# Patient Record
Sex: FEMALE | Race: WHITE | NOT HISPANIC OR LATINO | Employment: FULL TIME | URBAN - METROPOLITAN AREA
[De-identification: names, ages, dates, MRNs, and addresses within clinical notes are randomized per-mention and may not be internally consistent; named-entity substitution may affect disease eponyms.]

---

## 2017-08-25 ENCOUNTER — ALLSCRIPTS OFFICE VISIT (OUTPATIENT)
Dept: OTHER | Facility: OTHER | Age: 57
End: 2017-08-25

## 2017-08-25 DIAGNOSIS — I10 ESSENTIAL (PRIMARY) HYPERTENSION: ICD-10-CM

## 2017-08-25 DIAGNOSIS — R19.7 DIARRHEA: ICD-10-CM

## 2017-08-25 DIAGNOSIS — F41.9 ANXIETY DISORDER: ICD-10-CM

## 2017-08-25 DIAGNOSIS — R00.2 PALPITATIONS: ICD-10-CM

## 2017-08-26 ENCOUNTER — APPOINTMENT (OUTPATIENT)
Dept: LAB | Facility: MEDICAL CENTER | Age: 57
End: 2017-08-26
Payer: COMMERCIAL

## 2017-08-26 DIAGNOSIS — F41.9 ANXIETY DISORDER: ICD-10-CM

## 2017-08-26 DIAGNOSIS — I10 ESSENTIAL (PRIMARY) HYPERTENSION: ICD-10-CM

## 2017-08-26 DIAGNOSIS — R19.7 DIARRHEA: ICD-10-CM

## 2017-08-26 DIAGNOSIS — R00.2 PALPITATIONS: ICD-10-CM

## 2017-08-26 LAB
ALBUMIN SERPL BCP-MCNC: 3.6 G/DL (ref 3.5–5)
ALP SERPL-CCNC: 93 U/L (ref 46–116)
ALT SERPL W P-5'-P-CCNC: 55 U/L (ref 12–78)
ANION GAP SERPL CALCULATED.3IONS-SCNC: 9 MMOL/L (ref 4–13)
AST SERPL W P-5'-P-CCNC: 18 U/L (ref 5–45)
BASOPHILS # BLD AUTO: 0.03 THOUSANDS/ΜL (ref 0–0.1)
BASOPHILS NFR BLD AUTO: 0 % (ref 0–1)
BILIRUB SERPL-MCNC: 0.43 MG/DL (ref 0.2–1)
BUN SERPL-MCNC: 16 MG/DL (ref 5–25)
CALCIUM SERPL-MCNC: 9.3 MG/DL (ref 8.3–10.1)
CHLORIDE SERPL-SCNC: 102 MMOL/L (ref 100–108)
CHOLEST SERPL-MCNC: 164 MG/DL (ref 50–200)
CO2 SERPL-SCNC: 24 MMOL/L (ref 21–32)
CREAT SERPL-MCNC: 0.81 MG/DL (ref 0.6–1.3)
EOSINOPHIL # BLD AUTO: 0.14 THOUSAND/ΜL (ref 0–0.61)
EOSINOPHIL NFR BLD AUTO: 2 % (ref 0–6)
ERYTHROCYTE [DISTWIDTH] IN BLOOD BY AUTOMATED COUNT: 12.9 % (ref 11.6–15.1)
GFR SERPL CREATININE-BSD FRML MDRD: 81 ML/MIN/1.73SQ M
GLUCOSE P FAST SERPL-MCNC: 99 MG/DL (ref 65–99)
HCT VFR BLD AUTO: 44.7 % (ref 34.8–46.1)
HDLC SERPL-MCNC: 41 MG/DL (ref 40–60)
HGB BLD-MCNC: 14.1 G/DL (ref 11.5–15.4)
LDLC SERPL CALC-MCNC: 97 MG/DL (ref 0–100)
LYMPHOCYTES # BLD AUTO: 2.17 THOUSANDS/ΜL (ref 0.6–4.47)
LYMPHOCYTES NFR BLD AUTO: 32 % (ref 14–44)
MCH RBC QN AUTO: 29.1 PG (ref 26.8–34.3)
MCHC RBC AUTO-ENTMCNC: 31.5 G/DL (ref 31.4–37.4)
MCV RBC AUTO: 92 FL (ref 82–98)
MONOCYTES # BLD AUTO: 0.37 THOUSAND/ΜL (ref 0.17–1.22)
MONOCYTES NFR BLD AUTO: 5 % (ref 4–12)
NEUTROPHILS # BLD AUTO: 4.08 THOUSANDS/ΜL (ref 1.85–7.62)
NEUTS SEG NFR BLD AUTO: 61 % (ref 43–75)
NRBC BLD AUTO-RTO: 0 /100 WBCS
PLATELET # BLD AUTO: 293 THOUSANDS/UL (ref 149–390)
PMV BLD AUTO: 10.7 FL (ref 8.9–12.7)
POTASSIUM SERPL-SCNC: 4.3 MMOL/L (ref 3.5–5.3)
PROT SERPL-MCNC: 7.7 G/DL (ref 6.4–8.2)
RBC # BLD AUTO: 4.84 MILLION/UL (ref 3.81–5.12)
SODIUM SERPL-SCNC: 135 MMOL/L (ref 136–145)
TRIGL SERPL-MCNC: 131 MG/DL
TSH SERPL DL<=0.05 MIU/L-ACNC: 1.68 UIU/ML (ref 0.36–3.74)
WBC # BLD AUTO: 6.82 THOUSAND/UL (ref 4.31–10.16)

## 2017-08-26 PROCEDURE — 80061 LIPID PANEL: CPT

## 2017-08-26 PROCEDURE — 80053 COMPREHEN METABOLIC PANEL: CPT

## 2017-08-26 PROCEDURE — 85025 COMPLETE CBC W/AUTO DIFF WBC: CPT

## 2017-08-26 PROCEDURE — 84443 ASSAY THYROID STIM HORMONE: CPT

## 2017-08-26 PROCEDURE — 36415 COLL VENOUS BLD VENIPUNCTURE: CPT

## 2017-08-28 ENCOUNTER — GENERIC CONVERSION - ENCOUNTER (OUTPATIENT)
Dept: OTHER | Facility: OTHER | Age: 57
End: 2017-08-28

## 2017-09-25 ENCOUNTER — ALLSCRIPTS OFFICE VISIT (OUTPATIENT)
Dept: OTHER | Facility: OTHER | Age: 57
End: 2017-09-25

## 2017-09-25 DIAGNOSIS — Z12.31 ENCOUNTER FOR SCREENING MAMMOGRAM FOR MALIGNANT NEOPLASM OF BREAST: ICD-10-CM

## 2017-10-03 ENCOUNTER — ALLSCRIPTS OFFICE VISIT (OUTPATIENT)
Dept: OTHER | Facility: OTHER | Age: 57
End: 2017-10-03

## 2017-10-05 NOTE — PROGRESS NOTES
Assessment  1  Encounter for routine gynecological examination (V72 31) (Z01 419)    Plan  Visit for screening mammogram    · * MAMMO SCREENING BILATERAL W CAD; Status:Hold For - Scheduling,Retrospective  By Protocol Authorization; Requested SGD:[id number];    Perform:[facility]; [phone]; Last Updated Lori Powers; 10/3/2017 2:21:25 PM;Ordered; For:Visit for screening mammogram; Ordered By:Keshia Patel;    Discussion/Summary  healthy adult female cervical cancer screening is not indicated Breast cancer screening: mammogram has been ordered  Colorectal cancer screening: colorectal cancer screening is current  Advice and education were given regarding weight bearing exercise, calcium supplements and vitamin D supplements  Annual exam performedrx Brandon Howell year  History of Present Illness  HPI: 63 y/o female here for annual GYN exam  Pt denies any changes in medical, surgical or family histories  Pt had hysterectomy, no bleeding or issues since  Pt denies any hot flashes  (+) sexually active with , denies any dryness or pain with intercourse  last pap 2005 WNL  last mammo 2012 WNL  last colonoscopy 2 yrs ago  complaints   GYN HM, Adult Female [facility]: The patient is being seen for a gynecology evaluation  General Health: The patient's health since the last visit is described as good  Lifestyle:  She exercises regularly -She does not use tobacco  The patient is a former cigarette smoker -She consumes alcohol  She reports occasional alcohol use  Reproductive health:  she uses no contraception -she is sexually active  She is monogamous with a male partner -pregnancy history: G 3P 3  Screening: Cervical cancer screening includes a pap smear performed 11/17/05  Breast cancer screening includes a mammogram performed 12/11/12-and-monthly breast self-exams performed  Colorectal cancer screening includes a colonoscopy performed 2015        Review of Systems    Constitutional: no fever-and-no chills  Cardiovascular: no chest pain  Respiratory: no shortness of breath  Breasts: no breast pain-and-no breast lump  Gastrointestinal: no abdominal pain  Genitourinary: no dysuria,-no pelvic pain,-no vaginal discharge,-no dysmenorrhea-and-no unexplained vaginal bleeding  Neurological: no headache  Active Problems  1  Anxiety (300 00) (F41 9)   2  Diarrhea (787 91) (R19 7)   3  Encounter for routine gynecological examination (V72 31) (Z01 419)   4  Hypertension (401 9) (I10)   5  Visit for screening mammogram (V76 12) (Z12 31)    Past Medical History   · History of anemia (V12 3) (Z86 2)   · History of menorrhagia (V13 29) (Z87 42)    The active problems and past medical history were reviewed and updated today  Surgical History   · History of Biopsy Endometrial, Without Cervical Dilation   · History of Complete Colonoscopy   · History of Dilation And Curettage   · History of Endoscopic Control Of Gastric Bleeding   · History of Episiotomy   · History of Gallbladder Surgery   · History of Nasal Septal Deviation Repair   · History of Total Abdominal Hysterectomy    The surgical history was reviewed and updated today  Family History  Father    · Family history of Lung Cancer (V16 1)  Family History    · Family history of Adenocarcinoma In Situ In Villous Adenoma Of The Breast   · Family history of Depression With Anxiety   · Family history of Diabetes Mellitus (V18 0)   · Family history of Fibrocystic Disease Of Breast   · Family history of Headache   · Family history of Heart Disease (V17 49)   · Family history of Hiatal Hernia   · Family history of Hypertension (V17 49)   · Family history of Irritable Bowel Syndrome   · Denied: Family history of Mental illness in member of household   · Family history of Reported Prior Kidney Disease   · Family history of Skin Disorder (V19 4)   · Family history of Urinary Tract Infection    The family history was reviewed and updated today  Social History   · Being A Social Drinker   · Caffeine Use   · Daily Coffee Consumption (___ Cups/Day)   · Denied: History of Drug use   · Exercise Frequency (Times/Week)   · Former smoker (V15 82) (R15 929)   · Marital History - Currently    · Sexually Active  The social history was reviewed and updated today  The social history was reviewed and is unchanged  Current Meds   1  Sertraline HCl - 25 MG Oral Tablet; TAKE 1 TABLET DAILY; Therapy: 77Fxa1423 to (Evaluate:24Mar2018)  Requested for: 07XOJ3372; Last   Rx:44Hdl0042 Ordered   2  Valsartan 320 MG Oral Tablet; TAKE 1 TABLET ONCE DAILY  Requested for:   44Out2607; Last Rx:10Zjl2192 Ordered    Allergies  1  Vicodin TABS    Vitals   Recorded: 57FZN8110 64:84QS   Systolic 932, RUE, Sitting   Diastolic 78, RUE, Sitting   Height 5 ft 4 in   Weight 211 lb    BMI Calculated 36 22   BSA Calculated 2   LMP hyst     Physical Exam    Constitutional   General appearance: No acute distress, well appearing and well nourished  Neck   Thyroid: Normal, no thyromegaly  Pulmonary   Respiratory effort: No increased work of breathing or signs of respiratory distress  Auscultation of lungs: Clear to auscultation  Cardiovascular   Auscultation of heart: Normal rate and rhythm, normal S1 and S2, no murmurs  Genitourinary   External genitalia: Normal and no lesions appreciated  Vagina: Normal, no lesions or dryness appreciated  Urethra: Normal     Urethral meatus: Normal     Bladder: Normal, soft, non-tender and no prolapse or masses appreciated  Cervix: Surgically absent  Uterus: Surgically absent  Adnexa/parametria: Surgically absent  Chest   Breasts: Normal and no dimpling or skin changes noted  Abdomen   Abdomen: Normal, non-tender, and no organomegaly noted      Psychiatric   Orientation to person, place, and time: Normal     Mood and affect: Normal        Future Appointments    Date/Time Provider Specialty Site   03/26/2018 03:00 PM EULALIO Coon   [address] [facility]   10/09/2018 03:00 PM Fernando Cross [facility]ne[facility]   Evan Gonzalez 8     Signatures   Electronically signed by : Lito Barrera [facility]; Oct  3 2017  3:58PM EST                       (Author)    Electronically signed by : EULALIO Estrella ; Oct  4 2017  1:10PM EST

## 2017-10-17 ENCOUNTER — TRANSCRIBE ORDERS (OUTPATIENT)
Dept: ADMINISTRATIVE | Facility: HOSPITAL | Age: 57
End: 2017-10-17

## 2017-10-17 DIAGNOSIS — R19.7 DIARRHEA, UNSPECIFIED TYPE: Primary | ICD-10-CM

## 2017-10-19 ENCOUNTER — HOSPITAL ENCOUNTER (OUTPATIENT)
Dept: RADIOLOGY | Facility: MEDICAL CENTER | Age: 57
Discharge: HOME | End: 2017-10-19
Payer: COMMERCIAL

## 2017-10-19 DIAGNOSIS — Z12.31 ENCOUNTER FOR SCREENING MAMMOGRAM FOR MALIGNANT NEOPLASM OF BREAST: ICD-10-CM

## 2017-10-19 PROCEDURE — G0202 SCR MAMMO BI INCL CAD: HCPCS

## 2017-10-23 RX ORDER — VALSARTAN 320 MG/1
320 TABLET ORAL DAILY
COMMUNITY
End: 2018-03-26 | Stop reason: SDUPTHER

## 2017-10-23 RX ORDER — SERTRALINE HYDROCHLORIDE 25 MG/1
50 TABLET, FILM COATED ORAL DAILY
COMMUNITY
End: 2018-03-26 | Stop reason: SDUPTHER

## 2017-10-24 ENCOUNTER — ANESTHESIA (OUTPATIENT)
Dept: GASTROENTEROLOGY | Facility: HOSPITAL | Age: 57
End: 2017-10-24
Payer: COMMERCIAL

## 2017-10-24 ENCOUNTER — HOSPITAL ENCOUNTER (OUTPATIENT)
Facility: HOSPITAL | Age: 57
Setting detail: OUTPATIENT SURGERY
Discharge: HOME | End: 2017-10-24
Attending: COLON & RECTAL SURGERY | Admitting: COLON & RECTAL SURGERY
Payer: COMMERCIAL

## 2017-10-24 ENCOUNTER — ANESTHESIA EVENT (OUTPATIENT)
Dept: GASTROENTEROLOGY | Facility: HOSPITAL | Age: 57
End: 2017-10-24
Payer: COMMERCIAL

## 2017-10-24 VITALS
HEIGHT: 66 IN | HEART RATE: 62 BPM | OXYGEN SATURATION: 98 % | WEIGHT: 210 LBS | DIASTOLIC BLOOD PRESSURE: 71 MMHG | BODY MASS INDEX: 33.75 KG/M2 | SYSTOLIC BLOOD PRESSURE: 136 MMHG | RESPIRATION RATE: 18 BRPM | TEMPERATURE: 98.9 F

## 2017-10-24 PROCEDURE — 88305 TISSUE EXAM BY PATHOLOGIST: CPT | Performed by: COLON & RECTAL SURGERY

## 2017-10-24 RX ORDER — SODIUM CHLORIDE 9 MG/ML
50 INJECTION, SOLUTION INTRAVENOUS CONTINUOUS
Status: DISCONTINUED | OUTPATIENT
Start: 2017-10-24 | End: 2017-10-24 | Stop reason: HOSPADM

## 2017-10-24 RX ORDER — PROPOFOL 10 MG/ML
INJECTION, EMULSION INTRAVENOUS AS NEEDED
Status: DISCONTINUED | OUTPATIENT
Start: 2017-10-24 | End: 2017-10-24 | Stop reason: SURG

## 2017-10-24 RX ORDER — LIDOCAINE HYDROCHLORIDE 10 MG/ML
INJECTION, SOLUTION INFILTRATION; PERINEURAL AS NEEDED
Status: DISCONTINUED | OUTPATIENT
Start: 2017-10-24 | End: 2017-10-24 | Stop reason: SURG

## 2017-10-24 RX ORDER — PROPOFOL 10 MG/ML
INJECTION, EMULSION INTRAVENOUS CONTINUOUS PRN
Status: DISCONTINUED | OUTPATIENT
Start: 2017-10-24 | End: 2017-10-24 | Stop reason: SURG

## 2017-10-24 RX ADMIN — PROPOFOL 110 MCG/KG/MIN: 10 INJECTION, EMULSION INTRAVENOUS at 09:49

## 2017-10-24 RX ADMIN — LIDOCAINE HYDROCHLORIDE 50 MG: 10 INJECTION, SOLUTION INFILTRATION; PERINEURAL at 09:49

## 2017-10-24 RX ADMIN — SODIUM CHLORIDE 50 ML/HR: 0.9 INJECTION, SOLUTION INTRAVENOUS at 08:26

## 2017-10-24 RX ADMIN — PROPOFOL 120 MG: 10 INJECTION, EMULSION INTRAVENOUS at 09:49

## 2017-10-24 NOTE — PROGRESS NOTES
Endo anal ultrasound     Indication:  Fecal incontinence  Probe was inserted through the anus and advanced to the anal canal   Appropriate views were taken for evaluation of external sphincter and internal sphincter  Patient significant scarring of the anterior 90° of her anal canal consistent with prior injury/repair  No gross mass lesions were noted  Plan for colonoscopy to follow

## 2017-10-24 NOTE — ANESTHESIA PREPROCEDURE EVALUATION
Review of Systems/Medical History  Patient summary reviewed  Chart reviewed  No history of anesthetic complications     Cardiovascular  Exercise tolerance: good,  Hypertension controlled,    Pulmonary       GI/Hepatic            Endo/Other     GYN       Hematology   Musculoskeletal       Neurology   Psychology   Anxiety, Depression ,            Physical Exam    Airway    Mallampati score: II  TM Distance: >3 FB  Neck ROM: full     Dental   No notable dental hx     Cardiovascular      Pulmonary      Other Findings        Anesthesia Plan  ASA Score- 2       Anesthesia Type- IV sedation with anesthesia with ASA Monitors  Additional Monitors:   Airway Plan:           Induction- intravenous  Informed Consent- Anesthetic plan and risks discussed with patient  I personally reviewed this patient with the CRNA  Discussed and agreed on the Anesthesia Plan with the CRNA  Flory Shah

## 2017-10-24 NOTE — H&P
History and Physical   Colon and Rectal Surgery   Conchita Tadeo 64 y o  female MRN: [id number]  Unit/Bed#: ENDO POOL Encounter: [id number]  10/24/17   @NOW    No chief complaint on file  History of Present Illness   HPI:  Conchita Tadeo is a 64 y o  female who presents for evaluation of frequent bm and fecal incontinence  Last fc over 3 years ago  BM are up to 8-10 times daily  Historical Information   Past Medical History:   Diagnosis Date    Anxiety     Depression     Diarrhea     Fecal incontinence     History of colon polyps      Past Surgical History:   Procedure Laterality Date    CHOLECYSTECTOMY      HYSTERECTOMY      NASAL SEPTUM SURGERY      ROTATOR CUFF REPAIR         Meds/Allergies     Prescriptions Prior to Admission   Medication    sertraline (ZOLOFT) 25 mg tablet    valsartan (DIOVAN) 320 MG tablet       No current facility-administered medications for this encounter  Allergies no known allergies      Social History   History   Alcohol use Not on file     History   Drug use: Unknown     History   Smoking Status    Not on file   Smokeless Tobacco    Not on file         Family History: No family history on file  Objective     Current Vitals:      No intake or output data in the 24 hours ending 10/24/17 0800    Physical Exam:  General:  Resting comfortably in bed   Eyes:Sclera anicteric  ENT: Trachea midline  Pulm:  Symmetric chest raise  No respiratory Distress  CV:  Regular on monitor  Abdomen:  Soft NT ND  Extremities:  No clubbing/ cyanosis/ edema    Lab Results: I have personally reviewed pertinent lab results  Imaging: I have personally reviewed pertinent reports  ASSESSMENT:  Conchita Tadeo is a 64 y o  female who presents for outpatient colonoscopy  PLAN:  For colonoscopy    Risks/ Benefits reviewed to include but not limited to anesthesia, bleeding, missed lesions, and colonoscopic perforation requiring surgery

## 2017-10-24 NOTE — OP NOTE
**** GI/ENDOSCOPY REPORT ****     PATIENT NAME: GONZALEZ IGLESIAS ------ VISIT ID:  Patient ID:   [id number] YOB: 1960     INTRODUCTION: Colonoscopy - A 64 female patient presents for an outpatient   Colonoscopy at [address] [place] [address]  PREVIOUS COLONOSCOPY:     INDICATIONS: Change in bowel habits  Diarrhea  CONSENT:  The benefits, risks, and alternatives to the procedure were   discussed and informed consent was obtained from the patient  PREPARATION: EKG, pulse, pulse oximetry and blood pressure were monitored   throughout the procedure  The patient was identified by myself both   verbally and by visual inspection of ID band  MEDICATIONS: Anesthesia-check records     PROCEDURE:  The endoscope was passed without difficulty through the anus   under direct visualization and advanced to the cecum, confirmed by   appendiceal orifice and ileocecal valve  The scope was withdrawn and the   mucosa was carefully examined  The quality of the preparation was good  Cecal Intubation Time: 2 minutes(s) Scope Withdrawal Time: 8 minutes(s)     RECTAL EXAM: Decreased sphincter tone  FINDINGS:  The terminal ileum appeared to be normal   A cold forceps   biopsy was taken (jar 1)  There was evidence of mild diverticulosis in the   sigmoid colon  Otherwise, the colon appeared to be normal  Multiple   random cold forceps biopsies was taken from the whole colon (jar 2)  COMPLICATIONS: There were no complications  IMPRESSIONS: Decreased sphincter tone was noted during the rectal exam    Normal terminal ileum  Biopsy taken  Mild diverticulosis found in the   sigmoid colon  RECOMMENDATIONS:     ESTIMATED BLOOD LOSS:     PATHOLOGY SPECIMENS: Cold forceps random biopsy taken (jar 1)  Multiple   cold forceps random biopsies taken from the whole colon (jar 2)       PROCEDURE CODES:     ICD-9 Codes: 787 99 Other symptoms involving digestive system 787 91   Diarrhea 562 10 Diverticulosis of colon (without mention of hemorrhage)     ICD-10 Codes: R19 4 Change in bowel habit R19 7 Diarrhea, unspecified   K59 8 Other specified functional intestinal disorders K57 Diverticular   disease of intestine     PERFORMED BY: EULALIO Rosenberg  on 10/24/2017  Version 1, electronically signed by EULALIO Colon  on 10/24/2017   at 10:09

## 2017-10-24 NOTE — ANESTHESIA POSTPROCEDURE EVALUATION
Post-Op Assessment Note      CV Status:  Stable    Mental Status:  Alert and awake    Hydration Status:  Stable    PONV Controlled:  None    Airway Patency:  Patent    Post Op Vitals Reviewed: Yes          Staff: CRNA           /67 (10/24/17 1008)    Temp      Pulse 66 (10/24/17 1008)   Resp 16 (10/24/17 1008)    SpO2 94 % (10/24/17 1008)

## 2017-10-26 ENCOUNTER — GENERIC CONVERSION - ENCOUNTER (OUTPATIENT)
Dept: OTHER | Facility: OTHER | Age: 57
End: 2017-10-26

## 2017-10-31 ENCOUNTER — HOSPITAL ENCOUNTER (OUTPATIENT)
Dept: GASTROENTEROLOGY | Facility: HOSPITAL | Age: 57
Discharge: HOME | End: 2017-10-31
Attending: COLON & RECTAL SURGERY
Payer: COMMERCIAL

## 2017-10-31 VITALS
TEMPERATURE: 98.3 F | HEART RATE: 81 BPM | OXYGEN SATURATION: 95 % | RESPIRATION RATE: 20 BRPM | DIASTOLIC BLOOD PRESSURE: 99 MMHG | WEIGHT: 210 LBS | BODY MASS INDEX: 33.75 KG/M2 | SYSTOLIC BLOOD PRESSURE: 189 MMHG | HEIGHT: 66 IN

## 2017-10-31 PROCEDURE — 91122 HB ANAL PRESSURE RECORD: CPT

## 2018-01-12 VITALS
WEIGHT: 215.38 LBS | RESPIRATION RATE: 16 BRPM | BODY MASS INDEX: 36.77 KG/M2 | HEIGHT: 64 IN | DIASTOLIC BLOOD PRESSURE: 80 MMHG | SYSTOLIC BLOOD PRESSURE: 132 MMHG | HEART RATE: 80 BPM

## 2018-01-12 VITALS
HEIGHT: 64 IN | DIASTOLIC BLOOD PRESSURE: 74 MMHG | SYSTOLIC BLOOD PRESSURE: 128 MMHG | WEIGHT: 211.38 LBS | RESPIRATION RATE: 16 BRPM | BODY MASS INDEX: 36.09 KG/M2 | HEART RATE: 90 BPM

## 2018-01-15 VITALS
BODY MASS INDEX: 36.02 KG/M2 | HEIGHT: 64 IN | SYSTOLIC BLOOD PRESSURE: 138 MMHG | WEIGHT: 211 LBS | DIASTOLIC BLOOD PRESSURE: 78 MMHG

## 2018-01-15 NOTE — RESULT NOTES
Verified Results  * MAMMO SCREENING BILATERAL W CAD [id number] 03:38PM Carlene Licona Order Number: [id number]    - Patient Instructions: To schedule this appointment, please contact Central Scheduling at [phone]  Do not wear any perfume, powder, lotion or deodorant on breast or underarm area  Please bring your doctors order, referral (if needed) and insurance information with you on the day of the test  Failure to bring this information may result in this test being rescheduled  Arrive 15 minutes prior to your appointment time to register  On the day of your test, please bring any prior mammogram or breast studies with you that were not performed at a [facility]  Failure to bring prior exams may result in your test needing to be rescheduled  Test Name Result Flag Reference   MAMMO SCREENING BILATERAL W CAD (Report)     Patient History:   Patient is postmenopausal    No Hormone Replacement Therapy   Patient has never smoked  Patient's BMI is 33 9  Reason for exam: screening, asymptomatic  Mammo Screening Bilateral W CAD: October 19, 2017 - Check In #:    [de-identified]   Bilateral MLO, CC, and XCCL view(s) were taken  Technologist: RT CINDY Delarosa   Prior study comparison: December 11, 2012, mammo screening    bilateral W CAD, performed at [facility]  November 30, 2011, mammo screening bilateral W CAD, performed at    [facility]  October 27, 2010, mammo screening    bilateral W CAD, performed at [facility]  July 31, 2008, mammo screening bilateral W CAD, performed at [facility]  July 31, 2008, mammo screening bilateral   W CAD, performed at [facility]  There are scattered fibroglandular densities  No dominant soft tissue mass, architectural distortion or    suspicious calcifications are noted in either breast   The skin    and nipple structures are within normal limits  Scattered benign   appearing calcifications are noted  No evidence of malignancy  No significant changes when compared with prior studies  ACR BI-RADSï¾® Assessments: BiRad:2 - Benign     Recommendation:   Routine screening mammogram of both breasts in 1 year  Analyzed by CAD     The patient is scheduled in a reminder system for screening    mammography  8-10% of cancers will be missed on mammography  Management of a    palpable abnormality must be based on clinical grounds  Patients   will be notified of their results via letter from our facility  Accredited by [employer] of [employer] and FDA       Transcription Location: [facility]: [id number]     Risk Value(s):   Tyrer-Cuzick 10 Year: 2 500%, Tyrer-Cuzick Lifetime: 7 900%,    Myriad Table: 1 5%, DENIS 5 Year: 0 9%, NCI Lifetime: 5 9%   Signed by:   Jessica Gomez MD   10/26/17

## 2018-01-15 NOTE — RESULT NOTES
Verified Results  (1) CBC/PLT/DIFF [id number] 07:30AM Eileen Mitchell Order Number: [id number]     Test Name Result Flag Reference   WBC COUNT 6 82 Thousand/uL  4 31-10 16   RBC COUNT 4 84 Million/uL  3 81-5 12   HEMOGLOBIN 14 1 g/dL  11 5-15 4   HEMATOCRIT 44 7 %  34 8-46  1   MCV 92 fL  82-98   MCH 29 1 pg  26 8-34 3   MCHC 31 5 g/dL  31 4-37 4   RDW 12 9 %  11 6-15 1   MPV 10 7 fL  8 9-12 7   PLATELET COUNT 687 Thousands/uL  149-390   nRBC AUTOMATED 0 /100 WBCs     NEUTROPHILS RELATIVE PERCENT 61 %  43-75   LYMPHOCYTES RELATIVE PERCENT 32 %  14-44   MONOCYTES RELATIVE PERCENT 5 %  4-12   EOSINOPHILS RELATIVE PERCENT 2 %  0-6   BASOPHILS RELATIVE PERCENT 0 %  0-1   NEUTROPHILS ABSOLUTE COUNT 4 08 Thousands/? ??L  1 85-7 62   LYMPHOCYTES ABSOLUTE COUNT 2 17 Thousands/? ??L  0 60-4 47   MONOCYTES ABSOLUTE COUNT 0 37 Thousand/? ??L  0 17-1 22   EOSINOPHILS ABSOLUTE COUNT 0 14 Thousand/? ??L  0 00-0 61   BASOPHILS ABSOLUTE COUNT 0 03 Thousands/? ??L  0 00-0 10     (1) COMPREHENSIVE METABOLIC PANEL [id number] 32:01CO Eileen Mitchell Order Number: [id number]     Test Name Result Flag Reference   SODIUM 135 mmol/L L 136-145   POTASSIUM 4 3 mmol/L  3 5-5 3   CHLORIDE 102 mmol/L  100-108   CARBON DIOXIDE 24 mmol/L  21-32   ANION GAP (CALC) 9 mmol/L  4-13   BLOOD UREA NITROGEN 16 mg/dL  5-25   CREATININE 0 81 mg/dL  0 60-1 30   Standardized to IDMS reference method   CALCIUM 9 3 mg/dL  8 3-10 1   BILI, TOTAL 0 43 mg/dL  0 20-1 00   ALK PHOSPHATAS 93 U/L     ALT (SGPT) 55 U/L  12-78   Specimen collection should occur prior to Sulfasalazine and/or Sulfapyridine administration due to the potential for falsely depressed results  AST(SGOT) 18 U/L  5-45   Specimen collection should occur prior to Sulfasalazine administration due to the potential for falsely depressed results     ALBUMIN 3 6 g/dL  3 5-5 0   TOTAL PROTEIN 7 7 g/dL  6 4-8 2   eGFR 81 ml/min/1 73sq m     National Kidney Disease Education Program recommendations are as follows:  GFR calculation is accurate only with a steady state creatinine  Chronic Kidney disease less than 60 ml/min/1 73 sq  meters  Kidney failure less than 15 ml/min/1 73 sq  meters  GLUCOSE FASTING 99 mg/dL  65-99   Specimen collection should occur prior to Sulfasalazine administration due to the potential for falsely depressed results  Specimen collection should occur prior to Sulfapyridine administration due to the potential for falsely elevated results  (1) TSH WITH FT4 REFLEX 26Aug2017 07:30AM Rosemarie Crowe Order Number: [id number]     Test Name Result Flag Reference   TSH 1 680 uIU/mL  0 358-3 740   Patients undergoing fluorescein dye angiography may retain small amounts of fluorescein in the body for 48-72 hours post procedure  Samples containing fluorescein can produce falsely depressed TSH values  If the patient had this procedure,a specimen should be resubmitted post fluorescein clearance  The recommended reference ranges for TSH during pregnancy are as follows:  First trimester 0 1 to 2 5 uIU/mL  Second trimester  0 2 to 3 0 uIU/mL  Third trimester 0 3 to 3 0 uIU/m     (1) LIPID PANEL, FASTING 26Aug2017 07:30AM "[employer] Order Number: [id number]     Test Name Result Flag Reference   CHOLESTEROL 164 mg/dL     HDL,DIRECT 41 mg/dL  40-60   Specimen collection should occur prior to Metamizole administration due to the potential for falsley depressed results  LDL CHOLESTEROL CALCULATED 97 mg/dL  0-100   Triglyceride:        Normal ??? ??? ??? ??? ??? ??? ??? <150 mg/dl   ??? ??? ???Borderline High ??? ??? 150-199 mg/dl   ??? ??? ? ?? High ??? ??? ??? ??? ??? ??? ??? 200-499 mg/dl   ??? ??? ? ??Very High ??? ??? ??? ??? ??? >499 mg/dl      Cholesterol:       Desirable ??? ??? ??? ??? <200 mg/dl   ??? ??? Borderline High ??? 200-239 mg/dl   ??? ???  High ??? ??? ??? ??? ??? ??? >239 mg/dl      HDL Cholesterol:       High ??? ???>59 mg/dL   ??? ??? Low ??? ??? <41 mg/dL      This screening LDL is a calculated result  It does not have the accuracy of the Direct Measured LDL in the monitoring of patients with hyperlipidemia and/or statin therapy  Direct Measure LDL (UUG989) must be ordered separately in these patients  TRIGLYCERIDES 131 mg/dL  <=150   Specimen collection should occur prior to N-Acetylcysteine or Metamizole administration due to the potential for falsely depressed results

## 2018-03-26 ENCOUNTER — APPOINTMENT (OUTPATIENT)
Dept: LAB | Facility: MEDICAL CENTER | Age: 58
End: 2018-03-26
Payer: COMMERCIAL

## 2018-03-26 ENCOUNTER — OFFICE VISIT (OUTPATIENT)
Dept: FAMILY MEDICINE CLINIC | Facility: MEDICAL CENTER | Age: 58
End: 2018-03-26
Payer: COMMERCIAL

## 2018-03-26 VITALS
RESPIRATION RATE: 16 BRPM | DIASTOLIC BLOOD PRESSURE: 72 MMHG | HEART RATE: 72 BPM | SYSTOLIC BLOOD PRESSURE: 132 MMHG | WEIGHT: 213.2 LBS | BODY MASS INDEX: 34.41 KG/M2

## 2018-03-26 DIAGNOSIS — E87.1 HYPONATREMIA: ICD-10-CM

## 2018-03-26 DIAGNOSIS — Z11.59 NEED FOR HEPATITIS C SCREENING TEST: ICD-10-CM

## 2018-03-26 DIAGNOSIS — F41.9 ANXIETY: ICD-10-CM

## 2018-03-26 DIAGNOSIS — I10 ESSENTIAL HYPERTENSION: Primary | ICD-10-CM

## 2018-03-26 DIAGNOSIS — Z00.00 PHYSICAL EXAM, ANNUAL: Primary | ICD-10-CM

## 2018-03-26 LAB
ANION GAP SERPL CALCULATED.3IONS-SCNC: 5 MMOL/L (ref 4–13)
BUN SERPL-MCNC: 15 MG/DL (ref 5–25)
CALCIUM SERPL-MCNC: 9.2 MG/DL (ref 8.3–10.1)
CHLORIDE SERPL-SCNC: 106 MMOL/L (ref 100–108)
CO2 SERPL-SCNC: 28 MMOL/L (ref 21–32)
CREAT SERPL-MCNC: 0.68 MG/DL (ref 0.6–1.3)
GFR SERPL CREATININE-BSD FRML MDRD: 97 ML/MIN/1.73SQ M
GLUCOSE SERPL-MCNC: 99 MG/DL (ref 65–140)
POTASSIUM SERPL-SCNC: 4 MMOL/L (ref 3.5–5.3)
SODIUM SERPL-SCNC: 139 MMOL/L (ref 136–145)

## 2018-03-26 PROCEDURE — 80048 BASIC METABOLIC PNL TOTAL CA: CPT

## 2018-03-26 PROCEDURE — 86803 HEPATITIS C AB TEST: CPT

## 2018-03-26 PROCEDURE — 99396 PREV VISIT EST AGE 40-64: CPT | Performed by: FAMILY MEDICINE

## 2018-03-26 PROCEDURE — 36415 COLL VENOUS BLD VENIPUNCTURE: CPT

## 2018-03-26 RX ORDER — CLOBETASOL PROPIONATE 0.5 MG/G
OINTMENT TOPICAL
Refills: 0 | COMMUNITY
Start: 2017-12-18 | End: 2019-04-02

## 2018-03-26 NOTE — PROGRESS NOTES
Assessment/Plan:    No problem-specific Assessment & Plan notes found for this encounter  Diagnoses and all orders for this visit:    Physical exam, annual    Need for hepatitis C screening test  -     Hepatitis C antibody; Future    Hyponatremia  -     Basic metabolic panel; Future    Anxiety    Other orders  -     clobetasol (TEMOVATE) 0 05 % ointment;     Patient appears to be doing well overall  Continuation of healthy lifestyle encouraged  I reviewed her labs  She has not had hepatitis C screening and I recommended we get the testing done  Patient is agreeable  She also has the past labs showing hyponatremia  I will repeat a BMP to assess for normalization  She has anxiety and feels she would benefit from an increased dose of her zoloft  She is on a rather low dose now  Will increase it to 50 mg   I asked the patient call the office in a few weeks to see if that this helped or if she went back down to 25 mg and patient agreed  Follow-up in six months or sooner if needed  Subjective:      Patient ID: Stacey De La Vega is a 62 y o  female  Patient presents for physical exam   No acute concerns however she does have anxiety and is currently on Zoloft 25 mg daily  She has been under some more pressure at work and her anxiety has increased  She would like to increase her dose of Zoloft if possible  States she eats a healthy diet and gets some exercise  Did smoke for about 10 years when she was younger but no longer smokes  Social alcohol use  No drug use  Is up-to-date on her mammogram and colonoscopy  Would be agreeable to hepatitis C testing  The following portions of the patient's history were reviewed and updated as appropriate: allergies, current medications, past family history, past medical history, past social history, past surgical history and problem list     Review of Systems   Constitutional: Negative for fever  Respiratory: Negative for shortness of breath  Cardiovascular: Negative for chest pain  Gastrointestinal: Negative for abdominal pain and blood in stool  Genitourinary: Negative for dysuria  Musculoskeletal: Negative for arthralgias and myalgias  Skin: Negative for rash  Neurological: Negative for headaches  Objective:      /72 (BP Location: Left arm, Patient Position: Sitting, Cuff Size: Large)   Pulse 72   Resp 16   Wt 96 7 kg (213 lb 3 2 oz)   BMI 34 41 kg/m²          Physical Exam   Constitutional: She is oriented to person, place, and time  Vital signs are normal  She appears well-developed and well-nourished  HENT:   Head: Normocephalic and atraumatic  Right Ear: Tympanic membrane, external ear and ear canal normal    Left Ear: Tympanic membrane, external ear and ear canal normal    Nose: Nose normal    Mouth/Throat: Uvula is midline, oropharynx is clear and moist and mucous membranes are normal    Eyes: Conjunctivae, EOM and lids are normal  Pupils are equal, round, and reactive to light  Neck: Trachea normal  Neck supple  No thyromegaly present  Cardiovascular: Normal rate, regular rhythm, S1 normal and S2 normal     No murmur heard  Pulmonary/Chest: Effort normal and breath sounds normal    Abdominal: Soft  Bowel sounds are normal  There is no hepatosplenomegaly  There is no tenderness  Lymphadenopathy:     She has no cervical adenopathy  Neurological: She is alert and oriented to person, place, and time  Skin: Skin is warm and dry     Psychiatric: Her speech is normal and behavior is normal  Judgment and thought content normal  Cognition and memory are normal

## 2018-03-27 LAB — HCV AB SER QL: NORMAL

## 2018-03-27 RX ORDER — VALSARTAN 320 MG/1
320 TABLET ORAL DAILY
Qty: 90 TABLET | Refills: 1 | Status: SHIPPED | OUTPATIENT
Start: 2018-03-27 | End: 2018-09-04 | Stop reason: SDUPTHER

## 2018-03-27 RX ORDER — SERTRALINE HYDROCHLORIDE 25 MG/1
50 TABLET, FILM COATED ORAL DAILY
Qty: 90 TABLET | Refills: 1 | Status: SHIPPED | OUTPATIENT
Start: 2018-03-27 | End: 2018-06-17 | Stop reason: SDUPTHER

## 2018-05-21 ENCOUNTER — OFFICE VISIT (OUTPATIENT)
Dept: FAMILY MEDICINE CLINIC | Facility: MEDICAL CENTER | Age: 58
End: 2018-05-21
Payer: COMMERCIAL

## 2018-05-21 VITALS
HEART RATE: 76 BPM | DIASTOLIC BLOOD PRESSURE: 78 MMHG | WEIGHT: 216 LBS | RESPIRATION RATE: 16 BRPM | BODY MASS INDEX: 34.86 KG/M2 | SYSTOLIC BLOOD PRESSURE: 134 MMHG

## 2018-05-21 DIAGNOSIS — R31.9 HEMATURIA, UNSPECIFIED TYPE: ICD-10-CM

## 2018-05-21 DIAGNOSIS — R01.1 MURMUR: Primary | ICD-10-CM

## 2018-05-21 DIAGNOSIS — R09.89 BILATERAL CAROTID BRUITS: ICD-10-CM

## 2018-05-21 PROCEDURE — 99214 OFFICE O/P EST MOD 30 MIN: CPT | Performed by: FAMILY MEDICINE

## 2018-05-21 NOTE — PROGRESS NOTES
Assessment/Plan:    No problem-specific Assessment & Plan notes found for this encounter  Diagnoses and all orders for this visit:    Murmur  -     Echo complete with contrast if indicated; Future  No obvious murmur on exam   I do not doubt that another physician did auscultate a murmur  Check an echo for evaluation  Bilateral carotid bruits  -     VAS carotid complete study; Future  No obvious bruit on exam   I again do not doubt that another physician did auscultated bruit  Check carotid Dopplers for evaluation  Hematuria, unspecified type  -     UA (URINE) with reflex to Microscopic  Patient is currently asymptomatic  Etiology of hematuria unclear  Will repeat a urinalysis to assess for normalization  If hematuria persists then will have patient see a urologist     Follow-up in one month if symptoms persist or sooner if needed  Subjective:      Patient ID: Ayden Whitney is a 62 y o  female  Patient presents for follow-up  She recently had a [facility] physical on 5/15/2018  Patient states that at that [facility] physical she was found to have a heart murmur  She has not had a heart murmur in the past   She has no chest pain  No trouble breathing  She is concerned and wants to know what the next steps are  Patient was also told she has clogging of her carotid arteries  She states the doctor at the [facility] physical listened to her carotid arteries and said they were clogged  She has no lightheadedness or dizziness  She does have a family history in her father of requiring surgery for clogged carotid arteries  She wants to know what the next steps are to test this  She was also told she has blood in the urine  She has not noticed any blood in the urine  She has no urinary symptoms such as dysuria, frequency or urgency  She has no back pain  No history of kidney stones          The following portions of the patient's history were reviewed and updated as appropriate: allergies, current medications and problem list     Review of Systems   Constitutional: Negative for fever  Respiratory: Negative for shortness of breath  Cardiovascular: Negative for chest pain  Objective:      /78 (BP Location: Left arm, Patient Position: Sitting, Cuff Size: Large)   Pulse 76   Resp 16   Wt 98 kg (216 lb)   BMI 34 86 kg/m²          Physical Exam   Constitutional: She appears well-developed and well-nourished  Cardiovascular: Normal rate, regular rhythm and normal heart sounds  No murmur heard    No obvious carotid bruits on exam    Pulmonary/Chest: Effort normal and breath sounds normal

## 2018-06-11 ENCOUNTER — HOSPITAL ENCOUNTER (EMERGENCY)
Facility: HOSPITAL | Age: 58
End: 2018-06-11
Attending: EMERGENCY MEDICINE
Payer: COMMERCIAL

## 2018-06-11 ENCOUNTER — APPOINTMENT (EMERGENCY)
Dept: RADIOLOGY | Facility: HOSPITAL | Age: 58
End: 2018-06-11
Payer: COMMERCIAL

## 2018-06-11 ENCOUNTER — APPOINTMENT (EMERGENCY)
Dept: CT IMAGING | Facility: HOSPITAL | Age: 58
End: 2018-06-11
Payer: COMMERCIAL

## 2018-06-11 ENCOUNTER — HOSPITAL ENCOUNTER (OUTPATIENT)
Facility: HOSPITAL | Age: 58
Setting detail: OBSERVATION
Discharge: HOME | End: 2018-06-13
Attending: SURGERY | Admitting: SURGERY
Payer: COMMERCIAL

## 2018-06-11 VITALS
HEIGHT: 66 IN | RESPIRATION RATE: 26 BRPM | TEMPERATURE: 98.1 F | DIASTOLIC BLOOD PRESSURE: 81 MMHG | WEIGHT: 213 LBS | SYSTOLIC BLOOD PRESSURE: 154 MMHG | HEART RATE: 76 BPM | BODY MASS INDEX: 34.23 KG/M2 | OXYGEN SATURATION: 97 %

## 2018-06-11 DIAGNOSIS — R55 SYNCOPE AND COLLAPSE: ICD-10-CM

## 2018-06-11 DIAGNOSIS — R10.9 RIGHT FLANK PAIN: ICD-10-CM

## 2018-06-11 DIAGNOSIS — I60.9 SAH (SUBARACHNOID HEMORRHAGE) (HCC): Primary | ICD-10-CM

## 2018-06-11 DIAGNOSIS — S06.9X9A HEAD INJURY WITH LOSS OF CONSCIOUSNESS (HCC): Primary | ICD-10-CM

## 2018-06-11 DIAGNOSIS — S22.31XA CLOSED FRACTURE OF ONE RIB OF RIGHT SIDE, INITIAL ENCOUNTER: ICD-10-CM

## 2018-06-11 DIAGNOSIS — S16.1XXA CERVICAL STRAIN: ICD-10-CM
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
## 2018-06-11 PROBLEM — S09.90XA CLOSED HEAD INJURY: Status: ACTIVE | Noted: 2018-06-11

## 2018-06-11 PROBLEM — S06.9XAA MILD TBI: Status: ACTIVE | Noted: 2018-06-11

## 2018-06-11 PROBLEM — S22.39XA CLOSED RIB FRACTURE: Status: ACTIVE | Noted: 2018-06-11

## 2018-06-11 LAB
ALBUMIN SERPL BCP-MCNC: 3.6 G/DL (ref 3.5–5)
ALP SERPL-CCNC: 101 U/L (ref 46–116)
ALT SERPL W P-5'-P-CCNC: 71 U/L (ref 12–78)
ANION GAP SERPL CALCULATED.3IONS-SCNC: 9 MMOL/L (ref 4–13)
APTT PPP: 27 SECONDS (ref 24–36)
AST SERPL W P-5'-P-CCNC: 20 U/L (ref 5–45)
ATRIAL RATE: 76 BPM
BASOPHILS # BLD AUTO: 0.07 THOUSANDS/ΜL (ref 0–0.1)
BASOPHILS NFR BLD AUTO: 1 % (ref 0–1)
BILIRUB SERPL-MCNC: 0.3 MG/DL (ref 0.2–1)
BUN SERPL-MCNC: 11 MG/DL (ref 5–25)
CALCIUM SERPL-MCNC: 9.1 MG/DL (ref 8.3–10.1)
CHLORIDE SERPL-SCNC: 103 MMOL/L (ref 100–108)
CO2 SERPL-SCNC: 28 MMOL/L (ref 21–32)
CREAT SERPL-MCNC: 0.74 MG/DL (ref 0.6–1.3)
EOSINOPHIL # BLD AUTO: 0.15 THOUSAND/ΜL (ref 0–0.61)
EOSINOPHIL NFR BLD AUTO: 2 % (ref 0–6)
ERYTHROCYTE [DISTWIDTH] IN BLOOD BY AUTOMATED COUNT: 12.3 % (ref 11.6–15.1)
GFR SERPL CREATININE-BSD FRML MDRD: 90 ML/MIN/1.73SQ M
GLUCOSE SERPL-MCNC: 134 MG/DL (ref 65–140)
HCT VFR BLD AUTO: 41.6 % (ref 34.8–46.1)
HGB BLD-MCNC: 13.3 G/DL (ref 11.5–15.4)
IMM GRANULOCYTES # BLD AUTO: 0.03 THOUSAND/UL (ref 0–0.2)
IMM GRANULOCYTES NFR BLD AUTO: 0 % (ref 0–2)
INR PPP: 0.94 (ref 0.86–1.17)
LYMPHOCYTES # BLD AUTO: 2.56 THOUSANDS/ΜL (ref 0.6–4.47)
LYMPHOCYTES NFR BLD AUTO: 33 % (ref 14–44)
MCH RBC QN AUTO: 29.2 PG (ref 26.8–34.3)
MCHC RBC AUTO-ENTMCNC: 32 G/DL (ref 31.4–37.4)
MCV RBC AUTO: 91 FL (ref 82–98)
MONOCYTES # BLD AUTO: 0.41 THOUSAND/ΜL (ref 0.17–1.22)
MONOCYTES NFR BLD AUTO: 5 % (ref 4–12)
NEUTROPHILS # BLD AUTO: 4.46 THOUSANDS/ΜL (ref 1.85–7.62)
NEUTS SEG NFR BLD AUTO: 59 % (ref 43–75)
NRBC BLD AUTO-RTO: 0 /100 WBCS
P AXIS: 56 DEGREES
PLATELET # BLD AUTO: 250 THOUSANDS/UL (ref 149–390)
PMV BLD AUTO: 9.5 FL (ref 8.9–12.7)
POTASSIUM SERPL-SCNC: 3.7 MMOL/L (ref 3.5–5.3)
PR INTERVAL: 158 MS
PROT SERPL-MCNC: 7.8 G/DL (ref 6.4–8.2)
PROTHROMBIN TIME: 12.5 SECONDS (ref 11.8–14.2)
QRS AXIS: 81 DEGREES
QRSD INTERVAL: 76 MS
QT INTERVAL: 382 MS
QTC INTERVAL: 429 MS
RBC # BLD AUTO: 4.55 MILLION/UL (ref 3.81–5.12)
SODIUM SERPL-SCNC: 140 MMOL/L (ref 136–145)
T WAVE AXIS: 35 DEGREES
TROPONIN I SERPL-MCNC: <0.02 NG/ML
VENTRICULAR RATE: 76 BPM
WBC # BLD AUTO: 7.68 THOUSAND/UL (ref 4.31–10.16)

## 2018-06-11 PROCEDURE — 73030 X-RAY EXAM OF SHOULDER: CPT

## 2018-06-11 PROCEDURE — 96360 HYDRATION IV INFUSION INIT: CPT

## 2018-06-11 PROCEDURE — 73564 X-RAY EXAM KNEE 4 OR MORE: CPT

## 2018-06-11 PROCEDURE — 85025 COMPLETE CBC W/AUTO DIFF WBC: CPT | Performed by: PHYSICIAN ASSISTANT

## 2018-06-11 PROCEDURE — 74177 CT ABD & PELVIS W/CONTRAST: CPT

## 2018-06-11 PROCEDURE — 36415 COLL VENOUS BLD VENIPUNCTURE: CPT | Performed by: PHYSICIAN ASSISTANT

## 2018-06-11 PROCEDURE — 93010 ELECTROCARDIOGRAM REPORT: CPT | Performed by: INTERNAL MEDICINE

## 2018-06-11 PROCEDURE — 85730 THROMBOPLASTIN TIME PARTIAL: CPT | Performed by: PHYSICIAN ASSISTANT

## 2018-06-11 PROCEDURE — 99285 EMERGENCY DEPT VISIT HI MDM: CPT

## 2018-06-11 PROCEDURE — 80053 COMPREHEN METABOLIC PANEL: CPT | Performed by: PHYSICIAN ASSISTANT

## 2018-06-11 PROCEDURE — 72125 CT NECK SPINE W/O DYE: CPT

## 2018-06-11 PROCEDURE — 93005 ELECTROCARDIOGRAM TRACING: CPT

## 2018-06-11 PROCEDURE — 99220 PR INITIAL OBSERVATION CARE/DAY 70 MINUTES: CPT | Performed by: SURGERY

## 2018-06-11 PROCEDURE — 71260 CT THORAX DX C+: CPT

## 2018-06-11 PROCEDURE — 70450 CT HEAD/BRAIN W/O DYE: CPT

## 2018-06-11 PROCEDURE — 84484 ASSAY OF TROPONIN QUANT: CPT | Performed by: PHYSICIAN ASSISTANT

## 2018-06-11 PROCEDURE — 85610 PROTHROMBIN TIME: CPT | Performed by: PHYSICIAN ASSISTANT

## 2018-06-11 RX ORDER — METHOCARBAMOL 500 MG/1
500 TABLET, FILM COATED ORAL EVERY 6 HOURS PRN
Status: DISCONTINUED | OUTPATIENT
Start: 2018-06-11 | End: 2018-06-12

## 2018-06-11 RX ORDER — CYCLOBENZAPRINE HCL 10 MG
10 TABLET ORAL ONCE
Status: COMPLETED | OUTPATIENT
Start: 2018-06-11 | End: 2018-06-11

## 2018-06-11 RX ORDER — GINSENG 100 MG
1 CAPSULE ORAL 2 TIMES DAILY
Status: DISCONTINUED | OUTPATIENT
Start: 2018-06-11 | End: 2018-06-13 | Stop reason: HOSPADM

## 2018-06-11 RX ORDER — LIDOCAINE 50 MG/G
1 PATCH TOPICAL DAILY
Status: DISCONTINUED | OUTPATIENT
Start: 2018-06-12 | End: 2018-06-13 | Stop reason: HOSPADM

## 2018-06-11 RX ORDER — ACETAMINOPHEN 325 MG/1
975 TABLET ORAL EVERY 8 HOURS SCHEDULED
Status: DISCONTINUED | OUTPATIENT
Start: 2018-06-11 | End: 2018-06-13 | Stop reason: HOSPADM

## 2018-06-11 RX ORDER — VALSARTAN 160 MG/1
320 TABLET ORAL DAILY
Status: DISCONTINUED | OUTPATIENT
Start: 2018-06-12 | End: 2018-06-13 | Stop reason: HOSPADM

## 2018-06-11 RX ORDER — LIDOCAINE 50 MG/G
1 PATCH TOPICAL ONCE
Status: DISCONTINUED | OUTPATIENT
Start: 2018-06-11 | End: 2018-06-11 | Stop reason: HOSPADM

## 2018-06-11 RX ADMIN — ACETAMINOPHEN 975 MG: 325 TABLET ORAL at 18:25

## 2018-06-11 RX ADMIN — SODIUM CHLORIDE 1000 ML: 0.9 INJECTION, SOLUTION INTRAVENOUS at 12:51

## 2018-06-11 RX ADMIN — LIDOCAINE 1 PATCH: 50 PATCH CUTANEOUS at 14:10

## 2018-06-11 RX ADMIN — CYCLOBENZAPRINE HYDROCHLORIDE 10 MG: 10 TABLET, FILM COATED ORAL at 14:10

## 2018-06-11 RX ADMIN — BACITRACIN ZINC 1 SMALL APPLICATION: 500 OINTMENT TOPICAL at 18:26

## 2018-06-11 RX ADMIN — ACETAMINOPHEN 975 MG: 325 TABLET ORAL at 22:54

## 2018-06-11 RX ADMIN — METHOCARBAMOL 500 MG: 500 TABLET ORAL at 22:54

## 2018-06-11 RX ADMIN — IOHEXOL 100 ML: 350 INJECTION, SOLUTION INTRAVENOUS at 12:59

## 2018-06-11 NOTE — LETTER
[address] [place] [address] 9  [address] [address]  Dept: [phone]    June 13, 2018     Patient: Chaim Hurt   YOB: 1960   Date of Visit: 6/11/2018       To Whom it May Concern:    Nicole Marti is under my professional care  She was seen in the hospital from 6/11/2018   to 06/13/18  She may return to work on Once seen by the primary care provider in 2 weeks  Please excuse the patient at this time until they have follow-up with her primary care provider  Please call the office any questions or concerns       If you have any questions or concerns, please don't hesitate to call           Sincerely,          Elizabeth Pedersen PA-C

## 2018-06-11 NOTE — EMTALA/ACUTE CARE TRANSFER
[address] [address]  Dept: [phone]      [facility] TRANSFER CONSENT    NAME Radha Wall                                         1960                              MRN [id number]    I have been informed of my rights regarding examination, treatment, and transfer   by Dr Bar Hull: Specialized equipment and/or services available at the receiving facility (Include comment)________________________, Other benefits (Include comment)_______________________ (ICH/trauma)    Risks:        Consent for Transfer:  I acknowledge that my medical condition has been evaluated and explained to me by the emergency department physician or other qualified medical person and/or my attending physician, who has recommended that I be transferred to the service of  Accepting Physician: Dr Joann Luque at [address] [facility] Name, [address] : [facility]  The above potential benefits of such transfer, the potential risks associated with such transfer, and the probable risks of not being transferred have been explained to me, and I fully understand them  The doctor has explained that, in my case, the benefits of transfer outweigh the risks  I agree to be transferred  I authorize the performance of emergency medical procedures and treatments upon me in both transit and upon arrival at the receiving facility  Additionally, I authorize the release of any and all medical records to the receiving facility and request they be transported with me, if possible  I understand that the safest mode of transportation during a medical emergency is an ambulance and that the Hospital advocates the use of this mode of transport  Risks of traveling to the receiving facility by car, including absence of medical control, life sustaining equipment, such as oxygen, and medical personnel has been explained to me and I fully understand them      ([place] BELOW)  [  ]  I consent to the stated transfer and to be transported by ambulance/helicopter  [  ]  I consent to the stated transfer, but refuse transportation by ambulance and accept full responsibility for my transportation by car  I understand the risks of non-ambulance transfers and I exonerate the Hospital and its staff from any deterioration in my condition that results from this refusal     X___________________________________________    DATE  18  TIME________  Signature of patient or legally responsible individual signing on patient behalf           RELATIONSHIP TO PATIENT_________________________          Provider Certification    NAME Mayte Hawley                                         1960                              MRN [id number]    A medical screening exam was performed on the above named patient  Based on the examination:    Condition Necessitating Transfer The primary encounter diagnosis was Head injury with loss of consciousness ([facility] [address] )  Diagnoses of Right flank pain and Cervical strain were also pertinent to this visit      Patient Condition: The patient has been stabilized such that within reasonable medical probability, no material deterioration of the patient condition or the condition of the unborn child(magan) is likely to result from the transfer    Reason for Transfer: Level of Care needed not available at this facility, No bed available at level of patient's needs, Other (Include comment)____________________ (Trauma patient, [facility])    Transfer Requirements: Facility [facility]   · Space available and qualified personnel available for treatment as acknowledged by [facility]  · Agreed to accept transfer and to provide appropriate medical treatment as acknowledged by       Dr Bg Ramsay  · Appropriate medical records of the examination and treatment of the patient are provided at the time of transfer   [address] _______  · Transfer will be performed by qualified personnel from [facility]  and appropriate transfer equipment as required, including the use of necessary and appropriate life support measures  Provider Certification: I have examined the patient and explained the following risks and benefits of being transferred/refusing transfer to the patient/family:  General risk, such as traffic hazards, adverse weather conditions, rough terrain or turbulence, possible failure of equipment (including vehicle or aircraft), or consequences of actions of persons outside the control of the transport personnel, Unanticipated needs of medical equipment and personnel during transport, Risk of worsening condition, The possibility of a transport vehicle being unavailable      Based on these reasonable risks and benefits to the patient and/or the unborn child(magan), and based upon the information available at the time of the patients examination, I certify that the medical benefits reasonably to be expected from the provision of appropriate medical treatments at another medical facility outweigh the increasing risks, if any, to the individuals medical condition, and in the case of labor to the unborn child, from effecting the transfer      X____________________________________________ DATE 06/11/18        TIME_______      ORIGINAL - SEND TO MEDICAL RECORDS   COPY - SEND WITH PATIENT DURING TRANSFER

## 2018-06-11 NOTE — ASSESSMENT & PLAN NOTE
-Anterograde amnesia of the event; does not remember falling off the horse or anything that happened afterward until she was back at home   Also does not remember falling today at home  -Supportive care  -OT cognitive evaluation

## 2018-06-11 NOTE — H&P
H&P- Edith Espinoza 1960, 62 y o  female MRN: [id number]    Unit/Bed#: ED 16 Encounter: [id number]    Primary Care Provider: Neda Oro DO   Date and time admitted to hospital: 6/11/2018  3:13 PM    H&P Exam - Trauma   Edith Espinoza 62 y o  female MRN: [id number]  Unit/Bed#: ED 16 Encounter: [id number]    Assessment/Plan   Trauma Alert: Evaluation  Model of Arrival: Ambulance  Trauma Team: Attending Aakash Saab and AP Fellow Dinah  Consultants: None    Trauma Active Problems:   -Fall x 2  -Traumatic brain injury  -Possible small intracranial punctate bleed  -Right 10th nondisplaced rib fracture  -R shoulder pain  -R knee pain   -R rib pain      Trauma Plan:   -Pain control  Patient has an allergy (anaphylaxis) to Vicodin, but states she can take Tylenol without a problem  Will order Tylenol 975mg Q 8 H, Robaxin 500mg Q 6 H, and Lidoderm patch daily  Avoid NSAIDs d/t possible mild head bleed  -Pt has a history of syncope  Discussed with Dr Aakash Saab  Based on her CT scan and her history, will not repeat CT head tomorrow  Will not consult NSGY at this time  We will, however, order an echo and a carotid doppler which were to be done soon as an outpatient  -PT/OT and cog eval for TBI  -Supportive care for TBI: limit stimulation, cell phone use, TV use, etc     Chief Complaint: Fall, head injury    History of Present Illness   HPI:  Edith Espinoza is a 62 y o  female who presents as a transfer from [facility] for fall x 2  Yesterday she was riding a horse when she was thrown from the horse to the ground, landing on her right side and striking her head  She has no memory of the incident and does not remember anything until she was back at home  Has a mild abrasion of the R side of the face and an area of swelling in the right postauricular area  Today, she was walking onto her deck when she fell again  Unsure why; says there was nothing there to trip over, but again, she does not remember this   Both were witnessed by her   She c/o feeling dizzy currently, as well as right knee pain, right shoulder pain, R rib pain, left SCM pain, and pain in the right postauricular area  She is not on any blood thinners  She was evaluated [facility] at [facility], where she had a CT of the head, c-spine, chest, abdomen, and pelvis  There was a small, questionable area of subarachnoid hemorrhage vs  artifact  There was also a questionable right 10th nondisplaced rib fracture  The rest if her imaging was unremarkable  She was transferred here for further care  Mechanism:Fall    Review of Systems   Constitutional: Negative for chills and fever  HENT: Negative for congestion, hearing loss, mouth sores, nosebleeds, postnasal drip, rhinorrhea, sinus pain and sinus pressure  Eyes: Negative for photophobia and visual disturbance  Respiratory: Negative for cough, chest tightness and shortness of breath  Cardiovascular: Negative for chest pain and palpitations  Gastrointestinal: Positive for nausea  Negative for abdominal pain, blood in stool, constipation, diarrhea and vomiting  Genitourinary: Negative for flank pain, frequency, genital sores and hematuria  Musculoskeletal: Positive for arthralgias  Negative for back pain  Neurological: Positive for dizziness, syncope and headaches  Negative for tremors, seizures, speech difficulty, weakness, light-headedness and numbness  All other systems reviewed and are negative  Historical Information   History is obtained from the patient and from her chart  Past Medical History:   Diagnosis Date    Anemia     Anxiety     Depression     Diarrhea     Fecal incontinence     History of colon polyps     Hypertension      Past Surgical History:   Procedure Laterality Date    CHOLECYSTECTOMY      COLONOSCOPY N/A 10/24/2017    Procedure: COLONOSCOPY;  Surgeon: Latoya Rodriguez MD;  Location: [facility];   Service: Colorectal    COLONOSCOPY W/ ENDOSCOPIC US N/A 10/24/2017    Procedure: ANAL ENDOSCOPIC U/S;  Surgeon: Marii Clay MD;  Location: [facility]; Service: Colorectal    DILATION AND CURETTAGE OF UTERUS      ENDOMETRIAL BIOPSY      WITHOUT CERVICAL DILATION    GALLBLADDER SURGERY      HYSTERECTOMY      NASAL SEPTUM SURGERY      NOSE SURGERY      NASAL SEPTAL  DEVIATION REPAIR    OTHER SURGICAL HISTORY      ENDOSCOPIC CONTROL OF GASTRIC BLEEDING, EPISIOTOMY    ROTATOR CUFF REPAIR       Social History   History   Alcohol Use    Yes     Comment: social     History   Drug Use No     History   Smoking Status    Former Smoker   Smokeless Tobacco    Never Used     Comment: Smoked for 10yrs  There is no immunization history on file for this patient  Last Tetanus: Unknown  Family History: Non-contributory  Unable to obtain/limited by None      Meds/Allergies   all current active meds have been reviewed, current meds:   Current Facility-Administered Medications   Medication Dose Route Frequency    bacitracin topical ointment 1 small application  1 small application Topical BID    [START ON 6/12/2018] sertraline (ZOLOFT) tablet 50 mg  50 mg Oral Daily    [START ON 6/12/2018] valsartan (DIOVAN) tablet 320 mg  320 mg Oral Daily    and PTA meds:   Prior to Admission Medications   Prescriptions Last Dose Informant Patient Reported? Taking?    clobetasol (TEMOVATE) 0 05 % ointment   Yes No   sertraline (ZOLOFT) 25 mg tablet   No No   Sig: Take 2 tablets (50 mg total) by mouth daily   valsartan (DIOVAN) 320 MG tablet   No No   Sig: Take 1 tablet (320 mg total) by mouth daily      Facility-Administered Medications: None       Allergies   Allergen Reactions    Vicodin [Hydrocodone-Acetaminophen] Anaphylaxis     Reaction Date: 26Oct2011;          PHYSICAL EXAM    PE limited by: None    Objective   Vitals:   First set: Temperature: 98 2 °F (36 8 °C) (06/11/18 1515)  Pulse: 74 (06/11/18 1515)  Respirations: (!) 24 (06/11/18 1515)  Blood Pressure: (!) 175/85 (06/11/18 4488)    Primary Survey:   (A) Airway: Intact  (B) Breathing: Equal breath sounds bilat  (C) Circulation: Pulses:   carotid  2/4, pedal  2/4, radial  2/4 and femoral  2/4  (D) Disabliity:  GCS Total:  15  (E) Expose:  Completed    Secondary Survey: (Click on Physical Exam tab above)  Physical Exam   Constitutional: She is oriented to person, place, and time  She appears well-developed and well-nourished  No distress  HENT:   Head: Normocephalic  Head is without raccoon's eyes and without laceration  Right Ear: External ear normal    Left Ear: External ear normal    Nose: Nose normal    Mouth/Throat: Oropharynx is clear and moist    Eyes: Conjunctivae and EOM are normal  Pupils are equal, round, and reactive to light  Right eye exhibits no discharge  Left eye exhibits no discharge  Neck: Normal range of motion  Neck supple  Tracheal tenderness and muscular tenderness present  No spinous process tenderness present  No neck rigidity  Normal range of motion present  No midline c-spine tenderness   Cardiovascular: Normal rate, regular rhythm, normal heart sounds and intact distal pulses  Exam reveals no gallop and no friction rub  No murmur heard  Pulmonary/Chest: Effort normal and breath sounds normal  No respiratory distress  She has no wheezes  She has no rales  She exhibits tenderness  Abdominal: Soft  She exhibits no distension  There is no tenderness  Musculoskeletal: Normal range of motion  Legs:  No deformity to inspection of the C/T/L/S spines  No tenderness to palpation of the C/T/L/S spines  No bony deformity noted of the upper or lower extremities bilat  Tender to palpation over the anterior and posterior R shoulder  Mild tenderness to palpation over the R knee  No tenderness to palpation otherwise of the extremities  ROM normal and MS 5/5 in all four extremities  Neurological: She is alert and oriented to person, place, and time   No cranial nerve deficit  Skin: Skin is warm and dry  She is not diaphoretic  Psychiatric: She has a normal mood and affect  Her behavior is normal  Judgment and thought content normal        Invasive Devices     Peripheral Intravenous Line            Peripheral IV 06/11/18 Right Antecubital less than 1 day                Lab Results:   BMP/CMP:   Lab Results   Component Value Date     06/11/2018    K 3 7 06/11/2018     06/11/2018    CO2 28 06/11/2018    ANIONGAP 9 06/11/2018    BUN 11 06/11/2018    CREATININE 0 74 06/11/2018    GLUCOSE 134 06/11/2018    CALCIUM 9 1 06/11/2018    AST 20 06/11/2018    ALT 71 06/11/2018    ALKPHOS 101 06/11/2018    PROT 7 8 06/11/2018    BILITOT 0 30 06/11/2018    EGFR 90 06/11/2018    and CBC:   Lab Results   Component Value Date    WBC 7 68 06/11/2018    HGB 13 3 06/11/2018    HCT 41 6 06/11/2018    MCV 91 06/11/2018     06/11/2018    MCH 29 2 06/11/2018    MCHC 32 0 06/11/2018    RDW 12 3 06/11/2018    MPV 9 5 06/11/2018    NRBC 0 06/11/2018     Imaging/EKG Studies: CT Scan Head: Possible small SAH vs artifact, CT Scan C-Spine: Negative, CT Chest: Possible R 10th rib fx, CT Scan Abdomen/Pelvis: Negative  Other Studies: XR R shoulder normal per my read; XR R knee normal per my read    Code Status: Level 1 Full Code  Advance Directive and Living Will:      Power of :    POLST:          Mild TBI (HCC)   Assessment & Plan    -Anterograde amnesia of the event; does not remember falling off the horse or anything that happened afterward until she was back at home   Also does not remember falling today at home  -Supportive care  -OT cognitive evaluation        Closed head injury   Assessment & Plan    -S/P fall from horse and striking R side of head  -Mild superficial abrasions on the R side of face and area of swelling in the R postauricular area  -No skull fx on CT        SAH (subarachnoid hemorrhage) (HCC)   Assessment & Plan    -Very small traumatic SAH vs artifact vs dense blood vessel  -NSGY consult  -Defer to NSGY for timing of repeat head CT  -No pharmacologic VTE ppx  -Patient is not on any AC/AP at home  -HOT protocol        Closed rib fracture   Assessment & Plan    -Rib fracture protocol  -Analgesia  -IS every hour

## 2018-06-11 NOTE — ED NOTES
When: [facility]@ 2142  Where: [facility]  Report: [phone]  Accepting: Dr Petrona Del Valle  06/11/18 9041

## 2018-06-11 NOTE — ASSESSMENT & PLAN NOTE
-Very small traumatic SAH vs artifact vs dense blood vessel  -NSGY consult  -Defer to NSGY for timing of repeat head CT  -No pharmacologic VTE ppx  -Patient is not on any AC/AP at home  -HOT protocol

## 2018-06-11 NOTE — ED PROVIDER NOTES
History  Chief Complaint   Patient presents with    Head Injury w/LOC     pt was thrown from a horse yesterday and hit her head on a fence, pt doesnt remember fall  Pt also fell this morning but doesnt remember if she hit her head at all       40-year-old female here for evaluation of injuries after falling off of a horse yesterday and again following today  She states yesterday she was thrown from a horse and struck the right side her head off of a fence and landed on her right side/flank area  She had a witnessed loss of consciousness for a few seconds  She was able to get up afterwards and ambulate  She thought nothing of her pain up until today  Just prior to arrival she was lying or dogs out and while on the deck she fell to the ground again  She states she is unsure how exactly she fell not sure if she tripped  There is no loss of consciousness on this occasion  She remembers everything before and after  She remembers the process of falling  She was able to brace her fall  She again landed on her right side  She is having pain along her right flank and has pain with deep inspiration  She has neck pain  Headache  No visual disturbances  No nausea vomiting  She states she feels generally off and weak  History provided by:  Patient   used: No    Head Injury w/LOC   Location:  Generalized  Time since incident:  1 day  Mechanism of injury: fall    Fall:     Fall occurred: From a horse  Height of fall:  Horse    Impact surface: Fence and dirt  Point of impact:  Head (Right flank)    Entrapped after fall: no    Pain details:     Quality:  Aching    Severity:  Moderate    Duration:  1 day    Timing:  Constant    Progression:  Worsening  Chronicity:  New  Relieved by:  Nothing  Worsened by:   Movement (Deep inspiration)  Ineffective treatments:  None tried  Associated symptoms: headache, loss of consciousness and neck pain    Associated symptoms: no blurred vision, no difficulty breathing, no disorientation, no double vision, no focal weakness, no hearing loss, no memory loss, no nausea, no numbness, no seizures, no tinnitus and no vomiting    Loss of consciousness:     LOC duration: "a few seconds"    Witnessed: yes      Suspicion of head trauma:  Yes  Risk factors: no alcohol use, no aspirin use, not elderly, no obesity and no occupational exposure        Prior to Admission Medications   Prescriptions Last Dose Informant Patient Reported? Taking? clobetasol (TEMOVATE) 0 05 % ointment   Yes No   sertraline (ZOLOFT) 25 mg tablet   No No   Sig: Take 2 tablets (50 mg total) by mouth daily   valsartan (DIOVAN) 320 MG tablet   No No   Sig: Take 1 tablet (320 mg total) by mouth daily      Facility-Administered Medications: None       Past Medical History:   Diagnosis Date    Anemia     Anxiety     Depression     Diarrhea     Fecal incontinence     History of colon polyps     Hypertension        Past Surgical History:   Procedure Laterality Date    CHOLECYSTECTOMY      COLONOSCOPY N/A 10/24/2017    Procedure: COLONOSCOPY;  Surgeon: Otis Davila MD;  Location: [facility]; Service: Colorectal    COLONOSCOPY W/ ENDOSCOPIC US N/A 10/24/2017    Procedure: ANAL ENDOSCOPIC U/S;  Surgeon: Otis Davila MD;  Location: [facility];   Service: Colorectal    DILATION AND CURETTAGE OF UTERUS      ENDOMETRIAL BIOPSY      WITHOUT CERVICAL DILATION    GALLBLADDER SURGERY      HYSTERECTOMY      NASAL SEPTUM SURGERY      NOSE SURGERY      NASAL SEPTAL  DEVIATION REPAIR    OTHER SURGICAL HISTORY      ENDOSCOPIC CONTROL OF GASTRIC BLEEDING, EPISIOTOMY    ROTATOR CUFF REPAIR         Family History   Problem Relation Age of Onset    Lung cancer Father     Cancer Father     Other Family      ADENOCARCINOMA IN SIOBHAN IN VILLOUS ADENOMA OF THE BREAST, MIGRAINES, SKIN DISORDER    Depression Family     Anxiety disorder Family     Diabetes Family      MELLITUS    Fibrocystic breast disease Family     Heart disease Family     Hiatal hernia Family     Hypertension Family     Irritable bowel syndrome Family     Kidney disease Family     Urinary tract infection Family     Heart disease Mother     Atrial fibrillation Mother      I have reviewed and agree with the history as documented  Social History   Substance Use Topics    Smoking status: Former Smoker    Smokeless tobacco: Never Used      Comment: Smoked for [employer]   Alcohol use Yes      Comment: social        Review of Systems   Constitutional: Negative  HENT: Negative for dental problem, ear pain, hearing loss, nosebleeds, tinnitus and trouble swallowing  Eyes: Negative for blurred vision, double vision, photophobia, pain and visual disturbance  Respiratory: Negative for apnea, cough, choking, chest tightness, shortness of breath, wheezing and stridor  Gastrointestinal: Negative for abdominal pain, nausea and vomiting  Genitourinary: Negative for decreased urine volume and enuresis  Musculoskeletal: Positive for neck pain  Negative for back pain, myalgias and neck stiffness  Skin: Negative for pallor, rash and wound  Allergic/Immunologic: Negative  Neurological: Positive for loss of consciousness and headaches  Negative for dizziness, focal weakness, seizures, syncope, speech difficulty, weakness, light-headedness and numbness  Psychiatric/Behavioral: Negative for memory loss  All other systems reviewed and are negative  Physical Exam  Physical Exam   Constitutional: She is oriented to person, place, and time  She appears well-developed and well-nourished  Non-toxic appearance  She does not have a sickly appearance  She does not appear ill  No distress  HENT:   Head: Normocephalic and atraumatic  Eyes: EOM and lids are normal  Pupils are equal, round, and reactive to light  Neck: Trachea normal and normal range of motion  Muscular tenderness present  No spinous process tenderness present  Normal range of motion present  No midline C spine tenderness, step off or crepitus  Does have right sided paraspinal tenderness without any crepitus  Cardiovascular: Normal rate, regular rhythm, S1 normal, S2 normal, normal heart sounds, intact distal pulses and normal pulses  Exam reveals no gallop, no distant heart sounds, no friction rub and no decreased pulses  No murmur heard  Pulses:       Radial pulses are 2+ on the right side, and 2+ on the left side  Pulmonary/Chest: Effort normal and breath sounds normal  No accessory muscle usage  No apnea, no tachypnea and no bradypnea  No respiratory distress  She has no decreased breath sounds  She has no wheezes  She has no rhonchi  She has no rales  She exhibits tenderness  She exhibits no crepitus  Abdominal: Soft  Normal appearance and bowel sounds are normal  She exhibits no distension and no mass  There is tenderness (right flank) in the right upper quadrant  There is no rigidity, no rebound and no guarding  No hernia  Musculoskeletal: Normal range of motion  She exhibits no edema, tenderness or deformity  Neurological: She is alert and oriented to person, place, and time  No cranial nerve deficit  GCS eye subscore is 4  GCS verbal subscore is 5  GCS motor subscore is 6  GCS 15  AAOx3  No focal neuro deficits  CN II-XII grossly intact  Speech normal, no aphasia or dysarthria  No pronator drift  Cerebellar function normal  Finger to nose normal  PERRL  EOMI  Peripheral vision intact  No nystagmus  Upper and lower extremity strength 5/5 through   strength 5/5 b/l  Gross sensation intact b/l  Skin: Skin is warm, dry and intact  No rash noted  She is not diaphoretic  No erythema  No pallor  Psychiatric: Her speech is normal    Nursing note and vitals reviewed        Vital Signs  ED Triage Vitals [06/11/18 1153]   Temperature Pulse Respirations Blood Pressure SpO2   98 1 °F (36 7 °C) 83 16 (!) 183/90 98 %      Temp src Heart Rate Source Patient Position - Orthostatic VS BP Location FiO2 (%)   -- Monitor Sitting Left arm --      Pain Score       6           Vitals:    06/11/18 1153 06/11/18 1300 06/11/18 1400   BP: (!) 183/90 143/82 154/81   Pulse: 83 76 76   Patient Position - Orthostatic VS: Sitting  Lying       Visual Acuity  Visual Acuity      Most Recent Value   L Pupil Size (mm)  3   R Pupil Size (mm)  3          ED Medications  Medications   lidocaine (LIDODERM) 5 % patch 1 patch (1 patch Transdermal Medication Applied 6/11/18 1410)   sodium chloride 0 9 % bolus 1,000 mL (0 mL Intravenous Stopped 6/11/18 1351)   iohexol (OMNIPAQUE) 350 MG/ML injection (MULTI-DOSE) 100 mL (100 mL Intravenous Given 6/11/18 1259)   cyclobenzaprine (FLEXERIL) tablet 10 mg (10 mg Oral Given 6/11/18 1410)       Diagnostic Studies  Results Reviewed     Procedure Component Value Units Date/Time    Troponin I [[id number]]  (Normal) Collected:  06/11/18 1243    Lab Status:  Final result Specimen:  Blood from Arm, Right Updated:  06/11/18 1313     Troponin I <0 02 ng/mL     Narrative:         [employer] Chemistry analyzer 99% cutoff is > 0 04 ng/mL in network labs    o cTnI 99% cutoff is useful only when applied to patients in the clinical setting of myocardial ischemia  o cTnI 99% cutoff should be interpreted in the context of clinical history, ECG findings and possibly cardiac imaging to establish correct diagnosis  o cTnI 99% cutoff may be suggestive but clearly not indicative of a coronary event without the clinical setting of myocardial ischemia      Comprehensive metabolic panel [[id number]] Collected:  06/11/18 1243    Lab Status:  Final result Specimen:  Blood from Arm, Right Updated:  06/11/18 1311     Sodium 140 mmol/L      Potassium 3 7 mmol/L      Chloride 103 mmol/L      CO2 28 mmol/L      Anion Gap 9 mmol/L      BUN 11 mg/dL      Creatinine 0 74 mg/dL      Glucose 134 mg/dL      Calcium 9 1 mg/dL      AST 20 U/L      ALT 71 U/L      Alkaline Phosphatase 101 U/L      Total Protein 7 8 g/dL      Albumin 3 6 g/dL      Total Bilirubin 0 30 mg/dL      eGFR 90 ml/min/1 73sq m     Narrative:         National Kidney Disease Education Program recommendations are as follows:  GFR calculation is accurate only with a steady state creatinine  Chronic Kidney disease less than 60 ml/min/1 73 sq  meters  Kidney failure less than 15 ml/min/1 73 sq  meters  Protime-INR [[id number]]  (Normal) Collected:  06/11/18 1243    Lab Status:  Final result Specimen:  Blood from Arm, Right Updated:  06/11/18 1300     Protime 12 5 seconds      INR 0 94    APTT [[id number]]  (Normal) Collected:  06/11/18 1243    Lab Status:  Final result Specimen:  Blood from Arm, Right Updated:  06/11/18 1300     PTT 27 seconds     CBC and differential [[id number]] Collected:  06/11/18 1243    Lab Status:  Final result Specimen:  Blood from Arm, Right Updated:  06/11/18 1252     WBC 7 68 Thousand/uL      RBC 4 55 Million/uL      Hemoglobin 13 3 g/dL      Hematocrit 41 6 %      MCV 91 fL      MCH 29 2 pg      MCHC 32 0 g/dL      RDW 12 3 %      MPV 9 5 fL      Platelets 873 Thousands/uL      nRBC 0 /100 WBCs      Neutrophils Relative 59 %      Immat GRANS % 0 %      Lymphocytes Relative 33 %      Monocytes Relative 5 %      Eosinophils Relative 2 %      Basophils Relative 1 %      Neutrophils Absolute 4 46 Thousands/µL      Immature Grans Absolute 0 03 Thousand/uL      Lymphocytes Absolute 2 56 Thousands/µL      Monocytes Absolute 0 41 Thousand/µL      Eosinophils Absolute 0 15 Thousand/µL      Basophils Absolute 0 07 Thousands/µL                  CT chest abdomen pelvis w contrast   Final Result by Yris Silverman MD (06/11 9983)      1  Mild irregularity of the lateral aspect of the right 10th rib possibly representing a nondisplaced fracture  Clinical correlation for pain and tenderness in this region is recommended  2   No acute hepatic injury to the chest, abdomen, or pelvic viscera        3   Hepatomegaly with moderate to severe hepatic steatosis  Workstation performed: [id number]         CT head without contrast   Final Result by Loretta Shaw MD (06/11 1863)      Small focus of hyperdensity in the interhemispheric fissure which may represent a small calcification, dense blood vessel or possibly tiny focus of subarachnoid hemorrhage  Additional subtle hyperdensity in the left frontal subcortical white matter    which could be artifactual versus mild parenchymal blood  A 12 hour follow-up CT is recommended  I personally discussed this study with Malik aGmino on 6/11/2018 at 1:10 PM                         Workstation performed: [id number]         CT spine cervical without contrast   Final Result by Loretta Shaw MD (06/11 0413)      No cervical spine fracture or traumatic malalignment                     Workstation performed: [id number]                    Procedures  ECG 12 Lead Documentation  Date/Time: 6/11/2018 2:35 PM  Performed by: Serene Montes  Authorized by: Serene Montes     Indications / Diagnosis:  Syncope  ECG reviewed by me, the ED Provider: yes    Patient location:  ED  Previous ECG:     Previous ECG:  Unavailable    Comparison to cardiac monitor: Yes    Interpretation:     Interpretation: normal    Quality:     Tracing quality:  Limited by artifact  Rate:     ECG rate:  76    ECG rate assessment: normal    Rhythm:     Rhythm: sinus rhythm    Ectopy:     Ectopy: none    QRS:     QRS axis:  Normal    QRS intervals:  Normal  Conduction:     Conduction: normal    ST segments:     ST segments:  Normal  T waves:     T waves: normal    CriticalCare Time  Performed by: Benjamin Green by: Serene Montes     Critical care provider statement:     Critical care time (minutes):  35    Critical care time was exclusive of:  Separately billable procedures and treating other patients    Critical care was necessary to treat or prevent imminent or life-threatening deterioration of the following conditions:  CNS failure or compromise and trauma (Intracranial hemorrhage, trauma patient)    Critical care was time spent personally by me on the following activities:  Examination of patient, discussions with consultants, development of treatment plan with patient or surrogate, blood draw for specimens, ordering and review of laboratory studies, ordering and review of radiographic studies and re-evaluation of patient's condition           Phone Contacts  ED Phone Contact    ED Course  ED Course as of Jun 11 1436   Mon Jun 11, 2018   1238 IV being established now  Called CT to have patient brought over next  [address] with radiology  Called PAC  Trauma paged, no response  [address] with Dr Cl Pinto  Transfer to [facility] trauma          HEART Risk Score      Most Recent Value   History  0 Filed at: 06/11/2018 1434   ECG     Age  1 Filed at: 06/11/2018 1434   Risk Factors  0 Filed at: 06/11/2018 1434   Troponin  0 Filed at: 06/11/2018 1434   Heart Score Risk Calculator   History  0 Filed at: 06/11/2018 1434   ECG     Age  1 Filed at: 06/11/2018 1434   Risk Factors  0 Filed at: 06/11/2018 1434   Troponin  0 Filed at: 06/11/2018 1434   HEART Score     HEART Score                              MDM  Number of Diagnoses or Management Options  Cervical strain: new and requires workup  Head injury with loss of consciousness ([facility] [address] ): new and requires workup  Right flank pain: new and requires workup  Diagnosis management comments: DDx including but not limited to: intracranial injury, cervical injury, intrathoracic injury, intraabdominal injury, extremity injury  Plan:  Trauma scan, analgesia offered which she declined  Disposition pending         Amount and/or Complexity of Data Reviewed  Clinical lab tests: ordered and reviewed  Tests in the radiology section of CPT®: ordered and reviewed    Risk of Complications, Morbidity, and/or Mortality  Presenting problems: moderate  Management options: low  General comments: 60-year-old female here for injuries suffered from being thrown off a horse  CT head revealed questionable intracranial hemorrhage versus vascular abnormality versus artifact  Given the mechanism of injury and her loss of consciousness fine concern for intracranial hemorrhage  Discussed with trauma and patient will go down for observation  She also has what appears to be isolated nondisplaced rib fracture on right, 10th rib  Laboratory evaluation unremarkable  Remainder of CT scans unremarkable  Patient agrees with transfer  Stable at time of transfer  Agrees to flexeril for pain and lidoderm patch  Patient Progress  Patient progress: stable    CritCare Time    Disposition  Final diagnoses:   Head injury with loss of consciousness ([facility] [address] ) - r/o intracranial hemorrhage  Right flank pain   Cervical strain     Time reflects when diagnosis was documented in both MDM as applicable and the Disposition within this note     Time User Action Codes Description Comment    6/11/2018  1:46 PM Debo SUAREZ Add [S09 90XA] Acute head injury without loss of consciousness     6/11/2018  1:46 PM Vanessa Dunlap Remove [S09 90XA] Acute head injury without loss of consciousness     6/11/2018  1:46 PM Savana Pitts Add [G48 [id number]] Head injury with loss of consciousness ([facility] [address] )     6/11/2018  1:46 PM Savana Pitts Add [R10 9] Right flank pain     6/11/2018  1:47 PM Savana Hinton Jenni Files  1XXA] Cervical strain     6/11/2018  1:47 PM Savana Pitts Modify [N40 5P6K] Head injury with loss of consciousness ([facility] [address] ) r/o intracranial hemorrhage  ED Disposition     ED Disposition Condition Comment    Transfer to Another [address] [place] [address] should be transferred [facility] to [facility]        MD Documentation      Most Recent Value   Patient Condition  The patient has been stabilized such that within reasonable medical probability, no material deterioration of the patient condition or the condition of the unborn child(magan) is likely to result from the transfer   Reason for Transfer  Level of Care needed not available at this facility, No bed available at level of patient's needs, Other (Include comment)____________________ Cherie Erazo patient, [facility]]   Benefits of Transfer  Specialized equipment and/or services available at the receiving facility (Include comment)________________________, Other benefits (Include comment)_______________________ [ICH/trauma]   Accepting Physician  Dr Radha Henderson Name, [address]   B    (Name & Tel number)  [facility]   Transported by (Company and Unit #)  Risa Dawkins PA-C   Provider Certification  General risk, such as traffic hazards, adverse weather conditions, rough terrain or turbulence, possible failure of equipment (including vehicle or aircraft), or consequences of actions of persons outside the control of the transport personnel, Unanticipated needs of medical equipment and personnel during transport, Risk of worsening condition, The possibility of a transport vehicle being unavailable      RN Documentation      [facility] Name, [address]   B    (Name & Tel number)  [facility]   Transported by (Company and Unit #)  [employer]      Follow-up Information    None         Discharge Medication List as of 6/11/2018  2:15 PM      CONTINUE these medications which have NOT CHANGED    Details   clobetasol (TEMOVATE) 0 05 % ointment Starting Mon 12/18/2017, Historical Med      sertraline (ZOLOFT) 25 mg tablet Take 2 tablets (50 mg total) by mouth daily, Starting Tue 3/27/2018, Normal      valsartan (DIOVAN) 320 MG tablet Take 1 tablet (320 mg total) by mouth daily, Starting Tue 3/27/2018, Normal           No discharge procedures on file      ED Provider  Electronically Signed by           Emma Fleming PA-C  06/11/18 [id number] [place], [place] [address]JALYN  06/11/18 [id number] [place]

## 2018-06-11 NOTE — ASSESSMENT & PLAN NOTE
-S/P fall from horse and striking R side of head  -Mild superficial abrasions on the R side of face and area of swelling in the R postauricular area  -No skull fx on CT

## 2018-06-12 ENCOUNTER — APPOINTMENT (OUTPATIENT)
Dept: NON INVASIVE DIAGNOSTICS | Facility: HOSPITAL | Age: 58
End: 2018-06-12
Payer: COMMERCIAL

## 2018-06-12 LAB
ANION GAP SERPL CALCULATED.3IONS-SCNC: 6 MMOL/L (ref 4–13)
BUN SERPL-MCNC: 9 MG/DL (ref 5–25)
CALCIUM SERPL-MCNC: 8.6 MG/DL (ref 8.3–10.1)
CHLORIDE SERPL-SCNC: 106 MMOL/L (ref 100–108)
CO2 SERPL-SCNC: 27 MMOL/L (ref 21–32)
CREAT SERPL-MCNC: 0.64 MG/DL (ref 0.6–1.3)
ERYTHROCYTE [DISTWIDTH] IN BLOOD BY AUTOMATED COUNT: 12.3 % (ref 11.6–15.1)
GFR SERPL CREATININE-BSD FRML MDRD: 99 ML/MIN/1.73SQ M
GLUCOSE SERPL-MCNC: 118 MG/DL (ref 65–140)
HCT VFR BLD AUTO: 40.7 % (ref 34.8–46.1)
HGB BLD-MCNC: 12.8 G/DL (ref 11.5–15.4)
MCH RBC QN AUTO: 29.3 PG (ref 26.8–34.3)
MCHC RBC AUTO-ENTMCNC: 31.4 G/DL (ref 31.4–37.4)
MCV RBC AUTO: 93 FL (ref 82–98)
PLATELET # BLD AUTO: 230 THOUSANDS/UL (ref 149–390)
PMV BLD AUTO: 9.8 FL (ref 8.9–12.7)
POTASSIUM SERPL-SCNC: 4 MMOL/L (ref 3.5–5.3)
RBC # BLD AUTO: 4.37 MILLION/UL (ref 3.81–5.12)
SODIUM SERPL-SCNC: 139 MMOL/L (ref 136–145)
WBC # BLD AUTO: 6.13 THOUSAND/UL (ref 4.31–10.16)

## 2018-06-12 PROCEDURE — 97166 OT EVAL MOD COMPLEX 45 MIN: CPT

## 2018-06-12 PROCEDURE — 99225 PR SBSQ OBSERVATION CARE/DAY 25 MINUTES: CPT | Performed by: SURGERY

## 2018-06-12 PROCEDURE — G8987 SELF CARE CURRENT STATUS: HCPCS

## 2018-06-12 PROCEDURE — G8978 MOBILITY CURRENT STATUS: HCPCS

## 2018-06-12 PROCEDURE — 97162 PT EVAL MOD COMPLEX 30 MIN: CPT

## 2018-06-12 PROCEDURE — 80048 BASIC METABOLIC PNL TOTAL CA: CPT | Performed by: PHYSICIAN ASSISTANT

## 2018-06-12 PROCEDURE — 93306 TTE W/DOPPLER COMPLETE: CPT

## 2018-06-12 PROCEDURE — G8988 SELF CARE GOAL STATUS: HCPCS

## 2018-06-12 PROCEDURE — 85027 COMPLETE CBC AUTOMATED: CPT | Performed by: PHYSICIAN ASSISTANT

## 2018-06-12 PROCEDURE — 93880 EXTRACRANIAL BILAT STUDY: CPT | Performed by: SURGERY

## 2018-06-12 PROCEDURE — G8979 MOBILITY GOAL STATUS: HCPCS

## 2018-06-12 PROCEDURE — 97535 SELF CARE MNGMENT TRAINING: CPT

## 2018-06-12 PROCEDURE — 93880 EXTRACRANIAL BILAT STUDY: CPT

## 2018-06-12 PROCEDURE — 93306 TTE W/DOPPLER COMPLETE: CPT | Performed by: INTERNAL MEDICINE

## 2018-06-12 RX ORDER — METHOCARBAMOL 500 MG/1
500 TABLET, FILM COATED ORAL EVERY 6 HOURS SCHEDULED
Status: DISCONTINUED | OUTPATIENT
Start: 2018-06-12 | End: 2018-06-13 | Stop reason: HOSPADM

## 2018-06-12 RX ADMIN — BACITRACIN ZINC 1 SMALL APPLICATION: 500 OINTMENT TOPICAL at 08:58

## 2018-06-12 RX ADMIN — BACITRACIN ZINC 1 SMALL APPLICATION: 500 OINTMENT TOPICAL at 17:15

## 2018-06-12 RX ADMIN — LIDOCAINE 1 PATCH: 50 PATCH TOPICAL at 08:58

## 2018-06-12 RX ADMIN — ACETAMINOPHEN 975 MG: 325 TABLET ORAL at 05:01

## 2018-06-12 RX ADMIN — SERTRALINE HYDROCHLORIDE 50 MG: 50 TABLET ORAL at 08:59

## 2018-06-12 RX ADMIN — VALSARTAN 320 MG: 160 TABLET ORAL at 08:59

## 2018-06-12 RX ADMIN — ACETAMINOPHEN 975 MG: 325 TABLET ORAL at 21:11

## 2018-06-12 RX ADMIN — METHOCARBAMOL 500 MG: 500 TABLET ORAL at 17:15

## 2018-06-12 RX ADMIN — METHOCARBAMOL 500 MG: 500 TABLET ORAL at 12:00

## 2018-06-12 RX ADMIN — ACETAMINOPHEN 975 MG: 325 TABLET ORAL at 13:39

## 2018-06-12 NOTE — CASE MANAGEMENT
Initial Clinical Review    Admission: Date/Time/Statement: N/A @ N/A     No orders of the defined types were placed in this encounter  ED: Date/Time/Mode of Arrival:   ED Arrival Information     Expected Arrival Acuity Means of Arrival Escorted By Service Admission Type    6/11/2018 14:29 6/11/2018 15:13 [facility]    Arrival Complaint    head injury          Chief Complaint:   Chief Complaint   Patient presents with    Head Injury w/LOC     thrown off horse yesterday with head strike on fence and + LOC  had syncopal episode today in driveway,  brought to [facility], transferred for trauma eval         History of Illness:  :  Chanelle Mahmood is a 62 y o  female who presents as a transfer from [facility] for fall x 2  Yesterday she was riding a horse when she was thrown from the horse to the ground, landing on her right side and striking her head  She has no memory of the incident and does not remember anything until she was back at home  Has a mild abrasion of the R side of the face and an area of swelling in the right postauricular area  Today, she was walking onto her deck when she fell again  Unsure why; says there was nothing there to trip over, but again, she does not remember this  Both were witnessed by her   She c/o feeling dizzy currently, as well as right knee pain, right shoulder pain, R rib pain, left SCM pain, and pain in the right postauricular area  She is not on any blood thinners       She was evaluated today at [facility], where she had a CT of the head, c-spine, chest, abdomen, and pelvis  There was a small, questionable area of subarachnoid hemorrhage vs  artifact  There was also a questionable right 10th nondisplaced rib fracture  The rest if her imaging was unremarkable  She was transferred here for further care            Temperature Pulse Respirations Blood Pressure SpO2   06/11/18 1515 06/11/18 1515 06/11/18 1515 06/11/18 1515 06/11/18 1515   98 2 °F (36 8 °C) 74 (!) 24 (!) 175/85 96 %         Pain Score       06/11/18 1700       6        Wt Readings from Last 1 Encounters:   06/11/18 103 kg (226 lb 13 7 oz)       Vital Signs (abnormal):    Date/Time  Temp  Pulse  Resp  BP  SpO2  O2 Device  Patient Position - Orthostatic VS   06/12/18 0715  98 °F (36 7 °C)  68  18  160/81  99 %  None (Room air)  Sitting   06/11/18 2359   97 4 °F (36 3 °C)  69  18  138/81  98 %  --  Lying   06/11/18 1809  98 6 °F (37 °C)  73  20  134/71  --  --  Lying   06/11/18 1700  --  72   24  144/82  95 %  --  Sitting   06/11/18 1630  --  76   24  166/93  99 %  --  Sitting   06/11/18 1600  --  74  22   183/79  100 %  --  Sitting   06/11/18 1545  --  72   24   177/86  100 %  --  Sitting   06/11/18 1530  --  78   24  151/70  98 %  --  Sitting         ED Treatment:   Medication Administration from 06/11/2018 1429 to 06/11/2018 1803     None       Past Medical/Surgical History: Active Ambulatory Problems     Diagnosis Date Noted    Anxiety 08/25/2017    Hypertension 08/25/2017       Past Medical History:    Anemia     Anxiety     Depression     Diarrhea     Fecal incontinence     History of colon polyps     Hypertension        Admitting Diagnosis: Head injury [S09 90XA]  [facility] [I60 9]    Age/Sex: 62 y o  female    Assessment/Plan:   Trauma Active Problems:   -Fall x 2  -Traumatic brain injury  -Possible small intracranial punctate bleed  -Right 10th nondisplaced rib fracture  -R shoulder pain  -R knee pain   -R rib pain        Trauma Plan:   -Pain control  Patient has an allergy (anaphylaxis) to Vicodin, but states she can take Tylenol without a problem  Will order Tylenol 975mg Q 8 H, Robaxin 500mg Q 6 H, and Lidoderm patch daily  Avoid NSAIDs d/t possible mild head bleed  -Pt has a history of syncope  Discussed with Dr Aakash Saab  Based on her CT scan and her history, will not repeat CT head tomorrow   Will not consult NSGY at this time  We will, however, order an echo and a carotid doppler which were to be done soon as an outpatient    -PT/OT and cog eval for TBI  -Supportive care for TBI: limit stimulation, cell phone use, TV use, etc       Admission Orders:    CAROTID COMPLETE STUDY  ECHO   REGULAR DIET   PT AND OT EVAL AND TREAT     Scheduled Meds:   Current Facility-Administered Medications:  acetaminophen 975 mg Oral Q8H [address]   bacitracin 1 small application Topical BID   lidocaine 1 patch Transdermal Daily   methocarbamol 500 mg Oral Q6H PRN   sertraline 50 mg Oral Daily   valsartan 320 mg Oral Daily     Continuous Infusions:    PRN Meds: methocarbamol

## 2018-06-12 NOTE — PROGRESS NOTES
Progress Note - Kiya Kirk 1960, 62 y o  female MRN: [id number]    Unit/Bed#: [facility] 922-01 Encounter: [id number]    Primary Care Provider: Isabel Negro DO   Date and time admitted to hospital: 6/11/2018  3:13 PM        Mild TBI [facility])   Assessment & Plan    -Anterograde amnesia of the event; does not remember falling off the horse or anything that happened afterward until she was back at home  Also does not remember falling today at home  -Supportive care  -OT cognitive evaluation        Closed head injury   Assessment & Plan    -S/P fall from horse and striking R side of head  -Mild superficial abrasions on the R side of face and area of swelling in the R postauricular area  -No skull fx on CT        SAH (subarachnoid hemorrhage) (HCC)   Assessment & Plan    -Very small traumatic SAH vs artifact vs dense blood vessel  -Monitor clinically; will not consult NSGY or repeat head CT at this time per Dr Greg Thompson  -Note that pt has a hx of syncope -- echo and carotid doppler were to be done as an outpatient; these will be done today   Currently pending  -No pharmacologic VTE ppx  -Patient is not on any AC/AP at home          Closed rib fracture   Assessment & Plan    -Analgesia - patient has an anaphylactic allergy to hydrocodone; she is comfortable with Tylenol and Robaxin for pain  -IS every hour                  Subjective/Objective   Chief Complaint: I feel better    Subjective: Pt reports some mild dizziness and R rib pain    Objective: Awake, alert, lying comfortably in bed    Meds/Allergies   Prescriptions Prior to Admission   Medication Sig Dispense Refill Last Dose    clobetasol (TEMOVATE) 0 05 % ointment   0 Past Month at Unknown time    valsartan (DIOVAN) 320 MG tablet Take 1 tablet (320 mg total) by mouth daily 90 tablet 1 6/11/2018 at 0630    sertraline (ZOLOFT) 25 mg tablet Take 2 tablets (50 mg total) by mouth daily 90 tablet 1 6/11/2018 at 0630       Vitals: Blood pressure 160/81, pulse 68, temperature 98 °F (36 7 °C), temperature source Oral, resp  rate 18, height 5' 6" (1 676 m), weight 103 kg (226 lb 13 7 oz), SpO2 99 %  Body mass index is 36 62 kg/m²  SpO2: SpO2: 99 %    ABG: No results found for: PHART, QQI1ZYJ, PO2ART, BNY5AXA, X7DOVVFT, BEART, SOURCE      Intake/Output Summary (Last 24 hours) at 06/12/18 0947  Last data filed at 06/12/18 0900   Gross per 24 hour   Intake              430 ml   Output                0 ml   Net              430 ml       Invasive Devices     Peripheral Intravenous Line            Peripheral IV 06/11/18 Right Antecubital less than 1 day                Nutrition/GI Proph/Bowel Reg: Regular diet    Physical Exam:   GENERAL APPEARANCE: Awake, alert, lying in bed  HEENT: EOM intact, mild abrasion of R face  CV: RRR, no murmur  LUNGS: CTAB  ABD: soft, NT  EXT: moves all four extremities spontaneously; abrasion of R knee  NEURO: no focal deficit  SKIN: warm and dry    Lab Results:   BMP/CMP:   Lab Results   Component Value Date     06/12/2018    K 4 0 06/12/2018     06/12/2018    CO2 27 06/12/2018    ANIONGAP 6 06/12/2018    BUN 9 06/12/2018    CREATININE 0 64 06/12/2018    GLUCOSE 118 06/12/2018    CALCIUM 8 6 06/12/2018    AST 20 06/11/2018    ALT 71 06/11/2018    ALKPHOS 101 06/11/2018    PROT 7 8 06/11/2018    BILITOT 0 30 06/11/2018    EGFR 99 06/12/2018    and CBC:   Lab Results   Component Value Date    WBC 6 13 06/12/2018    HGB 12 8 06/12/2018    HCT 40 7 06/12/2018    MCV 93 06/12/2018     06/12/2018    MCH 29 3 06/12/2018    MCHC 31 4 06/12/2018    RDW 12 3 06/12/2018    MPV 9 8 06/12/2018    NRBC 0 06/11/2018     Imaging/EKG Studies: Results: I have personally reviewed pertinent reports      Other Studies: N/A  VTE Prophylaxis: none d/t possible SAH

## 2018-06-12 NOTE — OCCUPATIONAL THERAPY NOTE
[address]  Evaluation     Patient Name: Bryce Gomez  WDFRB'Q Date: 6/12/2018  Problem List  Patient Active Problem List   Diagnosis    Anxiety    Hypertension    Closed rib fracture    [facility]    Closed head injury    Mild TBI [facility])     Past Medical History  Past Medical History:   Diagnosis Date    Anemia     Anxiety     Depression     Diarrhea     Fecal incontinence     History of colon polyps     Hypertension      Past Surgical History  Past Surgical History:   Procedure Laterality Date    CHOLECYSTECTOMY      COLONOSCOPY N/A 10/24/2017    Procedure: COLONOSCOPY;  Surgeon: Abe Meléndez MD;  [facility]; Service: Colorectal    COLONOSCOPY W/ ENDOSCOPIC US N/A 10/24/2017    Procedure: ANAL ENDOSCOPIC U/S;  Surgeon: Abe Meléndez MD;  [facility]; Service: Colorectal    DILATION AND CURETTAGE OF UTERUS      ENDOMETRIAL BIOPSY      WITHOUT CERVICAL DILATION    GALLBLADDER SURGERY      HYSTERECTOMY      NASAL SEPTUM SURGERY      NOSE SURGERY      NASAL SEPTAL  DEVIATION REPAIR    OTHER SURGICAL HISTORY      ENDOSCOPIC CONTROL OF GASTRIC BLEEDING, EPISIOTOMY    ROTATOR CUFF REPAIR           06/12/18 1054   Note Type   Note type Eval/Treat   Restrictions/Precautions   Weight Bearing Precautions Per Order No   Other Precautions Cognitive; Fall Risk;Pain;Telemetry   Pain Assessment   Pain Assessment No/denies pain   Pain Score No Pain   Pain Type Acute pain   Pain Location Rib cage   Home Living   Type of [address] [place] [address] Two level; Able to live on main level with bedroom/bathroom; Laundry in basement   [employer] Tub/shower unit   Bathroom Toilet Standard   Bathroom Equipment Shower chair;Grab bars in shower   Bathroom Accessibility Accessible   Additional Comments Pt lives with her  in a 2 story home with a first floor set up     Prior Function   Level of [place] Independent with ADLs and functional mobility Lives With Spouse   Receives Help From Family   ADL Assistance Independent   IADLs Independent   Falls in the last 6 months 1 to 4  (2- related to current admission, none prior )   Vocational Full time employment   Comments Pt was I w/  ADLS and IADLS, drove, worked & required no use of DME PTA   Lifestyle   Autonomy Pt was I w/ ADLS/IADLS PTA  + driving    Reciprocal Relationships Pt lives with her  who works    Service to [id number] works full time as transportation dispatch  Reports performs many computer tasks w/ extensive screen time for job duty    Intrinsic Gratification Pt active PTA   Psychosocial   Psychosocial (WDL) WDL   ADL   Eating Assistance 7  3 \[facility]\"" 7  [address] [facility] 5  [address] [place] [address] Dr [address]  5  Supervision/Setup    [facility] 5  [address]  5  [phone] [facility] 5  Supervision/Setup   Additional Comments S- SBA ADLS 2* dizziness and balance    Bed Mobility   Supine to Sit 5  Supervision   Additional items HOB elevated; Increased time required;Verbal cues   Transfers   Sit to Stand 5  Supervision   Additional items Increased time required;Verbal cues   Stand to Sit 5  Supervision   Additional items Increased time required;Verbal cues   Functional Mobility   Functional Mobility 5  Supervision   Additional Comments without AD   Balance   Static Sitting Good   Dynamic Sitting Fair   Static Standing Fair   Dynamic Standing Fair -   Ambulatory Fair -   Activity Tolerance   Activity Tolerance Patient tolerated treatment well; Other (Comment); Treatment limited secondary to medical complications (Comment)  (dizziness )   Medical Staff Made Aware PT, CM   Nurse Made Aware yes   RUE Assessment   RUE Assessment WFL   LUE Assessment   LUE Assessment WFL   Hand Function   Gross Motor Coordination Functional   Fine Motor Coordination Functional   Vision-Basic Assessment   Current Vision Wears glasses all the time   Cognition   Overall Cognitive Status [facility]   Arousal/Participation Alert; Responsive; Cooperative   Attention Within functional limits   Orientation Level Oriented to person;Oriented to place;Oriented to situation   Memory Decreased short term memory;Decreased recall of recent events   Following Commands Follows one step commands without difficulty   Comments Pt is alert and oriented to person, place and situation  Pt oriented to month, year and day of the week  Pt unable to determine date, reporting it was the beginning of the month  Pt reports no memory of either fall or the events in between the falls  Recommend formal cognitive evaluation to assist w/ safe D/C planning ([facility])  Assessment   Limitation Decreased cognition;Decreased endurance;Decreased self-care trans;Decreased high-level ADLs   Prognosis Good   Assessment Pt is a 63 y/o female seen for OT eval s/p adm to [facility] as a transfer from [facility] s/p falls x2 on 6/10  Pt was thrown from her horse w/ + headstrike, then returned home where she later fell again for unclear reason  Pt has no memory of fall off horse or of 2nd fall at home  Pt presents w/ R knee and shoulder pain, R rib fx, SAH, closed head injury and mild TBI  Comorbidities include a h/o anxiety, depression, and HTN  Pt with active OT orders and okay to mobilize per trauma  Pt lives with her  in a 2 story home with a first floor set up  Pt was I w/  ADLS and IADLS, drove, worked & required no use of DME PTA  Pt is currently demonstrating the following occupational deficits: S ADLS, S functional transfers and CGA Functional mobility without AD  Pt with deficits and limitations in all baseline areas of occupation 2* dizziness, lightheadedness, pain, decreased endurance/activity tolerance, impaired balance, occasional nausea and occasional confusion  Pt admits to + concussion symptoms   The following Occupational Performance Areas to address include: bathing/shower, dressing, medication management, functional mobility, community mobility, meal prep, household maintenance and job performance/volunteering  Pt scored overall 70/100 on the Barthel Index  Based on the aforementioned OT evaluation, functional performance deficits, and assessments, pt has been identified as a moderate complexity evaluation  Anticipate pt will be able to return home with increased family support pending progress and improvement of symptoms  Recommend formal cognitive evaluation, however anticipate outpatient OT for cog/concussion rehab  Pt to continue to benefit from acute immediate OT services to address the following goals 3-5x/wk to  w/in 7-10 days:   Goals   Patient Goals to go home    Plan   Treatment Interventions Cognitive reorientation;Patient/family training;Continued evaluation; Activityengagement;ADL retraining;Functional transfer training   Goal Expiration Date 18   OT Frequency 3-5x/wk   Recommendation   OT Discharge Recommendation Other (Comment)  (Please see assessment section )   OT - OK to Discharge No  (cog eval needed)   Barthel Index   Feeding 10   Bathing 5   Grooming Score 5   Dressing Score 10   Bladder Score 10   Bowels Score 10   Toilet Use Score 10   Transfers (Bed/Chair) Score 10   Mobility (Level Surface) Score 0   Stairs Score 0   Barthel Index Score 70       GOALS:    1) Pt will complete toileting w/ mod I w/ G hygiene/thoroughness using DME as needed  2) Pt will improve functional transfers on/off all surfaces using DME as needed w/ G balance/safety including toileting w/ mod I  3) Pt will improve functional mobility during ADL/IADL/leisure tasks using DME as needed w/ g balance/safety w/ mod I  4) Pt will engage in ongoing cognitive assessment w/ G participation w/ mod I to assist w/ safe d/c planning/recommendations  5) Pt will demonstrate G carryover of pt/caregiver education and training as appropriate w/ mod I w/o cues w/ G tolerance      Marina Oleary MS, OTR/L

## 2018-06-12 NOTE — PHYSICIAN ADVISOR
Current patient class: Outpatient Procedure  The patient is currently on Hospital Day: 2 at [facility]        The patient was admitted to the hospital  on N/A at N/A for the following diagnosis:  Head injury [S09 90XA]  SAH (subarachnoid hemorrhage) ([facility] ) [I60 9]     After review of the relevant documentation, labs, vital signs and test results, the patient is most appropriate for OBSERVATION STATUS  The patient was admitted to the hospital without an expected length of stay of at least 2 midnights  Given that the patient is subject to the 2 midnight benchmark as a CMS patient, they are appropriate for observation status at this time  Should the patient remain hospitalized for a second midnight the status should be reevaluated for medical necessity  Rationale is as follows: The patient is a 62 yrs   Female who presented to the ED at 6/11/2018  3:13 PM with a chief complaint of Head Injury w/LOC (thrown off horse yesterday with head strike on fence and + LOC  had syncopal episode today in driveway,  brought to [facility] ED, transferred for trauma eval  )     Pt currently remains for monitoring and observation of possible SAH s/p trauma with TBI  Given the need for observation and without intervention the patient is appropriate for at least observation status pending further diagnostics      The patients vitals on arrival were ED Triage Vitals   Temperature Pulse Respirations Blood Pressure SpO2   06/11/18 1515 06/11/18 1515 06/11/18 1515 06/11/18 1515 06/11/18 1515   98 2 °F (36 8 °C) 74 (!) 24 (!) 175/85 96 %      Temp Source Heart Rate Source Patient Position - Orthostatic VS BP Location FiO2 (%)   06/11/18 1515 06/11/18 1515 06/11/18 1515 06/11/18 1809 --   Oral Monitor Sitting Right arm       Pain Score       06/11/18 1700       6           Past Medical History:   Diagnosis Date    Anemia     Anxiety     Depression     Diarrhea     Fecal incontinence     History of colon polyps     Hypertension      Past Surgical History:   Procedure Laterality Date    CHOLECYSTECTOMY      COLONOSCOPY N/A 10/24/2017    Procedure: COLONOSCOPY;  Surgeon: Jesse Borja MD;  [facility]; Service: Colorectal    COLONOSCOPY W/ ENDOSCOPIC US N/A 10/24/2017    Procedure: ANAL ENDOSCOPIC U/S;  Surgeon: Jesse Borja MD;  [facility];   Service: Colorectal    DILATION AND CURETTAGE OF UTERUS      ENDOMETRIAL BIOPSY      WITHOUT CERVICAL DILATION    GALLBLADDER SURGERY      HYSTERECTOMY      NASAL SEPTUM SURGERY      NOSE SURGERY      NASAL SEPTAL  DEVIATION REPAIR    OTHER SURGICAL HISTORY      ENDOSCOPIC CONTROL OF GASTRIC BLEEDING, EPISIOTOMY    ROTATOR CUFF REPAIR             Consults have been placed to:   IP CONSULT TO CASE MANAGEMENT    Vitals:    06/11/18 1809 06/11/18 2359 06/12/18 0715 06/12/18 1529   BP: 134/71 138/81 160/81 150/76   BP Location: Right arm Left arm Left arm Left arm   Pulse: 73 69 68 69   Resp: 20 18 18 18   Temp: 98 6 °F (37 °C) (!) 97 4 °F (36 3 °C) 98 °F (36 7 °C) 97 8 °F (36 6 °C)   TempSrc: Oral Oral Oral Oral   SpO2:  98% 99% 98%   Weight: 103 kg (226 lb 13 7 oz)      Height: 5' 6" (1 676 m)          Most recent labs:    Recent Labs      06/11/18   1243  06/12/18   0520   WBC  7 68  6 13   HGB  13 3  12 8   HCT  41 6  40 7   PLT  250  230   K  3 7  4 0   NA  140  139   CALCIUM  9 1  8 6   BUN  11  9   CREATININE  0 74  0 64   INR  0 94   --    TROPONINI  <0 02   --    AST  20   --    ALT  71   --    ALKPHOS  101   --    BILITOT  0 30   --        Scheduled Meds:  Current Facility-Administered Medications:  acetaminophen 975 mg Oral Q8H [address] Melissa Nix PA-C   bacitracin 1 small application Topical BID Melissa Nix PA-C   lidocaine 1 patch Transdermal Daily Melissa Nix PA-C   methocarbamol 500 mg Oral Q6H [address] JEFERSON Charles   sertraline 50 mg Oral Daily Melissa Nix PA-C   valsartan 320 mg Oral Daily Melissa Nix PA-C     Continuous Infusions:   PRN Meds:      Surgical procedures (if appropriate):

## 2018-06-12 NOTE — ASSESSMENT & PLAN NOTE
-Analgesia - patient has an anaphylactic allergy to hydrocodone; she is comfortable with Tylenol and Robaxin for pain  -IS every hour

## 2018-06-12 NOTE — PLAN OF CARE
Problem: OCCUPATIONAL THERAPY ADULT  Goal: Performs self-care activities at highest level of function for planned discharge setting  See evaluation for individualized goals  Treatment Interventions: Cognitive reorientation, Patient/family training, Continued evaluation, Activityengagement, ADL retraining, Functional transfer training          See flowsheet documentation for full assessment, interventions and recommendations  Outcome: Progressing  Limitation: Decreased cognition, Decreased endurance, Decreased self-care trans, Decreased high-level ADLs  Prognosis: Good  Assessment: pt participated in pm ot session and was seen focusing on ongoing cognitive assessment using antonio cognitive assessment version 7 1 and scored 23/30 indicating minimal cognitive decline       OT Discharge Recommendation: Outpatient OT  OT - OK to Discharge: No (cog eval needed)  Corinna Ley

## 2018-06-12 NOTE — TERTIARY TRAUMA SURVEY
Progress Note - Tertiary Trauma Survery   Javier Pearce 62 y o  female MRN: [id number]  Unit/Bed#: [facility] 922-01 Encounter: [id number]    Summary of Diagnosed Injuries:   -Very small SAH vs artifact on head CT  -TBI  -R shoulder pain  -R knee pain  -10th R rib fx    Clinical Plan:   -Await echo and carotid doppler result  -PT/OT  -OT cog eval  -Pain control  -IS    Mechanism of Injury: Fall    [place] [facility] [place]  Outside Films Received: yes  Tertiary Exam Due on: 6/12/18    Vitals: Blood pressure 160/81, pulse 68, temperature 98 °F (36 7 °C), temperature source Oral, resp  rate 18, height 5' 6" (1 676 m), weight 103 kg (226 lb 13 7 oz), SpO2 99 %  ,Body mass index is 36 62 kg/m²      CT / RADIOGRAPHS: ALL RESULTS MUST BE CONFIRMED BY FACULTY OR PRINTED REPORT    CT HEAD:  CT CHEST:    CT FACE:  CT ABDOMEN / PELVIS:   CT CERVICAL SPINE:  XR PELVIS:    CT THORACIC / LUMBAR SPINE:  CXR CHEST:    OTHER: OTHER:    OTHER:  OTHER:    OTHER: OTHER:    OTHER:  OTHER:    OTHER:  OTHER:      Consultants - List Service/ Faculty and Date: None    Active medications:           Current Facility-Administered Medications:     acetaminophen (TYLENOL) tablet 975 mg, 975 mg, Oral, Q8H [facility], 975 mg at 06/12/18 0501    bacitracin topical ointment 1 small application, 1 small application, Topical, BID, 1 small application at 66/69/41 0858    lidocaine (LIDODERM) 5 % patch 1 patch, 1 patch, Transdermal, Daily, 1 patch at 06/12/18 0858    methocarbamol (ROBAXIN) tablet 500 mg, 500 mg, Oral, Q6H [facility], 500 mg at 06/12/18 1200    sertraline (ZOLOFT) tablet 50 mg, 50 mg, Oral, Daily, 50 mg at 06/12/18 0859    valsartan (DIOVAN) tablet 320 mg, 320 mg, Oral, Daily, 320 mg at 06/12/18 0859      Intake/Output Summary (Last 24 hours) at 06/12/18 1251  Last data filed at 06/12/18 0900   Gross per 24 hour   Intake              430 ml   Output                0 ml   Net              430 ml       Invasive Devices     Peripheral Intravenous Line            Peripheral IV 06/11/18 Right Antecubital 1 day                CAGE-AID Questionnaire:    Was the patient able to participate in the CAGE-AID screening questions on admission? Yes    Is the patient 65 years or older: No    1  GCS:  GCS Total:  15  2  Head:   a  Inspect and palpate SCALP for:  lac/abrasion:  Present: Mild abrasion of R face, b  Inspect and palpate FACE for:   swelling/ecchymosis:  None and c  Examine EYES Record: eye movement:  Appropriate  3  Neck:   a  Inspect for lac/abrasion/hematoma/swelling:  None, b   Palpate for tenderness:  None, c   Palpate for subcutaneous air:  None and d   C-spine cleared radiographically:  yes  4  Chest:   b   Palpate for tenderness:  ribs/sternum/clavicle:  Present: R lower ribs  5  Abdomen/Pelvis:   a  Inspect for:    lac/abrasion:  None, b   Palpate for:   tenderness/guarding:  None and c  PELVIS:  stability/tenderness:  stable  6  Back (log roll with spinal immobilization unless cleared radiographically):   b   Palpate for tenderness and deformity:  vertebrae (T-L-S spine):  None  7  Extremities:   Lacs, abrasions, swelling, ecchymosis: Mild abrasion R face   Tenderness, pain with motor, instability: Mild tenderness to palpation of R shoulder and R knee  8  Peripheral Nerves: WNL    Do NOT use the following abbreviations: DTO, gr, Mirta, MS, MSO4, MgSO4, Nitro, QD, QID, QOD, u, , ?, ?g or trailing zeros   Always use a zero before a decimal     Labs:   CBC:   Lab Results   Component Value Date    WBC 6 13 06/12/2018    HGB 12 8 06/12/2018    HCT 40 7 06/12/2018    MCV 93 06/12/2018     06/12/2018    MCH 29 3 06/12/2018    MCHC 31 4 06/12/2018    RDW 12 3 06/12/2018    MPV 9 8 06/12/2018     CMP:   Lab Results   Component Value Date     06/12/2018     06/12/2018    CO2 27 06/12/2018    ANIONGAP 6 06/12/2018    BUN 9 06/12/2018    CREATININE 0 64 06/12/2018    GLUCOSE 118 06/12/2018    CALCIUM 8 6 06/12/2018    EGFR 99 06/12/2018

## 2018-06-12 NOTE — PLAN OF CARE
Problem: PHYSICAL THERAPY ADULT  Goal: Performs mobility at highest level of function for planned discharge setting  See evaluation for individualized goals  Treatment/Interventions: Functional transfer training, LE strengthening/ROM, Elevations, Therapeutic exercise, Endurance training, Patient/family training, Equipment eval/education, Gait training, Bed mobility, Spoke to nursing, Spoke to case management, OT          See flowsheet documentation for full assessment, interventions and recommendations  Prognosis: Good  Problem List: Impaired balance, Decreased mobility, Decreased coordination  Assessment: Pt is a 62year old female presenting as a transfer from [facility] s/p fall  Pt found to have sustained a SAH and head injury  Additional PMH/problem list includes anxiety, HTN, closed rib fracture, anemia, and depression  PT consulted to assess functional mobility and assist with safe d/c planning  PTA, pt living home with her  in a 1 floor ranch with 2 JABARI and a basement  Pt (I), driving and working full time  On eval, pt presenting with impaired balance, light headedness and impaired coordination  Supervision provided for bed mobility, transfers and gait 2* dizziness and imbalance  PT to follow up 1 session to continue to assess gait  Patient will be appropriate for home with OPPT for concussion rehab when medically appropriate  Recommendation: Home with family support, Outpatient PT          See flowsheet documentation for full assessment

## 2018-06-12 NOTE — PLAN OF CARE
Problem: OCCUPATIONAL THERAPY ADULT  Goal: Performs self-care activities at highest level of function for planned discharge setting  See evaluation for individualized goals  Treatment Interventions: Cognitive reorientation, Patient/family training, Continued evaluation, Activityengagement, ADL retraining, Functional transfer training          See flowsheet documentation for full assessment, interventions and recommendations  Limitation: Decreased cognition, Decreased endurance, Decreased self-care trans, Decreased high-level ADLs  Prognosis: Good  Assessment: Pt is a 61 y/o female seen for OT eval s/p adm to [facility] as a transfer from [place] s/p falls x2 on 6/10  Pt was thrown from her horse w/ + headstrike, then returned home where she later fell again for unclear reason  Pt has no memory of fall off horse or of 2nd fall at home  Pt presents w/ R knee and shoulder pain, R rib fx, SAH, closed head injury and mild TBI  Comorbidities include a h/o anxiety, depression, and HTN  Pt with active OT orders and okay to mobilize per trauma  Pt lives with her  in a 2 story home with a first floor set up  Pt was I w/  ADLS and IADLS, drove, worked & required no use of DME PTA  Pt is currently demonstrating the following occupational deficits: S ADLS, S functional transfers and CGA Functional mobility without AD  Pt with deficits and limitations in all baseline areas of occupation 2* dizziness, lightheadedness, pain, decreased endurance/activity tolerance, impaired balance, occasional nausea and occasional confusion  Pt admits to + concussion symptoms  The following Occupational Performance Areas to address include: bathing/shower, dressing, medication management, functional mobility, community mobility, meal prep, household maintenance and job performance/volunteering  Pt scored overall 70/100 on the Barthel Index   Based on the aforementioned OT evaluation, functional performance deficits, and assessments, pt has been identified as a moderate complexity evaluation  Anticipate pt will be able to return home with increased family support pending progress and improvement of symptoms  Recommend formal cognitive evaluation, however anticipate outpatient OT for cog/concussion rehab   Pt to continue to benefit from acute immediate OT services to address the following goals 3-5x/wk to  w/in 7-10 days:     OT Discharge Recommendation: Other (Comment) (Please see assessment section )  OT - OK to Discharge: No (cog eval needed)      Sho Bravo MS, OTR/L

## 2018-06-12 NOTE — PHYSICAL THERAPY NOTE
Physical Therapy Evaluation     Patient's Name: Paulino Stewart    Admitting Diagnosis  Head injury [S09 90XA]  SAH (subarachnoid hemorrhage) ([facility] [address] ) [I60 9]    Problem List  Patient Active Problem List   Diagnosis    Anxiety    Hypertension    Closed rib fracture    [facility]    Closed head injury    Mild TBI [facility])       Past Medical History  Past Medical History:   Diagnosis Date    Anemia     Anxiety     Depression     Diarrhea     Fecal incontinence     History of colon polyps     Hypertension        Past Surgical History  Past Surgical History:   Procedure Laterality Date    CHOLECYSTECTOMY      COLONOSCOPY N/A 10/24/2017    Procedure: COLONOSCOPY;  Surgeon: Bishnu Caballero MD;  [facility]: [facility]; Service: Colorectal    COLONOSCOPY W/ ENDOSCOPIC US N/A 10/24/2017    Procedure: ANAL ENDOSCOPIC U/S;  Surgeon: Bishnu Caballero MD;  [facility]: [facility];   Service: Colorectal    DILATION AND CURETTAGE OF UTERUS      ENDOMETRIAL BIOPSY      WITHOUT CERVICAL DILATION    GALLBLADDER SURGERY      HYSTERECTOMY      NASAL SEPTUM SURGERY      NOSE SURGERY      NASAL SEPTAL  DEVIATION REPAIR    OTHER SURGICAL HISTORY      ENDOSCOPIC CONTROL OF GASTRIC BLEEDING, EPISIOTOMY    ROTATOR CUFF REPAIR          06/12/18 1053   Note Type   Note type Eval/Treat   Pain Assessment   Pain Assessment No/denies pain   Pain Score No Pain   Home Living   Type of Home House   Home Layout Stairs to enter with rails  (Ranch 2 [address])   Bathroom Shower/Tub Tub/shower unit   Bathroom Toilet Standard   Bathroom Accessibility Accessible   Additional Comments Pt denies any use of DME PTA   Prior Function   Level of [place] Independent with ADLs and functional mobility   Lives With Spouse   Receives Help From Family   ADL Assistance Independent   IADLs Independent   Falls in the last 6 months 1 to 4  (2)   Vocational Full time employment   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 Restrictions/Precautions   Weight Bearing Precautions Per Order No   Other Precautions Fall Risk;Pain   General   Family/Caregiver Present No   Cognition   Overall Cognitive Status Impaired   Arousal/Participation Cooperative   Orientation Level Oriented to person;Oriented to place;Oriented to situation  (oriented to month and year)   Memory Decreased recall of recent events   Following Commands Follows one step commands with increased time or repetition   Comments Pt admits to recent difficulty remembering recent events  She reports inability to remember fall and events post fall    RUE Assessment   RUE Assessment WFL   LUE Assessment   LUE Assessment WFL   RLE Assessment   RLE Assessment WFL   LLE Assessment   LLE Assessment WFL   Coordination   Movements are Fluid and Coordinated 0   Coordination and Movement Description bradykinetic 2* dizziness    Sensation WFL   Light Touch   RLE Light Touch Grossly intact   LLE Light Touch Grossly intact   Proprioception   RLE Proprioception Grossly intact   LLE Proprioception Grossly Intact   Bed Mobility   Supine to Sit 5  Supervision   Additional items HOB elevated; Increased time required;Verbal cues   Additional Comments Reported dizziness/light headedness sitting EOB; subsided with time but increased again with standing and ambulation    Transfers   Sit to Stand 5  Supervision   Additional items Increased time required;Verbal cues   Stand to Sit 5  Supervision   Additional items Increased time required;Verbal cues   Ambulation/Elevation   Gait pattern Decreased foot clearance  (variable speed and path deviaiton noted )   Gait Assistance 5  Supervision   Additional items (CGA)   Assistive Device None   Distance 20'   Balance   Static Sitting Good   Dynamic Sitting Fair   Static Standing Fair   Dynamic Standing Fair -   Ambulatory Fair -   Endurance Deficit   Endurance Deficit No   Activity Tolerance   Activity Tolerance Patient tolerated treatment well  (limited by light headedness)   Medical Staff Made Aware Francisco Javier Back, [facility]; Sally Corona   Nurse Made Aware appropriate to see   Assessment   Prognosis Good   Problem List Impaired balance;Decreased mobility; Decreased coordination   Assessment Pt is a 62year old female presenting as a transfer from [facility] s/p fall  Pt found to have sustained a SAH and head injury  Additional PMH/problem list includes anxiety, HTN, closed rib fracture, anemia, and depression  PT consulted to assess functional mobility and assist with safe d/c planning  PTA, pt living home with her  in a 1 floor ranch with 2 JABARI and a basement  Pt (I), driving and working full time  On eval, pt presenting with impaired balance, light headedness and impaired coordination  Supervision provided for bed mobility, transfers and gait 2* dizziness and imbalance  PT to follow up 1 session to continue to assess gait  Patient will be appropriate for home with OPPT for concussion rehab when medically appropriate  Goals   Patient Goals Patient would like to return to work  STG Expiration Date 06/15/18   Short Term Goal #1 Pt will be mod(I) with rolling R and L for ease with OOB transfer  Pt will be mod(I) with sit<->stand to promote (I) with toiletingPt will be mod(I) with supine<->sit transfer to ease with OOB  Pt will be mod(I) with ambualtion of household distances (appx 50') to reduce caregiver burden  Pt will be S with community distance ambulaiton using least restrictive AD to increase tolerance to activity  Pt will be S ascending and descending 2 JABARI home  to gain access into home  Pt will particiapte in HEP consisitng of balance, strength, ROM and coordinaiton tasks to increase activitiy, improve (I) and decrease pain  Treatment Day 0   Plan   Treatment/Interventions Functional transfer training;LE strengthening/ROM; Elevations; Therapeutic exercise; Endurance training;Patient/family training;Equipment eval/education;Gait training;Bed mobility;Spoke to nursing;Spoke to case management;OT   PT Frequency Follow-up visit only   Recommendation   Recommendation Home with family support; Outpatient PT   Additional Comments OOB in chair with alarm activated and all needs within reach   Barthel Index   Feeding 10   Bathing 0   Grooming Score 5   Dressing Score 10   Bladder Score 10   Bowels Score 10   Toilet Use Score 10   Transfers (Bed/Chair) Score 10   Mobility (Level Surface) Score 0   Stairs Score 0   Barthel Index Score 65         Keshia Mcmanus, PT

## 2018-06-12 NOTE — SOCIAL WORK
CM met with pt to discuss the role of CM  Pt lives with her  in a 1 story home which has 2STE and 12 steps to the basement  Pt works, drives, and was dully independent PTA  Pt owns a cane, walker, and shower seat  Pt's pharmacy is [facility] in [place]  Pt has anxiety and her PCP manages  Pt has no Psych admissions  Pt has no hx of substance abuse   Pt made aware of meds to beds program  CM will follow up

## 2018-06-12 NOTE — ASSESSMENT & PLAN NOTE
-Very small traumatic SAH vs artifact vs dense blood vessel  -Monitor clinically; will not consult NSGY or repeat head CT at this time per Dr Aakash Saab  -Note that pt has a hx of syncope -- echo and carotid doppler were to be done as an outpatient; these will be done today   Currently pending  -No pharmacologic VTE ppx  -Patient is not on any AC/AP at home

## 2018-06-12 NOTE — OCCUPATIONAL THERAPY NOTE
Occupational Therapy Treatment Note:       06/12/18 1515   Pain Assessment   Pain Assessment 0-10   Pain Score 3   Pain Type Acute pain   Pain Location Head   Cognition   Cognition Assessment Tools MOCA   Score 23   Assessment   Assessment pt participated in pm ot session and was seen focusing on ongoing cognitive assessment using pippa cognitive assessment version 7 1 and scored 23/30 indicating minimal cognitive decline  Plan   Treatment Interventions Cognitive reorientation;Patient/family training;Functional transfer training;ADL retraining   Goal Expiration Date 06/22/18   Treatment Day 1   OT Frequency 3-5x/wk   Recommendation   OT Discharge Recommendation Outpatient OT   Barthel Index   Feeding 10   Bathing 5   Grooming Score 5   Dressing Score 10   Bladder Score 10   Bowels Score 10   Toilet Use Score 10   Transfers (Bed/Chair) Score 10   Mobility (Level Surface) Score 0   Stairs Score 0   Barthel Index Score 70   PT SEEN FOR PIPPA COGNITIVE ASSESSMENT  SCORED  23/30 INDICATING _______minimal____ COGNITIVE IMPAIRMENT FOR AGE/EDUCATION    SCORES ARE AS FOLLOWS:    VISUOSPATIAL/EXECUTIVE FUNCTION: 5/5    NAMING: 3/3    ATTENTION: 3/6  Unable to subtract    LANGUAGE: 2/3  Could only name 10 "f" words in 1 minute    ABSTRACTION: 2/2    DELAYED RECALL: 2/5    ORIENTATION: 6/6

## 2018-06-13 ENCOUNTER — TELEPHONE (OUTPATIENT)
Dept: FAMILY MEDICINE CLINIC | Facility: MEDICAL CENTER | Age: 58
End: 2018-06-13

## 2018-06-13 ENCOUNTER — TRANSITIONAL CARE MANAGEMENT (OUTPATIENT)
Dept: FAMILY MEDICINE CLINIC | Facility: MEDICAL CENTER | Age: 58
End: 2018-06-13

## 2018-06-13 VITALS
HEART RATE: 79 BPM | RESPIRATION RATE: 18 BRPM | DIASTOLIC BLOOD PRESSURE: 92 MMHG | BODY MASS INDEX: 36.46 KG/M2 | OXYGEN SATURATION: 97 % | HEIGHT: 66 IN | TEMPERATURE: 98 F | WEIGHT: 226.85 LBS | SYSTOLIC BLOOD PRESSURE: 182 MMHG

## 2018-06-13 PROCEDURE — 97116 GAIT TRAINING THERAPY: CPT

## 2018-06-13 PROCEDURE — G8979 MOBILITY GOAL STATUS: HCPCS

## 2018-06-13 PROCEDURE — G8978 MOBILITY CURRENT STATUS: HCPCS

## 2018-06-13 PROCEDURE — 99217 PR OBSERVATION CARE DISCHARGE MANAGEMENT: CPT | Performed by: PHYSICIAN ASSISTANT

## 2018-06-13 PROCEDURE — G8980 MOBILITY D/C STATUS: HCPCS

## 2018-06-13 RX ORDER — METHOCARBAMOL 500 MG/1
500 TABLET, FILM COATED ORAL EVERY 6 HOURS SCHEDULED
Qty: 30 TABLET | Refills: 0 | Status: SHIPPED | OUTPATIENT
Start: 2018-06-13 | End: 2018-06-22

## 2018-06-13 RX ORDER — ACETAMINOPHEN 325 MG/1
650 TABLET ORAL EVERY 6 HOURS PRN
Qty: 30 TABLET | Refills: 0
Start: 2018-06-13 | End: 2020-06-25 | Stop reason: ALTCHOICE

## 2018-06-13 RX ADMIN — LIDOCAINE 1 PATCH: 50 PATCH TOPICAL at 08:30

## 2018-06-13 RX ADMIN — METHOCARBAMOL 500 MG: 500 TABLET ORAL at 00:00

## 2018-06-13 RX ADMIN — VALSARTAN 320 MG: 160 TABLET ORAL at 08:30

## 2018-06-13 RX ADMIN — SERTRALINE HYDROCHLORIDE 50 MG: 50 TABLET ORAL at 08:30

## 2018-06-13 RX ADMIN — ACETAMINOPHEN 975 MG: 325 TABLET ORAL at 05:14

## 2018-06-13 RX ADMIN — METHOCARBAMOL 500 MG: 500 TABLET ORAL at 12:09

## 2018-06-13 RX ADMIN — METHOCARBAMOL 500 MG: 500 TABLET ORAL at 05:14

## 2018-06-13 RX ADMIN — BACITRACIN ZINC 1 SMALL APPLICATION: 500 OINTMENT TOPICAL at 08:30

## 2018-06-13 NOTE — ASSESSMENT & PLAN NOTE
-Very small traumatic SAH vs artifact vs dense blood vessel  -Monitor clinically; will not consult NSGY or repeat head CT at this time per Dr Lorna Lama  -Note that pt has a hx of syncope -- echo and carotid doppler were to be done as an outpatient; these will be done today   Currently pending  -No pharmacologic VTE ppx in setting the patient will be discharged home today and the patient is ambulatory  -Patient is not on any AC/AP at home

## 2018-06-13 NOTE — ASSESSMENT & PLAN NOTE
-rib fracture protocol  -incentive spirometry  -p r n  pain management  -possible fracture located at the 10th rib right side

## 2018-06-13 NOTE — PHYSICAL THERAPY NOTE
Physical Therapy Progress Note     06/13/18 1300   Pain Assessment   Pain Assessment No/denies pain   Pain Score No Pain   Subjective   Subjective The pt  states that she is leaving shortly  Transfers   Sit to Stand 7  Independent   Stand to Sit 7  Independent   Ambulation/Elevation   Gait pattern WNL   Gait Assistance 7  Independent   Assistive Device None   Distance 500 feet  Stair Management Assistance 7  Independent   Stair Management Technique No rails   Number of Stairs 5   Balance   Static Sitting Normal   Dynamic Sitting Normal   Static Standing Normal   Dynamic Standing Normal   Ambulatory Normal   Activity Tolerance   Activity Tolerance Patient tolerated treatment well   Nurse Made Aware DENNIS Guajardo  Assessment   Prognosis Excellent   Assessment The pt  has returned to independence with all mobility, and she has no further deficits  She has no further skilled physical therapy needs  Barriers to Discharge None   Goals   Patient Goals To return home  STG Expiration Date 06/15/18   Treatment Day 1   Plan   Treatment/Interventions Functional transfer training;LE strengthening/ROM; Elevations; Endurance training;Patient/family training;Bed mobility;Gait training   Progress Discontinue PT   Recommendation   Recommendation Home independently;Home with family support   PT - OK to Discharge Yes     Yaneth Torrez, PTA

## 2018-06-13 NOTE — DISCHARGE SUMMARY
Discharge- [facility]na [facility] 1960, 62 y o  female MRN: [id number]    [facility] Encounter: [id number]    Primary Care Provider: William Orta DO   Date and time admitted to hospital: 6/11/2018  3:13 PM        SAH (subarachnoid hemorrhage) ([facility] [address] )   Assessment & Plan    -Very small traumatic SAH vs artifact vs dense blood vessel  -Monitor clinically; will not consult NSGY or repeat head CT at this time per Dr El Barone  -Note that pt has a hx of syncope -- echo and carotid doppler were to be done as an outpatient; these will be done today  Currently pending  -No pharmacologic VTE ppx in setting the patient will be discharged home today and the patient is ambulatory  -Patient is not on any AC/AP at home          Closed rib fracture   Assessment & Plan    -rib fracture protocol  -incentive spirometry  -p r n  pain management  -possible fracture located at the 10th rib right side        * Closed head injury   Assessment & Plan    -S/P fall from horse and striking R side of head  -Mild superficial abrasions on the R side of face and area of swelling in the R postauricular area  -No skull fx on CT          PT OT:  Outpatient PT and OT    Disposition:  Discharged today with home with family support    Subjective: "No complaints "    Physical exam:  General:  No acute distress, well-appearing  HEENT:  Minor facial abrasions, PERRLA, EOMs intact  Cardiac:  Regular rate and rhythm, no split S1-S2  Lungs:  CTA bilaterally, no rales or rhonchi  Abdomen:   Bowel sounds x4, nontender  Extremities:  +2 pulses on extremities, neurovascularly intact  Neuro:  Cranial nerves 2-12 intact, no focal deficits  Skin:  Warm, dry, intact        Resolved Problems  Date Reviewed: 6/13/2018    None          Admission Date:   Admission Orders     Ordered        06/12/18 1818  Place in Observation  Once               Admitting Diagnosis: Head injury [S09 90XA]  SA[facility]ach[facility]) ([facility] [I60 9]    HPI: Per Lupe Sepulveda Dinah, "Sherman Jensen is a 62 y o  female who presents as a transfer from [facility] for fall x 2  Yesterday she was riding a horse when she was thrown from the horse to the ground, landing on her right side and striking her head  She has no memory of the incident and does not remember anything until she was back at home  Has a mild abrasion of the R side of the face and an area of swelling in the right postauricular area  Today, she was walking onto her deck when she fell again  Unsure why; says there was nothing there to trip over, but again, she does not remember this  Both were witnessed by her   She c/o feeling dizzy currently, as well as right knee pain, right shoulder pain, R rib pain, left SCM pain, and pain in the right postauricular area  She is not on any blood thinners       She was evaluated today at [facility], where she had a CT of the head, c-spine, chest, abdomen, and pelvis  There was a small, questionable area of subarachnoid hemorrhage vs  artifact  There was also a questionable right 10th nondisplaced rib fracture  The rest if her imaging was unremarkable  She was transferred here for further care "       Procedures Performed: No orders of the defined types were placed in this encounter  Summary of Hospital Course:  Patient is a 59-year-old female who comes in for evaluation status post fall from horse  Patient was transferred from [facility] secondary to possible subarachnoid bleed  Patient was evaluated by trauma team at [facility] who noted that there was no acute subarachnoid head bleed  Neurosurgery was not consulted per the attending  Neuro exam remained intact during hospital course  GCS remained 15  She was not scheduled for repeat head CT  She was not started on Keppra secondary to the 1 cut possible head bleed  She was discharged in stable condition to home with family support  She did require outpatient PT and OT    She would have follow-up with her out patient primary care provider  Significant Findings, Care, Treatment and Services Provided: Xr Shoulder 2+ Vw Right    Result Date: 6/11/2018  Impression: No acute osseous abnormality  Workstation performed: [id number]     Xr Knee 4+ Vw Right Injury    Result Date: 6/11/2018  Impression: No acute osseous abnormality  Workstation performed: [id number]     Ct Head Without Contrast    Result Date: 6/11/2018  Impression: Small focus of hyperdensity in the interhemispheric fissure which may represent a small calcification, dense blood vessel or possibly tiny focus of subarachnoid hemorrhage  Additional subtle hyperdensity in the left frontal subcortical white matter which could be artifactual versus mild parenchymal blood  A 12 hour follow-up CT is recommended  I personally discussed this study with Maegan Jt on 6/11/2018 at 1:10 PM  Workstation performed: [id number]     Ct Spine Cervical Without Contrast    Result Date: 6/11/2018  Impression: No cervical spine fracture or traumatic malalignment  Workstation performed: [id number]     Ct Chest Abdomen Pelvis W Contrast    Result Date: 6/11/2018  Impression: 1  Mild irregularity of the lateral aspect of the right 10th rib possibly representing a nondisplaced fracture  Clinical correlation for pain and tenderness in this region is recommended  2   No acute hepatic injury to the chest, abdomen, or pelvic viscera  3   Hepatomegaly with moderate to severe hepatic steatosis  Workstation performed: [id number]       Complications:  No complications    Condition at Discharge: good         Discharge instructions/Information to patient and family:   See after visit summary for information provided to patient and family  Provisions for Follow-Up Care:  See after visit summary for information related to follow-up care and any pertinent home health orders        PCP: Isac Esteves DO    Disposition: Home    Planned Readmission: No    Discharge Statement   I spent 22 minutes discharging the patient  This time was spent on the day of discharge  I had direct contact with the patient on the day of discharge  Additional documentation is required if more than 30 minutes were spent on discharge  Discharge Medications:  See after visit summary for reconciled discharge medications provided to patient and family

## 2018-06-13 NOTE — PLAN OF CARE
Problem: PHYSICAL THERAPY ADULT  Goal: Performs mobility at highest level of function for planned discharge setting  See evaluation for individualized goals  Treatment/Interventions: Functional transfer training, LE strengthening/ROM, Elevations, Therapeutic exercise, Endurance training, Patient/family training, Equipment eval/education, Gait training, Bed mobility, Spoke to nursing, Spoke to case management, OT          See flowsheet documentation for full assessment, interventions and recommendations  Outcome: Completed Date Met: 06/13/18  Prognosis: Excellent  Problem List: Impaired balance, Decreased mobility, Decreased coordination  Assessment: The pt  has returned to independence with all mobility, and she has no further deficits  She has no further skilled physical therapy needs  Barriers to Discharge: None     Recommendation: Home independently, Home with family support     PT - OK to Discharge: Yes    See flowsheet documentation for full assessment

## 2018-06-13 NOTE — DISCHARGE INSTRUCTIONS
Head Injury   WHAT YOU NEED TO KNOW:   A head injury is most often caused by a blow to the head  This may occur from a fall, bicycle injury, sports injury, being struck in the head, or a motor vehicle accident  DISCHARGE INSTRUCTIONS:   Call 911 or have someone else call for any of the following:   · You cannot be woken  · You have a seizure  · You stop responding to others or you faint  · You have blurry or double vision  · Your speech becomes slurred or confused  · You have arm or leg weakness, loss of feeling, or new problems with coordination  · Your pupils are larger than usual or one pupil is a different size than the other  · You have blood or clear fluid coming out of your ears or nose  Seek care immediately if:   · You have repeated or forceful vomiting  · You feel confused  · Your headache gets worse or becomes severe  · You or someone caring for you notices that you are harder to wake than usual   Contact your healthcare provider if:   · Your symptoms last longer than 6 weeks after the injury  · You have questions or concerns about your condition or care  Medicines:   · Acetaminophen  decreases pain  Acetaminophen is available without a doctor's order  Ask how much to take and how often to take it  Follow directions  Acetaminophen can cause liver damage if not taken correctly  · Take your medicine as directed  Contact your healthcare provider if you think your medicine is not helping or if you have side effects  Tell him or her if you are allergic to any medicine  Keep a list of the medicines, vitamins, and herbs you take  Include the amounts, and when and why you take them  Bring the list or the pill bottles to follow-up visits  Carry your medicine list with you in case of an emergency  Self-care:   · Rest  or do quiet activities for 24 to 48 hours  Limit your time watching TV, using the computer, or doing tasks that require a lot of thinking   Slowly return to your normal activities as directed  Do not play sports or do activities that may cause you to get hit in the head  Ask your healthcare provider when you can return to sports  · Apply ice  on your head for 15 to 20 minutes every hour or as directed  Use an ice pack, or put crushed ice in a plastic bag  Cover it with a towel before you apply it to your skin  Ice helps prevent tissue damage and decreases swelling and pain  · Have someone stay with you for 24 hours  or as directed  This person can monitor you for complications and call [phone]  When you are awake the person should ask you a few questions to see if you are thinking clearly  An example would be to ask your name or your address  Prevent another head injury:   · Wear a helmet that fits properly  Do this when you play sports, or ride a bike, scooter, or skateboard  Helmets help decrease your risk of a serious head injury  Talk to your healthcare provider about other ways you can protect yourself if you play sports  · Wear your seat belt every time you are in a car  This helps to decrease your risk for a head injury if you are in a car accident  Follow up with your healthcare provider as directed:  Write down your questions so you remember to ask them during your visits  © 2017 [employer]  Information is for End User's use only and may not be sold, redistributed or otherwise used for commercial purposes  All illustrations and images included in CareNotes® are the copyrighted property of [employer]  or [employer] Land[employer]  The above information is an  only  It is not intended as medical advice for individual conditions or treatments  Talk to your doctor, nurse or pharmacist before following any medical regimen to see if it is safe and effective for you  Rib Fracture   WHAT YOU NEED TO KNOW:   A rib fracture is a crack or break in a rib bone  Rib fractures usually heal within 6 weeks   You should be able to return to normal activities before that time  Do not wrap anything around your body to try to splint your ribs  This can prevent you from taking deep breaths and increases your risk for pneumonia  DISCHARGE INSTRUCTIONS:   Call 911 for any of the following:   · You have trouble breathing  · You have new or increased pain  Return to the emergency department if:   · Your pain does not get better, even after treatment  · You have a fever or a cough  Contact your healthcare provider if:   · You have questions or concerns about your condition or care  Medicines:   · NSAIDs  help decrease swelling and pain  NSAIDs are available without a doctor's order  Ask your healthcare provider which medicine is right for you  Ask how much to take and when to take it  Take as directed  NSAIDs can cause stomach bleeding and kidney problems if not taken correctly  · Prescription pain medicine  may be given  Ask your healthcare provider how to take this medicine safely  Some prescription pain medicines contain acetaminophen  Do not take other medicines that contain acetaminophen without talking to your healthcare provider  Too much acetaminophen may cause liver damage  Prescription pain medicine may cause constipation  Ask your healthcare provider how to prevent or treat constipation  · Take your medicine as directed  Contact your healthcare provider if you think your medicine is not helping or if you have side effects  Tell him or her if you are allergic to any medicine  Keep a list of the medicines, vitamins, and herbs you take  Include the amounts, and when and why you take them  Bring the list or the pill bottles to follow-up visits  Carry your medicine list with you in case of an emergency  Follow up with your healthcare provider as directed:  Write down your questions so you remember to ask them during your visits  Deep breathing:  Deep breathing will decrease your risk for pneumonia   Hug a pillow on the injured side while doing this exercise, to decrease pain  Take a deep breath and hold it for as long as possible  You should let the air out and then cough strongly  Deep breaths help open your airway  You may be given an incentive spirometer to help take deep breaths  Put the plastic piece in your mouth  Take a slow, deep breath  You should then let the air out and cough  Repeat these steps 10 times every hour  Rest:  Rest and limit activity to decrease swelling and pain, and allow your injury to heal  Avoid activities that may cause more pain or damage to your ribs such as, pulling, pushing, and lifting  As your pain decreases, begin movements slowly  Take short walks between rest periods  Ice:  Apply ice on the fractured area for 15 to 20 minutes every hour or as directed  Use an ice pack or put crushed ice in a plastic bag  Cover it with a towel  Ice helps prevent tissue damage and decreases swelling and pain  © 2017 [employer]  Information is for End User's use only and may not be sold, redistributed or otherwise used for commercial purposes  All illustrations and images included in CareNotes® are the copyrighted property of [employer]  or [employer]e Landry  The above information is an  only  It is not intended as medical advice for individual conditions or treatments  Talk to your doctor, nurse or pharmacist before following any medical regimen to see if it is safe and effective for you

## 2018-06-17 DIAGNOSIS — F41.9 ANXIETY: ICD-10-CM

## 2018-06-18 RX ORDER — SERTRALINE HYDROCHLORIDE 25 MG/1
TABLET, FILM COATED ORAL
Qty: 90 TABLET | Refills: 1 | Status: SHIPPED | OUTPATIENT
Start: 2018-06-18 | End: 2018-09-19 | Stop reason: SDUPTHER

## 2018-06-21 ENCOUNTER — EVALUATION (OUTPATIENT)
Dept: PHYSICAL THERAPY | Facility: CLINIC | Age: 58
End: 2018-06-21
Payer: COMMERCIAL

## 2018-06-21 ENCOUNTER — EVALUATION (OUTPATIENT)
Dept: OCCUPATIONAL THERAPY | Facility: CLINIC | Age: 58
End: 2018-06-21
Payer: COMMERCIAL

## 2018-06-21 DIAGNOSIS — I60.9 SAH (SUBARACHNOID HEMORRHAGE) (HCC): Primary | ICD-10-CM

## 2018-06-21 DIAGNOSIS — I60.9 SAH (SUBARACHNOID HEMORRHAGE) (HCC): ICD-10-CM

## 2018-06-21 DIAGNOSIS — S06.0X9D CONCUSSION WITH LOSS OF CONSCIOUSNESS, SUBSEQUENT ENCOUNTER: Primary | ICD-10-CM

## 2018-06-21 PROCEDURE — G8979 MOBILITY GOAL STATUS: HCPCS | Performed by: PHYSICAL THERAPIST

## 2018-06-21 PROCEDURE — 97162 PT EVAL MOD COMPLEX 30 MIN: CPT | Performed by: PHYSICAL THERAPIST

## 2018-06-21 PROCEDURE — G8991 OTHER PT/OT GOAL STATUS: HCPCS

## 2018-06-21 PROCEDURE — G8978 MOBILITY CURRENT STATUS: HCPCS | Performed by: PHYSICAL THERAPIST

## 2018-06-21 PROCEDURE — 97166 OT EVAL MOD COMPLEX 45 MIN: CPT

## 2018-06-21 PROCEDURE — G8990 OTHER PT/OT CURRENT STATUS: HCPCS

## 2018-06-21 NOTE — PROGRESS NOTES
Occupational Therapy Concussion Evaluation    Today's Date: 2018  Patient Name: Shawn Hannah  : 1960  MRN: [id number]  Referring Provider: Estefany Berkowitz PA-C  Dx: [facility] (subarachnoid hemorrhage) [facility]) [I60 9]    Active Problem List:   Patient Active Problem List   Diagnosis    Anxiety    Hypertension    Closed rib fracture    [facility]    Closed head injury    Mild TBI ([facility] )     Past Medical Hx:   Past Medical History:   Diagnosis Date    Anemia     Anxiety     Depression     Diarrhea     Fecal incontinence     History of colon polyps     Hypertension      Past Surgical Hx:   Past Surgical History:   Procedure Laterality Date    CHOLECYSTECTOMY      COLONOSCOPY N/A 10/24/2017    Procedure: COLONOSCOPY;  Surgeon: Sabrina Babb MD;  [facility]: [facility]; Service: Colorectal    COLONOSCOPY W/ ENDOSCOPIC US N/A 10/24/2017    Procedure: ANAL ENDOSCOPIC U/S;  Surgeon: Sabrina Babb MD;  Location: [facility];   Service: Colorectal    DILATION AND CURETTAGE OF UTERUS      ENDOMETRIAL BIOPSY      WITHOUT CERVICAL DILATION    GALLBLADDER SURGERY      HYSTERECTOMY      NASAL SEPTUM SURGERY      NOSE SURGERY      NASAL SEPTAL  DEVIATION REPAIR    OTHER SURGICAL HISTORY      ENDOSCOPIC CONTROL OF GASTRIC BLEEDING, EPISIOTOMY    ROTATOR CUFF REPAIR        Pain Levels:   Resting:  3    With Activity:  5    Subjective/Patient Goal: "To work on my memory and my eyes"    History of Present Illness:  Pt is a pleasant, active,  62 y o  female seen for OT matthew s/p referred to [facility] s/p d/c'd from hospitals P9 s/p tfer from [facility] for fall x2, riding a horse and was thrown from the horse landing on the ground striking her head, no memory of incident not remembering anything until she wa sback at home, mild abrasion of the R side of the face and area of swelling in the rihgt postauricular area, was walking onto her deck when she fell again no recall of incident, witnessed per , c/o R shoulder pain, R rib pain, left SCM pain, R pain in the R postauricular area, CTB @ [facility] + SAH tferred to [facility] for neurosx c/s, now dx'd w/ R 10th nondisplaced rib fx, traumatic SAH, closed head injury, comorbidities as listed above  Lifestyle Performance Model:  Autonomy: Pt was I w/ I/ADLs, drove, & required no use of DME PTA  Reciprocal Relationships: Supportive , 3 children, 2 grandchildren  Service to Others: 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  Home Setup: Pt lives in [facility] w/ FF to basement w/ laundry in the cellar used regularly, 1 JABARI  Objective  Impairments Section:   1  Convergence Insufficiency Symptom Survey (CISS): 31/60 FAIL    2  Concussion Cognitive Checklist: self report symptom checklist  *Patient indicated that she is experiencing the following symptoms:    · Memory: Remembering people's names    · Attention: Keeping attention during a conversation, Focusing or concentrating on a specific task, Sustaining attention on a task and Dividing your attention (i e , multi-tasking)    · Processing: Responding to questions in a timely manner    · Executive Functions: None reported    · Communication: Word finding in conversation and Expressing thoughts and ideas fluently    · Visual: Losing spot on the page when reading    · Emotional: Frustration tolerance    · Increased Sensitivities to: Lighting and Noise     3   Contextual Memory Test (CMT): Pt reports has noticed a change in her memory, rates her memory capacity at 75%, if studied 20 objects for 90 seconds would recall 8 of them, would have recalled 15 of them pre injury, frequently forgets things that happened the day before 50% of the time, forgets important details 25% of the time, frequently forgets things people have told her 25% of the time, frequently forgets things that happened a few minutes ago almost never, would remember facts about this form a week from now  IMMEDIATE RECALL:     score falls  in WFL/WNL deficit range    DELAYED RECALL:   score falls in suspect deficit range   RECOGNITION:   with 3 confabulations resulting in score 18/     4  [place] Cognitive Assessment Version 8 2 (MoCA V8 2): Pt completed MoCA V8 1 in acute care w/ OT on 2018 and scored 23/30 indicative of mild neurocognitive impairments  Visuospatial/executive functionin/5  Naming: 3/3  Memory: 1st trial: , 2nd trial:   Attention/concentration:   List of letters:   Serial Seven Subtraction: 3/3 w/ 0 errors  Language/sentence repetition:   Language Fluency:   Abstract/Correlational Thinkin/2  Delayed Recall: 3/5  Orientation:    Memory Index Score: 13/15  MoCA V1 8 2 Raw Score: 27/30, MIS: 13/15, indicative of normal neurocognitive functioning  5  Vision Screening Recording Form:   vision screen: + progressive glasses @ all times present for vision screen  near acuity: R 20/25, L 20/30  binocularity far: orthophoria  binocularity near: orthophoria  red green fusion: PLRG @ 3"  near point of convergence: diplopia @ 12"  mike string: suppression of far w/ divergence insufficiency  Pursuits: jerky in all planes w/ intolerance w/ pursuits hard blinking eye strain and eye fatigue  Saccades: inaccurate in all planes w/ eye strain and eye fatigue hard blinking  ocular ROM: intact/full  visual perceptual midline shift: @ midline and @ horizon    6  Anxiety/Depression  Pt engaged in the Neuro QOL Anxiety Short Form and Neuro QOL Depression Short Form in order to screen for anxiety and depressive s/s  Pt reported the following:  Anxiety- Short Form:   --used to measure anxious s/s  For scoring purposes, scores of 25+ indicate the threshold for treatment per neurology/SLIM  Score:10/40   Pt  Most often chose the response never when asked about feeling anxious s/s  Depression- Short Form:   --used to measure depressive s/s  For scoring purposes, scores of 25+ indicate the threshold for treatment per neurology/SLIM  Score:  Pt  Most often chose the response never when asked about feeling depressive s/s  Assessment/Plan  Occupational Therapy Skilled Analysis Assessment and Plan of Care:  Pt requires overall mod I for ADLs/self care and mod I for fx'l mobility w/o DME  Pt is currently demonstrating the following occupational deficits: limited 2* photophobia, phonophobia, HA, dizziness, nausea, impaired high level dynamic balance t/o I/ADL/leisure tasks, divergence insufficiency, suppression of far, difficulty w/ reading comprehension, processing, divided attention, STM/immediate delayed recall, decreased river role, decreased worker role, external stimuli hypersensitivity, decreased attention/concentration to task, diplopia @ 12" for near point of convergence, jerky pursuits in all planes, inaccurate saccades in all planes, hard blinking, eye watering, intolerance to oncoming objects, hippussing, difficulty w/ divided attention, auditory processing  The following Occupational Performance Areas to address include: medication management, socialization, health maintenance, functional mobility, community mobility, clothing management, cleaning, meal prep, money management, household maintenance, care of children, care of pets, job performance/volunteering and social participation  Based on the aforementioned OT evaluation, functional performance deficits, and assessments, pt has been identified as a moderate complexity evaluation   Pt to continue to benefit from outpatient skilled OT services to address the following goals 2x/wk to  w/in 4 weeks with special focus on self-care management, pt education,  and VM training as well as motor training to improve above defiicits and enhance overall QOL/function      Goals:  Short Term Goals:  - Pt will increase auditory processing to take notes while listening in multi-modal work related/classroom symptom free at baseline performance for improved work/school performance, once returned  4 weeks as applicable  - Pt will increase attention to 2+ tasks for improved work/school performance (once returned) and engagement in salient tasks 4 weeks as applicable  - Pt will increase temporal awareness for keeping to schedule within 5 min increments, recall appointments, functional addition of time with 80% accuracy 4 weeks  - Pt will increase verbal and written direction following with processing time of <2 min and 80% accuracy for improved work/school performance, once returned 4 weeks as applicable  - Pt will demo good carryover of internal and external memory aides for improved recall of daily events, improved executive functioning with 80% accuracy in 4 weeks  - Pt will increase insight into deficits for improved carryover of recommendations, accommodations, improved rate of healing 4 weeks  - Pt will demo good carryover of self calming strategies for hypersensitivities to decrease symptoms within 5 min to baseline for improved tolerance of cog load tasks in 4 weeks  - Pt will increase screen tolerance to 2 hours with min increase in HA by 1-2 levels for improved leisure pursuits and work/school performance 4 weeks as applicable  - Pt will increase oculomotor control for improved saccades, con/divergent tasks for improved reading, board to table tasks with minimal increase in symptoms 4 weeks  - Pt will tolerate multi-modal envt x 15 min with 80% accuracy of cog load and min increase of symptoms of 2 levels in HA/dizziness/nausea 4 weeks  Long Term Goals:  - Pt will increase attention to 3+ tasks for improved divided attention with work/school and pre driving roles as applicable  - Pt will increase verbal and written direction following with processing time of <1 min and 85% accuracy  - Pt will tolerate multimodal envt x 60 min symptom free for return to work/school  - Pt will demo with decreased anxiety and frustration for improved insight into concussion process and rate of recovery  - Pt will demo with G carryover and understanding of accommodations for school/work environment to allow for enhanced learning environment symptom free, if needed  - Pt will increase oculomotor control for improved dynamic activities with head turns, board/screen to table tasks symptom free, improved VMI and return to baseline handwriting  - Pt will increase oculomotor control for WNL saccades, con/divergent tasks symptom free  - Pt will increase screen tolerance to 3 hours with min increase in HA by 1-2 levels for improved leisure pursuits and work/school performance as applicable    INTERVENTION COMMENTS:  Diagnosis: R 10th nondisplaced rib fx, traumatic SAH, closed head injury  Precautions: R rib fx  FOTO: 79 with 21% limitation  Insurance: [facility] [[id number]]  1 of 12 visits, PN due 7/21/2018    Thank you for the consult!   Please call if you have any questions: [phone]  Jona Stein, OTADIEL, OTR/L, C-GCM, CSRS  Director of Outpatient Neuro Occupational Therapy

## 2018-06-21 NOTE — PROGRESS NOTES
PT Evaluation     Today's date: 2018  Patient name: Radha Wall  : 1960  MRN: [id number]  Referring provider: Suman Centeno PA-C  Dx:   Encounter Diagnosis     ICD-10-CM    1  Concussion with loss of consciousness, subsequent encounter S06  [id number]    2  SAH (subarachnoid hemorrhage) ([facility]) I60 9        Start Time: 0910  Stop Time: 1000  Total time in clinic (min): 50 minutes    Assessment  Impairments: abnormal gait, impaired balance and safety issue  Other impairment: increased dizziness, fogginess, and headache    Assessment details: Pt is a 62year old female referred after concussion and SAH from being thrown off of a horse  Pt presented today with impairments in decreased standing balance specifically with eyes closed, increased c/o HA, dizziness, and fogginess throughout day, convergence insufficiency, abnormal VOR, and increased dizziness with oculomotor exam all which limit her functionally with gait, elevations, transfers, and returning to work  Pt will benefit from PT plan below needed to address above impairments, reduce symptoms, and return to work  Understanding of Dx/Px/POC: good   Prognosis: good    Goals  STGs:  1  Pt will reduce HA rating to at worst a 2/10 within 6 weeks  2  Pt will reduce dizziness rating to at worst a 2/10 within 6 weeks  3  Pt will improve DHI score by at least 5 points within 6 weeks  4  Pt will improve balance on noncompliant surface with eyes closed for 30 seconds within 6 weeks  5  Pt will demonstrate independence with HEP within 6 weeks  LTGs:  1  Pt will deny headache within 12 weeks  2  Pt will deny dizziness 12 weeks  3  Pt will improve DHI score to at least a 15/100 within 12 weeks  4  Pt will improve DGI score to at least 23/24 needed to reduce fall risk within 12 weeks  5  Pt will demonstrate independence with HEP within 12 weeks  6  Pt will have returned to normal tolerance for work within 12 weeks      Plan  Patient would benefit from: skilled physical therapy and skilled occupational therapy  Referral necessary: No  Planned therapy interventions: balance, gait training, home exercise program, therapeutic exercise, patient education and neuromuscular re-education  Frequency: 2x week  Duration in weeks: 12  Treatment plan discussed with: patient  Plan details: Certification period from today 18 through 18        Subjective Evaluation    History of Present Illness  Date of onset: 6/10/2018  Mechanism of injury: Thrown from a horse, not wearing a helmet, went to look at a horse to buy, wasn't calm  Has no memory of falling off, unsure if lost conscious, head hit the fence  Hit on right side and front  Has rib fractures on R side  Went to ER, then admitted to hospital   Found to have a small SAH as well  Currently complaints off dizziness, Has, pain in ribs  Can't move too quickly due to feeling off balance  Nothing in particulate makes her dizzy  Works as dispatcher, works with three screens, has been off work  Sees PCP tomorrow    Dizziness currently 2/10, best 2/10, worst 3-4/10  Not a recurrent problem   Pain  Current pain ratin  At best pain ratin  At worst pain ratin  Location: right ribs  Quality: sharp    Social Support  Steps to enter house: yes  Stairs in house: yes (in basement)   Lives in: [facility]  Lives with: spouse    Employment status: working  Hand dominance: right      Diagnostic Tests  CT scan: abnormal  Carotid study: normal  Patient Goals  Patient goals for therapy: return to work and improved balance          Objective  PT/OT Neuro Exam  Neurologic Exam      Dysequilibrium: Yes  Lightheadedness: Yes  Vertigo: No  Rocking or Swaying: No         Oscillopsia: No  Diplopia: No  Motion sickness: No  Floating, Swimming, Disconnected: Yes    Exacerbation Factors:  Bending over: Yes  Turning Head: No  Rolling in bed: Yes  Walking: No  Looking up: No  Supine to/from sitting: Yes  Optokinetic movement: Yes  Walking in busy environment: Yes     Concurrent Complaints:  Tinnitus:Yes, both  Aural Fullness:No  Known hearing loss:No  Nausea, Vomiting: No  Altered Vision: Yes  Poor Concentration: Yes  Memory Loss: Yes  Peripheral Neuropathy:No      Pain Assessment      Headache       Best:0        Worst:4        Average: 2  Exacerbating Factors: bright lights, moving too fast  Relieving Factors: rest, closing eyes   Cervical 0     PHYSICAL FINDINGS:  Oculomotor ROM : WNL  Resting nystagmus: No  Gaze holding nystagmus Yes , up to right and left  VOMS (Vestibular/Ocular Motor Screen)--no symptoms unless noted      Smooth pursuit Normal, increased dizziness     Saccades (horizontal) Normal, increased dizziness     Saccades (vertical)  Normal, increased dizziness     Convergence (near point)--Abnormal trial 1: 15 cm     VOR (horizontal) Abnormal, increased dizziness, unable to keep eyes on target     VOR (vertical) Normal, increased dizziness     Visual Motion Sensitivity Abnormal  MCTSIB  30 eyes open firm surface   30 eyes closed form surface  30 eyes open foam surface  9 eyes closed foam surface, with loss of balance  DHI: 48/100  0-30 mild , 30-60    Flowsheet Rows      Most Recent Value   Dynamic Gait Index   Gait level surface   2   Change in gait speed  2   Gait with horizontal head turns   1   Gait with vertical head turns   1   Gait and pivot turn  2   Step over obstacle  2   Step around obstacle  2   Steps  2   Total score   14         Flowsheet Rows      Most Recent Value   Dynamic Gait Index   Gait level surface   2   Change in gait speed  2   Gait with horizontal head turns   1   Gait with vertical head turns   1   Gait and pivot turn  2   Step over obstacle  2   Step around obstacle  2   Steps  2   Total score   14      Gait speed:  58 m/s without AD; no remarkable deviations    Precautions concussion, SAH, anxiety/depression    Specialty Daily Treatment Diary     Manual Exercise Diary  6/21/18       VORx1, H/V  standing Plain  Reviewed for HEP       VORcx, H/V  standing Plain  Reviewed for HEP       Saccades, H/V standing     Plain  Reviewed for HEP       Smooth pursuits  standing Plain  Reviewed for HEP       Foam, arms crossed EC        Walking with HTs, H/V        Walking with turns        Tandem gait        Ball toss hand to hand        Rockerboard, M/L and A/P                                                                                            Modalities

## 2018-06-22 ENCOUNTER — OFFICE VISIT (OUTPATIENT)
Dept: FAMILY MEDICINE CLINIC | Facility: MEDICAL CENTER | Age: 58
End: 2018-06-22
Payer: COMMERCIAL

## 2018-06-22 VITALS
DIASTOLIC BLOOD PRESSURE: 90 MMHG | WEIGHT: 215.4 LBS | BODY MASS INDEX: 34.77 KG/M2 | OXYGEN SATURATION: 98 % | HEART RATE: 85 BPM | SYSTOLIC BLOOD PRESSURE: 150 MMHG

## 2018-06-22 DIAGNOSIS — S22.31XA CLOSED FRACTURE OF ONE RIB OF RIGHT SIDE, INITIAL ENCOUNTER: ICD-10-CM

## 2018-06-22 DIAGNOSIS — I60.9 SUBARACHNOID HEMORRHAGE (HCC): Primary | ICD-10-CM

## 2018-06-22 DIAGNOSIS — S06.0X9A CONCUSSION WITH LOSS OF CONSCIOUSNESS, INITIAL ENCOUNTER: ICD-10-CM

## 2018-06-22 DIAGNOSIS — I10 ESSENTIAL HYPERTENSION: ICD-10-CM

## 2018-06-22 PROCEDURE — 99495 TRANSJ CARE MGMT MOD F2F 14D: CPT | Performed by: FAMILY MEDICINE

## 2018-06-22 NOTE — PROGRESS NOTES
Assessment/Plan:    No problem-specific Assessment & Plan notes found for this encounter  Diagnoses and all orders for this visit:    Subarachnoid hemorrhage ([address] )  -     CT head wo contrast; Future  Seen on recent CT scan done in the hospital due to her head injury  Patient is still symptomatic  I am going to repeat her CT scan to assess if her hemorrhage has increased in size  Closed fracture of one rib of right side, initial encounter  Patient still has pain from the rib fracture  Continue Tylenol as needed  Continue the incentive spirometer  Call the office or go to the ER if shortness of breath develops or pain worsens  Concussion with loss of consciousness, initial encounter  -     Ambulatory referral to Sports Medicine; Future  Patient has a concussion as she is getting headaches and dizziness status post head injury from falling off the horse  She is going for occupational therapy  I will have her see the concussion specialist for further evaluation  Essential hypertension  Blood pressure did improve somewhat  No change in medication at this time  Close follow-up  I discussed with patient that I will complete her FMLA paperwork as well as her disability paperwork as I do not want her returning to work in the state she is in  I recommended patient not drive until further notice  I recommended against activities that cause headaches such is using the phone of the computer  Follow-up in two weeks or sooner if needed  Subjective:      Patient ID: aRdha Wall is a 62 y o  female  Patient presents for hospital follow-up  She states she went to look at a new hoarse that she was going to buy  This was on 6/10/2018  While she was riding the horse she was thrown off of the horse  She landed on a fence with her head and the right side of her chest   She did lose consciousness per her   Patient does not remember the incident    Patient's  states that patient got back on the horse and start riding again  Patient does not remember any of this  Patient's  wanted patient to go to the emergency room for evaluation but patient declined  The next day on 6/11/2018 patient was not feeling well with dizziness, headache and right-sided chest pain  She also fell suddenly while she was at home and does not know why  She decided it was a good idea to drive herself to the emergency room for evaluation  There she was diagnosed with a subarachnoid hemorrhage and rib fracture  This was done at [address] [place] [address]  She was subsequently transferred to [facility] in [place] as they are a trauma center  Patient stayed in the hospital for three days and was subsequently discharged as she was doing well and ambulating on her own  Patient states she still has headaches  She still gets dizzy  She was told she has a concussion  She has been going for occupational therapy  She was instructed to follow up with her PCP but not any other specialist   Her head hurts currently  She does get dizzy with just speaking and slight head motion  She does have an incentive spirometer at home and has been using it  Patient did notice that her blood pressure is elevated today  She has been taking her blood pressure medication every day  Patient did bring forms in for FMLA and disability as she does not feel she can return to work  The following portions of the patient's history were reviewed and updated as appropriate: allergies, current medications and problem list     Review of Systems   Constitutional: Negative for fever  Respiratory: Negative for shortness of breath  Cardiovascular: Positive for chest pain  Gastrointestinal: Negative for abdominal pain  Neurological: Positive for dizziness and headaches  Negative for syncope and speech difficulty           Objective:      /90 (BP Location: Left arm, Patient Position: Sitting, Cuff Size: Large)   Pulse 85 Wt 97 7 kg (215 lb 6 4 oz)   SpO2 98%   BMI 34 77 kg/m²          Physical Exam   Constitutional: She is oriented to person, place, and time  Vital signs are normal  She appears well-developed and well-nourished  HENT:   Head: Normocephalic and atraumatic  Right Ear: Tympanic membrane, external ear and ear canal normal    Left Ear: Tympanic membrane, external ear and ear canal normal    Nose: Nose normal    Mouth/Throat: Uvula is midline, oropharynx is clear and moist and mucous membranes are normal    Eyes: Conjunctivae, EOM and lids are normal  Pupils are equal, round, and reactive to light  Neck: Trachea normal  Neck supple  No thyromegaly present  Cardiovascular: Normal rate, regular rhythm, S1 normal and S2 normal     No murmur heard  Pulmonary/Chest: Effort normal and breath sounds normal        Lymphadenopathy:     She has no cervical adenopathy  Neurological: She is alert and oriented to person, place, and time  Patient does get very dizzy during the examination despite minimal head movements  Skin: Skin is warm and dry     Psychiatric: Her speech is normal and behavior is normal  Judgment and thought content normal  Cognition and memory are normal

## 2018-06-25 ENCOUNTER — OFFICE VISIT (OUTPATIENT)
Dept: OCCUPATIONAL THERAPY | Facility: CLINIC | Age: 58
End: 2018-06-25
Payer: COMMERCIAL

## 2018-06-25 ENCOUNTER — OFFICE VISIT (OUTPATIENT)
Dept: PHYSICAL THERAPY | Facility: CLINIC | Age: 58
End: 2018-06-25
Payer: COMMERCIAL

## 2018-06-25 DIAGNOSIS — S06.0X9D CONCUSSION WITH LOSS OF CONSCIOUSNESS, SUBSEQUENT ENCOUNTER: ICD-10-CM

## 2018-06-25 DIAGNOSIS — I60.9 SAH (SUBARACHNOID HEMORRHAGE) (HCC): Primary | ICD-10-CM

## 2018-06-25 PROCEDURE — 97535 SELF CARE MNGMENT TRAINING: CPT

## 2018-06-25 PROCEDURE — 97112 NEUROMUSCULAR REEDUCATION: CPT

## 2018-06-25 NOTE — PROGRESS NOTES
Daily Note     Today's date: 2018  Patient name: Nicole Wu  : 1960  MRN: [id number]  Referring provider: Cleta Moritz  Dx:   Encounter Diagnosis   Name Primary?  SAH (subarachnoid hemorrhage) ([facility]) Yes                  Subjective: "I have about a 2/10 HA right now "      Objective: See treatment diary below      Assessment: Tolerated treatment fair  Arrived 10 minutes  Patient required quiet, dark room for session  Patient arrived 10 minutes late to session  Reported a 2/10 HA  Completed internal/external memory strategy education  Displayed excellent carryover throughout education packet  Required multiple rest breaks due to stimulation  Educated on memory book to which patient reports her children recently purchased a journal for her at home  HA increased to a 3/10 at end of session  Patient also reported that she has noticed difficulty with word finding, and though organization  OTR was notified and to write a communication to MD for speech therapy      Plan: Continued skilled OT per POC     INTERVENTION COMMENTS:  Diagnosis: [facility] [I60 9]  Precautions: R rib fx  FOTO:  2 of 8 visits, PN due

## 2018-06-25 NOTE — PROGRESS NOTES
Daily Note     Today's date: 2018  Patient name: Vani Quesada  : 1960  MRN: [id number]  Referring provider: Felisha Mandujano PA-C  Dx:   Encounter Diagnosis     ICD-10-CM    1  SAH (subarachnoid hemorrhage) (HCC) I60 9    2  Concussion with loss of consciousness, subsequent encounter S06  0X9D          Subjective: Patient reports 3/10 headache and 2-3/10 dizziness  Objective: See treatment diary below  Precautions concussion, SAH, anxiety/depression     Specialty Daily Treatment Diary      Manual                                                                                          Exercise Diary  18         VORx1, H/V  standing Plain  Reviewed for HEP  30''x2  D 5/10         VORcx, H/V  standing Plain  Reviewed for HEP  30''x2         Saccades, H/V standing       Plain  Reviewed for HEP  30''x2         Smooth pursuits  standing Plain  Reviewed for HEP           Foam, arms crossed EC             Walking with HTs, H/V    1 lap         Walking with turns             Tandem gait             Ball toss hand to hand    ball follow, 1 lap         Rockerboard, M/L and A/P                                                                                                                                                               Modalities                                                              Assessment: Fair tolerance to performance of oculomotor exercises  Reports dizziness, headache, and nausea  Also reports target getting out of focus  Patient demonstrates postural sway at times  Poor to fair tolerance to exercises  Needs to perform exercises very slowly and needs to take frequent rest breaks  Exercises in hallway needed to be performed with lights off  Plan: Progress treatment as tolerated

## 2018-06-27 DIAGNOSIS — S06.0X0D CONCUSSION WITHOUT LOSS OF CONSCIOUSNESS, SUBSEQUENT ENCOUNTER: Primary | ICD-10-CM

## 2018-06-28 ENCOUNTER — OFFICE VISIT (OUTPATIENT)
Dept: PHYSICAL THERAPY | Facility: CLINIC | Age: 58
End: 2018-06-28
Payer: COMMERCIAL

## 2018-06-28 ENCOUNTER — EVALUATION (OUTPATIENT)
Dept: SPEECH THERAPY | Facility: CLINIC | Age: 58
End: 2018-06-28
Payer: COMMERCIAL

## 2018-06-28 ENCOUNTER — OFFICE VISIT (OUTPATIENT)
Dept: OCCUPATIONAL THERAPY | Facility: CLINIC | Age: 58
End: 2018-06-28
Payer: COMMERCIAL

## 2018-06-28 DIAGNOSIS — S06.0X0D CONCUSSION WITHOUT LOSS OF CONSCIOUSNESS, SUBSEQUENT ENCOUNTER: ICD-10-CM

## 2018-06-28 DIAGNOSIS — I60.9 SAH (SUBARACHNOID HEMORRHAGE) (HCC): Primary | ICD-10-CM

## 2018-06-28 DIAGNOSIS — R48.8 OTHER SYMBOLIC DYSFUNCTIONS: Primary | ICD-10-CM

## 2018-06-28 DIAGNOSIS — S06.0X9D CONCUSSION WITH LOSS OF CONSCIOUSNESS, SUBSEQUENT ENCOUNTER: ICD-10-CM

## 2018-06-28 PROCEDURE — 92522 EVALUATE SPEECH PRODUCTION: CPT

## 2018-06-28 PROCEDURE — 97112 NEUROMUSCULAR REEDUCATION: CPT

## 2018-06-28 PROCEDURE — G9163 LANG EXPRESS GOAL STATUS: HCPCS

## 2018-06-28 PROCEDURE — G9162 LANG EXPRESS CURRENT STATUS: HCPCS

## 2018-06-28 PROCEDURE — 97535 SELF CARE MNGMENT TRAINING: CPT | Performed by: OCCUPATIONAL THERAPIST

## 2018-06-28 PROCEDURE — 97140 MANUAL THERAPY 1/> REGIONS: CPT

## 2018-06-28 NOTE — PROGRESS NOTES
Speech-Language Pathology Initial Evaluation    Today's date: 2018  Patients name: Javier Pearce  : 1960  MRN: [id number]  Safety measures: Fall risk, headaches  Referring provider: David Villela PA-C    Medical history significant for:   Past Medical History:   Diagnosis Date    Anemia     Anxiety     Depression     Diarrhea     Fecal incontinence     History of colon polyps     Hypertension        Medication list:   Current Outpatient Prescriptions   Medication Sig Dispense Refill    acetaminophen (TYLENOL) 325 mg tablet Take 2 tablets (650 mg total) by mouth every 6 (six) hours as needed for mild pain 30 tablet 0    clobetasol (TEMOVATE) 0 05 % ointment   0    sertraline (ZOLOFT) 25 mg tablet take 2 tablets by mouth once daily 90 tablet 1    valsartan (DIOVAN) 320 MG tablet Take 1 tablet (320 mg total) by mouth daily 90 tablet 1     No current facility-administered medications for this visit  Allergies: Allergies   Allergen Reactions    Vicodin [Hydrocodone-Acetaminophen] Anaphylaxis     Reaction Date: ;        Subjective comments: Patient reported HA of 3/10 upon arrival, which she explained began when traveling to [facility] due to bright light outside  Patient also reported that her head "feels foggy" and that bright light is often aggravating  "It's up here (in head) what I want to say, but I can't get it out " Patient reported this is not an everyday occurrence, but she is frustrated with its frequency  When evaluated in hospital and by OT, patient noted particular difficulty with drawing (i e , a cube and chair) and with math (i e , subtraction), which she reported having no problems with prior to injury  During eval, HA increased to 5/10      Patient's goal(s): "To think clearly and get back to where I was "    Reason for referral: Change in cognitive status  Prior functional status: Communication effective and appropriate in all situations  Clinically complex situations: N/A    History: Patient is a 70-year-old female who received ambulatory referral to  following concussion w/out LOC s/p fall x2  Initial injury occurred on 6/10/2018, when patient was reportedly thrown from a horse, striking her head on the ground  Patient reportedly fell and hit her head again the next day when walking onto her deck  Hearing: WFL  Vision: With glasses    Home environment/lifestyle: Lives with   Highest level of education: High school  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)    Mental status: Alert  Behavior status: Cooperative  Communication modalities: Verbal  Rehabilitation prognosis: Excellent rehab potential to reach and maintain prior level of function       Assessments    Concussion Cognitive Checklist: self report symptom checklist  *Patient indicated that she is experiencing the following symptoms:     · Memory: Remembering people's names     · Attention: Keeping attention during a conversation, Focusing or concentrating on a specific task, Sustaining attention on a task and Dividing your attention (i e , multi-tasking)     · Processing: Responding to questions in a timely manner     · Executive Functions: None reported     · Communication: Word finding in conversation and Expressing thoughts and ideas fluently     · Visual: Losing spot on the page when reading     · Emotional: Frustration tolerance     · Increased Sensitivities to: Lighting and Noise      The Test of Adult Language  Fourth Edition (TOAL-4) is a standardized, norm-referenced assessment measuring important communicative abilities in spoken and written language  The test in normed on individuals 12:0-24:11  Due to these age restrictions, these standardized scores should not be compared to standard or percentile rank  Objective measures were taken to complete a diagnostic impression and prognosis for this pt   The TOAL-4 has 6 subtests; their results are reported as percentile ranks and scaled scores  The results of the TOAL-4 are generally used for three purposes; (a) to identify adolescents and adults who score significantly below their peers and therefore might need help improving their language proficiency, (b) to determine areas of relative strength and weakness among language abilities, and (c) to serve as a research tool in studies investigating language problems in adolescents and adults  Subtest: Raw Score: Percentile:  Scaled Score: Descriptive Ratin  Word Opposites  75%tile 12 Average   3  Spoken Analogies  37%tile 9 Average   4  Word Similarities  50%tile 10 Average       The Ross Information Processing Assessment-Geriatric: Second Edition (RIPA-) is a comprehensive, norm-referenced assessment battery designed to identify, describe, and quantify cognitive-linguistic deficits in the geriatric population (individuals 55 years and older)  Despite the patient's age, this assessment was utilized today obtain measurements of the patient's current level of cognitive-linguistic functioning   The subtests of the RIPA  include: (1) Immediate Memory--repeat numbers, words, and sentences of increasing length and complexity; (2) Temporal Orientation--answer questions related to the concept of time (elicitation of accurate responses requires recall from recent and remote memory/temporal concepts require functional organizational skills); (3) Spatial Orientation--answer questions related to the concept of locations or places (elicitation of accurate responses requires recall from both recent and remote memory/spatial concepts require organizational skills, including categorization and sequencing); (4) General Information--recall general information encoded in remote memory (most stimuli represented in this subtest includes information learned by the sixth grade); (5) Situational Knowledge--respond to stimuli requiring problem solving and reasoning strategies (ability to generate response options is based on relevant information/once a solution is deduced, it must be checked against knowledge of reality and against reality itself); (6) Categorical Vocabulary--list items in a category as well as name categories names of items (elicitation of accurate responses require a semantic organization base, word finding abilities, and general organization skills; and (7) Listening Comprehension--listen to a short narrative paragraph and answer specific questions about the content (paragraphs included are of increasing length and complexity/elicitation of accurate response require skills of immediate memory, auditory sequential memory, receptive vocabulary, and processing speed)  The following results were obtained during the administration of the assessment:     Subtest: Raw score: Percentile:  Scaled Score: Degree of Severity:   1  Immediate Memory 27/39 43%tile 9 WNL     OTHER:    *Patient named 19 concrete category members (animals) in 60 sec (norm=15+)  -- AVERAGE    *Patient named 8 abstract category members (words beginning with letter 'b') in 60 sec (norm=10+)  -- BELOW AVERAGE    Goals  Short-term goals:  1  Patient will complete word retrieval tasks (i e , analogies, category matrices, etc ) independently with 80% accuracy in 4-6 weeks to improve word finding ability  2  Patient will independently name 15+ items that begin with the same letter with 80% accuracy in 4-6 weeks to improve word generation ability  3  Patient will name 3 synonyms for given words with 80% accuracy with min verbal cuing in 4-6 weeks to develop word finding strategies (i e , circumlocution)  4  Patient will independently name an antonym for given words with 80% accuracy in 4-6 weeks to develop word finding strategies (i e , circumlocution)      5  Patient will complete auditory information processing tasks (i e, mental manipulation, anagrams, etc ) with 80% accuracy with min verbal cuing in 4-6 weeks to improve processing speed  6  Patient will complete complex thought organization tasks (i e, deduction puzzles) with 80% accuracy with min verbal cuing in 4-6 weeks to improve executive functioning abilities  Long-term goals:  1  Patient will demonstrate cognitive-communication skills consistent with age and education given use of compensatory strategies when needed to resume baseline activities and responsibilities in home, community, and work settings (to be achieved by discharge)  2  Patient will complete higher-level expressive language tasks (e g , word definitions, idioms, synonym/antonyms, etc) with 80% accuracy to improve functional communication skills by discharge  Functional Limitations Reporting (G-codes):   Flowsheet Rows      Most Recent Value   SLP G-Codes   FOTO information reviewed  N/A   Assessment Type  Evaluation   Functional Limitations  Spoken language expressive   Spoken Language Expression Current Status ()  CI   Spoken Language Expression Goal Status ()  CH            Impressions/Recommendations    Impressions: Patient presents with a mild cognitive-linguistic impairment, with noted difficulty in areas of word finding, thought organization, and processing speed  Patient was noted to become frustrated during testing, reportedly feeling aggravated by her cognitive status  This frustration appeared evident during testing, as patient required prolonged time to complete several subtests (i e , word opposites and word similarities)  Testing was not completed due to time restriction  Though patient scored in the average range on testing, she was triggered early on in testing and required lights to be off during eval (headache of 3/10 prior to testing, increasing to 5/10 after testing)  Patient will also be receiving OT to address auditory processing, attention, direction following, and memory   Speech therapy will address word finding, thought organization, and processing speed  Recommendations:  -Patient would benefit from outpatient skilled Speech Therapy services : Cognitive-Linguistic therapy    -Frequency: 2x weekly  -Duration: 4-6 weeks    -Intervention certification from: 4/46/0998  -Intervention certification to: 5/0/4675    Visit Tracking:  -Referring provider: Epic  -Billing guidelines: AMDARIA  -Visit #1  -[facility]  -RE due 8/9/2018  Patient was evaluated by Ileana Condon, graduate SLP student, and was under the direct supervision of EULALIO Velázquez , CCC-SLP

## 2018-06-28 NOTE — PROGRESS NOTES
Daily Note     Today's date: 2018  Patient name: Holly Fernandez  : 1960  MRN: [id number]  Referring provider: Abdulkadir Alvarado PA-C  Dx:   Encounter Diagnosis     ICD-10-CM    1  SAH (subarachnoid hemorrhage) (HCC) I60 9    2  Concussion with loss of consciousness, subsequent encounter S06  0X9D        Start Time: 1600  Stop Time: 0102  Total time in clinic (min): 55 minutes    Subjective: Reports 4-5/10 post cognitive retraining  Reports 3/5 pain post      Objective: See treatment diary below  Precautions concussion, SAH, anxiety/depression     Specialty Daily Treatment Diary      Manual   18            Trigger point release  7 min            Gentle Soft tissue mob/ justin fiber  5 min                                                           Exercise Diary  18       VORx1, H/V  standing Plain  Reviewed for HEP  30''x2  D /10 30" x 2       VORcx, H/V  standing Plain  Reviewed for HEP  30''x2  30" x 2       Saccades, H/V standing       Plain  Reviewed for HEP  30''x2  30" x 2       Smooth pursuits  standing Plain  Reviewed for HEP    30" x 2       Foam, arms crossed EC      30" x 2       Walking with HTs, H/V    1 lap  ea  1 lap ea       Walking with turns             Tandem gait             Ball toss hand to hand    ball follow, 1 lap  ball follow 1 lap       Rockerboard, M/L and A/P                                                                                                                                                               Modalities                                                             Assessment: Tolerated treatment well  Initated intervention in a quiet, dimmed light room and increased lighting as well as noise to a comfortable level  Manual therapy was initiated  Patient would benefit from continued PT      Plan: Progress treatment as tolerated

## 2018-07-02 ENCOUNTER — APPOINTMENT (OUTPATIENT)
Dept: LAB | Facility: MEDICAL CENTER | Age: 58
End: 2018-07-02
Payer: COMMERCIAL

## 2018-07-02 ENCOUNTER — TRANSCRIBE ORDERS (OUTPATIENT)
Dept: ADMINISTRATIVE | Facility: HOSPITAL | Age: 58
End: 2018-07-02

## 2018-07-02 ENCOUNTER — OFFICE VISIT (OUTPATIENT)
Dept: PHYSICAL THERAPY | Facility: CLINIC | Age: 58
End: 2018-07-02
Payer: COMMERCIAL

## 2018-07-02 ENCOUNTER — OFFICE VISIT (OUTPATIENT)
Dept: OCCUPATIONAL THERAPY | Facility: CLINIC | Age: 58
End: 2018-07-02
Payer: COMMERCIAL

## 2018-07-02 ENCOUNTER — HOSPITAL ENCOUNTER (OUTPATIENT)
Dept: RADIOLOGY | Facility: MEDICAL CENTER | Age: 58
Discharge: HOME | End: 2018-07-02
Payer: COMMERCIAL

## 2018-07-02 DIAGNOSIS — I10 ESSENTIAL HYPERTENSION, BENIGN: Primary | ICD-10-CM

## 2018-07-02 DIAGNOSIS — S06.0X9D CONCUSSION WITH LOSS OF CONSCIOUSNESS, SUBSEQUENT ENCOUNTER: ICD-10-CM

## 2018-07-02 DIAGNOSIS — I60.9 SAH (SUBARACHNOID HEMORRHAGE) (HCC): Primary | ICD-10-CM

## 2018-07-02 DIAGNOSIS — E78.5 HYPERLIPIDEMIA, UNSPECIFIED HYPERLIPIDEMIA TYPE: ICD-10-CM

## 2018-07-02 DIAGNOSIS — Z00.8 HEALTH EXAMINATION IN POPULATION SURVEYS: Primary | ICD-10-CM

## 2018-07-02 DIAGNOSIS — I60.9 SUBARACHNOID HEMORRHAGE (HCC): ICD-10-CM

## 2018-07-02 LAB
BACTERIA UR QL AUTO: ABNORMAL /HPF
BILIRUB UR QL STRIP: NEGATIVE
CHOLEST SERPL-MCNC: 182 MG/DL (ref 50–200)
CLARITY UR: ABNORMAL
COLOR UR: YELLOW
EST. AVERAGE GLUCOSE BLD GHB EST-MCNC: 148 MG/DL
GLUCOSE UR STRIP-MCNC: NEGATIVE MG/DL
HBA1C MFR BLD: 6.8 % (ref 4.2–6.3)
HDLC SERPL-MCNC: 42 MG/DL (ref 40–60)
HGB UR QL STRIP.AUTO: ABNORMAL
HYALINE CASTS #/AREA URNS LPF: ABNORMAL /LPF
KETONES UR STRIP-MCNC: NEGATIVE MG/DL
LDLC SERPL CALC-MCNC: 87 MG/DL (ref 0–100)
LEUKOCYTE ESTERASE UR QL STRIP: NEGATIVE
NITRITE UR QL STRIP: NEGATIVE
NON-SQ EPI CELLS URNS QL MICRO: ABNORMAL /HPF
NONHDLC SERPL-MCNC: 140 MG/DL
PH UR STRIP.AUTO: 5.5 [PH] (ref 4.5–8)
PROT UR STRIP-MCNC: NEGATIVE MG/DL
RBC #/AREA URNS AUTO: ABNORMAL /HPF
SP GR UR STRIP.AUTO: 1.03 (ref 1–1.03)
TRIGL SERPL-MCNC: 264 MG/DL
UROBILINOGEN UR QL STRIP.AUTO: 0.2 E.U./DL
WBC #/AREA URNS AUTO: ABNORMAL /HPF

## 2018-07-02 PROCEDURE — 36415 COLL VENOUS BLD VENIPUNCTURE: CPT | Performed by: PREVENTIVE MEDICINE

## 2018-07-02 PROCEDURE — 97535 SELF CARE MNGMENT TRAINING: CPT

## 2018-07-02 PROCEDURE — 70450 CT HEAD/BRAIN W/O DYE: CPT

## 2018-07-02 PROCEDURE — 83036 HEMOGLOBIN GLYCOSYLATED A1C: CPT | Performed by: FAMILY MEDICINE

## 2018-07-02 PROCEDURE — 97112 NEUROMUSCULAR REEDUCATION: CPT

## 2018-07-02 PROCEDURE — 81001 URINALYSIS AUTO W/SCOPE: CPT | Performed by: FAMILY MEDICINE

## 2018-07-02 PROCEDURE — 80061 LIPID PANEL: CPT | Performed by: FAMILY MEDICINE

## 2018-07-02 NOTE — PROGRESS NOTES
Daily Note     Today's date: 2018  Patient name: Fernando Mccollum  : 1960  MRN: [id number]  Referring provider: Emmie Tompkins PA-C  Dx:   Encounter Diagnosis     ICD-10-CM    1  SAH (subarachnoid hemorrhage) (HCC) I60 9    2  Concussion with loss of consciousness, subsequent encounter S06  0X9D          Subjective: Patient reports feeling a little better today  Reports 3/10 headache upon arrival  Denies dizziness upon arrival, but states having some earlier today  Reports photosensitivity  Objective: See treatment diary below  Precautions concussion, SAH, anxiety/depression     Specialty Daily Treatment Diary      Manual   18            Trigger point release  7 min            Gentle Soft tissue mob/ justin fiber  5 min                                                           Exercise Diary  18     VORx1, H/V  standing Plain  Reviewed for HEP  30''x2  D 10 30" x 2  60''      VORcx, H/V  standing Plain  Reviewed for HEP  30''x2  30" x 2  60''     Saccades, H/V standing       Plain  Reviewed for HEP  30''x2  30" x 2  60''     Smooth pursuits  standing Plain  Reviewed for HEP    30" x 2       Foam, arms crossed EC      30" x 2  FT/EC, foam, 30''x3     Walking with HTs, H/V    1 lap  ea  1 lap ea  1 lap ea     Walking with turns             Tandem gait        3 laps     Ball toss hand to hand    ball follow, 1 lap  ball follow 1 lap  ball follow 1 lap     Rockerboard, M/L and A/P             HT/HN       FA/EC on floor, 30''x2      semi tandem stance       Foam, 30''x3                                                                                                                           Modalities                                                             Assessment: Patient continues to demonstrate postural sway at times  Increased to 61'' with oculomotor exercises today with good tolerance  Reports nausea with oculomotor exercises, especially with horizontal VOR cx  Reports mild to moderate increase in dizziness and headache with exercises  Improved tolerance overall to treatment session today, patient able to perform exercises today out in gym area with lights on  Reports 5/10 headache post tx  Plan: Progress treatment as tolerated

## 2018-07-02 NOTE — PROGRESS NOTES
Daily Note     Today's date: 2018  Patient name: Vnai Quesada  : 1960  MRN: [id number]  Referring provider: Maverick Franco  Dx:   Encounter Diagnosis   Name Primary?  SAH (subarachnoid hemorrhage) ([facility]) Yes                  Subjective: "I [place] and I didn't wear earplugs "      Objective: See treatment diary below      Assessment: Tolerated treatment fair  Arrived to session with an 5/10 HA  Patient was able to tolerate multimodal environment this session with no sunglasses  Completed syed pinedo letter crossouts on blue paper  Timur number task at table top with auditory processing  and noted with dizziness and increase HA to a 6/10 and required rest break  Transitioned to auditory processing only with alphabetical sequencing  Dizziness and nausea noted which PARK then ended session due to severity of symptoms         Plan: Continued skilled OT per POC     INTERVENTION COMMENTS:  Diagnosis: SAH (subarachnoid hemorrhage) ([facility] [address] ) [I60 9]  Precautions: R rib fx  FOTO:  4 of 8 visits, PN due

## 2018-07-03 ENCOUNTER — TELEPHONE (OUTPATIENT)
Dept: FAMILY MEDICINE CLINIC | Facility: MEDICAL CENTER | Age: 58
End: 2018-07-03

## 2018-07-03 ENCOUNTER — OFFICE VISIT (OUTPATIENT)
Dept: OBGYN CLINIC | Facility: OTHER | Age: 58
End: 2018-07-03
Payer: COMMERCIAL

## 2018-07-03 ENCOUNTER — TELEPHONE (OUTPATIENT)
Dept: NEUROLOGY | Facility: CLINIC | Age: 58
End: 2018-07-03

## 2018-07-03 VITALS
SYSTOLIC BLOOD PRESSURE: 150 MMHG | HEART RATE: 80 BPM | WEIGHT: 215 LBS | BODY MASS INDEX: 34.55 KG/M2 | DIASTOLIC BLOOD PRESSURE: 91 MMHG | HEIGHT: 66 IN

## 2018-07-03 DIAGNOSIS — S06.0X9A CONCUSSION WITH LOSS OF CONSCIOUSNESS, INITIAL ENCOUNTER: ICD-10-CM

## 2018-07-03 DIAGNOSIS — S09.90XA CLOSED HEAD INJURY, INITIAL ENCOUNTER: ICD-10-CM

## 2018-07-03 DIAGNOSIS — I60.9 SAH (SUBARACHNOID HEMORRHAGE) (HCC): Primary | ICD-10-CM

## 2018-07-03 PROCEDURE — 99204 OFFICE O/P NEW MOD 45 MIN: CPT | Performed by: ORTHOPAEDIC SURGERY

## 2018-07-03 RX ORDER — AMANTADINE HYDROCHLORIDE 100 MG/1
100 CAPSULE, GELATIN COATED ORAL
Qty: 30 CAPSULE | Refills: 2 | Status: SHIPPED | OUTPATIENT
Start: 2018-07-03 | End: 2018-09-11 | Stop reason: SDDI

## 2018-07-03 NOTE — TELEPHONE ENCOUNTER
----- Message from Columba Aguayo DO sent at 7/3/2018  8:23 AM EDT -----  Head CT results reviewed  There is no acute abnormality and specifically there is no subarachnoid hemorrhage  There are some small calcifications that are unchanged from patient's initial head CT scan, are likely chronic and incidental findings  No additional workup for these    Please encouraged patient to go to her appointment today with the concussion specialist

## 2018-07-03 NOTE — PROGRESS NOTES
Assessment:       1  SAH (subarachnoid hemorrhage) ([facility] )    2  Concussion with loss of consciousness, initial encounter    3  Closed head injury, initial encounter          Plan:      I had a very detailed discussion with Michael Sanchez with regards to her head injury  She has significant vestibular and oculomotor deficits on today's exam and is very symptomatic secondary to her head injury  Based on her studies so far, she may have sustained a subarachnoid hemorrhage versus significant concussion injury  She is already doing physical therapy/occupational therapy rehabilitation and I have advised her to continue with the same  She will also consult with her primary care physician and is advised to continue/increase the dose of sertraline as needed for her mood associated symptoms  Additionally, she may benefit from a trial of oral amantadine for headache prophylaxis, dizziness and cognitive difficulty  I will refer her to neurology for further assessment and management in this regard  Total time spent was 45 minutes of which more than half was spent counseling  Subjective:     Patient ID: Chase Hinojosa is a 62 y o  female  Chief Complaint:    HPI  Assessment / Plan     Begin RTP:  N/A  Work Restrictions:  Yes, Currently unable to perform work duties due to multiple head injury associated symptoms    Diagnoses and all orders for this visit:    SAH (subarachnoid hemorrhage) ([facility] )  -     Ambulatory referral to Neurology; Future  -     amantadine (SYMMETREL) 100 mg capsule; Take 1 capsule (100 mg total) by mouth 2 (two) times a day before breakfast and lunch    Concussion with loss of consciousness, initial encounter  -     Ambulatory referral to Sports Medicine  -     Ambulatory referral to Neurology; Future  -     amantadine (SYMMETREL) 100 mg capsule;  Take 1 capsule (100 mg total) by mouth 2 (two) times a day before breakfast and lunch    Closed head injury, initial encounter  -     amantadine (SYMMETREL) 100 mg capsule; Take 1 capsule (100 mg total) by mouth 2 (two) times a day before breakfast and lunch             Discussed with Trainer:  N/A    Subjective       Patient ID:  Paulino Stewart is a 62 y o  female    School:  n/a  Sport:  n/a  Position:  n/a    Injury Description:  Date / Time:  6/10/2018  :  Patient  Injury Description:  Was thrown off from a horse hitting her head on the ground  Evidence of forcible blow to the head:  yes  Evidence of IC Injury / Fracture:  yes  Location:  unclear  Cause: Other:  [place] off from a horse  Amnesia:   Retrograde:  no   Anterograde:  yes   LOC:  yes  Early Signs:  Appears dazed/stunned, LOC, Appeared dazed, Dizziness, Forgetful, Headache and Nausea  Seizures:  No  CT Scan:  Yes, CT scan of the head done on 6/11/2018Revealed a small focus of hyperdensity in the interhemispheric fissure which was suspicious for possibly a small calcification, dense blood vessel or possibly a tiny focus of subarachnoid hemorrhage  Additional subtle hyperdensity in the left frontal subcortical white matter was noted which was suspected to be artifactual versus mild parenchymal blood  Follow-up CAT scan was done on 7/2/2018 Which did not reveal any acute intracranial abnormality  The small calcification adjacent to the anterior falxSeen on the previous examination was unchanged and was thought to represent possible dural calcification or vascular calcification  History of Concussion:  no   Headache History: No previous history of headache  Current headache is generalized, present several times a day and is made worse with physical and cognitive activities  Family History of Headache:  No  Developmental History:  Not applicable  History of Sleep Disorder:  No  Psychiatric History:  Anxiety, Depression and On Sertraline for over one year as per the patient  Do symptoms worsen with Physical Activity? Yes  Do symptoms worsen with Cognitive Activity?   Yes  Overall Rating:  What percent is this person back to normal?  Patient 60 %  Current Concussion symptom score is 16/22 positive for headache, nausea, balance problems, visual problems, dizziness, fatigue, sensitivity to light, sensitivity to noise, numbness and tingling, foggy, slow down, difficulty concentrating, difficulty remembering, irritability, more emotional, sleeping more  The following portions of the patient's history were reviewed and updated as appropriate: allergies, current medications, past family history, past medical history, past social history, past surgical history and problem list                Physical Exam     /91   Pulse 80   Ht 5' 6" (1 676 m)   Wt 97 5 kg (215 lb)   BMI 34 70 kg/m²   General:   NAD:  Appears fatigued  Psych:   AAOX3:  Yes   Mood and Affect:  Normal  HEENT:   Lacerations:  No   Bruising:  No   PEERLA:  Yes   EOMI:  Yes   C/D/I:  Yes   Fracture/Trauma:  No   Fundi Discs Sharp:  N/A  Neuro:   Examination of Coordination:  Abnormal:   Limited Balance:   Yes, Romberg:   Abnormal   Explain:  Loss of balance, Past Pointing:   Normal, Single Leg Stance:   Abnormal   Explain:  Loss of balance, Forward Tandem Gait:   Abnormal   Explain:  Loss of balance, Backward Tandem Gait:   Abnormal   Explain:  Loss of balance, Eyes Close Tandem Gait:   Abnormal   Explain:  Loss of balance, Dysdiadochokinesia:   No and Finger - Nose Impaired:   No   CNII - XII Intact:  Yes   FTN:  Normal   Accommodation:  > 50 cm   Convergence:  >30 cm  Vestibular Ocular:  Gaze stability:  Abnormal:   Nystagmus with horizontal motion and Dizziness with horizontal motion          Social History     Occupational History    Not on file  Social History Main Topics    Smoking status: Former Smoker    Smokeless tobacco: Never Used      Comment: Smoked for [employer]   Alcohol use Yes      Comment: social    Drug use: No    Sexual activity: Yes      Review of Systems   Constitutional: Positive for fatigue  HENT: Negative      Eyes: Negative  Respiratory: Negative  Cardiovascular: Negative  Gastrointestinal: Negative  Endocrine: Negative  Genitourinary: Negative  Skin: Negative  Allergic/Immunologic: Negative  Neurological: Negative  Hematological: Negative  Psychiatric/Behavioral: The patient is nervous/anxious  Objective:     Ortho ExamPhysical Exam   Constitutional: She is oriented to person, place, and time  She appears well-developed and well-nourished  HENT:   Head: Normocephalic and atraumatic  Eyes: Conjunctivae are normal  Pupils are equal, round, and reactive to light  Cardiovascular: Normal rate and regular rhythm  Pulmonary/Chest: Effort normal  No respiratory distress  Neurological: She is alert and oriented to person, place, and time  No cranial nerve deficit  Skin: Skin is warm and dry  No erythema  Psychiatric: She has a normal mood and affect  Her behavior is normal  Judgment and thought content normal          I have personally reviewed pertinent films in PACS

## 2018-07-05 ENCOUNTER — OFFICE VISIT (OUTPATIENT)
Dept: SPEECH THERAPY | Facility: CLINIC | Age: 58
End: 2018-07-05
Payer: COMMERCIAL

## 2018-07-05 ENCOUNTER — OFFICE VISIT (OUTPATIENT)
Dept: OCCUPATIONAL THERAPY | Facility: CLINIC | Age: 58
End: 2018-07-05
Payer: COMMERCIAL

## 2018-07-05 ENCOUNTER — OFFICE VISIT (OUTPATIENT)
Dept: PHYSICAL THERAPY | Facility: CLINIC | Age: 58
End: 2018-07-05
Payer: COMMERCIAL

## 2018-07-05 ENCOUNTER — OFFICE VISIT (OUTPATIENT)
Dept: FAMILY MEDICINE CLINIC | Facility: MEDICAL CENTER | Age: 58
End: 2018-07-05
Payer: COMMERCIAL

## 2018-07-05 ENCOUNTER — TELEPHONE (OUTPATIENT)
Dept: FAMILY MEDICINE CLINIC | Facility: MEDICAL CENTER | Age: 58
End: 2018-07-05

## 2018-07-05 ENCOUNTER — TELEPHONE (OUTPATIENT)
Dept: UROLOGY | Facility: AMBULATORY SURGERY CENTER | Age: 58
End: 2018-07-05

## 2018-07-05 ENCOUNTER — TELEPHONE (OUTPATIENT)
Dept: OBGYN CLINIC | Facility: CLINIC | Age: 58
End: 2018-07-05

## 2018-07-05 VITALS
WEIGHT: 216 LBS | DIASTOLIC BLOOD PRESSURE: 88 MMHG | BODY MASS INDEX: 34.86 KG/M2 | HEART RATE: 80 BPM | RESPIRATION RATE: 16 BRPM | SYSTOLIC BLOOD PRESSURE: 130 MMHG

## 2018-07-05 DIAGNOSIS — E78.1 HYPERTRIGLYCERIDEMIA: ICD-10-CM

## 2018-07-05 DIAGNOSIS — E11.9 TYPE 2 DIABETES MELLITUS WITHOUT COMPLICATION, WITHOUT LONG-TERM CURRENT USE OF INSULIN (HCC): Primary | ICD-10-CM

## 2018-07-05 DIAGNOSIS — S06.0X9D CONCUSSION WITH LOSS OF CONSCIOUSNESS, SUBSEQUENT ENCOUNTER: ICD-10-CM

## 2018-07-05 DIAGNOSIS — R48.8 OTHER SYMBOLIC DYSFUNCTIONS: Primary | ICD-10-CM

## 2018-07-05 DIAGNOSIS — I60.9 SAH (SUBARACHNOID HEMORRHAGE) (HCC): Primary | ICD-10-CM

## 2018-07-05 DIAGNOSIS — S06.9X9A MILD TRAUMATIC BRAIN INJURY WITH LOSS OF CONSCIOUSNESS, INITIAL ENCOUNTER (HCC): ICD-10-CM

## 2018-07-05 DIAGNOSIS — F41.9 ANXIETY: ICD-10-CM

## 2018-07-05 DIAGNOSIS — R31.9 HEMATURIA, UNSPECIFIED TYPE: ICD-10-CM

## 2018-07-05 DIAGNOSIS — I10 ESSENTIAL HYPERTENSION: ICD-10-CM

## 2018-07-05 DIAGNOSIS — S06.0X0D CONCUSSION WITHOUT LOSS OF CONSCIOUSNESS, SUBSEQUENT ENCOUNTER: ICD-10-CM

## 2018-07-05 DIAGNOSIS — S22.31XA CLOSED FRACTURE OF ONE RIB OF RIGHT SIDE, INITIAL ENCOUNTER: ICD-10-CM

## 2018-07-05 PROCEDURE — 92507 TX SP LANG VOICE COMM INDIV: CPT

## 2018-07-05 PROCEDURE — 99214 OFFICE O/P EST MOD 30 MIN: CPT | Performed by: FAMILY MEDICINE

## 2018-07-05 PROCEDURE — 3075F SYST BP GE 130 - 139MM HG: CPT | Performed by: FAMILY MEDICINE

## 2018-07-05 PROCEDURE — 3079F DIAST BP 80-89 MM HG: CPT | Performed by: FAMILY MEDICINE

## 2018-07-05 PROCEDURE — 97535 SELF CARE MNGMENT TRAINING: CPT | Performed by: OCCUPATIONAL THERAPIST

## 2018-07-05 PROCEDURE — 97112 NEUROMUSCULAR REEDUCATION: CPT

## 2018-07-05 RX ORDER — PRAVASTATIN SODIUM 10 MG
10 TABLET ORAL DAILY
Qty: 90 TABLET | Refills: 0 | Status: SHIPPED | OUTPATIENT
Start: 2018-07-05 | End: 2018-09-26 | Stop reason: SDUPTHER

## 2018-07-05 RX ORDER — LORAZEPAM 0.5 MG/1
0.5 TABLET ORAL DAILY PRN
Qty: 10 TABLET | Refills: 0 | Status: SHIPPED | OUTPATIENT
Start: 2018-07-05 | End: 2019-04-02 | Stop reason: SDUPTHER

## 2018-07-05 NOTE — TELEPHONE ENCOUNTER
Dear Dr Britney Asencio,  Amantadine has shown to have helped with post concussion headache and cognition along with dizziness  Hence, it may be useful with the patient's symptom cluster  She has a history of possible subarachnoid hemorrhage which is technically traumatic brain injury instead of a concussion injury  Hence, she suggested to follow-up with neurology as she may need further modulation of her treatment  Return to work would be a gradual process and decision could be made in this regard by Neurology upon follow-up assessment  I will call and talk to the patient with regards to the amantadine therapy      Thank you,    Tal Hernandez

## 2018-07-05 NOTE — PROGRESS NOTES
Daily Note     Today's date: 2018  Patient name: Vikas Kidd  : 1960  MRN: [id number]  Referring provider: Frannie Miner PA-C  Dx:   Encounter Diagnosis     ICD-10-CM    1  SAH (subarachnoid hemorrhage) (HCC) I60 9    2  Concussion with loss of consciousness, subsequent encounter S06  [id number]          Subjective: Reports 4/10 headache and nausea upon arrival       Objective: See treatment diary below  Precautions concussion, SAH, anxiety/depression     Specialty Daily Treatment Diary      Manual   18            Trigger point release  7 min            Gentle Soft tissue mob/ justin fiber  5 min                                                           Exercise Diary  18   VORx1, H/V  standing Plain  Reviewed for HEP  30''x2  D 5/10 30" x 2  60''   60''   VORcx, H/V  standing Plain  Reviewed for HEP  30''x2  30" x 2  60''  60''   Saccades, H/V standing       Plain  Reviewed for HEP  30''x2  30" x 2  60''  60''   Smooth pursuits  standing Plain  Reviewed for HEP    30" x 2       Foam, arms crossed EC      30" x 2  FT/EC, foam, 30''x3  FT/EC, foam, 30''x3   Walking with HTs, H/V    1 lap  ea  1 lap ea  1 lap ea  1 lap   Walking with turns          1 lap   Tandem gait        3 laps  3 laps   Ball toss hand to hand    ball follow, 1 lap  ball follow 1 lap  ball follow 1 lap Ball follow, 1 lap   Rockerboard, M/L and A/P             HT/HN       FA/EC on floor, 30''x2  FA/EC on foam, 30''x2    semi tandem stance       Foam, 30''x3      backwards walking         EC, 1 lap                                                                                                           Modalities                                                             Assessment: Patient continues to demonstrate postural sway at times with oculomotor exercises, but improving  Reports nausea with oculomotor exercises  Reports diplopia with saccades today   Continues to experience mild increases in nausea and dizziness  Patient demonstrates unsteadiness at times with static balance, able to correct utilizing mostly hip/ankle strategy  Plan: Progress treatment as tolerated

## 2018-07-05 NOTE — TELEPHONE ENCOUNTER
Reason for appointment/Complaint/Diagnosis : micro hematuria     Insurance: [employer]     History of Cancer? no                       If yes, what kind? Previous urologist?     no                  Records requested/where? In [facility] [address]     Outside testing/where? In Epic     Location Preference for office visit?  [facility]

## 2018-07-05 NOTE — TELEPHONE ENCOUNTER
I called the patient's listed mobile phone number today on 7/5/2018 at 4:30 p m  and left a voice mail message to call back if she has any questions with regards to her recently started medication

## 2018-07-05 NOTE — TELEPHONE ENCOUNTER
That is very good information and I thank you for that  I appreciate you calling patient as well  Have a great day

## 2018-07-05 NOTE — PROGRESS NOTES
Daily Speech Treatment Note    Today's date: 2018  Patients name: Man Christian  : 1960  MRN: [id number]  Safety measures: fall risk, headaches  Referring provider: Kaitlin Jim PA-C    Primary Diagnosis/Billing code: L16 6  Secondary Diagnosis/ Billing code: S06 0X0D    Visit Tracking:  -Referring provider: Epic  -Billing guidelines: AMA  -Visit #2; No auth required  [employer]  -RE due 2018  Subjective/Behavioral:  -"I over did it yesterday" - patient reports being outside by the pool all day, and thinks it increased her symptoms  Patient reporting increased dizziness, nausea and headaches today  HA 3/10 at start of session  Objective/Assessment:  -Reviewed testing results and goals in plan care with patient  Patient is in agreement at this time        Short-term goals:  1  Patient will complete word retrieval tasks (i e , analogies, category matrices, etc ) independently with 80% accuracy in 4-6 weeks to improve word finding ability  To target generative naming with initial letter and category provided, patient completed category members acitvity (i e , SPORT, begins with /g/)  Patient appropriately named 45/48 words; increasing to 48/48 with verbal cues  To target word finding and attention; patient completed the card game "Anomia" where they are asked to name people/places/things based on category presented on card (i e , artificial sweetener)  Target of game was for speed of naming and processing  Pt completed level 4 with average of 15 cards min (WNL; goal is 10+)  Pt skipped cards x 0      2  Patient will independently name 15+ items that begin with the same letter with 80% accuracy in 4-6 weeks to improve word generation ability      3   Patient will name 3 synonyms for given words with 80% accuracy with min verbal cuing in 4-6 weeks to develop word finding strategies (i e , circumlocution)      4  Patient will independently name an antonym for given words with 80% accuracy in 4-6 weeks to develop word finding strategies (i e , circumlocution)  To target semantic association and lexicon building, patient asked to name an opposite/antonym and synonym of an abstract word (i e , rigid)  Opposite naming completed 21/22 opp, increased to 22/22 with verbal cues  Synonym naming completed 19/22 opp, increased to 22/22 with verbal cues      5  Patient will complete auditory information processing tasks (i e, mental manipulation, anagrams, etc ) with 80% accuracy with min verbal cuing in 4-6 weeks to improve processing speed  Anagrams: To target auditory attention and mental manipulation during a word finding activity, patient was asked to listen to a word, and rearrange the letters within the word to create a new word (i e , late = tale)  Task completed over 14 trials  Completed 12/14 independently, increased to 14/14 with use of written support       6  Patient will complete complex thought organization tasks (i e, deduction puzzles) with 80% accuracy with min verbal cuing in 4-6 weeks to improve executive functioning abilities      HA 4/10 at end of session  Patient required multiple breaks during activities today due to increased HA symptoms  Attempted to keep all activities auditory  Plan:  -Patient was provided with home exercises/activities to target goals in plan of care at the end of today's session   -Continue with current plan of care

## 2018-07-05 NOTE — TELEPHONE ENCOUNTER
Patient was seen in the office today for follow-up  We discussed her recent office visit with you  She did inform me that you would be sending her to see Neurology  She did question a return to work date  I discussed with her that I did not feel she should return to work as she is still symptomatic and that ultimately it would be up to either you or the neurologist to clear her to return to work  Am I correct in this assumption? Also patient has not started her amantadine as she is really unsure why she needs to take the medication  I do not use this medication on a regular basis and I believe the last time that I even heard of this medication being used was as a student  Could you are one of your clinical staff call patient to discuss the importance of taking the amantadine? Thanks for your help

## 2018-07-05 NOTE — PROGRESS NOTES
Assessment/Plan:    No problem-specific Assessment & Plan notes found for this encounter  Diagnoses and all orders for this visit:    Type 2 diabetes mellitus without complication, without long-term current use of insulin ([facility] )  -     Ambulatory referral to Diabetic Education; Future  -     Ambulatory referral to medical nutrition therapy for diabetes; Future  -     pravastatin (PRAVACHOL) 10 mg tablet; Take 1 tablet (10 mg total) by mouth daily  -     Hepatic function panel; Future  -     Lipid Panel with Direct LDL reflex; Future  Patient has diabetes based on recent A1c of 6 8%  She is not currently on any medication  6 8% is not too bad of an A1c  We discussed starting medication verses diet and exercise verses both  Patient would rather not start medication at this time and would rather utilized diet and exercise which I feel is not a bad choice since her A1c is not too high  I did however encouraged patient to go for diabetes education and for diabetes nutrition as they can help her get on the right path to getting her blood sugar lowered without medication  Patient was agreeable to this and the referral was placed  I did discuss with patient that diabetes does increase her risk for cardiovascular disorder such as heart attack and stroke  I did recommend we at least start a statin to help lower her cardiovascular risk  Potential side effects of statins discussed  Patient is agreeable to taking the medication and it was faxed to her pharmacy  Repeat a hepatic function panel and a lipid panel in six weeks to assess if the statin dose needs to be changed  Essential hypertension  Blood pressure is well controlled  It was elevated at previous office visit likely to everything that was going on with patient  Medication was not adjusted at that time as I suspected her blood pressure would improve and it did  Continue current blood pressure medication dose        Anxiety  -     LORazepam (ATIVAN) 0 5 mg tablet; Take 1 tablet (0 5 mg total) by mouth daily as needed for anxiety  Patient's increase in anxiety is likely related to her recent concussion  I recommended she stay with the sertraline 25 mg daily and have her add a low-dose benzodiazepine to use as needed  I discussed the patient the potential side effects associated with benzodiazepines and she was agreeable to taking the medication  Will have her start Ativan 0 5 mg daily as needed for anxiety  Prior to prescribing the controlled substance, a patient search was performed on the [place] prescription drug monitoring program web site and NO Red Flags identified  Patient was instructed not to drive if she is going to take the Ativan and she acknowledged understanding  Closed fracture of one rib of right side, initial encounter  Symptoms improving  Monitor for now  No additional imaging at this time  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, initial encounter ([facility] )  Due to recent fall from a horse  Patient was seen by the concussion specialist who referred patient to Neurology  She has an upcoming appointment Neurology at the end of this month  Patient did not yet start her amantadine and it is unclear whether she will take this at all  I did inform patient that I would send a message to her concussion specialist and she was in agreement  Hematuria, unspecified type  -     Ambulatory referral to Urology; Future  Hematuria persists on recent labs  Will have patient see urology for further evaluation  Hypertriglyceridemia  Elevated triglycerides on recent labs  Likely due to poor diet  Should improve with diet controlled  Should also improve with the statin that was prescribed as above  Follow-up in three months or sooner if needed  Subjective:      Patient ID: Juan Carlos Ramirez is a 62 y o  female  Patient presents for follow-up      She recently sustained a concussion after falling off a horse  She was seen by the concussion specialist with [employer]  Patient states that the specialist stated that patient had more than just a concussion and referred her to see Neurology  She has an appointment with Neurology at the end of this month  Patient is still getting episodes of headaches and dizziness  This usually occurs when she tries to do too much physical activity  She has not been driving  She is not return to work  She is resting as much as she can but has gotten to the pool with her daughter and grandchild  The concussion specialist recommended patient start amantadine for her symptoms but she has not yet started the medication  Patient also sustained right-sided rib fractures secondary to the fall  Her rib pain is much improved  She only feels the pain once in a while with taking in a deep breath  No shortness of breath or chest pain  Patient has high blood pressure  She is currently on valsartan 320 mg daily  She is tolerating the medication well  Her blood pressure was elevated at her last appointment but no medication changes were made  Patient has anxiety  It has been worse since her accident  The concussion specialist recommended she discuss adjusting her sertraline dose as this is what she takes for anxiety  Patient states that she had a panic attack the other day  Her anxiety is well controlled otherwise but she does get these episodes of panic  The episode did last for few hours and she was able to calm down on her own  Patient has a history of blood in her urine  She did recently have a urinalysis to see if the blood was still present  Patient does not see any blood in her urine otherwise  She would like to review the results  Patient also had labs that show she has diabetes as well as elevated triglycerides  Patient was unaware of her diabetes diagnosis  She does not currently take any diabetes medication    She would like to avoid medication if possible and would like to stick with diet and exercise if okay  The following portions of the patient's history were reviewed and updated as appropriate: allergies, current medications and problem list     Review of Systems   Constitutional: Negative for fever  Respiratory: Negative for shortness of breath  Cardiovascular: Negative for chest pain  Objective:      /88 (BP Location: Left arm, Patient Position: Sitting, Cuff Size: Large)   Pulse 80   Resp 16   Wt 98 kg (216 lb)   BMI 34 86 kg/m²          Physical Exam   Constitutional: She appears well-developed and well-nourished  Cardiovascular: Normal rate, regular rhythm and normal heart sounds  Pulses are no weak pulses  Pulses:       Dorsalis pedis pulses are 2+ on the right side, and 2+ on the left side  Posterior tibial pulses are 2+ on the right side, and 2+ on the left side  Pulmonary/Chest: Effort normal and breath sounds normal    Feet:   Right Foot:   Skin Integrity: Negative for ulcer, skin breakdown, erythema, warmth, callus or dry skin  Left Foot:   Skin Integrity: Negative for ulcer, skin breakdown, erythema, warmth, callus or dry skin  Patient's shoes and socks removed  Right Foot/Ankle   Right Foot Inspection  Skin Exam: skin normal and skin intact no dry skin, no warmth, no callus, no erythema, no maceration, no abnormal color, no pre-ulcer, no ulcer and no callus                            Sensory       Monofilament testing: intact  Vascular  Capillary refills: < 3 seconds  The right DP pulse is 2+  The right PT pulse is 2+  Left Foot/Ankle  Left Foot Inspection  Skin Exam: skin normal and skin intactno dry skin, no warmth, no erythema, no maceration, normal color, no pre-ulcer, no ulcer and no callus                                         Sensory       Monofilament: intact  Vascular  Capillary refills: < 3 seconds  The left DP pulse is 2+  The left PT pulse is 2+  Assign Risk Category:  No deformity present; No loss of protective sensation;  No weak pulses       Risk: 0

## 2018-07-05 NOTE — PROGRESS NOTES
Daily Note     Today's date: 2018  Patient name: Conchita Tadeo  : 1960  MRN: [id number]  Referring provider: Tomas Alva PA-C  Dx: No diagnosis found  Subjective:       Objective: See treatment description below      Assessment: Tolerated treatment well  Patient would benefit from continued OT      HA 4/10 upon arrival after Speech services  Word search with cog load with focus on near/far saccadic movements, working memory, and increased processing speed-- Pt required x2 rest breaks for symptoms reduction with HA maintaining 4/10 and increased nausea 4/10  Pt with minimal blurriness reported with increased time spent on functional task  Pt completed functional task with appropriate cog pacing abilities and with 100% accuracy  Keeping in Mind and Embedded words worksheets completed simultaneously with focus on alternating attention, working memory, EF, direction-follow, and mental manipulation-- Pt continues to present with excellent insight on when a rest break is warranted without cues required  Pt with task completion with x3 rest breaks with self-report of increased visual blurriness, HA maintaining at 4/10, and nausea maintaining at 4/10  Pt required to complete at slowed cog pacing abilities 2* increased symptoms evident  Auditory processing and sustained attention with identifying amount of times "the" was stated with OTR reading short-story  Pt with 95% accuracy with requirement to write check marks for improved memory retention  Plan: Continue per plan of care           INTERVENTION COMMENTS:  Diagnosis: SAH (subarachnoid hemorrhage) (HCC) [I60 9]  Precautions: R rib fx  FOTO:  5 of 8 visits, PN due

## 2018-07-09 ENCOUNTER — OFFICE VISIT (OUTPATIENT)
Dept: SPEECH THERAPY | Facility: CLINIC | Age: 58
End: 2018-07-09
Payer: COMMERCIAL

## 2018-07-09 ENCOUNTER — OFFICE VISIT (OUTPATIENT)
Dept: OCCUPATIONAL THERAPY | Facility: CLINIC | Age: 58
End: 2018-07-09
Payer: COMMERCIAL

## 2018-07-09 ENCOUNTER — OFFICE VISIT (OUTPATIENT)
Dept: PHYSICAL THERAPY | Facility: CLINIC | Age: 58
End: 2018-07-09
Payer: COMMERCIAL

## 2018-07-09 DIAGNOSIS — R48.8 OTHER SYMBOLIC DYSFUNCTIONS: Primary | ICD-10-CM

## 2018-07-09 DIAGNOSIS — S06.0X9D CONCUSSION WITH LOSS OF CONSCIOUSNESS, SUBSEQUENT ENCOUNTER: ICD-10-CM

## 2018-07-09 DIAGNOSIS — I60.9 SAH (SUBARACHNOID HEMORRHAGE) (HCC): Primary | ICD-10-CM

## 2018-07-09 DIAGNOSIS — S06.0X0D CONCUSSION WITHOUT LOSS OF CONSCIOUSNESS, SUBSEQUENT ENCOUNTER: ICD-10-CM

## 2018-07-09 PROCEDURE — 97110 THERAPEUTIC EXERCISES: CPT

## 2018-07-09 PROCEDURE — 97150 GROUP THERAPEUTIC PROCEDURES: CPT

## 2018-07-09 PROCEDURE — 97530 THERAPEUTIC ACTIVITIES: CPT

## 2018-07-09 PROCEDURE — 97535 SELF CARE MNGMENT TRAINING: CPT

## 2018-07-09 PROCEDURE — 92507 TX SP LANG VOICE COMM INDIV: CPT

## 2018-07-09 PROCEDURE — 97112 NEUROMUSCULAR REEDUCATION: CPT

## 2018-07-09 NOTE — PROGRESS NOTES
Daily Note     Today's date: 2018  Patient name: Javier Pearce  : 1960  MRN: [id number]  Referring provider: David Villela PA-C  Dx:   Encounter Diagnosis     ICD-10-CM    1  SAH (subarachnoid hemorrhage) (HCC) I60 9    2  Concussion with loss of consciousness, subsequent encounter S06  0X9D          Subjective: Patient noted no headache nasueasous or dizziness pre treatment  Objective: See treatment diary below      Assessment: Nasueasous  noted throughout and  post treatment decreased with rest breaks throughout treatment  Patient still challenged with head turns increasing nausea  Patient would benefit from continued PT    Plan: Continue per plan of care     Precautions concussion, SAH, anxiety/depression     Specialty Daily Treatment Diary      Manual   18            Trigger point release  7 min            Gentle Soft tissue mob/ justin fiber  5 min                                                           Exercise Diary     VORx1, H/V  standing 60"  30''x2  D 5/10 30" x 2  60''   60''   VORcx, H/V  standing 60"  30''x2  30" x 2  60''  60''   Saccades, H/V standing         30''x2  30" x 2  60''  60''   Smooth pursuits  standing     30" x 2       Foam, arms crossed EC  FT/EC, foam, 30''x3    30" x 2  FT/EC, foam, 30''x3  FT/EC, foam, 30''x3   Walking with HTs, H/V    1 lap  ea  1 lap ea  1 lap ea  1 lap   Walking with turns          1 lap   Tandem gait        3 laps  3 laps   Ball toss hand to hand Ball follow, 1 lap  ball follow, 1 lap  ball follow 1 lap  ball follow 1 lap Ball follow, 1 lap   Rockerboard, M/L and A/P             HT/HN  FA/EC on foam, 30''x2     FA/EC on floor, 30''x2  FA/EC on foam, 30''x2    semi tandem stance       Foam, 30''x3      backwards walking  EC, 1 lap       EC, 1 lap                                                                                                           Modalities

## 2018-07-09 NOTE — PROGRESS NOTES
Daily Speech Treatment Note    Today's date: 2018  Patients name: Ayden Whitney  : 1960  MRN: [id number]  Safety measures: fall risk, headaches  Referring provider: Molly Lopez PA-C    Primary Diagnosis/Billing code: R38 1  Secondary Diagnosis/ Billing code: S06 0X0D    Visit Tracking:  -Referring provider: Epic  -Billing guidelines: AMA  -Visit #3/12; No auth required  [employer]  -RE due 2018  Subjective/Behavioral:  -"Today has been a great day in terms of headaches " Patient's friend accompanied her during today's session  Patient and friend requesting additional at-home activities to complete between sessions  HA 0/10 at start of session  Objective/Assessment:       Short-term goals:  1  Patient will complete word retrieval tasks (i e , analogies, category matrices, etc ) independently with 80% accuracy in 4-6 weeks to improve word finding ability  To target generative naming with initial letter and category provided, patient completed category members acitvity (i e , SPORT, begins with /g/)  Patient appropriately named 20/24 words independently; increasing to 24/24 with min verbal cues  Patient required extended amount of processing time for 4/24 items  To target education and practice of the circumlocution strategy, patient was asked to provide verbal/semantic descriptions of different people/places/things using "[employer]" cards  Task completed in  opp independently, increasing to  with min verbal cues to implement learned strategies       2  Patient will independently name 15+ items that begin with the same letter with 80% accuracy in 4-6 weeks to improve word generation ability      3   Patient will name 3 synonyms for given words with 80% accuracy with min verbal cuing in 4-6 weeks to develop word finding strategies (i e , circumlocution)      4  Patient will independently name an antonym for given words with 80% accuracy in 4-6 weeks to develop word finding strategies (i e , circumlocution)  To target semantic association and lexicon building, patient asked to name an opposite/antonym and synonym of an abstract word (i e , rigid)  Opposite naming completed 18/22 opp independently, increased to 21/22 with min verbal semantic cues  Synonym naming completed 21/22 opp independently, increased to 22/22 with min verbal semantic cues      5  Patient will complete auditory information processing tasks (i e, mental manipulation, anagrams, etc ) with 80% accuracy with min verbal cuing in 4-6 weeks to improve processing speed      6  Patient will complete complex thought organization tasks (i e, deduction puzzles) with 80% accuracy with min verbal cuing in 4-6 weeks to improve executive functioning abilities      HA 2/10 at end of session  Patient required multiple breaks during activities today due to increased HA symptoms  Attempted to keep all activities auditory  Plan:  -Patient was provided with home exercises/activities to target goals in plan of care at the end of today's session   -Continue with current plan of care

## 2018-07-09 NOTE — PROGRESS NOTES
Daily Note     Today's date: 2018  Patient name: Kurtis Valdes  : 1960  MRN: [id number]  Referring provider: Aruna Ro  Dx:   Encounter Diagnosis   Name Primary?  SAH (subarachnoid hemorrhage) (HCC) Yes                  Subjective: "I'm having a really good day today "      Objective: See treatment diary below      Assessment: Tolerated treatment well  HA and nausea at a 2/10 upon arrival and sustained at this level throughout session  Brain box for immediate recall with 5/6 correct answers and 5 trials  [employer] #6 for sustained attention, clutter, and visual memory  Spot the difference and noted slight blurriness with horizontal saccades  Plan: Continued skilled OT per POC      INTERVENTION COMMENTS:  Diagnosis: SAH (subarachnoid hemorrhage) (HCC) [I60 9]  Precautions: R rib fx  FOTO:  6 of 8 visits, PN due

## 2018-07-12 ENCOUNTER — OFFICE VISIT (OUTPATIENT)
Dept: OCCUPATIONAL THERAPY | Facility: CLINIC | Age: 58
End: 2018-07-12
Payer: COMMERCIAL

## 2018-07-12 ENCOUNTER — OFFICE VISIT (OUTPATIENT)
Dept: PHYSICAL THERAPY | Facility: CLINIC | Age: 58
End: 2018-07-12
Payer: COMMERCIAL

## 2018-07-12 ENCOUNTER — OFFICE VISIT (OUTPATIENT)
Dept: SPEECH THERAPY | Facility: CLINIC | Age: 58
End: 2018-07-12
Payer: COMMERCIAL

## 2018-07-12 DIAGNOSIS — I60.9 SAH (SUBARACHNOID HEMORRHAGE) (HCC): Primary | ICD-10-CM

## 2018-07-12 DIAGNOSIS — R48.8 OTHER SYMBOLIC DYSFUNCTIONS: Primary | ICD-10-CM

## 2018-07-12 DIAGNOSIS — S06.0X9D CONCUSSION WITH LOSS OF CONSCIOUSNESS, SUBSEQUENT ENCOUNTER: ICD-10-CM

## 2018-07-12 DIAGNOSIS — S06.0X0D CONCUSSION WITHOUT LOSS OF CONSCIOUSNESS, SUBSEQUENT ENCOUNTER: ICD-10-CM

## 2018-07-12 PROCEDURE — 97112 NEUROMUSCULAR REEDUCATION: CPT

## 2018-07-12 PROCEDURE — 92507 TX SP LANG VOICE COMM INDIV: CPT

## 2018-07-12 PROCEDURE — 97535 SELF CARE MNGMENT TRAINING: CPT

## 2018-07-12 NOTE — PROGRESS NOTES
Daily Speech Treatment Note    Today's date: 2018  Patients name: Kaycee Click  : 1960  MRN: [id number]  Safety measures: fall risk, headaches  Referring provider: Marcie Mcmullen PA-C    Primary Diagnosis/Billing code: R99 0  Secondary Diagnosis/ Billing code: S06 0X0D    Visit Tracking:  -Referring provider: Epic  -Billing guidelines: AMA  -Visit #; No auth required  [employer]  -RE due 2018  Subjective/Behavioral:  "I'm hanging in there " Patient reported experiencing a lack of sleep and requested door be closed during session to help her focus  HA 3/10 at start of session  Objective/Assessment:  Patient was unable to complete HEP  She reported she has been busy with her son visiting  Short-term goals:  1  Patient will complete word retrieval tasks (i e , analogies, category matrices, etc ) independently with 80% accuracy in 4-6 weeks to improve word finding ability  To target word relationships, patient completed second half, second part analogies ([facility]-9, pg  114)  Patient was asked to provide one word to complete the second half of an analogy (i e , hot is to cold, as left is to _____)  Task completed independently in  opp, increasing to 20/20 with min semantic verbal cuing  Patient also completed second half analogies ([facility]-9, pg  115)  Patient was asked to provide two words to complete the second half of an analogy (i e , hot is to cold, as ____ is to _____)  Task completed independently in  opp, increasing to 20/20 with min semantic verbal cuing      2  Patient will independently name 15+ items that begin with the same letter with 80% accuracy in 4-6 weeks to improve word generation ability      3  Patient will name 3 synonyms for given words with 80% accuracy with min verbal cuing in 4-6 weeks to develop word finding strategies (i e , circumlocution)  To target expressive vocabulary, patient completed a Synonym activity   Patient was asked to provide three synonyms for each word in a list of words ([facility]-8, pg  51)  Patient independently provided three synonyms for 13/22 words, two synonyms for 8/22 words, and one synonym for 1/22 words  This increased to three synonyms for 22/22 words with min-mod cuing from clinician      4  Patient will independently name an antonym for given words with 80% accuracy in 4-6 weeks to develop word finding strategies (i e , circumlocution)      5  Patient will complete auditory information processing tasks (i e, mental manipulation, anagrams, etc ) with 80% accuracy with min verbal cuing in 4-6 weeks to improve processing speed      6  Patient will complete complex thought organization tasks (i e, deduction puzzles) with 80% accuracy with min verbal cuing in 4-6 weeks to improve executive functioning abilities  To target attention and thought organization, patient was introduced to the card game "BLINK" (shapes/colors/numbers)  Patient was first asked to separate cards into two piles, only allowing them to have 3 cards in their hand at a time  Patient was instructed to discard/match into piles when meeting one of the three criteria (color, shape, or number)  Patient initially required reminders to pay attention to all three criteria  Once in full understanding of task, patient completed successfully in a timely manner to 100% acc      Patient required a break during activities today due to increased HA symptoms and fatigue  Attempted to keep all activities auditory  Plan:  -Patient was provided with home exercises/activities to target goals in plan of care at the end of today's session   -Continue with current plan of care  Patient was treated by Frankie Walker, graduate SLP student, and was under the direct supervision of EULALIO Szymanski , CCC-SLP

## 2018-07-12 NOTE — PROGRESS NOTES
Daily Note     Today's date: 2018  Patient name: Shawn Hannah  : 1960  MRN: [id number]  Referring provider: Estefany Berkowitz PA-C  Dx:   Encounter Diagnosis     ICD-10-CM    1  SAH (subarachnoid hemorrhage) (HCC) I60 9    2  Concussion with loss of consciousness, subsequent encounter S06  0X9D          Subjective: Patient reports 4/10 headache and mild dizziness upon arrival        Objective: See treatment diary below      Assessment: Requested modalities to start session with positive response  Still reports nausea and dizziness with TE  Demonstrating improved balance with dynamic gait exercises, some unsteadiness with tandem walking with EC  Plan: Continue per plan of care        Precautions concussion, SAH, anxiety/depression     Specialty Daily Treatment Diary      Manual   18            Trigger point release  7 min            Gentle Soft tissue mob/ justin fiber  5 min                                                           Exercise Diary         VORx1, H/V  standing Standing,busy, 60'',        VORcx, H/V  standing Busy w/ amb, 1 lap       Saccades, H/V standing       Standing, busy,  60''        Smooth pursuits  standing        Foam, arms crossed EC        Walking with HTs, H/V Bwd, 1 lap       Walking with turns 1 lap       Tandem gait EC, 1 lap       Ball toss hand to hand 1 lap       Rockerboard, M/L and A/P        HT/HN         semi tandem stance         backwards walking                                                                               Modalities              MHP/TENS 10 min

## 2018-07-16 NOTE — PROGRESS NOTES
Occupational Therapy Concussion Progress Note/Status Update: Today's Date: 2018  Patient Name: Criss Davila  : 1960  MRN: [id number]  Referring Provider: Yany Jiang  Dx: No primary diagnosis found  Active Problem List:   Patient Active Problem List   Diagnosis    Anxiety    Hypertension    Closed rib fracture    [facility]) [facility]    Closed head injury    Mild TBI ([facility]    Concussion with loss of consciousness    Hypertriglyceridemia    Hematuria    Type 2 diabetes mellitus without complication, without long-term current use of insulin (HCC)     Past Medical Hx:   Past Medical History:   Diagnosis Date    Anemia     Anxiety     Depression     Diarrhea     Fecal incontinence     Head injury     History of colon polyps     Hypertension      Past Surgical Hx:   Past Surgical History:   Procedure Laterality Date    CHOLECYSTECTOMY      COLONOSCOPY N/A 10/24/2017    Procedure: COLONOSCOPY;  Surgeon: Emmett Yi MD;  Location: [facility]; Service: Colorectal    COLONOSCOPY W/ ENDOSCOPIC US N/A 10/24/2017    Procedure: ANAL ENDOSCOPIC U/S;  Surgeon: Emmett Yi MD;  Location: [facility];   Service: Colorectal    DILATION AND CURETTAGE OF UTERUS      ENDOMETRIAL BIOPSY      WITHOUT CERVICAL DILATION    GALLBLADDER SURGERY      HYSTERECTOMY      NASAL SEPTUM SURGERY      NOSE SURGERY      NASAL SEPTAL  DEVIATION REPAIR    OTHER SURGICAL HISTORY      ENDOSCOPIC CONTROL OF GASTRIC BLEEDING, EPISIOTOMY    ROTATOR CUFF REPAIR        Pain Levels:   Resting:  {gen number 8-48:888525}    With Activity:  {gen number 9-02:566943}    Subjective/Patient Goal: "***"    History of Present Illness:  Pt is a pleasant, active,  62 y o  female seen for OT eval s/p referred to [address] [place] [address] s/p d/c'd from [facility] s/p tfer from [facility] for fall x2, riding a horse and was thrown from the horse landing on the ground striking her head, no memory of incident not remembering anything until she wa sback at home, mild abrasion of the R side of the face and area of swelling in the rihgt postauricular area, was walking onto her deck when she fell again no recall of incident, witnessed per , c/o R shoulder pain, R rib pain, left SCM pain, R pain in the R postauricular area, CTB @ [facility] + SAH tferred to [facility] for neurosx c/s, now dx'd w/ R 10th nondisplaced rib fx, traumatic SAH, closed head injury, comorbidities as listed above      Lifestyle Performance Model:  Autonomy: Pt was I w/ I/ADLs, drove, & required no use of DME PTA  Reciprocal Relationships: Supportive , 3 children, 2 grandchildren  Service to Others: 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, normal workday involves up to 2-3 computer screens at a time  Intrinsic Gratification: Enjoys camping, horseback riding, [employer]  Home Setup: Pt lives in [facility] w/ FF to basement w/ laundry in the cellar used regularly, 1 JABARI  Objective  Impairments Section:   1  Convergence Insufficiency Symptom Survey (CISS):     2  Concussion Cognitive Checklist: self report symptom checklist  *Patient indicated that {He/she (caps):10651} is experiencing the following symptoms:    · Memory: {MEMORY:60363}    · Attention: {ATTENTION:54787}    · Processing: {PROCESSIN}    · Executive Functions: {EXECUTIVE FUNCTIONS:98195}    · Communication: {COMMUNICATION:34150}    · Visual: {VISUAL:85935}    · Emotional: {EMOTIONAL:87496}    · Increased Sensitivities to: {INCREASED SENSITIVITIES TO:10057}     3   Contextual Memory Test (CMT): Pt reports has*** noticed a change in *** memory, rates her memory capacity at ***%, if studied 20 objects for 90 seconds would recall *** of them, would have recalled *** of them pre injury, frequently forgets things that happened the day before ***% of the time, forgets important details ***% of the time, frequently forgets things people have told her ***% of the time, frequently forgets things that happened a few minutes ago ***% of the time, would*** remember facts about this form a week from now  IMMEDIATE RECALL:   ***/20  score falls  in *** deficit range    DELAYED RECALL:  ***/20 score falls in *** deficit range   RECOGNITION:  ***/20 with *** confabulations resulting in score ***/20     4  Gabriel Cognitive Assessment Version 8 3 (MoCA V8 3): Pt engaged in MoCA V8 1 in acute care 6/12/2018 scored 23/30 indicative of mild neurocognitive impairments, scored 27/30 on R E  On MoCA V8 2 6/21/2018 indicative of normal neurocognitive functioning therefore V8 3 deferred)  5  Vision Screening Recording Form:   vision screen:   near acuity:   binocularity far:  binocularity near:  red green fusion:  near point of convergence:  mike string:   pursuits:  saccades:  ocular ROM:   visual perceptual midline shift:     6  Anxiety/Depression:  Pt engaged in the Neuro QOL Anxiety Short Form and Neuro QOL Depression Short Form in order to screen for anxiety and depressive s/s  Pt reported the following:  Anxiety- Short Form:   --used to measure anxious s/s  For scoring purposes, scores of 25+ indicate the threshold for treatment per neurology/SLIM  Score:***/40  Pt  Most often chose the response *** when asked about feeling anxious s/s  Depression- Short Form:   --used to measure depressive s/s  For scoring purposes, scores of 25+ indicate the threshold for treatment per neurology/SLIM  Score:***  Pt  Most often chose the response *** when asked about feeling depressive s/s  Assessment/Plan  Occupational Therapy Skilled Analysis Assessment and Plan of Care:  Pt requires overall mod I for ADLs/self care and mod I for fx'l mobility w/o DME   Pt is currently demonstrating the following occupational deficits: limited 2* photophobia, phonophobia, HA, dizziness, nausea, impaired high level dynamic balance t/o I/ADL/leisure tasks, divergence insufficiency, suppression of far, difficulty w/ reading comprehension, processing, divided attention, STM/immediate delayed recall, decreased river role, decreased worker role, external stimuli hypersensitivity, decreased attention/concentration to task, diplopia @ 12" for near point of convergence, jerky pursuits in all planes, inaccurate saccades in all planes, hard blinking, eye watering, intolerance to oncoming objects, hippussing, difficulty w/ divided attention, auditory processing  The following Occupational Performance Areas to address include: medication management, socialization, health maintenance, functional mobility, community mobility, clothing management, cleaning, meal prep, money management, household maintenance, care of children, care of pets, job performance/volunteering and social participation  Based on the aforementioned OT evaluation, functional performance deficits, and assessments, pt has been identified as a moderate complexity evaluation  Pt to continue to benefit from outpatient skilled OT services to address the following goals 2x/wk to  w/in 4 weeks with special focus on self-care management, pt education,  and VM training as well as motor training to improve above defiicits and enhance overall QOL/function  Pt has made great functional progress towards goals as stated per initial eval  Pt to continue to benefit from skilled outpt OT services to address goals as stated below  Recommend focus on *** 2x/wk for 4 more weeks   Pt agreeable      Goals:  Short Term Goals:  · Pt will increase auditory processing to take notes while listening in multi-modal work related/classroom symptom free at baseline performance for improved work/school performance, once returned  4 weeks as applicable  · Pt will increase attention to 2+ tasks for improved work/school performance (once returned) and engagement in salient tasks 4 weeks as applicable  · Pt will increase temporal awareness for keeping to schedule within 5 min increments, recall appointments, functional addition of time with 80% accuracy 4 weeks  · Pt will increase verbal and written direction following with processing time of <2 min and 80% accuracy for improved work/school performance, once returned 4 weeks as applicable  · Pt will demo good carryover of internal and external memory aides for improved recall of daily events, improved executive functioning with 80% accuracy in 4 weeks  · Pt will increase insight into deficits for improved carryover of recommendations, accommodations, improved rate of healing 4 weeks  · Pt will demo good carryover of self calming strategies for hypersensitivities to decrease symptoms within 5 min to baseline for improved tolerance of cog load tasks in 4 weeks  · Pt will increase screen tolerance to 2 hours with min increase in HA by 1-2 levels for improved leisure pursuits and work/school performance 4 weeks as applicable  · Pt will increase oculomotor control for improved saccades, con/divergent tasks for improved reading, board to table tasks with minimal increase in symptoms 4 weeks  · Pt will tolerate multi-modal envt x 15 min with 80% accuracy of cog load and min increase of symptoms of 2 levels in HA/dizziness/nausea 4 weeks  Long Term Goals:  · Pt will increase attention to 3+ tasks for improved divided attention with work/school and pre driving roles as applicable  · Pt will increase verbal and written direction following with processing time of <1 min and 85% accuracy  · Pt will tolerate multimodal envt x 60 min symptom free for return to work/school  · Pt will demo with decreased anxiety and frustration for improved insight into concussion process and rate of recovery  · Pt will demo with G carryover and understanding of accommodations for school/work environment to allow for enhanced learning environment symptom free, if needed  · Pt will increase oculomotor control for improved dynamic activities with head turns, board/screen to table tasks symptom free, improved VMI and return to baseline handwriting  · Pt will increase oculomotor control for WNL saccades, con/divergent tasks symptom free  · Pt will increase screen tolerance to 3 hours with min increase in HA by 1-2 levels for improved leisure pursuits and work/school performance as applicable     INTERVENTION COMMENTS:  Diagnosis: R 10th nondisplaced rib fx, traumatic SAH, closed head injury  Precautions: R rib fx  FOTO: ***  Insurance: [place] [id number] [[id number]]  8 of 8 visits, PN due 8/17/2018     Thank you for the consult!   Please call if you have any questions: [phone]  PIERCE Schultz, OTR/L, C-GCM, CSRS  Director of Outpatient Neuro Occupational Therapy

## 2018-07-17 ENCOUNTER — APPOINTMENT (OUTPATIENT)
Dept: PHYSICAL THERAPY | Facility: CLINIC | Age: 58
End: 2018-07-17
Payer: COMMERCIAL

## 2018-07-17 ENCOUNTER — APPOINTMENT (OUTPATIENT)
Dept: SPEECH THERAPY | Facility: CLINIC | Age: 58
End: 2018-07-17
Payer: COMMERCIAL

## 2018-07-17 ENCOUNTER — APPOINTMENT (OUTPATIENT)
Dept: OCCUPATIONAL THERAPY | Facility: CLINIC | Age: 58
End: 2018-07-17
Payer: COMMERCIAL

## 2018-07-19 ENCOUNTER — OFFICE VISIT (OUTPATIENT)
Dept: PHYSICAL THERAPY | Facility: CLINIC | Age: 58
End: 2018-07-19
Payer: COMMERCIAL

## 2018-07-19 ENCOUNTER — OFFICE VISIT (OUTPATIENT)
Dept: SPEECH THERAPY | Facility: CLINIC | Age: 58
End: 2018-07-19
Payer: COMMERCIAL

## 2018-07-19 ENCOUNTER — EVALUATION (OUTPATIENT)
Dept: OCCUPATIONAL THERAPY | Facility: CLINIC | Age: 58
End: 2018-07-19
Payer: COMMERCIAL

## 2018-07-19 DIAGNOSIS — S06.0X9D CONCUSSION WITH LOSS OF CONSCIOUSNESS, SUBSEQUENT ENCOUNTER: Primary | ICD-10-CM

## 2018-07-19 DIAGNOSIS — S06.0X0D CONCUSSION WITHOUT LOSS OF CONSCIOUSNESS, SUBSEQUENT ENCOUNTER: ICD-10-CM

## 2018-07-19 DIAGNOSIS — I60.9 SAH (SUBARACHNOID HEMORRHAGE) (HCC): ICD-10-CM

## 2018-07-19 DIAGNOSIS — R48.8 OTHER SYMBOLIC DYSFUNCTIONS: Primary | ICD-10-CM

## 2018-07-19 DIAGNOSIS — I60.9 SAH (SUBARACHNOID HEMORRHAGE) (HCC): Primary | ICD-10-CM

## 2018-07-19 PROCEDURE — G8991 OTHER PT/OT GOAL STATUS: HCPCS | Performed by: OCCUPATIONAL THERAPIST

## 2018-07-19 PROCEDURE — 92507 TX SP LANG VOICE COMM INDIV: CPT

## 2018-07-19 PROCEDURE — 97014 ELECTRIC STIMULATION THERAPY: CPT

## 2018-07-19 PROCEDURE — 97535 SELF CARE MNGMENT TRAINING: CPT | Performed by: OCCUPATIONAL THERAPIST

## 2018-07-19 PROCEDURE — G0283 ELEC STIM OTHER THAN WOUND: HCPCS

## 2018-07-19 PROCEDURE — G8990 OTHER PT/OT CURRENT STATUS: HCPCS | Performed by: OCCUPATIONAL THERAPIST

## 2018-07-19 PROCEDURE — 97112 NEUROMUSCULAR REEDUCATION: CPT

## 2018-07-19 NOTE — PROGRESS NOTES
Daily Note     Today's date: 2018  Patient name: Ayden Whitney  : 1960  MRN: [id number]  Referring provider: Molly Lopez PA-C  Dx:   Encounter Diagnosis     ICD-10-CM    1  Concussion with loss of consciousness, subsequent encounter S06  0X9D    2  SAH (subarachnoid hemorrhage) ([facility]) I60 9          Subjective: Patient reports 5/10 headache and dizziness upon arrival  Also complains of nausea  States having a bad day  Thinks this could be attributed to cleaning yesterday for the first time  Objective: See treatment diary below      Assessment: Started session with modalities with no significant improvement in symptoms noted  Performed oculomotor exercises with plain background today instead of busy due to symptoms  Fair tolerance overall to TE with increased nausea and dizziness  Did demonstrate improved balance with static and dynamic training  Plan: Continue per plan of care  Re-assessment NV  Precautions concussion, SAH, anxiety/depression     Specialty Daily Treatment Diary      Manual   18            Trigger point release  7 min            Gentle Soft tissue mob/ justin fiber  5 min                                                           Exercise Diary        VORx1, H/V  standing Standing,busy, 60'',  Standing, plain, 60''      VORcx, H/V  standing Busy w/ amb, 1 lap Standing, plain   61''      Saccades, H/V standing       Standing, busy,  60''  Standing, plain, 60''      Smooth pursuits  standing        Foam, arms crossed EC  FT/EC foam, 30''      Walking with HTs, H/V Bwd, 1 lap       Walking with turns 1 lap       Tandem gait EC, 1 lap       Ball toss hand to hand 1 lap 1 lap      Rockerboard, M/L and A/P        HT/HN  FA/EC on foam, 30''x2       semi tandem stance  Tandem, EC, foam, 30''x3       backwards walking         UT stretch  30''x2                                                                    Modalities            MHP/TENS 10 min  10 min

## 2018-07-19 NOTE — PROGRESS NOTES
Occupational Therapy Concussion Re- Evaluation    Today's Date: 2018  Patient Name: Chanelle Mahmood  : 1960  MRN: [id number]  Referring Provider: Raj Limb  Dx: No primary diagnosis found  Active Problem List:   Patient Active Problem List   Diagnosis    Anxiety    Hypertension    Closed rib fracture    [facility]) [facility] [address] [facility]    Closed head injury    Mild TBI ([facility] [address] [facility]    Concussion with loss of consciousness    Hypertriglyceridemia    Hematuria    Type 2 diabetes mellitus without complication, without long-term current use of insulin (HCC)     Past Medical Hx:   Past Medical History:   Diagnosis Date    Anemia     Anxiety     Depression     Diarrhea     Fecal incontinence     Head injury     History of colon polyps     Hypertension      Past Surgical Hx:   Past Surgical History:   Procedure Laterality Date    CHOLECYSTECTOMY      COLONOSCOPY N/A 10/24/2017    Procedure: COLONOSCOPY;  Surgeon: Betty Sandoval MD;  Location: [facility]; Service: Colorectal    COLONOSCOPY W/ ENDOSCOPIC US N/A 10/24/2017    Procedure: ANAL ENDOSCOPIC U/S;  Surgeon: Betty Sandoval MD;  Location: [facility]; Service: Colorectal    DILATION AND CURETTAGE OF UTERUS      ENDOMETRIAL BIOPSY      WITHOUT CERVICAL DILATION    GALLBLADDER SURGERY      HYSTERECTOMY      NASAL SEPTUM SURGERY      NOSE SURGERY      NASAL SEPTAL  DEVIATION REPAIR    OTHER SURGICAL HISTORY      ENDOSCOPIC CONTROL OF GASTRIC BLEEDING, EPISIOTOMY    ROTATOR CUFF REPAIR        Pain Levels:   Restin    With Activity:  8    Subjective/Patient Goal: "To work on my memory and my eyes"    "I feel like I am about 75% back to prior level"       History of Present Illness:  Pt is a pleasant, active,  62 y o  female seen for OT eval s/p referred to [address] [place] [address] s/p d/c'd from [facility] s/p tfer from [facility] for fall x2, riding a horse and was thrown from the horse landing on the ground striking her head, no memory of incident not remembering anything until she wa sback at home, mild abrasion of the R side of the face and area of swelling in the rihgt postauricular area, was walking onto her deck when she fell again no recall of incident, witnessed per , c/o R shoulder pain, R rib pain, left SCM pain, R pain in the R postauricular area, CTB @ [facility] + SAH tferred to [facility] for neurosx c/s, now dx'd w/ R 10th nondisplaced rib fx, traumatic SAH, closed head injury, comorbidities as listed above  Lifestyle Performance Model:  Autonomy: Pt was I w/ I/ADLs, drove, & required no use of DME PTA  Reciprocal Relationships: Supportive , 3 children, 2 grandchildren  Service to Others: 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 w/ FF to basement w/ laundry in the cellar used regularly, 1 JABARI  Objective  Impairments Section:   1  Convergence Insufficiency Symptom Survey (CISS): 31/60 FAIL    31/60 FAIL    2  Concussion Cognitive Checklist: self report symptom checklist  *Patient indicated that she is experiencing the following symptoms:    · Memory: Remembering people's names    resolved    · Attention: Keeping attention during a conversation, Focusing or concentrating on a specific task, Sustaining attention on a task and Dividing your attention (i e , multi-tasking)    Sustained and divided attention remains    · Processing: Responding to questions in a timely manner    resolved    · Executive Functions: None reported    · Communication: Word finding in conversation and Expressing thoughts and ideas fluently    Expression remains    · Visual: Losing spot on the page when reading    Misspelling remains    · Emotional: Frustration tolerance     Increased anxiety, frustration tolerance remains    · Increased Sensitivities to: Lighting and Noise    Lighting, noise, and screen time remains     3  Contextual Memory Test (CMT): Pt reports has noticed a change in her memory, rates her memory capacity at 75%, if studied 20 objects for 90 seconds would recall 8 of them, would have recalled 15 of them pre injury, frequently forgets things that happened the day before 50% of the time, forgets important details 25% of the time, frequently forgets things people have told her 25% of the time, frequently forgets things that happened a few minutes ago almost never, would remember facts about this form a week from now    Pt reports forgetting things that happened the day before, important details, and things that happened a few minutes ago almost never  Pt reports forgetting things that people have told her about 1/4 of the time  IMMEDIATE RECALL:     score falls  in WFL/WNL deficit range    16/20, falling into the WNL range   DELAYED RECALL:   score falls in suspect deficit range    14/20, falling into the WNL range  RECOGNITION:  / with 3 confabulations resulting in score 18/20    20/20 with no confabulations      4  Gabriel Cognitive Assessment Version 8 2 (MoCA V8 2): Pt completed MoCA V8 1 in acute care w/ OT on 2018 and scored 23/30 indicative of mild neurocognitive impairments  Visuospatial/executive functionin  Naming: 3/3  Memory: 1st trial: , 2nd trial:   Attention/concentration: 2/2  List of letters: /1  Serial Seven Subtraction: 3/3 w/ 0 errors  Language/sentence repetition: 2/2  Language Fluency: /  Abstract/Correlational Thinkin/2  Delayed Recall: 3/5  Orientation:    Memory Index Score: 13/15  MoCA V1 8 2 Raw Score: , MIS: 13/15, indicative of normal neurocognitive functioning  5  Vision Screening Recording Form:   vision screen: + progressive glasses @ all times present for vision screen  near acuity: R 20/25, L 20/30     R 20/25 with 1 error, L 20/25  binocularity far: orthophoria    Remains, with + dizziness 3/10 reported  binocularity near: orthophoria    Remains with +dizziness  1/10 reported   red green fusion: PLRG @ 3"    Diplopia at 3"   near point of convergence: diplopia @ 12"    Convergence intact, though demo Max difficulties with sustaining WNL convergence with L eye  mike string: suppression of far w/ divergence insufficiency    Suppression of near vision  Pursuits: jerky in all planes w/ intolerance w/ pursuits hard blinking eye strain and eye fatigue    Slightly jerky in all planes, +dizziness 2/10  Saccades: inaccurate in all planes w/ eye strain and eye fatigue hard blinking    Inaccurate in all planes with dysmetria (undershooting) evident  ocular ROM: intact/full  visual perceptual midline shifted above horizon    6  Anxiety/Depression  Pt engaged in the Neuro QOL Anxiety Short Form and Neuro QOL Depression Short Form in order to screen for anxiety and depressive s/s  Pt reported the following:  Anxiety- Short Form:   --used to measure anxious s/s  For scoring purposes, scores of 25+ indicate the threshold for treatment per neurology/SLIM  Score:10/40  Pt  Most often chose the response never when asked about feeling anxious s/s  Depression- Short Form:   --used to measure depressive s/s  For scoring purposes, scores of 25+ indicate the threshold for treatment per neurology/SLIM  Score:11/40  Pt  Most often chose the response never when asked about feeling depressive s/s  Assessment/Plan  Occupational Therapy Skilled Analysis Assessment and Plan of Care:  Pt requires overall mod I for ADLs/self care and mod I for fx'l mobility w/o DME   Pt is currently demonstrating the following occupational deficits: limited 2* photophobia, phonophobia, HA, dizziness, nausea, impaired high level dynamic balance t/o I/ADL/leisure tasks, divergence insufficiency, suppression of far, difficulty w/ reading comprehension, processing, divided attention, STM/immediate delayed recall, decreased river role, decreased worker role, external stimuli hypersensitivity, decreased attention/concentration to task, diplopia @ 12" for near point of convergence, jerky pursuits in all planes, inaccurate saccades in all planes, hard blinking, eye watering, intolerance to oncoming objects, hippussing, difficulty w/ divided attention, auditory processing  The following Occupational Performance Areas to address include: medication management, socialization, health maintenance, functional mobility, community mobility, clothing management, cleaning, meal prep, money management, household maintenance, care of children, care of pets, job performance/volunteering and social participation  Based on the aforementioned OT evaluation, functional performance deficits, and assessments, pt has been identified as a moderate complexity evaluation  Pt to continue to benefit from outpatient skilled OT services to address the following goals 2x/wk to  w/in 4 weeks with special focus on self-care management, pt education,  and VM training as well as motor training to improve above defiicits and enhance overall QOL/function  Pt demo with Excellent functional progression towards goals in POC  Pt is most limited by continuing HAs, nausea, brain fogginess, delayed processing, decreased divided attention, and decreased sustained convergence abilities affecting engagement in life roles  OTR recommending Pt to continue skilled OT services 2x/week for 4-6 weeks with focus on the above deficits for eventual return to worker role and for enhanced QOL       Goals:  Short Term Goals:  - Pt will increase auditory processing to take notes while listening in multi-modal work related/classroom symptom free at baseline performance for improved work/school performance, once returned  4 weeks as applicable-- PARTIALLY MET  - Pt will increase attention to 2+ tasks for improved work/school performance (once returned) and engagement in salient tasks 4 weeks as applicable-- PARTIALLY MET  - Pt will increase temporal awareness for keeping to schedule within 5 min increments, recall appointments, functional addition of time with 80% accuracy 4 weeks-- MET  - Pt will increase verbal and written direction following with processing time of <2 min and 80% accuracy for improved work/school performance, once returned 4 weeks as applicable-- MET  - Pt will demo good carryover of internal and external memory aides for improved recall of daily events, improved executive functioning with 80% accuracy in 4 weeks-- MET  - Pt will increase insight into deficits for improved carryover of recommendations, accommodations, improved rate of healing 4 weeks-- MET  - Pt will demo good carryover of self calming strategies for hypersensitivities to decrease symptoms within 5 min to baseline for improved tolerance of cog load tasks in 4 weeks--- MET  - Pt will increase screen tolerance to 2 hours with min increase in HA by 1-2 levels for improved leisure pursuits and work/school performance 4 weeks as applicable-- PARTIALLY MET  - Pt will increase oculomotor control for improved saccades, con/divergent tasks for improved reading, board to table tasks with minimal increase in symptoms 4 weeks-- PARTIALLY MET  - Pt will tolerate multi-modal envt x 15 min with 80% accuracy of cog load and min increase of symptoms of 2 levels in HA/dizziness/nausea 4 weeks-- MET  Long Term Goals:  - Pt will increase attention to 3+ tasks for improved divided attention with work/school and pre driving roles as applicable-- PARTIALLY MET  - Pt will increase verbal and written direction following with processing time of <1 min and 85% accuracy-- MET  - Pt will tolerate multimodal envt x 60 min symptom free for return to work/school-- PARTIALLY MET  - Pt will demo with decreased anxiety and frustration for improved insight into concussion process and rate of recovery--- PARTIALLY MET  - Pt will demo with G carryover and understanding of accommodations for school/work environment to allow for enhanced learning environment symptom free, if needed-- MET  - Pt will increase oculomotor control for improved dynamic activities with head turns, board/screen to table tasks symptom free, improved VMI and return to baseline handwriting-- PARTIALLY MET  - Pt will increase oculomotor control for WNL saccades, con/divergent tasks symptom free-- PARTIALLY MET  - Pt will increase screen tolerance to 3 hours with min increase in HA by 1-2 levels for improved leisure pursuits and work/school performance as applicable-- NOT MET    INTERVENTION COMMENTS:  Diagnosis: R 10th nondisplaced rib fx, traumatic SAH, closed head injury  Precautions: R rib fx  FOTO: 79 with 21% limitation    68, with 14% limitations  Insurance: Peter [employer] [[id number]]  0 of 8 visits, PN due 08/19/2018

## 2018-07-19 NOTE — PROGRESS NOTES
Daily Speech Treatment Note    Today's date: 2018  Patients name: Dacia Cortez  : 1960  MRN: [id number]  Safety measures: fall risk, headaches  Referring provider: Carlos Eduardo Stubbs PA-C    Primary Diagnosis/Billing code: K62 [id number]  Secondary Diagnosis/ Billing code: S06 0X0D    Visit Tracking:  -Referring provider: Epic  -Billing guidelines: AMA  -Visit #; No auth required  [employer]  -RE due 2018  Subjective/Behavioral:  "I over did it yesterday  I cleaned for 6 hours " Pt reported she felt well and decided to clean for 6 hours  Pt still cooperative and with good participation in today's session  Pt feels that her word finding abilities have improved a lot recently  Headache at start of session: 2-3 with [place] and dizziness after OT and PT   Headache at end of session:        Objective/Assessment:  Was provided with HEP to complete  Short-term goals:  1  Patient will complete word retrieval tasks (i e , analogies, category matrices, etc ) independently with 80% accuracy in 4-6 weeks to improve word finding ability  To target word finding and attention; patient completed the card game "Anomia" where they are asked to name people/places/things based on category presented on card (i e , artificial sweetener)  Target of game was for speed of naming and processing  Pt completed level 4 with average of 15 cards min (WNL; goal is 10+)  Pt skipped cards x2  To target word retrieval, pt was given a word and asked to provide 10 associating words (i e  Word: Summer- sun, sand, beach, etc )  Task completed in 58/60 trials, increasing to 60/60 with min visual cues       2  Patient will independently name 15+ items that begin with the same letter with 80% accuracy in 4-6 weeks to improve word generation ability  To target word generation, patient was asked to name as many words that begin with a specific letter in 60 seconds   Task completed over 3 trials, with average of 11 words/minute  (GOAL is 10/min)  Letters targeted: /s, b, p/      3  Patient will name 3 synonyms for given words with 80% accuracy with min verbal cuing in 4-6 weeks to develop word finding strategies (i e , circumlocution)        4  Patient will independently name an antonym for given words with 80% accuracy in 4-6 weeks to develop word finding strategies (i e , circumlocution)  To target semantic association and lexicon building, patient asked to name an opposite/antonym of an abstract word (i e , miserly)  Task completed in 19/20 opp overall, increased to 19/20 with min verbal cues         5  Patient will complete auditory information processing tasks (i e, mental manipulation, anagrams, etc ) with 80% accuracy with min verbal cuing in 4-6 weeks to improve processing speed      6  Patient will complete complex thought organization tasks (i e, deduction puzzles) with 80% accuracy with min verbal cuing in 4-6 weeks to improve executive functioning abilities  To target complex thought organization tasks, pt will complete number sudoku puzzle  Pt educated on how sudoku works (numbers 1-9 in each box, column, and row)  After initial explanation and mod verbal cues to complete first few numbers, pt with good understanding  Pt will complete rest for HEP; was provided answers       Patient required a break during activities today due to increased HA symptoms and fatigue  Plan:  -Patient was provided with home exercises/activities to target goals in plan of care at the end of today's session   -Continue with current plan of care  Patient was treated by EULALIO De La Garza , CF-SLP and was under the direct supervision of Baljeet LEDESMA , CCC-SLP

## 2018-07-23 ENCOUNTER — EVALUATION (OUTPATIENT)
Dept: PHYSICAL THERAPY | Facility: CLINIC | Age: 58
End: 2018-07-23
Payer: COMMERCIAL

## 2018-07-23 ENCOUNTER — OFFICE VISIT (OUTPATIENT)
Dept: SPEECH THERAPY | Facility: CLINIC | Age: 58
End: 2018-07-23
Payer: COMMERCIAL

## 2018-07-23 ENCOUNTER — APPOINTMENT (OUTPATIENT)
Dept: OCCUPATIONAL THERAPY | Facility: CLINIC | Age: 58
End: 2018-07-23
Payer: COMMERCIAL

## 2018-07-23 DIAGNOSIS — S06.0X9D CONCUSSION WITH LOSS OF CONSCIOUSNESS, SUBSEQUENT ENCOUNTER: Primary | ICD-10-CM

## 2018-07-23 DIAGNOSIS — S06.0X0D CONCUSSION WITHOUT LOSS OF CONSCIOUSNESS, SUBSEQUENT ENCOUNTER: ICD-10-CM

## 2018-07-23 DIAGNOSIS — R48.8 OTHER SYMBOLIC DYSFUNCTIONS: Primary | ICD-10-CM

## 2018-07-23 DIAGNOSIS — I60.9 SAH (SUBARACHNOID HEMORRHAGE) (HCC): ICD-10-CM

## 2018-07-23 PROCEDURE — 92507 TX SP LANG VOICE COMM INDIV: CPT

## 2018-07-23 PROCEDURE — 97150 GROUP THERAPEUTIC PROCEDURES: CPT | Performed by: PHYSICAL THERAPIST

## 2018-07-23 PROCEDURE — 97112 NEUROMUSCULAR REEDUCATION: CPT | Performed by: PHYSICAL THERAPIST

## 2018-07-23 NOTE — PROGRESS NOTES
Daily Speech Treatment Note    Today's date: 2018   Patients name: Shannon Ortiz  : 1960  MRN: [id number]  Safety measures: fall risk, headaches  Referring provider: Rodolfo Covington PA-C    Primary Diagnosis/Billing code: F79 [id number]  Secondary Diagnosis/ Billing code: S06 0X0D    Visit Tracking:  -Referring provider: Epic  -Billing guidelines: AMA  -Visit #; No auth required  [employer]  -RE due 2018  Subjective/Behavioral:  -"I had to wake up last night to take tylenol for a HA "  Pt states she is always exhausted and gets a headache by the end of the day (around 4pm)  Good participation and cooperation despite HA  Per pt request, had door open to increase level of difficulty for sustaining attention  Stated she has difficulty keeping attention when reading a book  senior care through session, closed door 2/2 HA symptoms  Pt returned with HEP to improve word finding  Asked to provide associated words, completed task in 60/60 opp  Headache at start of session: 310  Headache at end of session: increased to 4/10       Objective/Assessment:    Short-term goals:  1  Patient will complete word retrieval tasks (i e , analogies, category matrices, etc ) independently with 80% accuracy in 4-6 weeks to improve word finding ability  To target circumlocution and overall language skills; patient participated in the game "taboo" with clinician  Patient is presented with a word (i e , yellow), and must provide verbal clues to the clinician, without using "taboo" or restricted words (i e , color, bus, sun, etc ) Patient completed description of 6/6 words, and guessing of 5/5 words described by clinician  Patient noted to require additional time for execution of clues and word finding  2  Patient will independently name 15+ items that begin with the same letter with 80% accuracy in 4-6 weeks to improve word generation ability        3   Patient will name 3 synonyms for given words with 80% accuracy with min verbal cuing in 4-6 weeks to develop word finding strategies (i e , circumlocution)      4  Patient will independently name an antonym for given words with 80% accuracy in 4-6 weeks to develop word finding strategies (i e , circumlocution)        5  Patient will complete auditory information processing tasks (i e, mental manipulation, anagrams, etc ) with 80% accuracy with min verbal cuing in 4-6 weeks to improve processing speed  To target immediate memory; patient participated in a mental manipulation task  Words were read aloud by clinician, and patient was asked to recall words in a specific order denoted by SLP  Patient completed recall of words in word order in 10/10 opp independently  Patient completed recall of words in the order they would occur; task completed in 18/18 opp independently  Used tapping and visualization strategy  Anagrams: To target auditory attention and mental manipulation during a word finding activity, patient was asked to listen to a word, and rearrange the letters within the word to create a new word (i e , late = tale)  Task completed over 8 trials  Completed 4/8 independently, increased to 8/8 with use of written support and phonemic cue  Patient became visually upset at self during task due to its difficulty and length of time needed for completion  6  Patient will complete complex thought organization tasks (i e, deduction puzzles) with 80% accuracy with min verbal cuing in 4-6 weeks to improve executive functioning abilities        Patient required a break during activities today due to increased HA symptoms and fatigue  Plan:  -Patient was provided with home exercises/activities to target goals in plan of care at the end of today's session   -Continue with current plan of care

## 2018-07-23 NOTE — PROGRESS NOTES
Daily Note     Today's date: 2018  Patient name: Gianna Espana  : 1960  MRN: [id number]  Referring provider: Terrence Frey PA-C  Dx:   Encounter Diagnosis     ICD-10-CM    1  Concussion with loss of consciousness, subsequent encounter S06  0X9D    2  SAH (subarachnoid hemorrhage) ([facility]) I60 9          Subjective: Patient reports 5/10 headache and dizziness upon arrival  Also complains of nausea  States having a bad day  Thinks this could be attributed to cleaning yesterday for the first time  Objective: See treatment diary below    Progress update performed by KT 18  Overall feels improvements with some therapy activities, able to move head faster and balance better with therapeutic activities  Now able to perform household work for about 1 hour and then rest which is an improvement  Remains out of work, with increased HA and dizziness after cleaning for 6 hours last week  Follows up with Neurologist on Friday (Dr Cam Bosworth)    Reports HA at worst rated 7-8/10 had on last night that woke her up and couldn't go back to sleep   Usually take extra strength tylenol that helps to reduce symptoms     Dizziness: Intermittent   At worst 4-5/10 - when she "over does it" or if she moves to quickly - side to side and up/down  At best: 0/10  On Av-/10    DHI:  Intake - 48  18 - 58    STGs:  1  Pt will reduce HA rating to at worst a 2/10 within 6 weeks - NOT Met as of 18  2  Pt will reduce dizziness rating to at worst a 2/10 within 6 weeks  - NOT MET as of 18  3  Pt will improve DHI score by at least 5 points within 6 weeks  - NOT MET  4  Pt will improve balance on noncompliant surface with eyes closed for 30 seconds within 6 weeks  - MEt  5  Pt will demonstrate independence with HEP within 6 weeks  - MET    LTGs:  1  Pt will deny headache within 12 weeks  2  Pt will deny dizziness 12 weeks  3  Pt will improve DHI score to at least a 15/100 within 12 weeks    4  Pt will improve DGI score to at least 23/24 needed to reduce fall risk within 12 weeks  5  Pt will demonstrate independence with HEP within 12 weeks  6  Pt will have returned to normal tolerance for work within 12 weeks  Assessment: As per [address] patient with increased symptoms of dizziness despite subjective reports or improved tolerance to cleaning and therapeutic activities  Compliant with hep and improved use of vestibular cues to maintain balance  Remains with frequent HA's is to follow up with neurology for the first time since indicident on 7/27/2018  Making progress toward meeting her goals, participating in therapy and Hep and has improved static balance  Plan: Continue per plan of care  Patient treated 1:1 with PT from 05:00p- 5:30p,unsupervised from 5:30-5:45 grouped 5:45-06:00  Precautions concussion, SAH, anxiety/depression     Specialty Daily Treatment Diary      Manual   6/28/18 7/23        Trigger point release  7 min            Gentle Soft tissue mob/ justin fiber  5 min                                                           Exercise Diary  7/12 7/19 7/23/18     VORx1, H/V  standing Standing,busy, 60'',  Standing, plain, 60'' Standing plain  60"     VORcx, H/V  standing Busy w/ amb, 1 lap Standing, plain   61'' Plain  60" ea     Saccades, H/V standing       Standing, busy,  60''  Standing, plain, 60'' Plain 60" ea             Foam, arms crossed EC  FT/EC foam, 30''      Walking with HTs, H/V Bwd, 1 lap  1 lap each     Walking with turns 1 lap   1 lap each     Tandem gait EC, 1 lap       Ball toss hand to hand 1 lap 1 lap      Rockerboard, M/L and A/P        HT/HN  FA/EC on foam, 30''x2       semi tandem stance  Tandem, EC, foam, 30''x3 Tandem  airex EC  30"x3       backwards walking         UT stretch  30''x2                                                                    Modalities  7/12 7/19 7/23        MHP/TENS 10 min  10 min  10 min

## 2018-07-25 ENCOUNTER — OFFICE VISIT (OUTPATIENT)
Dept: DIABETES SERVICES | Facility: CLINIC | Age: 58
End: 2018-07-25
Payer: COMMERCIAL

## 2018-07-25 ENCOUNTER — TELEPHONE (OUTPATIENT)
Dept: FAMILY MEDICINE CLINIC | Facility: MEDICAL CENTER | Age: 58
End: 2018-07-25

## 2018-07-25 DIAGNOSIS — E11.9 TYPE 2 DIABETES MELLITUS WITHOUT COMPLICATION, WITHOUT LONG-TERM CURRENT USE OF INSULIN (HCC): Primary | ICD-10-CM

## 2018-07-25 PROCEDURE — G0108 DIAB MANAGE TRN  PER INDIV: HCPCS | Performed by: DIETITIAN, REGISTERED

## 2018-07-25 NOTE — PROGRESS NOTES
Type 2 Diabetes Class Assessment    HPI: Met with Kurtis Valdes for DSME Initial visit  Narcisa Cedeno has newly diagnosed Type 2 Diabetes, Obesity  Patient states recent head injury has prevented her from exercising  Under care of neurologist  She is not testing her blood sugar but would like to be able to use readings to evaluate the effects of her food intake on her blood sugar  She was given a meter and instructed on its use  She was educated to do paired BG testing to find patterns of hyperglycemia and facilitate dietary and lifestyle changes  She was given an estimate of 30 grams carb per meal until her MNT appointment  Diabetes Assessment  Visit Type: Initial visit  Present at Session: patient   Special Learning Needs: No  Barriers to Learning: no barriers    How do you feel about making lifestyle changes at this time? Contemplation  How would you rate your current knowledge of diabetes? fair  How confident are you that will be able to take better control of your diabetes?: excellent    How long have you had diabetes? 2 weeks  Have you had diabetes education in the past?: No  Do you have any family members with diabetes?: Yes - Father  Do you monitor your blood sugar? no  Type of blood sugar monitor: How old is your meter?:   How often do you test your blood sugars?:  Do you keep a written record of your blood sugars? No   Blood sugar log with patient today and reviewed by educator?: No   Blood Sugar ranges:    Fasting:    Before meals:    2 hours after meals:   Any financial concerns pertaining to your diabetes supplies, medication or care?: No  Have you ever experienced hypoglycemia?:  Yes  Have you ever been hospitalized or gone to the ER due to your blood sugars?: No  How do you treat low blood sugars?: Not sure  How do you treat high blood sugars?  NA  Do you wear a Diabetes I D ?: no  Where do you dispose of your sharps (needles,lancetes)?: N/A    Ht Readings from Last 1 Encounters:   07/03/18 5' 6" (0 512 m)     Wt Readings from Last 3 Encounters:   07/05/18 98 kg (216 lb)   07/03/18 97 5 kg (215 lb)   06/22/18 97 7 kg (215 lb 6 4 oz)        There is no height or weight on file to calculate BMI       Lab Results   Component Value Date    HGBA1C 6 8 (H) 07/02/2018       Lab Results   Component Value Date    CHOL 182 07/02/2018    CHOL 164 08/26/2017     Lab Results   Component Value Date    HDL 42 07/02/2018    HDL 41 08/26/2017     Lab Results   Component Value Date    LDLCALC 87 07/02/2018    LDLCALC 97 08/26/2017     Lab Results   Component Value Date    TRIG 264 (H) 07/02/2018    TRIG 131 08/26/2017     No components found for: CHOLHDL  No results found for: Devonte Elizabeth    Weight Change: Yes Gained    Diet Assessment    Do you follow any special diet presently?: No  Who cooks at home?:  patient  Who does the grocery shopping?: patient   How frequently do you eat out?: 1 x week    Activity Assessment    Exercise: No due to head injury    Lifestyle/Social Assessment    Racial/ethnic group:                                       Primary Language: English  Marital Status:   Education Level: High School Graduate   Work status: Disabled  Type of job and hours:  Who lives in your household?: spouse  Who is you primary support person(s): spouse   Describe your quality of life currently?: good  Any concerns for your safety?: No  Any Uatsdin or cultural practices that may affect your diabetes care: No  Do you have a decrease or loss of hearing?: No  Do you have a decrease or loss of vision?: No  When was the last time you had an ophthalmology exam?: Yearly  When was the last time you had dental exam?: every 6 months  Do you check your feet for cracks, sores, debris?: Yes  When was the last time you had podiatry or foot exam?: 2 years ago  Last flu shot?: 2017  Pneumonia shot?: No      The patient's history was reviewed and updated as appropriate: allergies, current medications  Intervention    Diabetes Overview :   Fausto Cramer was instructed on basic concepts of diabetes, including identifying role of diabetes self management, basic pathophysiology and types of diabetes, A1c and blood sugar targets  Fausto Cramer has good understanding of material covered  Taking Medications: Instructed patient on action, side effects, efficacy, prescribed dosage and appropriate timing and frequency of administration of her diabetes medication  Fausto Cramer has good understanding of material covered  Monitoring Blood Sugars  Instructions for Meter Teaching- Patient instructed in the following:  Site selection and skin preparation, Loading strips and lancet device, meter activation, obtaining blood sample, test strip and lancet disposal and recording log book entries  Patient has good understanding of material covered and was able to test their own blood sugar in office today  Comments: Gave and instructed on Accu Chek Guide meter, Patient demonstrated use, blood sugar in office 130 mg/dl  at  3:00 pm (fasting >4 hours post prandial     Lot #: [id number]  Exp Date: 8/19/19    Testing frequency: Encouraged pair testing  Test sugars before a meal and 2hr after the same meal, rotating between breakfast, lunch, and dinner  Test sugars twice a day (3 days a week, 7 days a week)  Goal Blood Sugars:   Premeal , even better <110  2hr after a meal <180, even better <140  A1C <7%, even better <6 5%  Hypoglycemia: Instructed patient on definition/risk of hypoglycemia, treatment, causes/symptoms, when to notify provider of lows, prevention of hypoglycemia and exercise precautions  Comments: Benoit Woodard understanding of hypoglycemia concepts      Physical Activity: Discussed benefits of physical activity to optimize blood glucose control, encouraged activity at patient is physically able   Always consult a physician prior to starting an exercise program   Comments: Benoit Woodard understanding of exercise concepts        Diabetes Education Record  Alice Brasher received the following handouts: Know Your Blood Sugar Numbers, BG Log, Hyper/hypoglycemia, 15/15 Rule, Diabetes Guidelines      Patient response to instruction    Comprehensionvery good  Motivationvery good  Expected Compliancevery good    Thank you for referring your patient to [facility], it was a pleasure working with them today  Please feel free to call with any questions or concerns      [facility] [place] [id number]

## 2018-07-25 NOTE — PATIENT INSTRUCTIONS
1  As instructed, test blood sugar before and after various meals to find patterns of hyperglycemia and to see the effect of your food intake on your blood sugar  2  Eat 30 grams carb at each meal  If different, indicate on blood sugar log  3  Gully with different carb foods or amounts to get to target blood sugar  4   Start exercise program as able

## 2018-07-26 ENCOUNTER — OFFICE VISIT (OUTPATIENT)
Dept: SPEECH THERAPY | Facility: CLINIC | Age: 58
End: 2018-07-26
Payer: COMMERCIAL

## 2018-07-26 ENCOUNTER — OFFICE VISIT (OUTPATIENT)
Dept: OCCUPATIONAL THERAPY | Facility: CLINIC | Age: 58
End: 2018-07-26
Payer: COMMERCIAL

## 2018-07-26 ENCOUNTER — OFFICE VISIT (OUTPATIENT)
Dept: PHYSICAL THERAPY | Facility: CLINIC | Age: 58
End: 2018-07-26
Payer: COMMERCIAL

## 2018-07-26 ENCOUNTER — TELEPHONE (OUTPATIENT)
Dept: FAMILY MEDICINE CLINIC | Facility: MEDICAL CENTER | Age: 58
End: 2018-07-26

## 2018-07-26 DIAGNOSIS — R48.8 OTHER SYMBOLIC DYSFUNCTIONS: Primary | ICD-10-CM

## 2018-07-26 DIAGNOSIS — S06.0X9D CONCUSSION WITH LOSS OF CONSCIOUSNESS, SUBSEQUENT ENCOUNTER: Primary | ICD-10-CM

## 2018-07-26 DIAGNOSIS — S06.0X0D CONCUSSION WITHOUT LOSS OF CONSCIOUSNESS, SUBSEQUENT ENCOUNTER: ICD-10-CM

## 2018-07-26 DIAGNOSIS — E11.9 TYPE 2 DIABETES MELLITUS WITHOUT COMPLICATION, WITHOUT LONG-TERM CURRENT USE OF INSULIN (HCC): Primary | ICD-10-CM

## 2018-07-26 DIAGNOSIS — I60.9 SAH (SUBARACHNOID HEMORRHAGE) (HCC): ICD-10-CM

## 2018-07-26 DIAGNOSIS — I60.9 SAH (SUBARACHNOID HEMORRHAGE) (HCC): Primary | ICD-10-CM

## 2018-07-26 PROCEDURE — 97112 NEUROMUSCULAR REEDUCATION: CPT

## 2018-07-26 PROCEDURE — 97535 SELF CARE MNGMENT TRAINING: CPT

## 2018-07-26 PROCEDURE — 92507 TX SP LANG VOICE COMM INDIV: CPT

## 2018-07-26 PROCEDURE — 97110 THERAPEUTIC EXERCISES: CPT

## 2018-07-26 NOTE — PROGRESS NOTES
Daily Speech Treatment Note    Today's date: 2018   Patients name: Chaim Hurt  : 1960  MRN: [id number]  Safety measures: fall risk, headaches  Referring provider: Richard Manjarrez PA-C    Primary Diagnosis/Billing code: N16 9  Secondary Diagnosis/ Billing code: S06 0X0D    Visit Tracking:  -Referring provider: Epic  -Billing guidelines: AMA  -Visit #; No auth required  [employer]  -RE due 2018  Subjective/Behavioral:  -"I feel like I have been at a plateau and I'm finally over the hump " Pt excited she has been improving with her word finding in daily conversations and that her headaches have lessened  Good cooperation and participation, however, frequently requires counseling on her performance during tasks  Pt discouraged at times despite progress  Headache at start of session: 2/10   Headache at end of session:  3/10       Objective/Assessment:  Reviewed completed HEP with patient  To target word finding, pt completed [facility] 8: pg 80 completing words  Task completed in  opp independently, increasing to  with min phonemic and verbal cues  Short-term goals:    1  Patient will complete word retrieval tasks (i e , analogies, category matrices, etc ) independently with 80% accuracy in 4-6 weeks to improve word finding ability  To target word finding, patient completed Naming Words by Letter activity ([facility]-8, pg  62-64)  Patient was asked to provide a word that best fits the provided definition, with the initial letter provided (i e , a liquid measure equaling four quarts___/g/___gallon)  Patient completed task in  opp independently  To target generative naming skills, patient participated in "[employer]" game, where they are asked to provide a word when given a specific letter and category (i e , boys name __/L/ = Foster Anthony)  Patient completed task over 2 trials in avg  opp, increased to 10/12 with mod verbal cuing      2  Patient will independently name 15+ items that begin with the same letter with 80% accuracy in 4-6 weeks to improve word generation ability  3  Patient will name 3 synonyms for given words with 80% accuracy with min verbal cuing in 4-6 weeks to develop word finding strategies (i e , circumlocution)      4  Patient will independently name an antonym for given words with 80% accuracy in 4-6 weeks to develop word finding strategies (i e , circumlocution)  5  Patient will complete auditory information processing tasks (i e, mental manipulation, anagrams, etc ) with 80% accuracy with min verbal cuing in 4-6 weeks to improve processing speed  To target mental manipulation, patient completed [facility] 8: Word Finding sheet (pg  129) to extract words from longer words (i e  Encyclopedia -->  Pen, cycle, etc )  Task completed independently with pt extracting 22 words in 5 min time frame  Pt initially frustrated by task complexity, but upon first few words improved attitude towards task  6  Patient will complete complex thought organization tasks (i e, deduction puzzles) with 80% accuracy with min verbal cuing in 4-6 weeks to improve executive functioning abilities  To target thought organization and auditory information processing, patient completed Deduction Puzzle #1 of [facility]-2 (4X3 grid)  Patient completed puzzle with 3/12 squares filled independently, increasing to 12/12 squares with verbal semantic cues to improve sequencing and thought process      *Patient required a break during activities today due to increased HA symptoms and fatigue  Plan:  -Patient was provided with home exercises/activities to target goals in plan of care at the end of today's session   -Continue with current plan of care

## 2018-07-26 NOTE — TELEPHONE ENCOUNTER
Pt left vm that she went for diabetic ed yesterday w/yury eduardo, and she suggested pt be started on metformin er 500 mg  Can you send an rx?

## 2018-07-26 NOTE — PROGRESS NOTES
Daily Note     Today's date: 2018  Patient name: Cori Newell  : 1960  MRN: [id number]  Referring provider: Hilda Steinberg PA-C  Dx:   Encounter Diagnosis     ICD-10-CM    1  Concussion with loss of consciousness, subsequent encounter S06  0X9D    2  SAH (subarachnoid hemorrhage) ([facility]) I60 9          Subjective: Patient reports 2/10 headache upon arrival and 1/10 dizziness upon arrival from OT  Objective: See treatment diary below      Assessment: Trialed warm-up on bike today, with only slight increased headache to 3/10  Patient demonstrated much improved tolerance to habituation exercises  She is demonstrating improved balance overall, but still very challenged with tandem stance on foam with EC  Headache reduced to 2/10, and rates dizziness at worse as 3/10 with exercises  Advised to monitor symptoms for next 24 hours  Plan: Continue per plan of care  Precautions concussion, SAH, anxiety/depression     Specialty Daily Treatment Diary      Manual   18            Trigger point release  7 min            Gentle Soft tissue mob/ justin fiber  5 min                                                           Exercise Diary       VORx1, H/V  standing Standing,busy, 60'',  Standing, plain, 60'' Standing,busy, 60'',      VORcx, H/V  standing Busy w/ amb, 1 lap Standing, plain   61''      Saccades, H/V standing       Standing, busy,  60''  Standing, plain, 60'' Standing,busy, 60'',      Smooth pursuits  standing        Foam, arms crossed EC  FT/EC foam, 30''      Walking with HTs, H/V Bwd, 1 lap  Bwd, 1 lap     Walking with turns 1 lap  Bwd, EC, 1 lap     Tandem gait EC, 1 lap  EC, 1 lap     Ball toss hand to hand 1 lap 1 lap 1 lap     Rockerboard, M/L and A/P        HT/HN  FA/EC on foam, 30''x2 FT/EC on foam, 30''x2      semi tandem stance  Tandem, EC, foam, 30''x3 Tandem, EC, foam, 30''x3      backwards walking         UT stretch  30''x2       bike   10 min, HA 3/10  tandem walking   Foam, 3 laps      sidestepping   Foam, w/ HT/HN, 3 laps                                        Modalities  7/12 7/19 7/23        MHP/TENS 10 min  10 min  10 min

## 2018-07-26 NOTE — TELEPHONE ENCOUNTER
Patient said after talking to the nutritionist, she feels it will be better for her liver to start on a medication  Patient wants to start it as well

## 2018-07-26 NOTE — TELEPHONE ENCOUNTER
I agree with starting the medication as well  I will have patient start metformin 500 mg twice daily  For the 1st week however I recommend patient take one tablet daily and then increase to one tablet twice daily after week  Metformin is notorious for causing diarrhea and this will help to lessen the severity of that  Patient may still have diarrhea but the body should acclimate to this within the next few weeks  Medication has been faxed to her pharmacy

## 2018-07-26 NOTE — PROGRESS NOTES
Daily Note     Today's date: 2018  Patient name: Sheree Dunlap  : 1960  MRN: [id number]  Referring provider: Bandar Jeffers  Dx:   Encounter Diagnosis   Name Primary?  SAH (subarachnoid hemorrhage) (HCC) Yes                  Subjective: "Wow, I didn't think this would be that hard  I should have known - I get dizzy when I bend down to get something out of the cabinet at home "      Objective: See treatment diary below      Assessment: Tolerated treatment well  HA 2/10 upon arrival  Missing Letter Word Circles in stance at mirror requiring position changes and head turns to locate foam letter blocks on mat table to R and place them on top of mirror OH utilizing black tongs  Focus was on improving saccades and tolerance to position changes  Pt c/o mod dizziness throughout task with HA increasing to 3/10, requiring seated rest breaks x2 and verbal cues to overcome frustration with task  Required full hour to complete worksheet  HA 2/10 with min dizziness post treatment  Plan: Continue skilled OT per POC with focus on oculomotor control  INTERVENTION COMMENTS:  Diagnosis: R 10th nondisplaced rib fx, traumatic SAH, closed head injury  Precautions: R rib fx  FOTO: 79 with 21% limitation    68, with 14% limitations  Insurance: Peter 71 [[id number]]  1 of 4 visits, PN due 2018

## 2018-07-26 NOTE — TELEPHONE ENCOUNTER
At patient's last office visit we discussed diet modification to help control diabetes versus starting medication  Patient and I came to an agreement to avoid medication at this time  Has patient changed her mind?

## 2018-07-27 ENCOUNTER — OFFICE VISIT (OUTPATIENT)
Dept: NEUROLOGY | Facility: CLINIC | Age: 58
End: 2018-07-27
Payer: COMMERCIAL

## 2018-07-27 VITALS
WEIGHT: 210 LBS | HEART RATE: 78 BPM | SYSTOLIC BLOOD PRESSURE: 122 MMHG | HEIGHT: 66 IN | BODY MASS INDEX: 33.75 KG/M2 | DIASTOLIC BLOOD PRESSURE: 78 MMHG

## 2018-07-27 DIAGNOSIS — S06.0X9A CONCUSSION WITH LOSS OF CONSCIOUSNESS, INITIAL ENCOUNTER: Primary | ICD-10-CM

## 2018-07-27 DIAGNOSIS — G44.319 ACUTE POST-TRAUMATIC HEADACHE, NOT INTRACTABLE: ICD-10-CM

## 2018-07-27 DIAGNOSIS — R42 DIZZINESS AND GIDDINESS: ICD-10-CM

## 2018-07-27 PROCEDURE — 99204 OFFICE O/P NEW MOD 45 MIN: CPT | Performed by: PSYCHIATRY & NEUROLOGY

## 2018-07-27 NOTE — PROGRESS NOTES
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 and I will see her back in follow-up in 6 weeks  Subjective:    HPI  Allen Card presents today with the above complaints  She states she was horseback riding with her  when she was thrown from a horse  She states that she recalls sensing the horses back end elevate but does not remember anything after that  She states that she was able to get right back up and get on the horse and continued to ride back to the stable  She then road with her  back to the house in their car  He thought it might be a good idea for her to be evaluated medically as she kept repeating things and was not acting quite right however she did not feel it necessary  She states she does not remember anything from the horses back end elevating until later that night when she was having a conversation with her   She states the next day she was walking her dog since she fell  She does not think she lost consciousness but because of this she drove herself to the emergency room  She had a CT scan done of her head and neck and there was some question of subarachnoid blood versus calcification and she was sent to the Trauma Unit in [place]  There she was observed for a day or 2 and then released    Since that time she has been in occupational therapy, speech therapy and physical therapy  Her current complaints or headaches  She states her headaches are a pressure type pain in the front and posterior head region that often throb when she tries to become more active  She states she still gets headaches daily but she states the intensity of the headache has improved since her head injury  Currently she states her headaches are about a 2 or 3  She does find that if she takes Tylenol this seems to get rid of the headache  She states she takes Tylenol about 2 or 3 times a day  She denies any previous history of headaches  She denies any previous history of head injury  She states in addition to the headaches she notes that her head feels foggy and she seems to have a difficult time concentrating  Initially she was having some word-finding difficulties but this seems to be improving with speech therapy  She also reports dizziness  She states her dizziness is more of a feeling of off balance that she finds typically occurs if she is moving her head side to side her moving her eyes side to side  It does not seem to happen as much if she is moving her eyes up and down  She states on rare occasion she notes a brief spinning but this has not been happening frequently  She states her balance has been improving with therapy, however she is still not driving and 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  She was prescribed amantadine and hopes to reduce headache frequency but she has decided not to take this as she wants to know how she is feeling and does not want anything to mask the headache        Past Medical History:   Diagnosis Date    Anemia     Anxiety     Depression     Diarrhea     Fecal incontinence     Head injury     6/10/11    History of colon polyps     Hypertension        Family History:  Family History   Problem Relation Age of Onset    Lung cancer Father     Other Family         ADENOCARCINOMA IN SIOBHAN IN VILLOUS ADENOMA OF THE BREAST, MIGRAINES, SKIN DISORDER    Depression Family     Anxiety disorder Family     Diabetes Family         MELLITUS    Fibrocystic breast disease Family     Heart disease Family     Hiatal hernia Family     Hypertension Family     Irritable bowel syndrome Family     Kidney disease Family     Urinary tract infection Family     Heart disease Mother     Atrial fibrillation Mother     No Known Problems Brother        Past Surgical History:  Past Surgical History:   Procedure Laterality Date    CHOLECYSTECTOMY      COLONOSCOPY N/A 10/24/2017    Procedure: COLONOSCOPY;  Surgeon: Kassandra Saavedra MD;  Location: [facility]; Service: Colorectal    COLONOSCOPY W/ ENDOSCOPIC US N/A 10/24/2017    Procedure: ANAL ENDOSCOPIC U/S;  Surgeon: Kassandra Saavedra MD;  Location: [facility]; Service: Colorectal    DILATION AND CURETTAGE OF UTERUS      ENDOMETRIAL BIOPSY      WITHOUT CERVICAL DILATION    HYSTERECTOMY      NASAL SEPTUM SURGERY      NOSE SURGERY      NASAL SEPTAL  DEVIATION REPAIR    OTHER SURGICAL HISTORY      ENDOSCOPIC CONTROL OF GASTRIC BLEEDING, EPISIOTOMY    ROTATOR CUFF REPAIR         Social History:   reports that she has quit smoking  She has never used smokeless tobacco  She reports that she drinks alcohol  She reports that she does not use drugs      Allergies:  Vicodin [hydrocodone-acetaminophen]      Current Outpatient Prescriptions:     acetaminophen (TYLENOL) 325 mg tablet, Take 2 tablets (650 mg total) by mouth every 6 (six) hours as needed for mild pain, Disp: 30 tablet, Rfl: 0    amantadine (SYMMETREL) 100 mg capsule, Take 1 capsule (100 mg total) by mouth 2 (two) times a day before breakfast and lunch, Disp: 30 capsule, Rfl: 2    clobetasol (TEMOVATE) 0 05 % ointment, , Disp: , Rfl: 0    LORazepam (ATIVAN) 0 5 mg tablet, Take 1 tablet (0 5 mg total) by mouth daily as needed for anxiety, Disp: 10 tablet, Rfl: 0    metFORMIN (GLUCOPHAGE) 500 mg tablet, Take 1 tablet (500 mg total) by mouth 2 (two) times a day with meals, Disp: 180 tablet, Rfl: 0    pravastatin (PRAVACHOL) 10 mg tablet, Take 1 tablet (10 mg total) by mouth daily, Disp: 90 tablet, Rfl: 0    sertraline (ZOLOFT) 25 mg tablet, take 2 tablets by mouth once daily, Disp: 90 tablet, Rfl: 1    valsartan (DIOVAN) 320 MG tablet, Take 1 tablet (320 mg total) by mouth daily, Disp: 90 tablet, Rfl: 1    I have reviewed the past medical, social and family history, current medications, allergies, vitals, review of systems and updated this information as appropriate today     Objective:    Vitals:  Blood pressure 122/78, pulse 78, height 5' 5 5" (1 664 m), weight 95 3 kg (210 lb)  Physical Exam    Neurological Exam    GENERAL:  Cooperative in no acute distress  Well-developed and well-nourished  It was noted that she did have some psychomotor slowing and was uncomfortable with eye movements    HEAD and NECK   Head is atraumatic normocephalic with no lesions or masses  Neck is supple with full range of motion    CARDIOVASCULAR  Carotid Arteries-no carotid bruits  NEUROLOGIC:  Mental Status-the patient is awake alert and oriented without aphasia or apraxia  She was able to recall 3 of 3 objects immediately and only 1 of 3 objects after a few minutes  She was able to spell world backwards after the 4th attempt  Cranial Nerves: Visual fields are full to confrontation  Discs are flat  Extraocular movements are full with with a few beats of end gaze this diagnosis to left lateral gaze nystagmus  Pupils are 2-1/2 mm and reactive  Face is symmetrical to light touch  Movements of facial expression move symmetrically  Hearing is normal to finger rub bilaterally  Soft palate lifts symmetrically  Shoulder shrug is symmetrical  Tongue is midline without atrophy  Motor: No drift is noted on arm extension  Strength is full in the upper and lower extremities with normal bulk and tone    Sensory: Intact to temperature and vibratory sensation in the upper and lower extremities bilaterally  Cortical function is intact  Coordination: Finger to nose testing is performed accurately  Romberg reveals mild sway with eyes closed  Gait reveals a normal base with symmetrical arm swing  Tandem walk is normal   Reflexes:  1+ and symmetrical in the biceps, triceps, brachioradialis, knee jerk and ankle jerk regions  Toes are downgoing  Hallpike maneuver was performed and produced no nystagmus with head down to either side however she did report a brief feeling of vertigo with head down to the left and reported feeling lightheaded upon returning to an upright position each time            ROS:    Review of Systems   Constitutional: Positive for fatigue  Negative for appetite change and fever  HENT: Negative  Negative for hearing loss, tinnitus, trouble swallowing and voice change  Eyes: Positive for photophobia and visual disturbance  Negative for pain  Respiratory: Negative  Negative for shortness of breath  Cardiovascular: Positive for palpitations  Gastrointestinal: Positive for nausea  Negative for abdominal pain and vomiting  Endocrine: Negative  Negative for cold intolerance and heat intolerance  Genitourinary: Negative  Negative for dysuria, frequency and urgency  Musculoskeletal: Negative  Negative for back pain, myalgias and neck pain  Skin: Negative  Negative for rash  Neurological: Positive for dizziness, speech difficulty and headaches  Negative for tremors, seizures, syncope, facial asymmetry, weakness, light-headedness and numbness  Hematological: Negative  Does not bruise/bleed easily  Psychiatric/Behavioral: Positive for agitation and confusion  Negative for hallucinations and sleep disturbance  The patient is nervous/anxious

## 2018-07-30 NOTE — PROGRESS NOTES
Daily Speech Treatment Note    Today's date: 2018  Patients name: Kaycee Click  : 1960  MRN: [id number]  Safety measures: fall risk, headaches  Referring provider: Marcie Mcmullen PA-C    Primary Diagnosis/Billing code: I78 6  Secondary Diagnosis/ Billing code: S06 0X0D    Visit Tracking:  -Referring provider: Epic  -Billing guidelines: AMA  -Visit #; No auth required  [employer]  -RE due 2018  Subjective/Behavioral:  -Patient reported HA of 3/10 at start of session  Objective/Assessment:  Reviewed completed HEP with patient  Deduction Puzzle #2 ([facility]-2, pg  274): Patient completed to 100% acc  Extracting Words from Longer Words ([facility]-8, pg  129): Patient completed task in 57/60 opp, increasing to 60/60 opp in session with min verbal cuing  Short-term goals:  1  Patient will complete word retrieval tasks (i e , analogies, category matrices, etc ) independently with 80% accuracy in 4-6 weeks to improve word finding ability  To target word finding, patient completed Naming Words by Letter activity  Patient was asked to provide a word that best fits the provided definition, with the initial letter provided (i e , a liquid measure equaling four quarts___/g/___gallon)  Patient completed task in 55/60 opp, increasing to 60/60 with min verbal cuing and additional time  To target word relationships, patient completed second half analogies ([facility]-9, pg  116)  Patient was asked to provide two words to complete the verbally-provided analogy with words missing from the second section (i e , Zackery Standing is to mammal as ____ is to ____ )  Task completed independently in 18/20 opp, increasing to 20/20 with min verbal cuing  Patient required prolonged time in opp x 5  To target word generation and attention, patient completed the card game "Anomia," where they are asked to name people/places/things based on category presented on card (i e , artificial sweetener)   The target of the game is for speed of naming and processing  Patient completed level 5 with an average of 8 cards/min (below average; goal is 10+)  Patient skipped cards x 8     2  Patient will independently name 15+ items that begin with the same letter with 80% accuracy in 4-6 weeks to improve word generation ability  3  Patient will name 3 synonyms for given words with 80% accuracy with min verbal cuing in 4-6 weeks to develop word finding strategies (i e , circumlocution)      4  Patient will independently name an antonym for given words with 80% accuracy in 4-6 weeks to develop word finding strategies (i e , circumlocution)  5  Patient will complete auditory information processing tasks (i e, mental manipulation, anagrams, etc ) with 80% accuracy with min verbal cuing in 4-6 weeks to improve processing speed  6  Patient will complete complex thought organization tasks (i e, deduction puzzles) with 80% accuracy with min verbal cuing in 4-6 weeks to improve executive functioning abilities  *Patient required lights be turned off during session due to increased HA symptoms and fatigue  Attempted to keep activities verbal     *Patient reported HA decrease to 2/10 at end of session  Plan:  -Patient was provided with home exercises/activities to target goals in plan of care at the end of today's session   -Continue with current plan of care  Patient was treated by Micah Peña, graduate SLP student, and was under the direct supervision of EULALIO Means , CCC-SLP

## 2018-07-31 ENCOUNTER — OFFICE VISIT (OUTPATIENT)
Dept: SPEECH THERAPY | Facility: CLINIC | Age: 58
End: 2018-07-31
Payer: COMMERCIAL

## 2018-07-31 ENCOUNTER — OFFICE VISIT (OUTPATIENT)
Dept: OCCUPATIONAL THERAPY | Facility: CLINIC | Age: 58
End: 2018-07-31
Payer: COMMERCIAL

## 2018-07-31 ENCOUNTER — OFFICE VISIT (OUTPATIENT)
Dept: PHYSICAL THERAPY | Facility: CLINIC | Age: 58
End: 2018-07-31
Payer: COMMERCIAL

## 2018-07-31 DIAGNOSIS — I60.9 SAH (SUBARACHNOID HEMORRHAGE) (HCC): ICD-10-CM

## 2018-07-31 DIAGNOSIS — S06.0X9D CONCUSSION WITH LOSS OF CONSCIOUSNESS, SUBSEQUENT ENCOUNTER: Primary | ICD-10-CM

## 2018-07-31 DIAGNOSIS — R48.8 OTHER SYMBOLIC DYSFUNCTIONS: Primary | ICD-10-CM

## 2018-07-31 DIAGNOSIS — S06.0X0D CONCUSSION WITHOUT LOSS OF CONSCIOUSNESS, SUBSEQUENT ENCOUNTER: ICD-10-CM

## 2018-07-31 DIAGNOSIS — I60.9 SAH (SUBARACHNOID HEMORRHAGE) (HCC): Primary | ICD-10-CM

## 2018-07-31 PROCEDURE — 97535 SELF CARE MNGMENT TRAINING: CPT

## 2018-07-31 PROCEDURE — 92507 TX SP LANG VOICE COMM INDIV: CPT

## 2018-07-31 PROCEDURE — 97110 THERAPEUTIC EXERCISES: CPT

## 2018-07-31 PROCEDURE — 97112 NEUROMUSCULAR REEDUCATION: CPT

## 2018-07-31 NOTE — PROGRESS NOTES
Daily Note     Today's date: 2018  Patient name: Parley Lefort  : 1960  MRN: [id number]  Referring provider: Houston Mao  Dx:   Encounter Diagnosis   Name Primary?  SAH (subarachnoid hemorrhage) ([facility] ) Yes       Start Time: 1500  Stop Time: 1600  Total time in clinic (min): 60 minutes    Subjective: "I feel pretty good  I work with three computers"  Objective: See treatment diary below      Assessment: Tolerated treatment well  Patient to benefit from continued skilled OT  Pt reported 1/10 HA and dizziness, taking tylenol prior to session  Pt engaged in mind manipulation task focusing on auditory processing with immediate recall, divided/sustained and alternating attn and scanning in all planes w/clutter  G recall demo- w/ability to unscramble 4 word sentences mentally  Pt completed task in stance on foam w/1/10 dizziness, 2/10 HA  Pt engaged in card match focusing on head turns and positional changes with activity placed on low surface  Activity modified to table top due to increase in HA, dizziness and nausea, pt completed activity with increase in HA 3/10  Pt with LOB when ascending from chair, able to self correct  Plan: Continued skilled OT per POC focusing on ocularmotor, memory and positional changes(monitored)    INTERVENTION COMMENTS:  Diagnosis: R 10th nondisplaced rib fx, traumatic SAH, closed head injury  Precautions: R rib fx  FOTO: 79 with 21% limitation    68, with 14% limitations  Insurance: [employer] [[id number]]  2 of 4 visits, PN due 2018

## 2018-07-31 NOTE — PROGRESS NOTES
Daily Note     Today's date: 2018  Patient name: Juan Carlos Ramirez  : 1960  MRN: [id number]  Referring provider: Kevon Dale PA-C  Dx:   Encounter Diagnosis     ICD-10-CM    1  Concussion with loss of consciousness, subsequent encounter S06  0X9D    2  SAH (subarachnoid hemorrhage) ([facility]) I60 9                   Subjective: Patient noted HA 2/10 and 1/10 dizziness pre treatment after bike 3/10 headache and 2/10 dizziness  Objective: See treatment diary below      Assessment: Patient noted increased in dizziness throughout treatment needed longer rest breaks in between exercises to decrease dizziness  Resume not performed  exercises NV  Patient would benefit from continued PT      Plan: Continue per plan of care  Precautions concussion, SAH, anxiety/depression     Specialty Daily Treatment Diary      Manual   18            Trigger point release  7 min            Gentle Soft tissue mob/ justin fiber  5 min                                                           Exercise Diary      VORx1, H/V  standing Standing,busy, 60'',  Standing, plain, 60'' Standing,busy, 60'',  Standing,busy, 60'',    VORcx, H/V  standing Busy w/ amb, 1 lap Standing, plain   61''      Saccades, H/V standing       Standing, busy,  60''  Standing, plain, 60'' Standing,busy, 60'',  Standing,busy, 60'',     Smooth pursuits  standing        Foam, arms crossed EC  FT/EC foam, 30''      Walking with HTs, H/V Bwd, 1 lap  Bwd, 1 lap np    Walking with turns 1 lap  Bwd, EC, 1 lap np    Tandem gait EC, 1 lap  EC, 1 lap np    Ball toss hand to hand 1 lap 1 lap 1 lap np    Rockerboard, M/L and A/P        HT/HN  FA/EC on foam, 30''x2 FT/EC on foam, 30''x2 np     semi tandem stance  Tandem, EC, foam, 30''x3 Tandem, EC, foam, 30''x3 np     backwards walking         UT stretch  30''x2       bike   10 min, HA 3/10 10 min HA 3/10     tandem walking   Foam, 3 laps Foam 3 laps      sidestepping   Foam, w/ HT/HN, 3 laps Foam, w/ HT/HN, 3 laps                                       Modalities  7/12 7/19 7/23 7/31      MHP/TENS 10 min  10 min  10 min  10 min

## 2018-08-01 NOTE — PROGRESS NOTES
Daily Speech Treatment Note    Today's date: 2018    Patients name: Omid Gant  : 1960  MRN: [id number]  Safety measures: fall risk, headaches  Referring provider: Latia Hopkins PA-C    Primary Diagnosis/Billing code: R83 2  Secondary Diagnosis/ Billing code: S06 0X0D    Visit Tracking:  -Referring provider: Epic  -Billing guidelines: AMA  -Visit #; No auth required   [employer]  -RE due 2018  Subjective/Behavioral:  -Patient reported HA of 1/10 at start of session  "It's just a little fuzzy " Pt going on vacation for 2 weeks  Was provided HEP packet, as requested  Objective/Assessment:  Reviewed completed HEP with patient  Deduction Puzzle #3 ([facility]-2, pg  275): Patient completed  opp independently, increased to  with min verbal cues  Analogies: [facility] 9 pg 117: pt stated she was frustrated because "it really could be anything, it's all how you think about it " Although pt with multiple incorrect answers, pt still grasping concept  Short-term goals:  1  Patient will complete word retrieval tasks (i e , analogies, category matrices, etc ) independently with 80% accuracy in 4-6 weeks to improve word finding ability  To target word generation and attention, patient completed the card game "Anomia," where they are asked to name people/places/things based on category presented on card (i e , artificial sweetener)  The target of the game is for speed of naming and processing  Patient completed level 5 with an average of 18 cards/min (increased from 8-previous session); (above average; goal is 10+)  Patient skipped cards x1     2  Patient will independently name 15+ items that begin with the same letter with 80% accuracy in 4-6 weeks to improve word generation ability  To target word generation, patient was asked to name as many words that begin with a specific letter in 60 seconds  Task completed over 3 trials, with average of 12 words/minute   (GOAL is 10/min)  3  Patient will name 3 synonyms for given words with 80% accuracy with min verbal cuing in 4-6 weeks to develop word finding strategies (i e , circumlocution)      4  Patient will independently name an antonym for given words with 80% accuracy in 4-6 weeks to develop word finding strategies (i e , circumlocution)  5  Patient will complete auditory information processing tasks (i e, mental manipulation, anagrams, etc ) with 80% accuracy with min verbal cuing in 4-6 weeks to improve processing speed  To target immediate memory; patient participated in a mental manipulation task  Words were read aloud by clinician, and patient was asked to recall words in a specific order denoted by SLP  Patient completed recall of words in the order they would occur (field of 4) in 16/18 opp independently, increasing to 18/18 when given repetition x2  Pt explained she is using a visualization strategy (i e  Calendar, number line, etc )     To target attention and following complex directions, patient was introduced to new learning task, "Math Coding " Patient must follow along a sequence of symbols, using a "key" as reference to the math code  Patient completed this complex direction following task with minimal verbal cues needed throughout  Task completed with errors x3  to complete, eventually self-correcting with 100% acc  6  Patient will complete complex thought organization tasks (i e, deduction puzzles) with 80% accuracy with min verbal cuing in 4-6 weeks to improve executive functioning abilities  Plan:  -Patient was provided with home exercises/activities to target goals in plan of care at the end of today's session   -Continue with current plan of care

## 2018-08-02 ENCOUNTER — OFFICE VISIT (OUTPATIENT)
Dept: PHYSICAL THERAPY | Facility: CLINIC | Age: 58
End: 2018-08-02
Payer: COMMERCIAL

## 2018-08-02 ENCOUNTER — OFFICE VISIT (OUTPATIENT)
Dept: SPEECH THERAPY | Facility: CLINIC | Age: 58
End: 2018-08-02
Payer: COMMERCIAL

## 2018-08-02 ENCOUNTER — APPOINTMENT (OUTPATIENT)
Dept: OCCUPATIONAL THERAPY | Facility: CLINIC | Age: 58
End: 2018-08-02
Payer: COMMERCIAL

## 2018-08-02 DIAGNOSIS — S06.0X9D CONCUSSION WITH LOSS OF CONSCIOUSNESS, SUBSEQUENT ENCOUNTER: Primary | ICD-10-CM

## 2018-08-02 DIAGNOSIS — R48.8 OTHER SYMBOLIC DYSFUNCTIONS: Primary | ICD-10-CM

## 2018-08-02 DIAGNOSIS — S06.0X0D CONCUSSION WITHOUT LOSS OF CONSCIOUSNESS, SUBSEQUENT ENCOUNTER: ICD-10-CM

## 2018-08-02 DIAGNOSIS — I60.9 SAH (SUBARACHNOID HEMORRHAGE) (HCC): ICD-10-CM

## 2018-08-02 PROCEDURE — 92507 TX SP LANG VOICE COMM INDIV: CPT

## 2018-08-02 PROCEDURE — 97110 THERAPEUTIC EXERCISES: CPT

## 2018-08-02 PROCEDURE — 97112 NEUROMUSCULAR REEDUCATION: CPT

## 2018-08-02 NOTE — PROGRESS NOTES
Daily Note     Today's date: 2018  Patient name: Parley Lefort  : 1960  MRN: [id number]  Referring provider: Elizabeth Womack PA-C  Dx:   Encounter Diagnosis     ICD-10-CM    1  Concussion with loss of consciousness, subsequent encounter S06  0X9D    2  SAH (subarachnoid hemorrhage) ([facility]) I60 9          Subjective: Patient denies any headache or dizziness upon arrival  Reports having a headache this morning, states that she took some Tylenol  Objective: See treatment diary below      Assessment: Patient reported "spinning dizziness" with amb w/ 360* turns  Patient then continued to experience spinning dizziness with EC  Patient continues to be challenged with stability with noted LOB with tandem walking with EC  Plan: Continue per plan of care  Precautions concussion, SAH, anxiety/depression     Specialty Daily Treatment Diary      Manual   18            Trigger point release  7 min            Gentle Soft tissue mob/ justin fiber  5 min                                                           Exercise Diary     VORx1, H/V  standing Standing,busy, 60'',  Standing, plain, 60'' Standing,busy, 60'',  Standing,busy, 60'', Standing,busy, 60'',   VORcx, H/V  standing Busy w/ amb, 1 lap Standing, plain   61''      Saccades, H/V standing       Standing, busy,  60''  Standing, plain, 60'' Standing,busy, 60'',  Standing,busy, 60'',     Smooth pursuits  standing        Foam, arms crossed EC  FT/EC foam, 30''      Walking with HTs, H/V Bwd, 1 lap  Bwd, 1 lap np Bwd, EC, 1 lap   Walking with turns 1 lap  Bwd, EC, 1 lap np Bwd, EC, 1 lap   Tandem gait EC, 1 lap  EC, 1 lap np EC, 1 lap   Ball toss hand to hand 1 lap 1 lap 1 lap np Bwd, 1 lap   Rockerboard, M/L and A/P        HT/HN  FA/EC on foam, 30''x2 FT/EC on foam, 30''x2 np FT/EC on foam, 30''x2    semi tandem stance  Tandem, EC, foam, 30''x3 Tandem, EC, foam, 30''x3 np     backwards walking         UT stretch  30''x2  bike   10 min, HA 3/10 10 min HA 3/10 10 min,     tandem walking   Foam, 3 laps Foam 3 laps      sidestepping   Foam, w/ HT/HN, 3 laps Foam, w/ HT/HN, 3 laps                                       Modalities  7/12 7/19 7/23 7/31      MHP/TENS 10 min  10 min  10 min  10 min

## 2018-08-14 ENCOUNTER — OFFICE VISIT (OUTPATIENT)
Dept: OCCUPATIONAL THERAPY | Facility: CLINIC | Age: 58
End: 2018-08-14
Payer: COMMERCIAL

## 2018-08-14 ENCOUNTER — EVALUATION (OUTPATIENT)
Dept: SPEECH THERAPY | Facility: CLINIC | Age: 58
End: 2018-08-14
Payer: COMMERCIAL

## 2018-08-14 ENCOUNTER — OFFICE VISIT (OUTPATIENT)
Dept: PHYSICAL THERAPY | Facility: CLINIC | Age: 58
End: 2018-08-14
Payer: COMMERCIAL

## 2018-08-14 ENCOUNTER — OFFICE VISIT (OUTPATIENT)
Dept: DIABETES SERVICES | Facility: CLINIC | Age: 58
End: 2018-08-14
Payer: COMMERCIAL

## 2018-08-14 DIAGNOSIS — S06.0X0D CONCUSSION WITHOUT LOSS OF CONSCIOUSNESS, SUBSEQUENT ENCOUNTER: ICD-10-CM

## 2018-08-14 DIAGNOSIS — E11.9 TYPE 2 DIABETES MELLITUS WITHOUT COMPLICATION, WITHOUT LONG-TERM CURRENT USE OF INSULIN (HCC): Primary | ICD-10-CM

## 2018-08-14 DIAGNOSIS — I60.9 SAH (SUBARACHNOID HEMORRHAGE) (HCC): ICD-10-CM

## 2018-08-14 DIAGNOSIS — S06.0X9D CONCUSSION WITH LOSS OF CONSCIOUSNESS, SUBSEQUENT ENCOUNTER: Primary | ICD-10-CM

## 2018-08-14 DIAGNOSIS — I60.9 SAH (SUBARACHNOID HEMORRHAGE) (HCC): Primary | ICD-10-CM

## 2018-08-14 DIAGNOSIS — R48.8 OTHER SYMBOLIC DYSFUNCTIONS: Primary | ICD-10-CM

## 2018-08-14 PROCEDURE — G9164 LANG EXPRESS D/C STATUS: HCPCS

## 2018-08-14 PROCEDURE — G9163 LANG EXPRESS GOAL STATUS: HCPCS

## 2018-08-14 PROCEDURE — G0109 DIAB MANAGE TRN IND/GROUP: HCPCS | Performed by: DIETITIAN, REGISTERED

## 2018-08-14 PROCEDURE — 97014 ELECTRIC STIMULATION THERAPY: CPT | Performed by: PHYSICAL THERAPIST

## 2018-08-14 PROCEDURE — 97112 NEUROMUSCULAR REEDUCATION: CPT | Performed by: PHYSICAL THERAPIST

## 2018-08-14 PROCEDURE — 97535 SELF CARE MNGMENT TRAINING: CPT

## 2018-08-14 PROCEDURE — 92507 TX SP LANG VOICE COMM INDIV: CPT

## 2018-08-14 PROCEDURE — G0283 ELEC STIM OTHER THAN WOUND: HCPCS | Performed by: PHYSICAL THERAPIST

## 2018-08-14 PROCEDURE — 97110 THERAPEUTIC EXERCISES: CPT | Performed by: PHYSICAL THERAPIST

## 2018-08-14 PROCEDURE — 97010 HOT OR COLD PACKS THERAPY: CPT | Performed by: PHYSICAL THERAPIST

## 2018-08-14 NOTE — PROGRESS NOTES
Speech-Language Pathology Discharge    Today's date: 2018  Patients name: Shannon Ortiz  : 1960  MRN: [id number]  Safety measures: Fall risk, headaches  Referring provider: Rodolfo Covington PA-C      Subjective comments: "I feel like I am over the hump" - patient reports decreased symptoms overall    Patient's goal(s): "To think clearly and get back to where I was " MET    History: Patient is a 70-year-old female who received ambulatory referral to [address] following concussion w/out LOC s/p fall x2  Initial injury occurred on 6/10/2018, when patient was reportedly thrown from a horse, striking her head on the ground  Patient reportedly fell and hit her head again the next day when walking onto her deck  Assessments    CONCUSSION COGNITIVE CHECKLIST:  *Patient indicated that she is experiencing the following symptoms:    · Visual: Misspelling while texting/email and Change in handwriting    · Increased Sensitivities to: Computer screen time/movies/TV    The Repeatable Battery for the Assessment of Neuropsychological Status (RBANS) is a brief, individually-administered assessment which measures attention, language, visuospatial/constructional abilities, and immediate & delayed memory  The RBANS is intended for use with adolescents to adults, ages 15 to 80 years  The following results were obtained during the administration of the assessment  Form: C    Cognitive Domain/Subtest: Index Score: Percentile Rank: Classification:   IMMEDIATE MEMORY 97 42%ile Average        1  List Learning ()        2  Story Memory ()       VISUOSPATIAL/  CONSTRUCTIONAL 109 73%ile Average        3  Figure Copy ()        4  Line Orientation ()       LANGUAGE 97 42%ile Average        5  Picture Naming (10/10)        6  Semantic Fluency ()       ATTENTION 112 79%ile High Average        7  Digit Span (10/16)        8  Coding (59/)       DELAYED MEMORY 108 70%ile Average        9   List Recall (8/10) 10  List Recognition (20/20)        11  Story Recall (7/12)        12  Figure Recall (18/20)         Sum of Index Scores:  523   Total Score:  105   Percentile: 63%ile   Classification: Average             Goals  Short-term goals:  1  Patient will complete word retrieval tasks (i e , analogies, category matrices, etc ) independently with 80% accuracy in 4-6 weeks to improve word finding ability  MET    2  Patient will independently name 15+ items that begin with the same letter with 80% accuracy in 4-6 weeks to improve word generation ability  MET    3  Patient will name 3 synonyms for given words with 80% accuracy with min verbal cuing in 4-6 weeks to develop word finding strategies (i e , circumlocution)  MET    4  Patient will independently name an antonym for given words with 80% accuracy in 4-6 weeks to develop word finding strategies (i e , circumlocution)  MET    5  Patient will complete auditory information processing tasks (i e, mental manipulation, anagrams, etc ) with 80% accuracy with min verbal cuing in 4-6 weeks to improve processing speed  MET    6  Patient will complete complex thought organization tasks (i e, deduction puzzles) with 80% accuracy with min verbal cuing in 4-6 weeks to improve executive functioning abilities  MET    Long-term goals:  1  Patient will demonstrate cognitive-communication skills consistent with age and education given use of compensatory strategies when needed to resume baseline activities and responsibilities in home, community, and work settings (to be achieved by discharge)  MET    2  Patient will complete higher-level expressive language tasks (e g , word definitions, idioms, synonym/antonyms, etc) with 80% accuracy to improve functional communication skills by discharge   MET    Functional Limitations Reporting (G-codes):   Flowsheet Rows      Most Recent Value   SLP G-Codes   FOTO information reviewed  N/A   Assessment Type  Discharge   Functional Limitations Spoken language expressive   Spoken Language Expression Goal Status ()  [facility]   Spoken Language Expression Discharge Status ()  [facility]            Impressions/Recommendations    Impressions: Patient has met all her goals in speech therapy and testing revealed "average" scores for her age  Patient reports she feels "85%" back to where she was  She reports she still gets dizzy, and headaches when she "over-does-it "     Recommendations: Discharge from skilled speech therapy    Visit Tracking:  -Referring provider: Epic  -Billing guidelines: AMA  -Visit #9/12; No auth required   [employer]

## 2018-08-14 NOTE — PROGRESS NOTES
Living Well with Diabetes Group Class #2    [id number] CESAR attended the Living Well with Diabetes Group Class #2  During class, Alice Brasher was instructed on the following topics: Macronutrients, Carbohydrate sources, What one serving of carbohydrate equals, effects of diet on blood glucose levels, effect of carbohydrates on blood glucose levels, basics of meal planning: balance, portions, meal times, measurements, reading food labels to determine carbohydrates  Alice Brasher participated in group activities of reading labels together and completing the meal planning activity  Alice Brasher will follow up with class #3  Will call with any questions or concerns prior to next session  The patient's history was reviewed and updated as appropriate: allergies, current medications  Lab Results   Component Value Date    HGBA1C 6 8 (H) 07/02/2018       Diabetes Education Record  Alice Brasher was provided Living Well with Diabetes Class #2 book      Patient response to instruction    Comprehensiongood  Motivationgood  Expected Compliancegood      Thank you for referring your patient to [facility], it was a pleasure working with them today  Please feel free to call with any questions or concerns      Mai Dempsey RD [facility] [phone]

## 2018-08-14 NOTE — PROGRESS NOTES
Daily Note     Today's date: 2018  Patient name: Gianna Espana  : 1960  MRN: [id number]  Referring provider: Terrence Frey PA-C  Dx: Subjective: Pt reports no HA, states it is first day where she did not have a HA from therapy  States only had one day during vacation where she felt very dizzy because she probably overdid it  Objective: See treatment diary below    Precautions concussion, SAH, anxiety/depression     Specialty Daily Treatment Diary      Manual   18          Trigger point release  7 min  STM UT, CS  5 min           Gentle Soft tissue mob/ justin fiber  5 min                                                           Exercise Diary     VORx1, H/V  standing Standing, mirror, FT foam, 60" x 2    Standing,busy, 60'',   VORcx, H/V  standing         Saccades, H/V standing               Smooth pursuits  standing         Foam, arms crossed EC         Walking with HTs, H/V FW/BW, EC 2 laps     Bwd, EC, 1 lap   Walking with turns 2 laps    Bwd, EC, 1 lap   Tandem gait EC, 3 laps     EC, 1 lap   Ball toss hand to hand FW/BW, 1 lap     Bwd, 1 lap   Rockerboard, M/L and A/P         HT/HN FTEC on foam, 30" x 2    FT/EC on foam, 30''x2    semi tandem stance          backwards walking          UT stretch          bike 10 min HA 2/10    10 min,               sidestepping                                             Modalities      MHP/TENS 10 min  10 min  10 min  10 min  10 min                                     Assessment: Tolerated treatment well, HA increased from 0 to 2/10 during bike  Pt continued to be challenged by narrow CALISTA without visual feedback and with head movements (horizontal turns worse than vertical)  Pt only reported dizziness during exercises with head turns and ambulation with turns this session  Patient would benefit from continued PT      Plan: Progress treatment as tolerated

## 2018-08-14 NOTE — PROGRESS NOTES
Daily Note     Today's date: 2018  Patient name: Ally Polo  : 1960  MRN: [id number]  Referring provider: Keturah Luna  Dx:   Encounter Diagnosis   Name Primary?  SAH (subarachnoid hemorrhage) ([facility]) Yes                  Subjective: "I feel amazing!"      Objective: See treatment diary below      Assessment: Tolerated treatment well  Arrived with no HA  Monocular taping to each eye for 5 minutes with visual worksheets followed by B/L peripheral taping  No increase in HA or symptoms  BITs therapy system with visual tracking activities         Plan: D/C next session due to visit limit    INTERVENTION COMMENTS:  Diagnosis: SAH (subarachnoid hemorrhage) ([facility] [address] ) [I60 9]  Precautions: R rib fx  FOTO:  3 of 4 visits, PN due

## 2018-08-14 NOTE — PATIENT INSTRUCTIONS
1  Find serving size and "total carbohydrates" on a food label  Calculate the total carbs by multiplying by the number of servings you ate  You can also use  internet search, phone apps, carb books to count carbs  2  Based on the number of carbs you should be eating at each meal, plan your meal by choosing the carb foods that fit in your plan (30-45 grams (2-3 choices) per meal for women; 45-60 grams (4-5 choices) per meal for men)  3  Test your blood sugar before and 2 hours after the meal to see if this is the right amount of carbs for you  If your blood sugar is higher than target, you may need to eat a smaller amount of carbs or a different type of carb food like a whole grain  4  Using list given, try to increase soluble fiber to >10 grams per day

## 2018-08-16 ENCOUNTER — APPOINTMENT (OUTPATIENT)
Dept: SPEECH THERAPY | Facility: CLINIC | Age: 58
End: 2018-08-16
Payer: COMMERCIAL

## 2018-08-16 ENCOUNTER — APPOINTMENT (OUTPATIENT)
Dept: LAB | Facility: MEDICAL CENTER | Age: 58
End: 2018-08-16
Payer: COMMERCIAL

## 2018-08-16 ENCOUNTER — OFFICE VISIT (OUTPATIENT)
Dept: UROLOGY | Facility: CLINIC | Age: 58
End: 2018-08-16
Payer: COMMERCIAL

## 2018-08-16 ENCOUNTER — APPOINTMENT (OUTPATIENT)
Dept: PHYSICAL THERAPY | Facility: CLINIC | Age: 58
End: 2018-08-16
Payer: COMMERCIAL

## 2018-08-16 ENCOUNTER — TRANSCRIBE ORDERS (OUTPATIENT)
Dept: ADMINISTRATIVE | Facility: HOSPITAL | Age: 58
End: 2018-08-16

## 2018-08-16 ENCOUNTER — EVALUATION (OUTPATIENT)
Dept: OCCUPATIONAL THERAPY | Facility: CLINIC | Age: 58
End: 2018-08-16
Payer: COMMERCIAL

## 2018-08-16 ENCOUNTER — TELEPHONE (OUTPATIENT)
Dept: FAMILY MEDICINE CLINIC | Facility: MEDICAL CENTER | Age: 58
End: 2018-08-16

## 2018-08-16 VITALS
SYSTOLIC BLOOD PRESSURE: 128 MMHG | HEIGHT: 66 IN | HEART RATE: 80 BPM | DIASTOLIC BLOOD PRESSURE: 68 MMHG | WEIGHT: 209 LBS | BODY MASS INDEX: 33.59 KG/M2

## 2018-08-16 DIAGNOSIS — E11.9 TYPE 2 DIABETES MELLITUS WITHOUT COMPLICATION, WITHOUT LONG-TERM CURRENT USE OF INSULIN (HCC): ICD-10-CM

## 2018-08-16 DIAGNOSIS — R31.9 HEMATURIA, UNSPECIFIED TYPE: ICD-10-CM

## 2018-08-16 DIAGNOSIS — I60.9 SAH (SUBARACHNOID HEMORRHAGE) (HCC): Primary | ICD-10-CM

## 2018-08-16 LAB
ALBUMIN SERPL BCP-MCNC: 3.8 G/DL (ref 3.5–5)
ALP SERPL-CCNC: 88 U/L (ref 46–116)
ALT SERPL W P-5'-P-CCNC: 48 U/L (ref 12–78)
AST SERPL W P-5'-P-CCNC: 14 U/L (ref 5–45)
BILIRUB DIRECT SERPL-MCNC: 0.12 MG/DL (ref 0–0.2)
BILIRUB SERPL-MCNC: 0.54 MG/DL (ref 0.2–1)
CHOLEST SERPL-MCNC: 167 MG/DL (ref 50–200)
HDLC SERPL-MCNC: 41 MG/DL (ref 40–60)
LDLC SERPL CALC-MCNC: 104 MG/DL (ref 0–100)
PROT SERPL-MCNC: 7.8 G/DL (ref 6.4–8.2)
SL AMB  POCT GLUCOSE, UA: ABNORMAL
SL AMB LEUKOCYTE ESTERASE,UA: ABNORMAL
SL AMB POCT BILIRUBIN,UA: ABNORMAL
SL AMB POCT BLOOD,UA: ABNORMAL
SL AMB POCT CLARITY,UA: CLEAR
SL AMB POCT COLOR,UA: YELLOW
SL AMB POCT KETONES,UA: ABNORMAL
SL AMB POCT NITRITE,UA: ABNORMAL
SL AMB POCT PH,UA: 5
SL AMB POCT SPECIFIC GRAVITY,UA: 1.01
SL AMB POCT URINE PROTEIN: ABNORMAL
SL AMB POCT UROBILINOGEN: ABNORMAL
TRIGL SERPL-MCNC: 112 MG/DL

## 2018-08-16 PROCEDURE — G8991 OTHER PT/OT GOAL STATUS: HCPCS | Performed by: OCCUPATIONAL THERAPIST

## 2018-08-16 PROCEDURE — 81002 URINALYSIS NONAUTO W/O SCOPE: CPT | Performed by: PHYSICIAN ASSISTANT

## 2018-08-16 PROCEDURE — 80061 LIPID PANEL: CPT

## 2018-08-16 PROCEDURE — 80076 HEPATIC FUNCTION PANEL: CPT

## 2018-08-16 PROCEDURE — 97535 SELF CARE MNGMENT TRAINING: CPT | Performed by: OCCUPATIONAL THERAPIST

## 2018-08-16 PROCEDURE — 99203 OFFICE O/P NEW LOW 30 MIN: CPT | Performed by: PHYSICIAN ASSISTANT

## 2018-08-16 PROCEDURE — 36415 COLL VENOUS BLD VENIPUNCTURE: CPT

## 2018-08-16 PROCEDURE — G8992 OTHER PT/OT  D/C STATUS: HCPCS | Performed by: OCCUPATIONAL THERAPIST

## 2018-08-16 NOTE — TELEPHONE ENCOUNTER
I had completed the initial disability form as patient had not yet seen the concussion specialist   Since she is followed by the concussion specialist now to Neurology she will need to bring the forms for them for completion as they have taken over her care  They will decide when she can return to work and any work restriction she may have

## 2018-08-16 NOTE — PROGRESS NOTES
Occupational Therapy Discharge Summary    Today's Date: 2018  Patient Name: Chaim Hurt  : 1960  MRN: [id number]  Referring Provider: Evens Bowman  Dx: No primary diagnosis found  Active Problem List:   Patient Active Problem List   Diagnosis    Anxiety    Hypertension    Closed rib fracture    [facility]) ([facility] [address] )    Closed head injury    Mild TBI ([facility] [address] )    Concussion with loss of consciousness    Hypertriglyceridemia    Hematuria    Type 2 diabetes mellitus without complication, without long-term current use of insulin (HCC)    Acute post-traumatic headache, not intractable    Dizziness and giddiness     Past Medical Hx:   Past Medical History:   Diagnosis Date    Anemia     Anxiety     Depression     Diarrhea     Fecal incontinence     Head injury     6/10/11    History of colon polyps     Hypertension      Past Surgical Hx:   Past Surgical History:   Procedure Laterality Date    CHOLECYSTECTOMY      COLONOSCOPY N/A 10/24/2017    Procedure: COLONOSCOPY;  Surgeon: Ericka Slade MD;  Location: [facility]; Service: Colorectal    COLONOSCOPY W/ ENDOSCOPIC US N/A 10/24/2017    Procedure: ANAL ENDOSCOPIC U/S;  Surgeon: Ericka Slade MD;  Location: [facility]; Service: Colorectal    DILATION AND CURETTAGE OF UTERUS      ENDOMETRIAL BIOPSY      WITHOUT CERVICAL DILATION    HYSTERECTOMY      NASAL SEPTUM SURGERY      NOSE SURGERY      NASAL SEPTAL  DEVIATION REPAIR    OTHER SURGICAL HISTORY      ENDOSCOPIC CONTROL OF GASTRIC BLEEDING, EPISIOTOMY    ROTATOR CUFF REPAIR        Pain Levels:   Restin    With Activity:  3-4    Subjective/Patient Goal: "To work on my memory and my eyes"    "I feel like I am about 90% back to prior level"       History of Present Illness:  Pt is a pleasant, active,  62 y o  female seen for OT eval s/p referred to [address] [place] [address] s/p d/c'd from [facility] s/p tfer from [facility] for fall x2, riding a horse and was thrown from the horse landing on the ground striking her head, no memory of incident not remembering anything until she wa sback at home, mild abrasion of the R side of the face and area of swelling in the rihgt postauricular area, was walking onto her deck when she fell again no recall of incident, witnessed per , c/o R shoulder pain, R rib pain, left SCM pain, R pain in the R postauricular area, CTB @ [facility] + SAH tferred to [facility] for neurosx c/s, now dx'd w/ R 10th nondisplaced rib fx, traumatic SAH, closed head injury, comorbidities as listed above  Lifestyle Performance Model:  Autonomy: Pt was I w/ I/ADLs, drove, & required no use of DME PTA  Reciprocal Relationships: Supportive , 3 children, 2 grandchildren  Service to Others: 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, normal workday involves up to 2-3 computer screens at a time  Intrinsic Gratification: Enjoys camping, horseback riding, [email or web address]  Home Setup: Pt lives in [facility] w/ FF to basement w/ laundry in the cellar used regularly, 1 JABARI  Objective  Impairments Section:   1  Convergence Insufficiency Symptom Survey (CISS): 31/60 FAIL    31/60 FAIL    16/60 PASS    2  Concussion Cognitive Checklist: self report symptom checklist  *Patient indicated that she is experiencing the following symptoms:    · Memory: Remembering people's names    resolved    · Attention: Keeping attention during a conversation, Focusing or concentrating on a specific task, Sustaining attention on a task and Dividing your attention (i e , multi-tasking)    Sustained and divided attention remains    resolved    · Processing: Responding to questions in a timely manner    resolved    · Executive Functions: None reported    · Communication: Word finding in conversation and Expressing thoughts and ideas fluently    Expression remains    resolved    · Visual: Losing spot on the page when reading    Misspelling remains  Virginia Martinez resolved    · Emotional: Frustration tolerance    Increased anxiety, frustration tolerance remains    resolved    · Increased Sensitivities to: Lighting and Noise    Lighting, noise, and screen time remains    Screen time (Pt reports tolerating screen time x 1 hours prior to noticing onset of HA symptoms)      3  Contextual Memory Test (CMT): Pt reports has noticed a change in her memory, rates her memory capacity at 75%, if studied 20 objects for 90 seconds would recall 8 of them, would have recalled 15 of them pre injury, frequently forgets things that happened the day before 50% of the time, forgets important details 25% of the time, frequently forgets things people have told her 25% of the time, frequently forgets things that happened a few minutes ago almost never, would remember facts about this form a week from now    Pt reports forgetting things that happened the day before, important details, and things that happened a few minutes ago almost never  Pt reports forgetting things that people have told her about 1/4 of the time  IMMEDIATE RECALL:     score falls  in WFL/WNL deficit range    16/20, falling into the WNL range   DELAYED RECALL:   score falls in suspect deficit range    14/20, falling into the WNL range  RECOGNITION:  / with 3 confabulations resulting in score 18/20    20/20 with no confabulations      4  [place] Cognitive Assessment Version 8 2 (MoCA V8 2): Pt completed MoCA V8 1 in acute care w/ OT on 2018 and scored 23/30 indicative of mild neurocognitive impairments    Visuospatial/executive functionin  Naming: 3/3  Memory: 1st trial: 5, 2nd trial:   Attention/concentration: 2/2  List of letters: /  Serial Seven Subtraction: 3/3 w/ 0 errors  Language/sentence repetition: 2/2  Language Fluency:   Abstract/Correlational Thinkin/2  Delayed Recall: 3/5  Orientation:    Memory Index Score: 13/15  MoCA V1 8 2 Raw Score: /30, MIS: 13/15, indicative of normal neurocognitive functioning  5  Vision Screening Recording Form:   vision screen: + progressive glasses @ all times present for vision screen  near acuity: R 20/25, L 20/30    R 20/25 with 1 error, L 20/25    R 20/25 with one error, L 20/25  binocularity far: orthophoria    Remains, with + dizziness 3/10 reported    Orthophoric with no dizziness reported  binocularity near: orthophoria    Remains with +dizziness  1/10 reported    Orthophoric with no dizziness reported  red green fusion: PLRG @ 3"    Diplopia at 3"    Diplopia at 0 5"   near point of convergence: diplopia @ 12"    Convergence intact, though demo Max difficulties with sustaining WNL convergence with L eye    Convergence and sustained convergence abilities intact  mike string: suppression of far w/ divergence insufficiency    Suppression of near vision    remains  Pursuits: jerky in all planes w/ intolerance w/ pursuits hard blinking eye strain and eye fatigue    Slightly jerky in all planes, +dizziness 2/10    Smooth in all planes with no dizziness reported  Saccades: inaccurate in all planes w/ eye strain and eye fatigue hard blinking    Inaccurate in all planes with dysmetria (undershooting) evident    Accurate in all planes  ocular ROM: intact/full  visual perceptual midline shifted above horizon    intact    6  Anxiety/Depression  Pt engaged in the Neuro QOL Anxiety Short Form and Neuro QOL Depression Short Form in order to screen for anxiety and depressive s/s  Pt reported the following:  Anxiety- Short Form:   --used to measure anxious s/s  For scoring purposes, scores of 25+ indicate the threshold for treatment per neurology/SLIM  Score:10/40  Pt  Most often chose the response never when asked about feeling anxious s/s  Depression- Short Form:   --used to measure depressive s/s  For scoring purposes, scores of 25+ indicate the threshold for treatment per neurology/SLIM  Score:11/40  Pt   Most often chose the response never when asked about feeling depressive s/s  Assessment/Plan  Occupational Therapy Skilled Analysis Assessment and Plan of Care:  Pt requires overall mod I for ADLs/self care and mod I for fx'l mobility w/o DME  Pt is currently demonstrating the following occupational deficits: limited 2* photophobia, phonophobia, HA, dizziness, nausea, impaired high level dynamic balance t/o I/ADL/leisure tasks, divergence insufficiency, suppression of far, difficulty w/ reading comprehension, processing, divided attention, STM/immediate delayed recall, decreased river role, decreased worker role, external stimuli hypersensitivity, decreased attention/concentration to task, diplopia @ 12" for near point of convergence, jerky pursuits in all planes, inaccurate saccades in all planes, hard blinking, eye watering, intolerance to oncoming objects, hippussing, difficulty w/ divided attention, auditory processing  The following Occupational Performance Areas to address include: medication management, socialization, health maintenance, functional mobility, community mobility, clothing management, cleaning, meal prep, money management, household maintenance, care of children, care of pets, job performance/volunteering and social participation  Based on the aforementioned OT evaluation, functional performance deficits, and assessments, pt has been identified as a moderate complexity evaluation  Pt to continue to benefit from outpatient skilled OT services to address the following goals 2x/wk to  w/in 4 weeks with special focus on self-care management, pt education,  and VM training as well as motor training to improve above defiicits and enhance overall QOL/function  Pt demo with Excellent functional progression towards goals in POC  Pt with significant improved tolerance to both cog load and oculomotor control improving engagement in functional tasks with improved functional performance    OTR will be D/Cing Pt at this time 2* all goals Met and maximal level reached  Pt's insurance also exhausted after this visit        Goals:  Short Term Goals:  - Pt will increase auditory processing to take notes while listening in multi-modal work related/classroom symptom free at baseline performance for improved work/school performance, once returned  4 weeks as applicable-- MET  - Pt will increase attention to 2+ tasks for improved work/school performance (once returned) and engagement in salient tasks 4 weeks as applicable--MET  - Pt will increase temporal awareness for keeping to schedule within 5 min increments, recall appointments, functional addition of time with 80% accuracy 4 weeks-- MET  - Pt will increase verbal and written direction following with processing time of <2 min and 80% accuracy for improved work/school performance, once returned 4 weeks as applicable-- MET  - Pt will demo good carryover of internal and external memory aides for improved recall of daily events, improved executive functioning with 80% accuracy in 4 weeks-- MET  - Pt will increase insight into deficits for improved carryover of recommendations, accommodations, improved rate of healing 4 weeks-- MET  - Pt will demo good carryover of self calming strategies for hypersensitivities to decrease symptoms within 5 min to baseline for improved tolerance of cog load tasks in 4 weeks--- MET  - Pt will increase screen tolerance to 2 hours with min increase in HA by 1-2 levels for improved leisure pursuits and work/school performance 4 weeks as applicable-- PARTIALLY MET  - Pt will increase oculomotor control for improved saccades, con/divergent tasks for improved reading, board to table tasks with minimal increase in symptoms 4 weeks--  MET  - Pt will tolerate multi-modal envt x 15 min with 80% accuracy of cog load and min increase of symptoms of 2 levels in HA/dizziness/nausea 4 weeks-- MET  Long Term Goals:  - Pt will increase attention to 3+ tasks for improved divided attention with work/school and pre driving roles as applicable--  MET  - Pt will increase verbal and written direction following with processing time of <1 min and 85% accuracy-- MET  - Pt will tolerate multimodal envt x 60 min symptom free for return to work/school--  MET  - Pt will demo with decreased anxiety and frustration for improved insight into concussion process and rate of recovery---  MET  - Pt will demo with G carryover and understanding of accommodations for school/work environment to allow for enhanced learning environment symptom free, if needed-- MET  - Pt will increase oculomotor control for improved dynamic activities with head turns, board/screen to table tasks symptom free, improved VMI and return to baseline handwriting-- MET  - Pt will increase oculomotor control for WNL saccades, con/divergent tasks symptom free-- MET  - Pt will increase screen tolerance to 3 hours with min increase in HA by 1-2 levels for improved leisure pursuits and work/school performance as applicable-- PARTIALLY  MET    INTERVENTION COMMENTS:  Diagnosis: R 10th nondisplaced rib fx, traumatic SAH, closed head injury  Precautions: R rib fx  FOTO: 79 with 21% limitation    68, with 14% limitations    89, with 11% limitations  Insurance: [employer] [[id number]]  4 of 8 visits, PN due 08/19/2018

## 2018-08-16 NOTE — TELEPHONE ENCOUNTER
Ghulam Harvey dropped of a disability form she states you filled it out last month  She states they are requiring it monthly  I placed the form on your desk

## 2018-08-16 NOTE — PROGRESS NOTES
1  Hematuria, unspecified type  Ambulatory referral to Urology    POCT urine dip         Assessment and plan:       1  Microscopic hematuria  -I reviewed with the patient a myriad of factors in which can cause microhematuria  We reviewed her most recent CT of the abdomen and pelvis with IV contrast from June 2018  There was no upper tract lesions noted at that time  Reviewed completing her hematuria evaluation with a cystoscopy to further evaluate her lower urinary tract  Patient verbalized understanding  She will follow up in the near future for further evaluation  She is aware to contact us in the interim with any changes in urinary symptoms or gross hematuria  All questions answered      Adwoa Tobar PA-C      Chief Complaint   New patient micro hematuria    History of Present Illness     Yanna Soliz is a 62 y o  female presenting today as a new patient for microscopic hematuria  Patient recently had a urinalysis with microscopy (07/02/2018) which revealed 4-10 RBC per high-power field  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  She denies any previous workup for her he micro hematuria  Patient had a recent CT of the abdomen pelvis with IV contrast 06/11/2018  There is no urologic abnormalities with the kidneys nor bladder  Patient denies any history of gross hematuria  She denies any history of nephrolithiasis or urologic manipulation  Patient does have a history of hysterectomy approximately 15 years ago on report  Patient is overall very happy with her urination  She feels like she has a good stream, feels empty after urination, and denies any nocturia  She denies any dysuria, gross hematuria, suprapubic pressure, flank pain, bone pain, weight loss  Patient reports that she is not a current smoker  Urinalysis in the office today is leukocyte and nitrite negative, however blood positive      Laboratory     Lab Results Component Value Date    CREATININE 0 64 06/12/2018        Ref Range & Units 7/2/18 10:07 AM Flag   RBC, UA None Seen, 0-5 /hpf 4-10   A    WBC, UA None Seen, 0-5, 5-55, 5-65 /hpf 4-10   A    Epithelial Cells None Seen, Occasional /hpf Moderate   A    Bacteria, UA None Seen, Occasional /hpf Occasional     Hyaline Casts, UA None Seen /lpf 3-5   A       Specimen Collected: 07/02/18 10:07 AM   Last Resulted: 07/02/18 12:46 PM            Review of Systems     Review of Systems   Constitutional: Negative for activity change, appetite change, chills, diaphoresis, fatigue, fever and unexpected weight change  Respiratory: Negative for chest tightness and shortness of breath  Cardiovascular: Negative for chest pain, palpitations and leg swelling  Gastrointestinal: Negative for abdominal distention, abdominal pain, constipation, diarrhea, nausea and vomiting  Genitourinary: Negative for decreased urine volume, difficulty urinating, dysuria, enuresis, flank pain, frequency, genital sores, hematuria and urgency  Musculoskeletal: Negative for back pain, gait problem and myalgias  Skin: Negative for color change, pallor, rash and wound  Psychiatric/Behavioral: Negative for behavioral problems  The patient is not nervous/anxious  Allergies     Allergies   Allergen Reactions    Vicodin [Hydrocodone-Acetaminophen] Anaphylaxis     Reaction Date: [id number];        Physical Exam     Physical Exam   Constitutional: She is oriented to person, place, and time  She appears well-developed and well-nourished  No distress  HENT:   Head: Normocephalic and atraumatic  Eyes: Conjunctivae are normal    Neck: Normal range of motion  Pulmonary/Chest: Effort normal    Abdominal: Soft  She exhibits no distension and no mass  There is no tenderness  There is no guarding  Overweight   Musculoskeletal: Normal range of motion  She exhibits no edema or deformity     Neurological: She is alert and oriented to person, place, and time  Skin: Skin is warm and dry  She is not diaphoretic  There is erythema  No pallor  Small erythematous rash around patient's left ankle  No purulent discharge noted  Psychiatric: She has a normal mood and affect   Her behavior is normal          Vital Signs     Vitals:    08/16/18 1303   BP: 128/68   Pulse: 80   Weight: 94 8 kg (209 lb)   Height: 5' 6" (1 676 m)         Current Medications       Current Outpatient Prescriptions:     acetaminophen (TYLENOL) 325 mg tablet, Take 2 tablets (650 mg total) by mouth every 6 (six) hours as needed for mild pain, Disp: 30 tablet, Rfl: 0    amantadine (SYMMETREL) 100 mg capsule, Take 1 capsule (100 mg total) by mouth 2 (two) times a day before breakfast and lunch, Disp: 30 capsule, Rfl: 2    clobetasol (TEMOVATE) 0 05 % ointment, , Disp: , Rfl: 0    LORazepam (ATIVAN) 0 5 mg tablet, Take 1 tablet (0 5 mg total) by mouth daily as needed for anxiety, Disp: 10 tablet, Rfl: 0    metFORMIN (GLUCOPHAGE) 500 mg tablet, Take 1 tablet (500 mg total) by mouth 2 (two) times a day with meals, Disp: 180 tablet, Rfl: 0    pravastatin (PRAVACHOL) 10 mg tablet, Take 1 tablet (10 mg total) by mouth daily, Disp: 90 tablet, Rfl: 0    sertraline (ZOLOFT) 25 mg tablet, take 2 tablets by mouth once daily, Disp: 90 tablet, Rfl: 1    valsartan (DIOVAN) 320 MG tablet, Take 1 tablet (320 mg total) by mouth daily, Disp: 90 tablet, Rfl: 1      Active Problems     Patient Active Problem List   Diagnosis    Anxiety    Hypertension    Closed rib fracture    SAH (subarachnoid hemorrhage) (HCC)    Closed head injury    Mild TBI (HCC)    Concussion with loss of consciousness    Hypertriglyceridemia    Hematuria    Type 2 diabetes mellitus without complication, without long-term current use of insulin (HCC)    Acute post-traumatic headache, not intractable    Dizziness and giddiness         Past Medical History     Past Medical History:   Diagnosis Date    Anemia     Anxiety     Depression     Diarrhea     Fecal incontinence     Head injury     6/10/11    History of colon polyps     Hypertension          Surgical History     Past Surgical History:   Procedure Laterality Date    CHOLECYSTECTOMY      COLONOSCOPY N/A 10/24/2017    Procedure: COLONOSCOPY;  Surgeon: Abe Meléndez MD;  Location: [facility]; Service: Colorectal    COLONOSCOPY W/ ENDOSCOPIC US N/A 10/24/2017    Procedure: ANAL ENDOSCOPIC U/S;  Surgeon: Abe Meléndez MD;  Location: [facility]; Service: Colorectal    DILATION AND CURETTAGE OF UTERUS      ENDOMETRIAL BIOPSY      WITHOUT CERVICAL DILATION    HYSTERECTOMY      NASAL SEPTUM SURGERY      NOSE SURGERY      NASAL SEPTAL  DEVIATION REPAIR    OTHER SURGICAL HISTORY      ENDOSCOPIC CONTROL OF GASTRIC BLEEDING, EPISIOTOMY    ROTATOR CUFF REPAIR           Family History     Family History   Problem Relation Age of Onset    Lung cancer Father     Other Family         ADENOCARCINOMA IN SIOBHAN IN VILLOUS ADENOMA OF THE BREAST, MIGRAINES, SKIN DISORDER    Depression Family     Anxiety disorder Family     Diabetes Family         MELLITUS    Fibrocystic breast disease Family     Heart disease Family     Hiatal hernia Family     Hypertension Family     Irritable bowel syndrome Family     Kidney disease Family     Urinary tract infection Family     Heart disease Mother     Atrial fibrillation Mother     No Known Problems Brother          Social History     Social History       Radiology     CT CHEST, ABDOMEN AND PELVIS WITH IV CONTRAST     INDICATION:   thrown from horse, right flank pain, pain with deep inspiration        COMPARISON: None      TECHNIQUE: CT examination of the chest, abdomen and pelvis was performed  Axial, sagittal, and coronal 2D reformatted images were created from the source data and submitted for interpretation      Radiation dose length product (DLP) for this visit:  523 mGy-cm     This examination, like all CT scans performed in the [facility], was performed utilizing techniques to minimize radiation dose exposure, including the use of iterative   reconstruction and automated exposure control      IV Contrast:  100 mL of iohexol (OMNIPAQUE)  Enteric Contrast: Enteric contrast was not administered      FINDINGS:     CHEST     LUNGS:  There is bibasilar platelike atelectasis or scarring  There is no tracheal or endobronchial lesion      PLEURA:  Unremarkable      HEART/GREAT VESSELS:  Unremarkable for patient's age      MEDIASTINUM AND IRVIN:  Unremarkable      CHEST WALL AND LOWER NECK:   Unremarkable      ABDOMEN     LIVER/BILIARY TREE:  There is hepatomegaly with moderate to severe hepatic steatosis     GALLBLADDER:  Gallbladder is surgically absent      SPLEEN:  Unremarkable      PANCREAS:  Unremarkable      ADRENAL GLANDS:  Unremarkable      KIDNEYS/URETERS:  Unremarkable  No hydronephrosis      STOMACH AND BOWEL:  Unremarkable      APPENDIX:  No findings to suggest appendicitis      ABDOMINOPELVIC CAVITY:  No ascites or free intraperitoneal air  No lymphadenopathy      VESSELS:  Unremarkable for patient's age      PELVIS     REPRODUCTIVE ORGANS:  Patient is status post hysterectomy      URINARY BLADDER:  Unremarkable      ABDOMINAL WALL/INGUINAL REGIONS:  Unremarkable      OSSEOUS STRUCTURES:  There is mild irregularity of the lateral aspect of the right 10th rib possibly representing an acute fracture      IMPRESSION:     1  Mild irregularity of the lateral aspect of the right 10th rib possibly representing a nondisplaced fracture  Clinical correlation for pain and tenderness in this region is recommended      2  No acute hepatic injury to the chest, abdomen, or pelvic viscera      3  Hepatomegaly with moderate to severe hepatic steatosis

## 2018-08-20 ENCOUNTER — APPOINTMENT (OUTPATIENT)
Dept: SPEECH THERAPY | Facility: CLINIC | Age: 58
End: 2018-08-20
Payer: COMMERCIAL

## 2018-08-20 ENCOUNTER — OFFICE VISIT (OUTPATIENT)
Dept: PHYSICAL THERAPY | Facility: CLINIC | Age: 58
End: 2018-08-20
Payer: COMMERCIAL

## 2018-08-20 ENCOUNTER — APPOINTMENT (OUTPATIENT)
Dept: OCCUPATIONAL THERAPY | Facility: CLINIC | Age: 58
End: 2018-08-20
Payer: COMMERCIAL

## 2018-08-20 DIAGNOSIS — I60.9 SAH (SUBARACHNOID HEMORRHAGE) (HCC): ICD-10-CM

## 2018-08-20 DIAGNOSIS — S06.0X9D CONCUSSION WITH LOSS OF CONSCIOUSNESS, SUBSEQUENT ENCOUNTER: Primary | ICD-10-CM

## 2018-08-20 PROCEDURE — 97014 ELECTRIC STIMULATION THERAPY: CPT | Performed by: PHYSICAL THERAPIST

## 2018-08-20 PROCEDURE — 97112 NEUROMUSCULAR REEDUCATION: CPT | Performed by: PHYSICAL THERAPIST

## 2018-08-20 PROCEDURE — G0283 ELEC STIM OTHER THAN WOUND: HCPCS | Performed by: PHYSICAL THERAPIST

## 2018-08-20 PROCEDURE — 97140 MANUAL THERAPY 1/> REGIONS: CPT | Performed by: PHYSICAL THERAPIST

## 2018-08-20 NOTE — PROGRESS NOTES
Daily Note     Today's date: 2018  Patient name: Shawn Hannah  : 1960  MRN: [id number]  Referring provider: Estefany Berkowitz PA-C  Dx: Subjective: Pt reports over doing it over the weekend and doesn't feel well today      Objective: See treatment diary below    Precautions concussion, SAH, anxiety/depression     Specialty Daily Treatment Diary      Manual   18        Trigger point release  7 min  STM UT, CS  5 min   with STM 10 min seated        Gentle Soft tissue mob/ justin fiber  5 min                                                           Exercise Diary     8/   VORx1, H/V  standing Standing, mirror, FT foam, 60" x 2 60"x 2 mirror   Standing,busy, 60'',   VORcx, H/V  standing         Saccades, H/V standing               Smooth pursuits  standing         Foam, arms crossed EC  HT/HN foam [id number] ea       Walking with HTs, H/V FW/BW, EC 2 laps     Bwd, EC, 1 lap   Walking with turns 2 laps    Bwd, EC, 1 lap   Tandem gait EC, 3 laps  HT/HN 3 laps   EC, 1 lap   Ball toss hand to hand FW/BW, 1 lap     Bwd, 1 lap   Rockerboard, M/L and A/P         HT/HN FTEC on foam, 30" x 2    FT/EC on foam, 30''x2    semi tandem stance          backwards walking          UT stretch          bike 10 min HA 2/10    10 min,     treadmill  10 min 2 5 mph        sidestepping                                             Modalities         MHP/TENS 10 min                                         Assessment: Patient reports no increase in symptoms with treadmill this session  She was able to complete exercises with no increasein HA but increase in dizziness was noted  Plan: Progress treatment as tolerated

## 2018-08-21 ENCOUNTER — OFFICE VISIT (OUTPATIENT)
Dept: DIABETES SERVICES | Facility: CLINIC | Age: 58
End: 2018-08-21
Payer: COMMERCIAL

## 2018-08-21 DIAGNOSIS — E11.9 DIABETES MELLITUS WITHOUT COMPLICATION (HCC): Primary | ICD-10-CM

## 2018-08-21 PROCEDURE — G0109 DIAB MANAGE TRN IND/GROUP: HCPCS

## 2018-08-22 NOTE — PROGRESS NOTES
Living Well with Diabetes Group Class #3    Gladis Hawley attended the Living Well with Diabetes Group Class #3  During class, Genet Prieto was instructed on the following topics: Oral and injectable medications, short term complications of diabetes, long term complications of diabetes, prevention of complications, foot care, sick day management, stress management, and traveling with diabetes  Genet Prieto participated in group activities  Genet Prieto will follow up with class #4  Will call with any questions or concerns prior to next session  The patient's history was reviewed and updated as appropriate: allergies, current medications  Lab Results   Component Value Date    HGBA1C 6 8 (H) 07/02/2018       Diabetes Education Record  Genet Prieto was provided Living Well with Diabetes Class #3 book      Patient response to instruction    Comprehensiongood  Motivationgood  Expected Compliancegood    Begin Time: 930 am  End Time: 1130 am  Referring Provider: Dr Jacqui Odonnell    Thank you for referring your patient to [address] [place] [address], it was a pleasure working with them today  Please feel free to call with any questions or concerns      Edison Cheng RN  [address] [facility] [phone]

## 2018-08-23 ENCOUNTER — APPOINTMENT (OUTPATIENT)
Dept: SPEECH THERAPY | Facility: CLINIC | Age: 58
End: 2018-08-23
Payer: COMMERCIAL

## 2018-08-23 ENCOUNTER — TELEPHONE (OUTPATIENT)
Dept: NEUROLOGY | Facility: CLINIC | Age: 58
End: 2018-08-23

## 2018-08-23 ENCOUNTER — OFFICE VISIT (OUTPATIENT)
Dept: PHYSICAL THERAPY | Facility: CLINIC | Age: 58
End: 2018-08-23
Payer: COMMERCIAL

## 2018-08-23 ENCOUNTER — APPOINTMENT (OUTPATIENT)
Dept: OCCUPATIONAL THERAPY | Facility: CLINIC | Age: 58
End: 2018-08-23
Payer: COMMERCIAL

## 2018-08-23 DIAGNOSIS — I60.9 SAH (SUBARACHNOID HEMORRHAGE) (HCC): ICD-10-CM

## 2018-08-23 DIAGNOSIS — S06.0X9D CONCUSSION WITH LOSS OF CONSCIOUSNESS, SUBSEQUENT ENCOUNTER: Primary | ICD-10-CM

## 2018-08-23 PROCEDURE — G0283 ELEC STIM OTHER THAN WOUND: HCPCS | Performed by: PHYSICAL THERAPIST

## 2018-08-23 PROCEDURE — 97110 THERAPEUTIC EXERCISES: CPT | Performed by: PHYSICAL THERAPIST

## 2018-08-23 PROCEDURE — 97140 MANUAL THERAPY 1/> REGIONS: CPT | Performed by: PHYSICAL THERAPIST

## 2018-08-23 PROCEDURE — 97014 ELECTRIC STIMULATION THERAPY: CPT | Performed by: PHYSICAL THERAPIST

## 2018-08-23 PROCEDURE — 97112 NEUROMUSCULAR REEDUCATION: CPT | Performed by: PHYSICAL THERAPIST

## 2018-08-23 NOTE — TELEPHONE ENCOUNTER
LMOM for patient to let her know that per her request her short term disability form that was filled out by Dr Eduardo Rose was mailed in the addressed envelope that she provided

## 2018-08-23 NOTE — PROGRESS NOTES
Daily Note     Today's date: 2018  Patient name: Vikas Kidd  : 1960  MRN: [id number]  Referring provider: Frannie Miner PA-C  Dx: Subjective: In a rush to get here, ran into a lot of traffic  Pt about 10 min late  HA 2/10 upon arrival, dizziness about a 2/10 as well  Reports neck being really stiff        Objective: See treatment diary below    Precautions concussion, SAH, anxiety/depression     Specialty Daily Treatment Diary      Manual   18      Trigger point release  7 min  STM UT, CS  5 min   with STM 10 min seated        Gentle Soft tissue mob/ justin fiber  5 min            SOR in supine        x4min      Gentle cervical distraction in supine        x4min                         Exercise Diary    8   VORx1, H/V  standing Standing, mirror, FT foam, 60" x 2 60"x 2 mirror Busy bubbles  2x30s ea  D: 3/10  Standing,busy, 60'',   VORcx, H/V  standing   With gait 2x40'ea      Saccades, H/V standing         Busy bubbles  2x30s ea      Smooth pursuits  standing         Foam, arms crossed EC  HT/HN foam [id number] ea semitandem EC 4x30s      Walking with HTs, H/V FW/BW, EC 2 laps     Bwd, EC, 1 lap   Walking with turns 2 laps  360 EC with turn  2x40'  Bwd, EC, 1 lap   Tandem gait EC, 3 laps  HT/HN 3 laps In silva EC arms crossed 2x40'  EC, 1 lap   Ball toss hand to hand FW/BW, 1 lap     Bwd, 1 lap   Rockerboard, M/L and A/P         HT/HN FTEC on foam, 30" x 2    FT/EC on foam, 30''x2    semi tandem stance          backwards walking          UT stretch          bike 10 min HA 2/10  Upright bike L3 x10 min  HA: 3/10  10 min,     treadmill  10 min 2 5 mph        sidestepping                                             Modalities        MHP/TENS 10 min  In supine  x10 min                                       Assessment: Progressed to busy bubbles with [id number] saccades, pt with increased difficulty due to dizziness, reported more difficulty with bubbles  Limited during session due to dizziness however quickly subsided with rest   Multple LOBs with tandem gait however able to self correct  Plan: Update HEP as needed NV

## 2018-08-27 ENCOUNTER — APPOINTMENT (OUTPATIENT)
Dept: OCCUPATIONAL THERAPY | Facility: CLINIC | Age: 58
End: 2018-08-27
Payer: COMMERCIAL

## 2018-08-27 ENCOUNTER — OFFICE VISIT (OUTPATIENT)
Dept: PHYSICAL THERAPY | Facility: CLINIC | Age: 58
End: 2018-08-27
Payer: COMMERCIAL

## 2018-08-27 ENCOUNTER — APPOINTMENT (OUTPATIENT)
Dept: SPEECH THERAPY | Facility: CLINIC | Age: 58
End: 2018-08-27
Payer: COMMERCIAL

## 2018-08-27 DIAGNOSIS — S06.0X9D CONCUSSION WITH LOSS OF CONSCIOUSNESS, SUBSEQUENT ENCOUNTER: Primary | ICD-10-CM

## 2018-08-27 DIAGNOSIS — I60.9 SAH (SUBARACHNOID HEMORRHAGE) (HCC): ICD-10-CM

## 2018-08-27 PROCEDURE — 97112 NEUROMUSCULAR REEDUCATION: CPT | Performed by: PHYSICAL THERAPIST

## 2018-08-27 NOTE — PROGRESS NOTES
Daily Note     Today's date: 2018  Patient name: Dacia Cortez  : 1960  MRN: [id number]  Referring provider: Carlos Eduardo Stubbs PA-C  Dx: Subjective: Feeling OK today      Objective: See treatment diary below    Precautions concussion, SAH, anxiety/depression     Specialty Daily Treatment Diary      Manual   18      Trigger point release  7 min  STM UT, CS  5 min   with STM 10 min seated        Gentle Soft tissue mob/ justin fiber  5 min            SOR in supine        x4min      Gentle cervical distraction in supine        x4min                         Exercise Diary      VORx1, H/V  standing Standing, mirror, FT foam, 60" x 2 60"x 2 mirror Busy bubbles  2x30s ea  D: 3/10 [id number] ea    VORcx, H/V  standing   With gait 2x40'ea 2 laps    Saccades, H/V standing         Busy bubbles  2x30s ea     Smooth pursuits  standing        Foam, arms crossed EC  HT/HN foam [id number] ea semitandem EC 4x30s [id number] ea h/v    Walking with HTs, H/V FW/BW, EC 2 laps    bkwd 3 laps    Walking with turns 2 laps  360 EC with turn  2x40' 360 2 laps    Tandem gait EC, 3 laps  HT/HN 3 laps In silva EC arms crossed 2x40' With ht/hn 2 laps    Ball toss hand to hand FW/BW, 1 lap        Rockerboard, M/L and A/P        HT/HN FTEC on foam, 30" x 2        semi tandem stance         backwards walking         UT stretch         bike 10 min HA 2/10  Upright bike L3 x10 min  HA: 310 10 min upright L3     treadmill  10 min 2 5 mph       rows    2x15 gtb     ext shoulder    2x15 gtb     ER    2x15 gtb                   Modalities        MHP/TENS 10 min  In supine  x10 min                                       Assessment: Pt reported increase in symptoms with exercise this session but with rest she was able to reduce symptoms to baseline  She did report increase in stiffness in neck in which therapist isntructed patient to continue with cervical exercise at home   Added postural exercises which she was able to complete with miniml difficulty  Plan: Update HEP as needed NV

## 2018-08-28 ENCOUNTER — OFFICE VISIT (OUTPATIENT)
Dept: DIABETES SERVICES | Facility: CLINIC | Age: 58
End: 2018-08-28
Payer: COMMERCIAL

## 2018-08-28 DIAGNOSIS — E11.9 TYPE 2 DIABETES MELLITUS WITHOUT COMPLICATION, WITHOUT LONG-TERM CURRENT USE OF INSULIN (HCC): Primary | ICD-10-CM

## 2018-08-28 PROCEDURE — G0109 DIAB MANAGE TRN IND/GROUP: HCPCS | Performed by: DIETITIAN, REGISTERED

## 2018-08-28 NOTE — PROGRESS NOTES
Living Well with Diabetes Group Class #4    Wilver Hawley attended the Living Well with Diabetes Group Class #4  During class, Morris Clayton was instructed on the following topics: Types of cholesterol, dietary sources of cholesterol, types of fat, types of fiber, reading food labels- in depth, healthy choices when dining out  Morris Clayton participated in group activities of reading labels together and completed the dining out activity  Morris Clayton will follow up with class #5 or additional DSMT/MNT as desired  Will call with any questions or concerns prior to next session  The patient's history was reviewed and updated as appropriate: allergies, current medications  Lab Results   Component Value Date    HGBA1C 6 8 (H) 07/02/2018       Diabetes Education Record  Morris Clayton was provided Living Well with Diabetes Class #4 book      Patient response to instruction    Comprehensiongood  Motivationgood  Expected Compliancegood    Thank you for referring your patient to [facility], it was a pleasure working with them today  Please feel free to call with any questions or concerns      Lia Moody RD [facility] [phone]

## 2018-08-28 NOTE — PATIENT INSTRUCTIONS
1  Check food labels and try to choose foods with the least amount of saturated fat and sodium  2   When eating out, check nutrition analysis on the computer or phone doni so that you can make the best choices to stay within your guidelines for carb, sodium and saturated fat

## 2018-08-29 ENCOUNTER — OFFICE VISIT (OUTPATIENT)
Dept: DIABETES SERVICES | Facility: CLINIC | Age: 58
End: 2018-08-29
Payer: COMMERCIAL

## 2018-08-29 VITALS — HEIGHT: 66 IN | BODY MASS INDEX: 32.98 KG/M2 | WEIGHT: 205.2 LBS

## 2018-08-29 DIAGNOSIS — E11.9 TYPE 2 DIABETES MELLITUS WITHOUT COMPLICATION, WITHOUT LONG-TERM CURRENT USE OF INSULIN (HCC): Primary | ICD-10-CM

## 2018-08-29 PROCEDURE — 97802 MEDICAL NUTRITION INDIV IN: CPT | Performed by: DIETITIAN, REGISTERED

## 2018-08-29 NOTE — PROGRESS NOTES
Medical Nutrition Therapy     Assessment    Visit Type: Initial visit  Chief complaint:  T2DM    HPI:  Patient is a 63 y/o female with newly diagnosed T2DM, Obesity  She has lost 11 lbs in the past 2 months through dietary changes  She is taking her medication as prescribed  She is testing her blood sugar as advised and majority of readings are at target except for an ocassional excursion due to dietary indiscretions  Patients food history shows a low carb intake and an excellent nutrient profile with adequate consumption of vegetables, fruits, lean meats and whole grains  She was educated on meal planning/carb counting and given a 1341 calorie, low carb (30%) personal meal plan  She plans on starting exercise once she is cleared to do so  Stressed importance of including strength training to decrease insulin resistance and weight  Ht Readings from Last 1 Encounters:   08/29/18 5' 6" (1 676 m)     Wt Readings from Last 3 Encounters:   08/29/18 93 1 kg (205 lb 3 2 oz)   08/16/18 94 8 kg (209 lb)   07/27/18 95 3 kg (210 lb)        Body mass index is 33 12 kg/m²  Lab Results   Component Value Date    HGBA1C 6 8 (H) 07/02/2018       No results found for: CHOL  Lab Results   Component Value Date    HDL 41 08/16/2018    HDL 42 07/02/2018    HDL 41 08/26/2017     Lab Results   Component Value Date    LDLCALC 104 (H) 08/16/2018    LDLCALC 87 07/02/2018    LDLCALC 97 08/26/2017     Lab Results   Component Value Date    TRIG 112 08/16/2018    TRIG 264 (H) 07/02/2018    TRIG 131 08/26/2017     No components found for: CHOLHDL        Weight Change: Yes 11 lbs in 8 weeks  Barriers to Learning: no barriers    Do you follow any special diet presently?: No  Who shops: patient  Who cooks: patient    Food Log: Completed via the method of food recall    Breakfast:1 c, [employer], 1/2 banana, 1/2 c   Ff milk, black coffee  Morning Snack:  Lunch:Zucchini, yellow squah, peppers, olive oil on ww wrap, water  Afternoon Snack: Not usually but sometimes fruit  Dinner:Pork loin chop, salad, 2 tsp dressing, 1/2 c  Long grain wild rice, zucchini, wateeer  Evening Snack:  Beverages: water, unsw iced tea, coffee  Eating out/Take out:1 x week  Exercise None at this time due to head injury but plans on starting gym once cleared to exercise      Nutrition Diagnosis:  Food and nutrition related knowledge deficit  related to Lack of prior exposure to accurate nutrition related information as evidenced by No prior knowledge of need for food and nutrition related recommendations    Intervention: plate method, label reading and carbohydrate counting     Treatment Goals: Patient will count carbohydrates    Monitoring and evaluation:    Term code indicator   1 6 3 Carbohydrate Intake Criteria: 45 grams breakfast, 30 grams lunch and dinner, may add 15 grams for afternoon snack if desired    Education Material Given  Ruby Ruiz was provided the following education material: [employer], 1341 calorie 30% carb personal meal plan     Patients Response to Instruction  Comprehensionexcellent  Motivationexcellent  Expected Complianceexcellent    Thank you for coming to the [facility] for education today  Please feel free to call with any questions or concerns      [address] [place] [phone]

## 2018-08-29 NOTE — PATIENT INSTRUCTIONS
1  Count carbs by identifying "total carbohydrates" on food labels and multiplying by the number of servings you ate  You can also use carb books, internet or phone apps to count carbs  2  Plan each meal using the number of total carbs allowed on your individual meal plan  3  Test your blood sugar before and 2 hours after various meals to find out if the number of carbs or particular food is working to keep your blood sugar at goal  If it is not, change the portion size of the food or try a different food choice  Eating more whole foods/whole grains versus processed food may help to slow the rise in your blood sugar  4  Include aerobic and resistance exercise in your daily plan when able   If you are not currently exercising start out slow and work up to 150 minutes of aerobic exercise and 20-30 minutes resistance training 2-3 days per week

## 2018-08-30 ENCOUNTER — APPOINTMENT (OUTPATIENT)
Dept: SPEECH THERAPY | Facility: CLINIC | Age: 58
End: 2018-08-30
Payer: COMMERCIAL

## 2018-08-30 ENCOUNTER — APPOINTMENT (OUTPATIENT)
Dept: OCCUPATIONAL THERAPY | Facility: CLINIC | Age: 58
End: 2018-08-30
Payer: COMMERCIAL

## 2018-08-30 ENCOUNTER — APPOINTMENT (OUTPATIENT)
Dept: PHYSICAL THERAPY | Facility: CLINIC | Age: 58
End: 2018-08-30
Payer: COMMERCIAL

## 2018-09-04 ENCOUNTER — OFFICE VISIT (OUTPATIENT)
Dept: DIABETES SERVICES | Facility: CLINIC | Age: 58
End: 2018-09-04
Payer: COMMERCIAL

## 2018-09-04 DIAGNOSIS — E11.9 DIABETES MELLITUS WITHOUT COMPLICATION (HCC): Primary | ICD-10-CM

## 2018-09-04 DIAGNOSIS — I10 ESSENTIAL HYPERTENSION: ICD-10-CM

## 2018-09-04 PROCEDURE — G0109 DIAB MANAGE TRN IND/GROUP: HCPCS

## 2018-09-05 ENCOUNTER — APPOINTMENT (OUTPATIENT)
Dept: PHYSICAL THERAPY | Facility: CLINIC | Age: 58
End: 2018-09-05
Payer: COMMERCIAL

## 2018-09-05 ENCOUNTER — APPOINTMENT (OUTPATIENT)
Dept: OCCUPATIONAL THERAPY | Facility: CLINIC | Age: 58
End: 2018-09-05
Payer: COMMERCIAL

## 2018-09-05 ENCOUNTER — APPOINTMENT (OUTPATIENT)
Dept: SPEECH THERAPY | Facility: CLINIC | Age: 58
End: 2018-09-05
Payer: COMMERCIAL

## 2018-09-05 PROCEDURE — 4010F ACE/ARB THERAPY RXD/TAKEN: CPT | Performed by: FAMILY MEDICINE

## 2018-09-05 RX ORDER — VALSARTAN 320 MG/1
TABLET ORAL
Qty: 90 TABLET | Refills: 1 | Status: SHIPPED | OUTPATIENT
Start: 2018-09-05 | End: 2019-02-25 | Stop reason: SDUPTHER

## 2018-09-05 NOTE — PROGRESS NOTES
Living Well with Diabetes Group Class #1    Sebastián Hawley attended the Living Well with Diabetes Group Class #1  Topics Covered in class today include: What is diabetes; Types of Diabetes; How Diabetes is diagnosed; Management skills; the role of exercise in blood sugar managements, Home glucose monitoring, and target ranges  Fausto Cramer participated in group activities    The patient's history was reviewed and updated as appropriate: allergies, current medications  Lab Results   Component Value Date    HGBA1C 6 8 (H) 07/02/2018       Diabetes Education Record  Fausto Cramer was provided Living Well with Diabetes Class #1 book      Patient response to instruction    Comprehensiongood  Motivationgood  Expected Compliancegood    Begin Time: 6 pm  End Time: 8 pm  Referring Provider: Dr Vale Chaparro    Thank you for referring your patient to [facility], it was a pleasure working with them today  Please feel free to call with any questions or concerns      Cee Garg RN  [address] [phone]

## 2018-09-05 NOTE — TELEPHONE ENCOUNTER
I spoke with [facility], and they do have valsartan from a different  and it was unaffected by the recall  Plenty in [address]

## 2018-09-05 NOTE — TELEPHONE ENCOUNTER
Please call patient's pharmacy  They are requesting refill for valsartan  Valsartan has been recall for contamination  Does the pharmacy have valsartan that has not been contaminated? If they do, will they have enough stock to continue the medication?

## 2018-09-06 ENCOUNTER — OFFICE VISIT (OUTPATIENT)
Dept: PHYSICAL THERAPY | Facility: CLINIC | Age: 58
End: 2018-09-06
Payer: COMMERCIAL

## 2018-09-06 ENCOUNTER — APPOINTMENT (OUTPATIENT)
Dept: OCCUPATIONAL THERAPY | Facility: CLINIC | Age: 58
End: 2018-09-06
Payer: COMMERCIAL

## 2018-09-06 ENCOUNTER — APPOINTMENT (OUTPATIENT)
Dept: SPEECH THERAPY | Facility: CLINIC | Age: 58
End: 2018-09-06
Payer: COMMERCIAL

## 2018-09-06 DIAGNOSIS — S06.0X9D CONCUSSION WITH LOSS OF CONSCIOUSNESS, SUBSEQUENT ENCOUNTER: Primary | ICD-10-CM

## 2018-09-06 DIAGNOSIS — I60.9 SAH (SUBARACHNOID HEMORRHAGE) (HCC): ICD-10-CM

## 2018-09-06 PROCEDURE — 97014 ELECTRIC STIMULATION THERAPY: CPT | Performed by: PHYSICAL THERAPIST

## 2018-09-06 PROCEDURE — G0283 ELEC STIM OTHER THAN WOUND: HCPCS | Performed by: PHYSICAL THERAPIST

## 2018-09-06 PROCEDURE — 97010 HOT OR COLD PACKS THERAPY: CPT | Performed by: PHYSICAL THERAPIST

## 2018-09-06 PROCEDURE — 97140 MANUAL THERAPY 1/> REGIONS: CPT | Performed by: PHYSICAL THERAPIST

## 2018-09-06 PROCEDURE — 97112 NEUROMUSCULAR REEDUCATION: CPT | Performed by: PHYSICAL THERAPIST

## 2018-09-06 PROCEDURE — 97110 THERAPEUTIC EXERCISES: CPT | Performed by: PHYSICAL THERAPIST

## 2018-09-06 NOTE — PROGRESS NOTES
Daily Note     Today's date: 2018  Patient name: Omid Gant  : 1960  MRN: [id number]  Referring provider: Latia Hopkins PA-C  Dx:   Encounter Diagnosis     ICD-10-CM    1  Concussion with loss of consciousness, subsequent encounter S06  0X9D    2  SAH (subarachnoid hemorrhage) (HCC) I60 9                   Subjective: Pt states that she is not feeling good today, feels foggy and does not know why  Reports today is a bad day and has 3/10 HA, dizziness, and neck pain  States current HEP is good and may actually be difficulty since she is having a lot of trouble with everything today       Objective: See treatment diary below  Precautions concussion, [facility], anxiety/depression     Specialty Daily Treatment Diary      Manual   18    Trigger point release  7 min  STM UT, CS  5 min   with STM 10 min seated    UT 5 min    Gentle Soft tissue mob/ justin fiber  5 min            SOR in supine        x4min  3 min    Gentle cervical distraction in supine        x4min  2 min                        Exercise Diary    9   VORx1, H/V  standing Standing, mirror, FT foam, 60" x 2 60"x 2 mirror Busy bubbles  2x30s ea  D: 3/10 [id number] ea  2 x 45"   VORcx, H/V  standing     With gait 2x40'ea 2 laps Standing busy 2 x 45"   Saccades, H/V standing           Busy bubbles  2x30s ea       Smooth pursuits  standing             Foam, arms crossed EC   HT/HN foam [id number] ea semitandem EC 4x30s [id number] ea h/v [id number] ea    Walking with HTs, H/V FW/BW, EC 2 laps      bkwd 3 laps     Walking with turns 2 laps   360 EC with turn  2x40' 360 2 laps     Tandem gait EC, 3 laps  HT/HN 3 laps In silva EC arms crossed 2x40' With ht/hn 2 laps  EC, no HT/HN 2 laps    Ball toss hand to hand FW/BW, 1 lap            Rockerboard, M/L and A/P             HT/HN FTEC on foam, 30" x 2            semi tandem stance              backwards walking              UT stretch              bike 10 min HA 10   Upright bike L3 x10 min  HA: 3/10 10 min upright L3 10 min upright L3    treadmill   10 min 2 5 mph          rows       2x15 gtb  2 x 15 gtb    ext shoulder       2x15 gtb  2 x 15 gtb   [employer]       2x15 gtb                        Modalities 8/20 8/22          MHP/TENS 10 min  In supine  x10 min                                            Assessment: Pt presented with increased sxs today so modified exercises today since pt demo increased instability compared to previous sessions and spent increased time with manuals today secondary to neck pain and HA  Pt had one posterior LOB while performing TB exercises due to dizziness and required more seated rest breaks throughout session  Pt frustrated due to poorer performance today with balance activities  Patient would benefit from continued PT      Plan: Progress treatment as tolerated

## 2018-09-11 ENCOUNTER — OFFICE VISIT (OUTPATIENT)
Dept: NEUROLOGY | Facility: CLINIC | Age: 58
End: 2018-09-11
Payer: COMMERCIAL

## 2018-09-11 VITALS
HEIGHT: 66 IN | DIASTOLIC BLOOD PRESSURE: 70 MMHG | HEART RATE: 67 BPM | BODY MASS INDEX: 32.72 KG/M2 | SYSTOLIC BLOOD PRESSURE: 118 MMHG | WEIGHT: 203.6 LBS

## 2018-09-11 DIAGNOSIS — R42 DIZZINESS AND GIDDINESS: ICD-10-CM

## 2018-09-11 DIAGNOSIS — G44.319 ACUTE POST-TRAUMATIC HEADACHE, NOT INTRACTABLE: ICD-10-CM

## 2018-09-11 DIAGNOSIS — S06.0X9D CONCUSSION WITH LOSS OF CONSCIOUSNESS, SUBSEQUENT ENCOUNTER: Primary | ICD-10-CM

## 2018-09-11 PROCEDURE — 99213 OFFICE O/P EST LOW 20 MIN: CPT | Performed by: PSYCHIATRY & NEUROLOGY

## 2018-09-11 RX ORDER — ASPIRIN 81 MG/1
81 TABLET ORAL DAILY
COMMUNITY
End: 2020-04-06 | Stop reason: ALTCHOICE

## 2018-09-11 NOTE — PROGRESS NOTES
Fernando Mccollum is a 62 y o  female who returns in follow-up today with history of concussion    Assessment:  1  Concussion with loss of consciousness, subsequent encounter    2  Dizziness and giddiness    3  Acute post-traumatic headache, not intractable        Plan:  Continue current therapy  Return back to work 4 hours per day  Follow-up in 2 months    Discussion:  Electa Kocher has had a definite improvement in her post concussive symptoms  She will continue with her current therapies and attempt to return back to work at a limited capacity of 4 hours per day  She feels she is able to do more she will notify me      Subjective:    HPI  Electa Kocher has noticed a definite improvement in her post concussive symptoms  She feels cognitively she is at least 90% and speech therapy has been discontinued  She feels that the dizziness has improved but she still notes if she turns her head side to side frequently she feels a little bit off  She also finds that her balance is not quite as good as it was  She reports that her headache frequency and intensity have improved  She states she gets headaches just a couple of times a week now and she only takes Tylenol if the headache is severe  She continues to note occasional neck pain symptoms in remains in physical therapy  She met her limit for occupational therapy    She feels at this point she is willing to try to go back to work on a limited basis as she still notes some blurring of her vision 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      Past Medical History:   Diagnosis Date    Anemia     Anxiety     Depression     Diarrhea     Fecal incontinence     Head injury     6/10/11    History of colon polyps     Hypertension        Family History:  Family History   Problem Relation Age of Onset    Lung cancer Father     Heart disease Father     Other Family         ADENOCARCINOMA IN SIOBHAN IN VILLOUS ADENOMA OF THE BREAST, MIGRAINES, SKIN DISORDER    Depression Family     Anxiety disorder Family     Diabetes Family         MELLITUS    Fibrocystic breast disease Family     Heart disease Family     Hiatal hernia Family     Hypertension Family     Irritable bowel syndrome Family     Kidney disease Family     Urinary tract infection Family     Atrial fibrillation Mother     No Known Problems Brother     No Known Problems Brother        Past Surgical History:  Past Surgical History:   Procedure Laterality Date    CHOLECYSTECTOMY      COLONOSCOPY N/A 10/24/2017    Procedure: COLONOSCOPY;  Surgeon: Lady Mcpherson MD;  Location: [facility]; Service: Colorectal    COLONOSCOPY W/ ENDOSCOPIC US N/A 10/24/2017    Procedure: ANAL ENDOSCOPIC U/S;  Surgeon: Lady Mcpherson MD;  Location: [facility]; Service: Colorectal    DILATION AND CURETTAGE OF UTERUS      ENDOMETRIAL BIOPSY      WITHOUT CERVICAL DILATION    HYSTERECTOMY      NASAL SEPTUM SURGERY      NOSE SURGERY      NASAL SEPTAL  DEVIATION REPAIR    OTHER SURGICAL HISTORY      ENDOSCOPIC CONTROL OF GASTRIC BLEEDING, EPISIOTOMY    ROTATOR CUFF REPAIR         Social History:   reports that she has quit smoking  She has never used smokeless tobacco  She reports that she drinks alcohol  She reports that she does not use drugs      Allergies:  Vicodin [hydrocodone-acetaminophen]      Current Outpatient Prescriptions:     acetaminophen (TYLENOL) 325 mg tablet, Take 2 tablets (650 mg total) by mouth every 6 (six) hours as needed for mild pain, Disp: 30 tablet, Rfl: 0    aspirin (LILIYA ASPIRIN EC LOW DOSE) 81 mg EC tablet, Take 81 mg by mouth daily, Disp: , Rfl:     clobetasol (TEMOVATE) 0 05 % ointment, , Disp: , Rfl: 0    LORazepam (ATIVAN) 0 5 mg tablet, Take 1 tablet (0 5 mg total) by mouth daily as needed for anxiety, Disp: 10 tablet, Rfl: 0    metFORMIN (GLUCOPHAGE) 500 mg tablet, Take 1 tablet (500 mg total) by mouth 2 (two) times a day with meals, Disp: 180 tablet, Rfl: 0    pravastatin (PRAVACHOL) 10 mg tablet, Take 1 tablet (10 mg total) by mouth daily, Disp: 90 tablet, Rfl: 0    sertraline (ZOLOFT) 25 mg tablet, take 2 tablets by mouth once daily, Disp: 90 tablet, Rfl: 1    valsartan (DIOVAN) 320 MG tablet, take 1 tablet by mouth once daily, Disp: 90 tablet, Rfl: 1    I have reviewed the past medical, social and family history, current medications, allergies, vitals, review of systems and updated this information as appropriate today     Objective:    Vitals:  Blood pressure 118/70, pulse 67, height 5' 5 5" (1 664 m), weight 92 4 kg (203 lb 9 6 oz)  Physical Exam    Neurological Exam  GENERAL:  Well-developed well-nourished woman in no acute distress  HEENT/NECK: Head is atraumatic normocephalic, neck is supple with some restricted range of motion to lateral rotation to the left greater than right  NEUROLOGIC:  Mental Status: Awake and alert without aphasia  Cranial Nerves: Extraocular movements are full  Face is symmetrical  Motor:   No drift is noted on arm extension  Coordination:   Finger-to-nose testing is performed accurately  Romberg is negative  Gait is stable  Reflexes:  1+ and symmetrical            ROS:    Review of Systems   Constitutional: Positive for fatigue  Negative for chills and fever  HENT: Negative for congestion, tinnitus and trouble swallowing  Eyes: Positive for visual disturbance (blurred)  Negative for pain  Respiratory: Negative for shortness of breath and wheezing  Cardiovascular: Negative  Gastrointestinal: Negative for nausea and vomiting  Endocrine: Negative for cold intolerance and heat intolerance  Genitourinary: Negative for frequency, hematuria and urgency  Musculoskeletal: Positive for neck pain  Negative for arthralgias, back pain, gait problem and neck stiffness  Skin: Negative for rash and wound  Neurological: Positive for light-headedness, numbness (bl hands and feet) and headaches   Negative for dizziness, tremors, seizures, syncope, facial asymmetry, speech difficulty and weakness  Hematological: Negative  Psychiatric/Behavioral: Negative for confusion, decreased concentration and sleep disturbance  All other systems reviewed and are negative

## 2018-09-11 NOTE — PROGRESS NOTES
To whom it May concern:    Ghulam Harvey months or may return back to work at her usual duties limited to 4 hours per day until further notice as of September 17, 2018      Roula Edouard MD

## 2018-09-12 ENCOUNTER — APPOINTMENT (OUTPATIENT)
Dept: PHYSICAL THERAPY | Facility: CLINIC | Age: 58
End: 2018-09-12
Payer: COMMERCIAL

## 2018-09-14 ENCOUNTER — OFFICE VISIT (OUTPATIENT)
Dept: PHYSICAL THERAPY | Facility: CLINIC | Age: 58
End: 2018-09-14
Payer: COMMERCIAL

## 2018-09-14 DIAGNOSIS — I60.9 SAH (SUBARACHNOID HEMORRHAGE) (HCC): ICD-10-CM

## 2018-09-14 DIAGNOSIS — S06.0X9D CONCUSSION WITH LOSS OF CONSCIOUSNESS, SUBSEQUENT ENCOUNTER: Primary | ICD-10-CM

## 2018-09-14 PROCEDURE — 97112 NEUROMUSCULAR REEDUCATION: CPT | Performed by: PHYSICAL THERAPIST

## 2018-09-14 PROCEDURE — 97110 THERAPEUTIC EXERCISES: CPT | Performed by: PHYSICAL THERAPIST

## 2018-09-14 NOTE — PROGRESS NOTES
Daily Note     Today's date: 2018  Patient name: Siria Land  : 1960  MRN: [id number]  Referring provider: Gopi Hong PA-C  Dx:   Encounter Diagnosis     ICD-10-CM    1  Concussion with loss of consciousness, subsequent encounter S06  0X9D    2  SAH (subarachnoid hemorrhage) ([facility]) I60 9                   Subjective: Pt states that she is feeling much better today compared to previous session    Objective: See treatment diary below  Precautions concussion, SAH, anxiety/depression     Specialty Daily Treatment Diary      Manual   18    Trigger point release  7 min  STM UT, CS  5 min   with STM 10 min seated    UT 5 min    Gentle Soft tissue mob/ justin fiber  5 min            SOR in supine        x4min  3 min    Gentle cervical distraction in supine        x4min  2 min                        Exercise Diary         VORx1, H/V  standing Standing, mirror, FT foam, 60" x 2       VORcx, H/V  standing  walking 2 laps       Saccades, H/V standing        busy standing 45 sec x 2       Smooth pursuits  standing         Foam, arms crossed EC  30 sec x1       Walking with HTs, H/V FW/BW, EC 2 laps        Walking with turns 2 laps - 360       Tandem gait EO foam, 3 laps        Ball toss hand to hand        Rockerboard, M/L and A/P  30x ht/hn ea       HT/HN FTEC on foam, 30" x 2        semi tandem stance          backwards walking          UT stretch          bike 10 min HA 2/10        treadmill          rows          ext shoulder          ER                         Modalities           MHP/TENS 10 min  In supine  x10 min                                            Assessment: Pt improved this session compared to previous session with balance and overall decreased symptoms  Patient will continue to benefit from vestibular and balance retraining  Plan: Progress treatment as tolerated

## 2018-09-18 ENCOUNTER — OFFICE VISIT (OUTPATIENT)
Dept: PHYSICAL THERAPY | Facility: CLINIC | Age: 58
End: 2018-09-18
Payer: COMMERCIAL

## 2018-09-18 DIAGNOSIS — S06.0X9D CONCUSSION WITH LOSS OF CONSCIOUSNESS, SUBSEQUENT ENCOUNTER: Primary | ICD-10-CM

## 2018-09-18 DIAGNOSIS — I60.9 SAH (SUBARACHNOID HEMORRHAGE) (HCC): ICD-10-CM

## 2018-09-18 PROCEDURE — 97140 MANUAL THERAPY 1/> REGIONS: CPT

## 2018-09-18 PROCEDURE — 97112 NEUROMUSCULAR REEDUCATION: CPT

## 2018-09-18 NOTE — PROGRESS NOTES
Daily Note     Today's date: 2018  Patient name: Chanelle Mahmood  : 1960  MRN: [id number]  Referring provider: Janet Edwards PA-C  Dx:   Encounter Diagnosis     ICD-10-CM    1  Concussion with loss of consciousness, subsequent encounter S06  0X9D    2  SAH (subarachnoid hemorrhage) ([facility]) I60 9        Subjective: Pt continues to complain of neck tightness that worsens as the day progresses  Patient reports that RTW yesterday  States sensitivity to lights, had to dim lights  Reports slightly increased headache  States that she is working 1/2 days for now  Reports 2/10 headache upon arrival      Objective: See treatment diary below  Precautions concussion, SAH, anxiety/depression     Specialty Daily Treatment Diary      Manual          Trigger point release         Gentle Soft tissue mob/ justin fiber         SOR in supine         Gentle cervical distraction in supine         STM   10 min               Exercise Diary        VORx1, H/V  standing Standing, mirror, FT foam, 60" x 2 Busy, walking fwd/bwd, 60''      VORcx, H/V  standing  walking 2 laps       Saccades, H/V standing        busy standing 45 sec x 2       Smooth pursuits  standing         Foam, arms crossed EC  30 sec x1       Walking with HTs, H/V FW/BW, EC 2 laps  Bwd, EC, 1 lap      Walking with turns 2 laps - 360 360*, EC, 1 lap      Tandem gait EO foam, 3 laps        Ball toss hand to hand        Rockerboard, M/L and A/P  30x ht/hn ea       HT/HN FTEC on foam, 30" x 2 FT/EC on foam, 30''x2       semi tandem stance          backwards walking          UT stretch          bike 10 min HA /10        treadmill   3 0 mph, 15 min       rows          ext shoulder          ER                         Modalities            MHP/TENS                                            Assessment: Contacted [employer] (TENS unit rep) about getting patient home unit  Patient has one more appointment approved due to insurance visit limit   Patient to return to PT in one week to review and transition to HEP  Reviewed and updated HEP to be performed in this time frame  Demonstrated increased tightness and TTP in right suboccipital region with STM performed  Plan: D/C NV

## 2018-09-19 DIAGNOSIS — F41.9 ANXIETY: ICD-10-CM

## 2018-09-19 RX ORDER — SERTRALINE HYDROCHLORIDE 25 MG/1
TABLET, FILM COATED ORAL
Qty: 90 TABLET | Refills: 1 | Status: SHIPPED | OUTPATIENT
Start: 2018-09-19 | End: 2018-12-18 | Stop reason: SDUPTHER

## 2018-09-20 ENCOUNTER — APPOINTMENT (OUTPATIENT)
Dept: PHYSICAL THERAPY | Facility: CLINIC | Age: 58
End: 2018-09-20
Payer: COMMERCIAL

## 2018-09-24 ENCOUNTER — APPOINTMENT (OUTPATIENT)
Dept: PHYSICAL THERAPY | Facility: CLINIC | Age: 58
End: 2018-09-24
Payer: COMMERCIAL

## 2018-09-26 ENCOUNTER — OFFICE VISIT (OUTPATIENT)
Dept: FAMILY MEDICINE CLINIC | Facility: MEDICAL CENTER | Age: 58
End: 2018-09-26
Payer: COMMERCIAL

## 2018-09-26 VITALS
WEIGHT: 202 LBS | HEART RATE: 70 BPM | SYSTOLIC BLOOD PRESSURE: 122 MMHG | OXYGEN SATURATION: 98 % | DIASTOLIC BLOOD PRESSURE: 74 MMHG | BODY MASS INDEX: 33.1 KG/M2

## 2018-09-26 DIAGNOSIS — E11.9 TYPE 2 DIABETES MELLITUS WITHOUT COMPLICATION, WITHOUT LONG-TERM CURRENT USE OF INSULIN (HCC): Primary | ICD-10-CM

## 2018-09-26 DIAGNOSIS — Z23 NEED FOR SHINGLES VACCINE: ICD-10-CM

## 2018-09-26 DIAGNOSIS — Z23 FLU VACCINE NEED: ICD-10-CM

## 2018-09-26 DIAGNOSIS — E78.1 HYPERTRIGLYCERIDEMIA: ICD-10-CM

## 2018-09-26 DIAGNOSIS — F41.9 ANXIETY: ICD-10-CM

## 2018-09-26 DIAGNOSIS — I10 ESSENTIAL HYPERTENSION: ICD-10-CM

## 2018-09-26 PROCEDURE — 99214 OFFICE O/P EST MOD 30 MIN: CPT | Performed by: FAMILY MEDICINE

## 2018-09-26 PROCEDURE — 90471 IMMUNIZATION ADMIN: CPT

## 2018-09-26 PROCEDURE — 90682 RIV4 VACC RECOMBINANT DNA IM: CPT

## 2018-09-26 RX ORDER — PRAVASTATIN SODIUM 10 MG
10 TABLET ORAL DAILY
Qty: 90 TABLET | Refills: 1 | Status: SHIPPED | OUTPATIENT
Start: 2018-09-26 | End: 2019-03-15 | Stop reason: SDUPTHER

## 2018-09-26 NOTE — PROGRESS NOTES
Assessment/Plan:    No problem-specific Assessment & Plan notes found for this encounter  Diagnoses and all orders for this visit:    Type 2 diabetes mellitus without complication, without long-term current use of insulin (HCC)  -     metFORMIN (GLUCOPHAGE) 500 mg tablet; Take 1 tablet (500 mg total) by mouth 2 (two) times a day with meals  -     pravastatin (PRAVACHOL) 10 mg tablet; Take 1 tablet (10 mg total) by mouth daily  -     Comprehensive metabolic panel; Future  -     Hemoglobin A1C; Future  -     Microalbumin / creatinine urine ratio; Future  Diabetes not too poorly controlled previously as A1c was 6 8% approximately three months ago  It is a bit too early for repeat A1c today and therefore I will have patient get labs done next week  If needed I will adjust her dose of metformin  I suspect the weight loss has helped to better control her sugars as well  Essential hypertension  Blood pressure is well controlled  Check labs as above to assess renal function  Adjust blood pressure medication if needed  Hypertriglyceridemia  Triglycerides were improved on previous labs and liver function was stable  Check a CMP as above to assess hepatic function again  Adjust statin if needed  Anxiety  Appears to be stable  Continue Zoloft and as needed Ativan  Flu vaccine need  -     influenza vaccine, 1731-4341, quadrivalent, recombinant, PF, 0 5 mL, for patients 18 yr+ (FLUBLOK)  Flu vaccine given today and tolerated well  Need for shingles vaccine  -     Zoster Vac Recomb Adjuvanted ([address]) 50 MCG SUSR; Inject 50 mcg into a muscle once for 1 dose Repeat in 2-6mo  I recommended patient get the shingles vaccine as well  She was in agreement  I informed her that it would be two shots  by 2-6 months and she was agreeable to that  Prescription printed for the shingles vaccine for patient to have done at her pharmacy      Follow-up in six months or sooner if needed  Subjective:      Patient ID: Conchita Tadeo is a 62 y o  female  Patient presents for follow-up  She has diabetes  She is currently on metformin 500 mg twice daily  She is tolerating the medication well  She has adjusted her diet and has lost about 15 lb since she was last seen by me  No side effects from the medication  Does need a refill of the metformin  She has high blood pressure  She is currently on valsartan 320 mg daily  She is tolerating the medication well without any cough, lightheadedness or dizziness  She has high triglycerides  She currently takes pravastatin 10 mg daily to help control his  She is tolerating the medication well without any myalgias  Does need a refill of the pravastatin  She has anxiety  Currently she is on Zoloft 25 mg daily and as needed lorazepam 0 5 mg daily  It is keeping her anxiety well controlled  Does not need medication refill at this time  She would like a flu shot          The following portions of the patient's history were reviewed and updated as appropriate:   She   Patient Active Problem List    Diagnosis Date Noted    Acute post-traumatic headache, not intractable 07/27/2018    Dizziness and giddiness 07/27/2018    Hypertriglyceridemia 07/05/2018    Hematuria 07/05/2018    Type 2 diabetes mellitus without complication, without long-term current use of insulin ([facility] ) 07/05/2018    Concussion with loss of consciousness 06/22/2018    Closed rib fracture 06/11/2018    SAH (subarachnoid hemorrhage) ([facility] ) 06/11/2018    Closed head injury 06/11/2018    Mild TBI ([facility] ) 06/11/2018    Anxiety 08/25/2017    Hypertension 08/25/2017     Current Outpatient Prescriptions   Medication Sig Dispense Refill    acetaminophen (TYLENOL) 325 mg tablet Take 2 tablets (650 mg total) by mouth every 6 (six) hours as needed for mild pain 30 tablet 0    aspirin (LILIYA ASPIRIN EC LOW DOSE) 81 mg EC tablet Take 81 mg by mouth daily      clobetasol (TEMOVATE) 0 05 % ointment   0    LORazepam (ATIVAN) 0 5 mg tablet Take 1 tablet (0 5 mg total) by mouth daily as needed for anxiety 10 tablet 0    metFORMIN (GLUCOPHAGE) 500 mg tablet Take 1 tablet (500 mg total) by mouth 2 (two) times a day with meals 180 tablet 1    pravastatin (PRAVACHOL) 10 mg tablet Take 1 tablet (10 mg total) by mouth daily 90 tablet 1    sertraline (ZOLOFT) 25 mg tablet take 2 tablets by mouth once daily 90 tablet 1    valsartan (DIOVAN) 320 MG tablet take 1 tablet by mouth once daily 90 tablet 1    Zoster Vac Recomb Adjuvanted (SHINGRIX) 50 MCG SUSR Inject 50 mcg into a muscle once for 1 dose Repeat in 2-6mo 1 each 1     No current facility-administered medications for this visit  She is allergic to vicodin [hydrocodone-acetaminophen]       Review of Systems   Constitutional: Negative for fever  Respiratory: Negative for shortness of breath  Cardiovascular: Negative for chest pain  Objective:      /74 (BP Location: Left arm, Patient Position: Sitting, Cuff Size: Large)   Pulse 70   Wt 91 6 kg (202 lb)   SpO2 98%   BMI 33 10 kg/m²          Physical Exam   Constitutional: She appears well-developed and well-nourished  Cardiovascular: Normal rate, regular rhythm and normal heart sounds      Pulmonary/Chest: Effort normal and breath sounds normal

## 2018-09-27 ENCOUNTER — TELEPHONE (OUTPATIENT)
Dept: NEUROLOGY | Facility: CLINIC | Age: 58
End: 2018-09-27

## 2018-09-27 NOTE — TELEPHONE ENCOUNTER
Pt states she was instructed to call w/work status update  Per last office note (9/11/18), pt ok to go back to work pt/4hrs a day    Pt feels ready to go back FT, 8hrs/day 10/8  Pt is a dispatcher, sitting mostly    Pt requesting letter  Rachel Sanders to generate letter?     Please advise    Pt will callback w/fax number

## 2018-09-28 ENCOUNTER — OFFICE VISIT (OUTPATIENT)
Dept: PHYSICAL THERAPY | Facility: CLINIC | Age: 58
End: 2018-09-28
Payer: COMMERCIAL

## 2018-09-28 DIAGNOSIS — S06.0X9D CONCUSSION WITH LOSS OF CONSCIOUSNESS, SUBSEQUENT ENCOUNTER: Primary | ICD-10-CM

## 2018-09-28 DIAGNOSIS — I60.9 SAH (SUBARACHNOID HEMORRHAGE) (HCC): ICD-10-CM

## 2018-09-28 PROCEDURE — 97112 NEUROMUSCULAR REEDUCATION: CPT | Performed by: PHYSICAL THERAPIST

## 2018-09-28 PROCEDURE — G8979 MOBILITY GOAL STATUS: HCPCS | Performed by: PHYSICAL THERAPIST

## 2018-09-28 PROCEDURE — G8980 MOBILITY D/C STATUS: HCPCS | Performed by: PHYSICAL THERAPIST

## 2018-09-28 NOTE — PROGRESS NOTES
Daily Note     Today's date: 2018  Patient name: Nicole Wu  : 1960  MRN: [id number]  Referring provider: Ector Bolton PA-C  Dx:   Encounter Diagnosis     ICD-10-CM    1  Concussion with loss of consciousness, subsequent encounter S06  0X9D    2  SAH (subarachnoid hemorrhage) ([facility]) I60 9                   Subjective: Pt has returned to work for about 4 hrs/day and is doing well  Will do one more week of 4 hr days, then return to full time  Pt has bought a new horse and rode 2x already for about 30 min each time with helmet  Pt reports that she continues to have daily HA 2-3/10 after work  Only time getting dizzy is when she moves her head quickly or bend over  Pt thinks balance is better but do not feel 100% yet  Worst HA rated as 3/10  Objective: See treatment diary below  Precautions concussion, SAH, anxiety/depression     DHI:  Intake - 48  18 - 58  18 - 6     STGs:  1  Pt will reduce HA rating to at worst a 2/10 within 6 weeks  - - NOT MET as of  (HA 3/10)   2  Pt will reduce dizziness rating to at worst a 2/10 within 6 weeks  - MET (Dizziness 1 or 2/10)  3  Pt will improve DHI score by at least 5 points within 6 weeks  - MET  4  Pt will improve balance on noncompliant surface with eyes closed for 30 seconds within 6 weeks  - MET  5  Pt will demonstrate independence with HEP within 6 weeks  - MET    LTGs:  1  Pt will deny headache within 12 weeks  - NOT MET   2  Pt will deny dizziness 12 weeks  - NOT MET  3  Pt will improve DHI score to at least a 15/100 within 12 weeks  - MET  4  Pt will improve DGI score to at least 23/24 needed to reduce fall risk within 12 weeks  - MET  5  Pt will demonstrate independence with HEP within 12 weeks  - MET   6  Pt will have returned to normal tolerance for work within 12 weeks  - MET    Assessment: Pt given TENS unit for home and reviewed parameters and how to set up unit herself   Performed re-eval and overall pt reports significant improvement in sxs and has returned to riding her horse and work (however limited duration)  Pt declined reviewing HEP since she feels like she is independent with HEP  Overall has met most of her goals except for being symptom free (of HA and dizziness)  At this time, pt will be discharged from skilled PT due insurance limitation  Plan: Discharge

## 2018-10-03 ENCOUNTER — APPOINTMENT (OUTPATIENT)
Dept: LAB | Facility: MEDICAL CENTER | Age: 58
End: 2018-10-03
Payer: COMMERCIAL

## 2018-10-03 DIAGNOSIS — E11.9 TYPE 2 DIABETES MELLITUS WITHOUT COMPLICATION, WITHOUT LONG-TERM CURRENT USE OF INSULIN (HCC): ICD-10-CM

## 2018-10-03 LAB
ALBUMIN SERPL BCP-MCNC: 3.8 G/DL (ref 3.5–5)
ALP SERPL-CCNC: 93 U/L (ref 46–116)
ALT SERPL W P-5'-P-CCNC: 37 U/L (ref 12–78)
ANION GAP SERPL CALCULATED.3IONS-SCNC: 8 MMOL/L (ref 4–13)
AST SERPL W P-5'-P-CCNC: 7 U/L (ref 5–45)
BILIRUB SERPL-MCNC: 0.34 MG/DL (ref 0.2–1)
BUN SERPL-MCNC: 21 MG/DL (ref 5–25)
CALCIUM SERPL-MCNC: 9.2 MG/DL (ref 8.3–10.1)
CHLORIDE SERPL-SCNC: 104 MMOL/L (ref 100–108)
CO2 SERPL-SCNC: 26 MMOL/L (ref 21–32)
CREAT SERPL-MCNC: 0.72 MG/DL (ref 0.6–1.3)
CREAT UR-MCNC: 173 MG/DL
EST. AVERAGE GLUCOSE BLD GHB EST-MCNC: 126 MG/DL
GFR SERPL CREATININE-BSD FRML MDRD: 93 ML/MIN/1.73SQ M
GLUCOSE P FAST SERPL-MCNC: 100 MG/DL (ref 65–99)
HBA1C MFR BLD: 6 % (ref 4.2–6.3)
MICROALBUMIN UR-MCNC: 36.7 MG/L (ref 0–20)
MICROALBUMIN/CREAT 24H UR: 21 MG/G CREATININE (ref 0–30)
POTASSIUM SERPL-SCNC: 4.4 MMOL/L (ref 3.5–5.3)
PROT SERPL-MCNC: 7.9 G/DL (ref 6.4–8.2)
SODIUM SERPL-SCNC: 138 MMOL/L (ref 136–145)

## 2018-10-03 PROCEDURE — 82043 UR ALBUMIN QUANTITATIVE: CPT

## 2018-10-03 PROCEDURE — 82570 ASSAY OF URINE CREATININE: CPT

## 2018-10-03 PROCEDURE — 80053 COMPREHEN METABOLIC PANEL: CPT

## 2018-10-03 PROCEDURE — 36415 COLL VENOUS BLD VENIPUNCTURE: CPT

## 2018-10-03 PROCEDURE — 83036 HEMOGLOBIN GLYCOSYLATED A1C: CPT

## 2018-10-03 PROCEDURE — 3061F NEG MICROALBUMINURIA REV: CPT | Performed by: FAMILY MEDICINE

## 2018-10-05 PROBLEM — E11.29 TYPE 2 DIABETES MELLITUS WITH MICROALBUMINURIA, WITHOUT LONG-TERM CURRENT USE OF INSULIN (HCC): Status: ACTIVE | Noted: 2018-07-05

## 2018-10-05 PROBLEM — R80.9 TYPE 2 DIABETES MELLITUS WITH MICROALBUMINURIA, WITHOUT LONG-TERM CURRENT USE OF INSULIN (HCC): Status: ACTIVE | Noted: 2018-07-05

## 2018-10-09 ENCOUNTER — ANNUAL EXAM (OUTPATIENT)
Dept: OBGYN CLINIC | Facility: MEDICAL CENTER | Age: 58
End: 2018-10-09
Payer: COMMERCIAL

## 2018-10-09 VITALS — SYSTOLIC BLOOD PRESSURE: 144 MMHG | WEIGHT: 201.8 LBS | BODY MASS INDEX: 33.07 KG/M2 | DIASTOLIC BLOOD PRESSURE: 72 MMHG

## 2018-10-09 DIAGNOSIS — Z01.419 ENCOUNTER FOR GYNECOLOGICAL EXAMINATION (GENERAL) (ROUTINE) WITHOUT ABNORMAL FINDINGS: Primary | ICD-10-CM

## 2018-10-09 DIAGNOSIS — Z12.39 SCREENING FOR MALIGNANT NEOPLASM OF BREAST: ICD-10-CM

## 2018-10-09 PROCEDURE — S0612 ANNUAL GYNECOLOGICAL EXAMINA: HCPCS | Performed by: PHYSICIAN ASSISTANT

## 2018-10-09 NOTE — PROGRESS NOTES
Assessment/Plan  Problem List Items Addressed This Visit     Encounter for gynecological examination (general) (routine) without abnormal findings - Primary     Annual exam performed  mammo rx given  RTO 1year           Other Visit Diagnoses     Screening for malignant neoplasm of breast        Relevant Orders    Mammo screening bilateral w cad        Subjective   Yahir Chang is a 62 y o  female who presents for annual GYN exam  She denies any changes in Surgical or Family histories  Medical hx changes- dx with DM2 on metformin  No menses, and she denies postmenopausal bleeding, spotting, or discharge  She reports that she is sexually active with 1 partner  She denies any pain or dryness with intercourse  Last Pap smear: 2005  Regular self breast exam: yes  Last mammogram: 10/2017  Last Colonoscopy: 10/2017  Family history of uterine or ovarian cancer: no  Family history of breast cancer: yes - MGGM dx age 76s  Family history of colon cancer: no    Menstrual History:  OB History      Para Term  AB Living    3 3            SAB TAB Ectopic Multiple Live Births                      No LMP recorded  Patient has had a hysterectomy  The following portions of the patient's history were reviewed and updated as appropriate: allergies, current medications, past family history, past medical history, past social history, past surgical history and problem list     Review of Systems  Review of Systems   Constitutional: Negative for chills and fever  Respiratory: Negative for shortness of breath  Cardiovascular: Negative for chest pain  Gastrointestinal: Negative for abdominal pain  Genitourinary: Negative for dysuria, pelvic pain, vaginal bleeding, vaginal discharge and vaginal pain  Negative for breast pain or lumps  (+) stress urinary incontinence  Neurological: Negative for headaches       Objective   /72 (BP Location: Right arm)   Wt 91 5 kg (201 lb 12 8 oz)   BMI 33 07 kg/m²     Physical Exam   Constitutional: She is oriented to person, place, and time  She appears well-developed and well-nourished  Neck: No thyromegaly present  Cardiovascular: Normal rate and regular rhythm  Pulmonary/Chest: Effort normal and breath sounds normal  Right breast exhibits no mass, no nipple discharge, no skin change and no tenderness  Left breast exhibits no mass, no nipple discharge, no skin change and no tenderness  Abdominal: Soft  There is no tenderness  There is no rebound and no guarding  Genitourinary: There is no rash or lesion on the right labia  There is no rash or lesion on the left labia  No bleeding in the vagina  No vaginal discharge found  Neurological: She is alert and oriented to person, place, and time  Psychiatric: She has a normal mood and affect  Nursing note and vitals reviewed

## 2018-10-15 ENCOUNTER — TELEPHONE (OUTPATIENT)
Dept: FAMILY MEDICINE CLINIC | Facility: MEDICAL CENTER | Age: 58
End: 2018-10-15

## 2018-10-15 NOTE — TELEPHONE ENCOUNTER
Said she got a phone call that she's in the early stages of kidney disease  Is there anything she is supposed to do? Change diet?   ???

## 2018-11-27 ENCOUNTER — OFFICE VISIT (OUTPATIENT)
Dept: NEUROLOGY | Facility: CLINIC | Age: 58
End: 2018-11-27
Payer: COMMERCIAL

## 2018-11-27 VITALS
HEART RATE: 63 BPM | BODY MASS INDEX: 31.69 KG/M2 | WEIGHT: 197.2 LBS | HEIGHT: 66 IN | SYSTOLIC BLOOD PRESSURE: 120 MMHG | DIASTOLIC BLOOD PRESSURE: 80 MMHG

## 2018-11-27 DIAGNOSIS — S06.0X9D CONCUSSION WITH LOSS OF CONSCIOUSNESS, SUBSEQUENT ENCOUNTER: Primary | ICD-10-CM

## 2018-11-27 DIAGNOSIS — R42 DIZZINESS AND GIDDINESS: ICD-10-CM

## 2018-11-27 DIAGNOSIS — G44.319 ACUTE POST-TRAUMATIC HEADACHE, NOT INTRACTABLE: ICD-10-CM

## 2018-11-27 PROCEDURE — 99213 OFFICE O/P EST LOW 20 MIN: CPT | Performed by: PSYCHIATRY & NEUROLOGY

## 2018-11-27 NOTE — PROGRESS NOTES
Godfrey Leroy is a 62 y o  female who returns in follow-up today with history of concussion    Assessment:  1  Concussion with loss of consciousness, subsequent encounter    2  Dizziness and giddiness    3  Acute post-traumatic headache, not intractable        Plan:  Follow-up p r n  Discussion:  [employer] reports that she feels almost back to baseline  She reports no headaches  She does note occasional stiffness in her neck, as well as occasional dizziness  But this is happening infrequently  She is back to work full time and is tolerating this well  Suspect the remainder of her symptoms will gradually improve over time  She will come back to see me on an as-needed basis      Subjective:    HPI  [employer] returns in follow-up today  She states she feels almost back to baseline  She states she no longer gets headaches  She notes occasional tightness in her neck and shoulders and when this occurs she uses a 10s unit in this seems to help but this is happening less often  She states on occasion she gets a little mixed up but this clears up pretty quickly  She states occasionally with moving quickly she gets slightly dizzy but nothing that prevents her from functioning  She is back to work full-time in happy to be doing so        Past Medical History:   Diagnosis Date    Anemia     Anxiety     Depression     Diabetes 1 5, managed as type 2 ([facility] [address] )     Diarrhea     Fecal incontinence     Head injury     6/10/11    History of colon polyps     Hypertension        Family History:  Family History   Problem Relation Age of Onset    Lung cancer Father     Heart disease Father     Other Family         ADENOCARCINOMA IN SIOBHAN IN VILLOUS ADENOMA OF THE BREAST, MIGRAINES, SKIN DISORDER    Depression Family     Anxiety disorder Family     Diabetes Family         MELLITUS    Fibrocystic breast disease Family     Heart disease Family     Hiatal hernia Family     Hypertension Family     Irritable bowel syndrome Family     Kidney disease Family     Urinary tract infection Family     Atrial fibrillation Mother     No Known Problems Brother     No Known Problems Brother        Past Surgical History:  Past Surgical History:   Procedure Laterality Date    CHOLECYSTECTOMY      COLONOSCOPY N/A 10/24/2017    Procedure: COLONOSCOPY;  Surgeon: Stefani Schultz MD;  [facility]; Service: Colorectal    COLONOSCOPY W/ ENDOSCOPIC US N/A 10/24/2017    Procedure: ANAL ENDOSCOPIC U/S;  Surgeon: Stefani Schultz MD;  [facility]; Service: Colorectal    DILATION AND CURETTAGE OF UTERUS      ENDOMETRIAL BIOPSY      WITHOUT CERVICAL DILATION    HYSTERECTOMY      NASAL SEPTUM SURGERY      NOSE SURGERY      NASAL SEPTAL  DEVIATION REPAIR    OTHER SURGICAL HISTORY      ENDOSCOPIC CONTROL OF GASTRIC BLEEDING, EPISIOTOMY    ROTATOR CUFF REPAIR         Social History:   reports that she has quit smoking  She has never used smokeless tobacco  She reports that she drinks alcohol  She reports that she does not use drugs      Allergies:  Vicodin [hydrocodone-acetaminophen]      Current Outpatient Prescriptions:     acetaminophen (TYLENOL) 325 mg tablet, Take 2 tablets (650 mg total) by mouth every 6 (six) hours as needed for mild pain, Disp: 30 tablet, Rfl: 0    aspirin (LILIYA ASPIRIN EC LOW DOSE) 81 mg EC tablet, Take 81 mg by mouth daily, Disp: , Rfl:     clobetasol (TEMOVATE) 0 05 % ointment, , Disp: , Rfl: 0    LORazepam (ATIVAN) 0 5 mg tablet, Take 1 tablet (0 5 mg total) by mouth daily as needed for anxiety, Disp: 10 tablet, Rfl: 0    metFORMIN (GLUCOPHAGE) 500 mg tablet, Take 1 tablet (500 mg total) by mouth 2 (two) times a day with meals, Disp: 180 tablet, Rfl: 1    pravastatin (PRAVACHOL) 10 mg tablet, Take 1 tablet (10 mg total) by mouth daily, Disp: 90 tablet, Rfl: 1    sertraline (ZOLOFT) 25 mg tablet, take 2 tablets by mouth once daily, Disp: 90 tablet, Rfl: 1    valsartan (DIOVAN) 320 MG tablet, take 1 tablet by mouth once daily, Disp: 90 tablet, Rfl: 1    I have reviewed the past medical, social and family history, current medications, allergies, vitals, review of systems and updated this information as appropriate today     Objective:    Vitals:  Blood pressure 120/80, pulse 63, height 5' 5 5" (1 664 m), weight 89 4 kg (197 lb 3 2 oz)  Physical Exam    Neurological Exam  GENERAL:  Well-developed well-nourished woman in no acute distress  HEENT/NECK: Head is atraumatic normocephalic, neck is supple with no tenderness in the lateral cervical region  CARDIOVASCULAR:  No Carotid bruit  NEUROLOGIC:  Mental Status: Awake and alert without aphasia  Cranial Nerves: Extraocular movements are full  Face is symmetrical  Motor:   No drift is noted on arm extension  Coordination:   Finger-to-nose testing is performed accurately  Romberg is negative  Gait is stable            ROS:    Review of Systems   Constitutional: Negative  Negative for appetite change and fever  HENT: Negative  Negative for hearing loss, tinnitus, trouble swallowing and voice change  Eyes: Negative  Negative for photophobia and pain  Respiratory: Negative  Negative for shortness of breath  Cardiovascular: Positive for palpitations (flutters)  Gastrointestinal: Negative  Negative for nausea and vomiting  Endocrine: Negative  Negative for cold intolerance and heat intolerance  Genitourinary: Negative  Negative for dysuria, frequency and urgency  Musculoskeletal: Positive for myalgias and neck stiffness  Negative for neck pain  Skin: Negative  Negative for rash  Neurological: Positive for light-headedness and numbness (hands and feet)  Negative for dizziness, tremors, seizures, syncope, facial asymmetry, speech difficulty, weakness and headaches  Hematological: Negative  Does not bruise/bleed easily  Psychiatric/Behavioral: Negative  Negative for confusion, hallucinations and sleep disturbance     All other systems reviewed and are negative

## 2018-12-18 DIAGNOSIS — F41.9 ANXIETY: ICD-10-CM

## 2018-12-18 RX ORDER — SERTRALINE HYDROCHLORIDE 25 MG/1
50 TABLET, FILM COATED ORAL DAILY
Qty: 90 TABLET | Refills: 0 | Status: SHIPPED | OUTPATIENT
Start: 2018-12-18 | End: 2019-02-18 | Stop reason: SDUPTHER

## 2018-12-20 ENCOUNTER — PROCEDURE VISIT (OUTPATIENT)
Dept: UROLOGY | Facility: CLINIC | Age: 58
End: 2018-12-20
Payer: COMMERCIAL

## 2018-12-20 VITALS
SYSTOLIC BLOOD PRESSURE: 138 MMHG | HEART RATE: 85 BPM | WEIGHT: 226 LBS | BODY MASS INDEX: 36.32 KG/M2 | HEIGHT: 66 IN | DIASTOLIC BLOOD PRESSURE: 92 MMHG

## 2018-12-20 DIAGNOSIS — R31.0 GROSS HEMATURIA: Primary | ICD-10-CM

## 2018-12-20 LAB
SL AMB  POCT GLUCOSE, UA: NORMAL
SL AMB LEUKOCYTE ESTERASE,UA: NORMAL
SL AMB POCT BLOOD,UA: NORMAL
SL AMB POCT CLARITY,UA: NORMAL
SL AMB POCT COLOR,UA: YELLOW
SL AMB POCT KETONES,UA: NORMAL
SL AMB POCT NITRITE,UA: NORMAL
SL AMB POCT PH,UA: 5
SL AMB POCT SPECIFIC GRAVITY,UA: 1.02
SL AMB POCT URINE PROTEIN: NORMAL

## 2018-12-20 PROCEDURE — 81002 URINALYSIS NONAUTO W/O SCOPE: CPT | Performed by: UROLOGY

## 2018-12-20 PROCEDURE — 52000 CYSTOURETHROSCOPY: CPT | Performed by: UROLOGY

## 2018-12-20 NOTE — PROGRESS NOTES
Office Cystoscopy Procedure Note    Indication:    Hematuria    Informed consent   The risks, benefits, complications, treatment options, and expected outcomes were discussed with the patient  The patient concurred with the proposed plan and provided informed consent  Anesthesia  Lidocaine jelly 2%    Antibiotic prophylaxis   None    Procedure  In the presence of a female nurse, the patient was placed in the supine frog-legged position, was prepped and draped in the usual manner using sterile technique, and 2% lidocaine jelly instilled into the urethra  A 17 F flexible cystoscope was then inserted into the urethra and the urethra and bladder carefully examined  The following findings were noted:    Findings:  Urethra:  Normal  Bladder:  Normal  Ureteral orifices:  Normal  Other findings:  None     Specimens: None                 Complications:    None; patient tolerated the procedure well           Disposition: To home after 30 minute observation  Condition: Stable    Plan: Patient has now completed a full hematuria workup including a CT and cystoscopy  Thankfully all this is negative for genitourinary malignancies  I recommended an annual urinalysis    She is a nonsmoker and an otherwise low risk patient, if she were to have continued microscopic hematuria after 5 years she would need a referral back to repeat her evaluation

## 2019-01-04 ENCOUNTER — HOSPITAL ENCOUNTER (OUTPATIENT)
Dept: RADIOLOGY | Facility: MEDICAL CENTER | Age: 59
Discharge: HOME | End: 2019-01-04
Payer: COMMERCIAL

## 2019-01-04 VITALS — WEIGHT: 226 LBS | BODY MASS INDEX: 36.32 KG/M2 | HEIGHT: 66 IN

## 2019-01-04 DIAGNOSIS — Z12.39 SCREENING FOR MALIGNANT NEOPLASM OF BREAST: ICD-10-CM

## 2019-01-04 PROCEDURE — 77067 SCR MAMMO BI INCL CAD: CPT

## 2019-02-15 ENCOUNTER — TELEPHONE (OUTPATIENT)
Dept: FAMILY MEDICINE CLINIC | Facility: MEDICAL CENTER | Age: 59
End: 2019-02-15

## 2019-02-15 NOTE — TELEPHONE ENCOUNTER
Patient called the office stated Dermatologist wants patient to take Humira for her Psoriasis and wants to know Dr Rasheed Klein opinion on this

## 2019-02-18 DIAGNOSIS — F41.9 ANXIETY: ICD-10-CM

## 2019-02-18 NOTE — TELEPHONE ENCOUNTER
If patient has any concerns with regards to the medication she should discuss this with her dermatologist

## 2019-02-19 ENCOUNTER — APPOINTMENT (OUTPATIENT)
Dept: LAB | Facility: MEDICAL CENTER | Age: 59
End: 2019-02-19
Payer: COMMERCIAL

## 2019-02-19 ENCOUNTER — TRANSCRIBE ORDERS (OUTPATIENT)
Dept: ADMINISTRATIVE | Facility: HOSPITAL | Age: 59
End: 2019-02-19

## 2019-02-19 DIAGNOSIS — L30.9 ACUTE DERMATITIS: ICD-10-CM

## 2019-02-19 DIAGNOSIS — L30.9 ACUTE DERMATITIS: Primary | ICD-10-CM

## 2019-02-19 LAB
ALBUMIN SERPL BCP-MCNC: 3.7 G/DL (ref 3.5–5)
ALP SERPL-CCNC: 85 U/L (ref 46–116)
ALT SERPL W P-5'-P-CCNC: 27 U/L (ref 12–78)
ANION GAP SERPL CALCULATED.3IONS-SCNC: 7 MMOL/L (ref 4–13)
AST SERPL W P-5'-P-CCNC: 7 U/L (ref 5–45)
BASOPHILS # BLD AUTO: 0.06 THOUSANDS/ΜL (ref 0–0.1)
BASOPHILS NFR BLD AUTO: 1 % (ref 0–1)
BILIRUB SERPL-MCNC: 0.24 MG/DL (ref 0.2–1)
BUN SERPL-MCNC: 20 MG/DL (ref 5–25)
CALCIUM SERPL-MCNC: 9 MG/DL (ref 8.3–10.1)
CHLORIDE SERPL-SCNC: 107 MMOL/L (ref 100–108)
CO2 SERPL-SCNC: 26 MMOL/L (ref 21–32)
CREAT SERPL-MCNC: 0.68 MG/DL (ref 0.6–1.3)
EOSINOPHIL # BLD AUTO: 0.12 THOUSAND/ΜL (ref 0–0.61)
EOSINOPHIL NFR BLD AUTO: 2 % (ref 0–6)
ERYTHROCYTE [DISTWIDTH] IN BLOOD BY AUTOMATED COUNT: 12.6 % (ref 11.6–15.1)
GFR SERPL CREATININE-BSD FRML MDRD: 97 ML/MIN/1.73SQ M
GLUCOSE P FAST SERPL-MCNC: 108 MG/DL (ref 65–99)
HBV CORE AB SER QL: NORMAL
HBV SURFACE AG SER QL: NORMAL
HCT VFR BLD AUTO: 43.2 % (ref 34.8–46.1)
HGB BLD-MCNC: 13.7 G/DL (ref 11.5–15.4)
IMM GRANULOCYTES # BLD AUTO: 0.03 THOUSAND/UL (ref 0–0.2)
IMM GRANULOCYTES NFR BLD AUTO: 1 % (ref 0–2)
LYMPHOCYTES # BLD AUTO: 1.78 THOUSANDS/ΜL (ref 0.6–4.47)
LYMPHOCYTES NFR BLD AUTO: 30 % (ref 14–44)
MCH RBC QN AUTO: 29.7 PG (ref 26.8–34.3)
MCHC RBC AUTO-ENTMCNC: 31.7 G/DL (ref 31.4–37.4)
MCV RBC AUTO: 94 FL (ref 82–98)
MONOCYTES # BLD AUTO: 0.36 THOUSAND/ΜL (ref 0.17–1.22)
MONOCYTES NFR BLD AUTO: 6 % (ref 4–12)
NEUTROPHILS # BLD AUTO: 3.63 THOUSANDS/ΜL (ref 1.85–7.62)
NEUTS SEG NFR BLD AUTO: 60 % (ref 43–75)
NRBC BLD AUTO-RTO: 0 /100 WBCS
PLATELET # BLD AUTO: 251 THOUSANDS/UL (ref 149–390)
PMV BLD AUTO: 10.6 FL (ref 8.9–12.7)
POTASSIUM SERPL-SCNC: 4.4 MMOL/L (ref 3.5–5.3)
PROT SERPL-MCNC: 7.5 G/DL (ref 6.4–8.2)
RBC # BLD AUTO: 4.61 MILLION/UL (ref 3.81–5.12)
SODIUM SERPL-SCNC: 140 MMOL/L (ref 136–145)
WBC # BLD AUTO: 5.98 THOUSAND/UL (ref 4.31–10.16)

## 2019-02-19 PROCEDURE — 85025 COMPLETE CBC W/AUTO DIFF WBC: CPT

## 2019-02-19 PROCEDURE — 86480 TB TEST CELL IMMUN MEASURE: CPT

## 2019-02-19 PROCEDURE — 36415 COLL VENOUS BLD VENIPUNCTURE: CPT

## 2019-02-19 PROCEDURE — 80053 COMPREHEN METABOLIC PANEL: CPT

## 2019-02-19 PROCEDURE — 86704 HEP B CORE ANTIBODY TOTAL: CPT

## 2019-02-19 PROCEDURE — 87340 HEPATITIS B SURFACE AG IA: CPT

## 2019-02-19 RX ORDER — SERTRALINE HYDROCHLORIDE 25 MG/1
50 TABLET, FILM COATED ORAL DAILY
Qty: 90 TABLET | Refills: 2 | Status: SHIPPED | OUTPATIENT
Start: 2019-02-19 | End: 2019-06-26 | Stop reason: SDUPTHER

## 2019-02-20 LAB
GAMMA INTERFERON BACKGROUND BLD IA-ACNC: 0.03 IU/ML
M TB IFN-G BLD-IMP: NEGATIVE
M TB IFN-G CD4+ BCKGRND COR BLD-ACNC: 0 IU/ML
M TB IFN-G CD4+ BCKGRND COR BLD-ACNC: 0.02 IU/ML
MITOGEN IGNF BCKGRD COR BLD-ACNC: 2.08 IU/ML

## 2019-02-25 DIAGNOSIS — I10 ESSENTIAL HYPERTENSION: ICD-10-CM

## 2019-02-25 NOTE — TELEPHONE ENCOUNTER
mltco-  Wait for RA to call back about the Valsartan   Pop up when I went to refill it, to check with pharmacy if ok to dispence with their supply

## 2019-02-27 RX ORDER — VALSARTAN 320 MG/1
320 TABLET ORAL DAILY
Qty: 90 TABLET | Refills: 1 | Status: SHIPPED | OUTPATIENT
Start: 2019-02-27 | End: 2019-08-19 | Stop reason: SDUPTHER

## 2019-03-15 DIAGNOSIS — E11.9 TYPE 2 DIABETES MELLITUS WITHOUT COMPLICATION, WITHOUT LONG-TERM CURRENT USE OF INSULIN (HCC): ICD-10-CM

## 2019-03-15 RX ORDER — PRAVASTATIN SODIUM 10 MG
TABLET ORAL
Qty: 90 TABLET | Refills: 1 | Status: SHIPPED | OUTPATIENT
Start: 2019-03-15 | End: 2019-09-13 | Stop reason: SDUPTHER

## 2019-04-02 ENCOUNTER — OFFICE VISIT (OUTPATIENT)
Dept: FAMILY MEDICINE CLINIC | Facility: MEDICAL CENTER | Age: 59
End: 2019-04-02
Payer: COMMERCIAL

## 2019-04-02 VITALS
SYSTOLIC BLOOD PRESSURE: 134 MMHG | WEIGHT: 204 LBS | RESPIRATION RATE: 16 BRPM | DIASTOLIC BLOOD PRESSURE: 80 MMHG | HEART RATE: 68 BPM | BODY MASS INDEX: 32.93 KG/M2

## 2019-04-02 DIAGNOSIS — F41.9 ANXIETY: ICD-10-CM

## 2019-04-02 DIAGNOSIS — E78.1 HYPERTRIGLYCERIDEMIA: ICD-10-CM

## 2019-04-02 DIAGNOSIS — I10 ESSENTIAL HYPERTENSION: ICD-10-CM

## 2019-04-02 DIAGNOSIS — R80.9 TYPE 2 DIABETES MELLITUS WITH MICROALBUMINURIA, WITHOUT LONG-TERM CURRENT USE OF INSULIN (HCC): Primary | ICD-10-CM

## 2019-04-02 DIAGNOSIS — E11.29 TYPE 2 DIABETES MELLITUS WITH MICROALBUMINURIA, WITHOUT LONG-TERM CURRENT USE OF INSULIN (HCC): Primary | ICD-10-CM

## 2019-04-02 PROBLEM — I60.9 SAH (SUBARACHNOID HEMORRHAGE) (HCC): Status: RESOLVED | Noted: 2018-06-11 | Resolved: 2019-04-02

## 2019-04-02 PROBLEM — S09.90XA CLOSED HEAD INJURY: Status: RESOLVED | Noted: 2018-06-11 | Resolved: 2019-04-02

## 2019-04-02 PROBLEM — S06.0X9A CONCUSSION WITH LOSS OF CONSCIOUSNESS: Status: RESOLVED | Noted: 2018-06-22 | Resolved: 2019-04-02

## 2019-04-02 PROBLEM — S22.39XA CLOSED RIB FRACTURE: Status: RESOLVED | Noted: 2018-06-11 | Resolved: 2019-04-02

## 2019-04-02 LAB — SL AMB POCT HEMOGLOBIN AIC: 5.7 (ref ?–6.5)

## 2019-04-02 PROCEDURE — 3079F DIAST BP 80-89 MM HG: CPT | Performed by: FAMILY MEDICINE

## 2019-04-02 PROCEDURE — 3075F SYST BP GE 130 - 139MM HG: CPT | Performed by: FAMILY MEDICINE

## 2019-04-02 PROCEDURE — 83036 HEMOGLOBIN GLYCOSYLATED A1C: CPT | Performed by: FAMILY MEDICINE

## 2019-04-02 PROCEDURE — 1036F TOBACCO NON-USER: CPT | Performed by: FAMILY MEDICINE

## 2019-04-02 PROCEDURE — 99214 OFFICE O/P EST MOD 30 MIN: CPT | Performed by: FAMILY MEDICINE

## 2019-04-02 PROCEDURE — 3044F HG A1C LEVEL LT 7.0%: CPT | Performed by: FAMILY MEDICINE

## 2019-04-02 RX ORDER — BETAMETHASONE DIPROPIONATE 0.5 MG/G
CREAM TOPICAL
Refills: 0 | COMMUNITY
Start: 2019-01-25 | End: 2019-10-02 | Stop reason: ALTCHOICE

## 2019-04-02 RX ORDER — LORAZEPAM 0.5 MG/1
0.5 TABLET ORAL DAILY PRN
Qty: 30 TABLET | Refills: 0 | Status: SHIPPED | OUTPATIENT
Start: 2019-04-02 | End: 2019-05-14

## 2019-05-13 ENCOUNTER — TELEPHONE (OUTPATIENT)
Dept: FAMILY MEDICINE CLINIC | Facility: MEDICAL CENTER | Age: 59
End: 2019-05-13

## 2019-05-17 LAB
LEFT EYE DIABETIC RETINOPATHY: NORMAL
RIGHT EYE DIABETIC RETINOPATHY: NORMAL

## 2019-06-26 DIAGNOSIS — F41.9 ANXIETY: ICD-10-CM

## 2019-08-19 DIAGNOSIS — I10 ESSENTIAL HYPERTENSION: ICD-10-CM

## 2019-08-20 PROCEDURE — 4010F ACE/ARB THERAPY RXD/TAKEN: CPT | Performed by: FAMILY MEDICINE

## 2019-08-20 RX ORDER — VALSARTAN 320 MG/1
TABLET ORAL
Qty: 90 TABLET | Refills: 1 | Status: SHIPPED | OUTPATIENT
Start: 2019-08-20 | End: 2020-02-11

## 2019-08-27 ENCOUNTER — TRANSCRIBE ORDERS (OUTPATIENT)
Dept: ADMINISTRATIVE | Facility: HOSPITAL | Age: 59
End: 2019-08-27

## 2019-08-27 ENCOUNTER — APPOINTMENT (OUTPATIENT)
Dept: LAB | Facility: MEDICAL CENTER | Age: 59
End: 2019-08-27
Payer: COMMERCIAL

## 2019-08-27 DIAGNOSIS — L40.0 PSORIASIS VULGARIS: Primary | ICD-10-CM

## 2019-08-27 LAB
ALBUMIN SERPL BCP-MCNC: 4 G/DL (ref 3.5–5)
ALP SERPL-CCNC: 87 U/L (ref 46–116)
ALT SERPL W P-5'-P-CCNC: 30 U/L (ref 12–78)
ANION GAP SERPL CALCULATED.3IONS-SCNC: 6 MMOL/L (ref 4–13)
AST SERPL W P-5'-P-CCNC: 7 U/L (ref 5–45)
BASOPHILS # BLD AUTO: 0.06 THOUSANDS/ΜL (ref 0–0.1)
BASOPHILS NFR BLD AUTO: 1 % (ref 0–1)
BILIRUB SERPL-MCNC: 0.35 MG/DL (ref 0.2–1)
BUN SERPL-MCNC: 17 MG/DL (ref 5–25)
CALCIUM SERPL-MCNC: 9.3 MG/DL (ref 8.3–10.1)
CHLORIDE SERPL-SCNC: 108 MMOL/L (ref 100–108)
CO2 SERPL-SCNC: 27 MMOL/L (ref 21–32)
CREAT SERPL-MCNC: 0.77 MG/DL (ref 0.6–1.3)
EOSINOPHIL # BLD AUTO: 0.17 THOUSAND/ΜL (ref 0–0.61)
EOSINOPHIL NFR BLD AUTO: 3 % (ref 0–6)
ERYTHROCYTE [DISTWIDTH] IN BLOOD BY AUTOMATED COUNT: 12.8 % (ref 11.6–15.1)
GFR SERPL CREATININE-BSD FRML MDRD: 85 ML/MIN/1.73SQ M
GLUCOSE P FAST SERPL-MCNC: 119 MG/DL (ref 65–99)
HCT VFR BLD AUTO: 43.8 % (ref 34.8–46.1)
HGB BLD-MCNC: 13.6 G/DL (ref 11.5–15.4)
IMM GRANULOCYTES # BLD AUTO: 0.03 THOUSAND/UL (ref 0–0.2)
IMM GRANULOCYTES NFR BLD AUTO: 0 % (ref 0–2)
LYMPHOCYTES # BLD AUTO: 2.15 THOUSANDS/ΜL (ref 0.6–4.47)
LYMPHOCYTES NFR BLD AUTO: 32 % (ref 14–44)
MCH RBC QN AUTO: 29.3 PG (ref 26.8–34.3)
MCHC RBC AUTO-ENTMCNC: 31.1 G/DL (ref 31.4–37.4)
MCV RBC AUTO: 94 FL (ref 82–98)
MONOCYTES # BLD AUTO: 0.56 THOUSAND/ΜL (ref 0.17–1.22)
MONOCYTES NFR BLD AUTO: 8 % (ref 4–12)
NEUTROPHILS # BLD AUTO: 3.8 THOUSANDS/ΜL (ref 1.85–7.62)
NEUTS SEG NFR BLD AUTO: 56 % (ref 43–75)
NRBC BLD AUTO-RTO: 0 /100 WBCS
PLATELET # BLD AUTO: 274 THOUSANDS/UL (ref 149–390)
PMV BLD AUTO: 10.7 FL (ref 8.9–12.7)
POTASSIUM SERPL-SCNC: 4.7 MMOL/L (ref 3.5–5.3)
PROT SERPL-MCNC: 7.4 G/DL (ref 6.4–8.2)
RBC # BLD AUTO: 4.64 MILLION/UL (ref 3.81–5.12)
SODIUM SERPL-SCNC: 141 MMOL/L (ref 136–145)
WBC # BLD AUTO: 6.77 THOUSAND/UL (ref 4.31–10.16)

## 2019-08-27 PROCEDURE — 36415 COLL VENOUS BLD VENIPUNCTURE: CPT | Performed by: NURSE PRACTITIONER

## 2019-08-27 PROCEDURE — 80053 COMPREHEN METABOLIC PANEL: CPT | Performed by: NURSE PRACTITIONER

## 2019-08-27 PROCEDURE — 85025 COMPLETE CBC W/AUTO DIFF WBC: CPT | Performed by: NURSE PRACTITIONER

## 2019-09-13 DIAGNOSIS — E11.9 TYPE 2 DIABETES MELLITUS WITHOUT COMPLICATION, WITHOUT LONG-TERM CURRENT USE OF INSULIN (HCC): ICD-10-CM

## 2019-09-13 RX ORDER — PRAVASTATIN SODIUM 10 MG
TABLET ORAL
Qty: 90 TABLET | Refills: 1 | Status: SHIPPED | OUTPATIENT
Start: 2019-09-13 | End: 2020-03-16

## 2019-10-02 ENCOUNTER — OFFICE VISIT (OUTPATIENT)
Dept: FAMILY MEDICINE CLINIC | Facility: MEDICAL CENTER | Age: 59
End: 2019-10-02
Payer: COMMERCIAL

## 2019-10-02 ENCOUNTER — APPOINTMENT (OUTPATIENT)
Dept: LAB | Facility: MEDICAL CENTER | Age: 59
End: 2019-10-02
Payer: COMMERCIAL

## 2019-10-02 VITALS
RESPIRATION RATE: 16 BRPM | HEART RATE: 76 BPM | DIASTOLIC BLOOD PRESSURE: 90 MMHG | BODY MASS INDEX: 34.96 KG/M2 | WEIGHT: 216.6 LBS | SYSTOLIC BLOOD PRESSURE: 160 MMHG

## 2019-10-02 DIAGNOSIS — E78.1 HYPERTRIGLYCERIDEMIA: ICD-10-CM

## 2019-10-02 DIAGNOSIS — R80.9 TYPE 2 DIABETES MELLITUS WITH MICROALBUMINURIA, WITHOUT LONG-TERM CURRENT USE OF INSULIN (HCC): Primary | ICD-10-CM

## 2019-10-02 DIAGNOSIS — Z23 ENCOUNTER FOR IMMUNIZATION: ICD-10-CM

## 2019-10-02 DIAGNOSIS — I10 ESSENTIAL HYPERTENSION: ICD-10-CM

## 2019-10-02 DIAGNOSIS — R80.9 TYPE 2 DIABETES MELLITUS WITH MICROALBUMINURIA, WITHOUT LONG-TERM CURRENT USE OF INSULIN (HCC): ICD-10-CM

## 2019-10-02 DIAGNOSIS — E66.09 CLASS 1 OBESITY DUE TO EXCESS CALORIES WITH SERIOUS COMORBIDITY AND BODY MASS INDEX (BMI) OF 34.0 TO 34.9 IN ADULT: ICD-10-CM

## 2019-10-02 DIAGNOSIS — E11.29 TYPE 2 DIABETES MELLITUS WITH MICROALBUMINURIA, WITHOUT LONG-TERM CURRENT USE OF INSULIN (HCC): Primary | ICD-10-CM

## 2019-10-02 DIAGNOSIS — E11.29 TYPE 2 DIABETES MELLITUS WITH MICROALBUMINURIA, WITHOUT LONG-TERM CURRENT USE OF INSULIN (HCC): ICD-10-CM

## 2019-10-02 DIAGNOSIS — F41.9 ANXIETY: ICD-10-CM

## 2019-10-02 PROBLEM — E66.9 OBESITY: Status: ACTIVE | Noted: 2019-10-02

## 2019-10-02 LAB
ALBUMIN SERPL BCP-MCNC: 4.3 G/DL (ref 3.5–5)
ALP SERPL-CCNC: 94 U/L (ref 46–116)
ALT SERPL W P-5'-P-CCNC: 34 U/L (ref 12–78)
ANION GAP SERPL CALCULATED.3IONS-SCNC: 8 MMOL/L (ref 4–13)
AST SERPL W P-5'-P-CCNC: 7 U/L (ref 5–45)
BILIRUB SERPL-MCNC: 0.38 MG/DL (ref 0.2–1)
BUN SERPL-MCNC: 14 MG/DL (ref 5–25)
CALCIUM SERPL-MCNC: 9.8 MG/DL (ref 8.3–10.1)
CHLORIDE SERPL-SCNC: 106 MMOL/L (ref 100–108)
CHOLEST SERPL-MCNC: 185 MG/DL (ref 50–200)
CO2 SERPL-SCNC: 26 MMOL/L (ref 21–32)
CREAT SERPL-MCNC: 0.75 MG/DL (ref 0.6–1.3)
CREAT UR-MCNC: 43.1 MG/DL
GFR SERPL CREATININE-BSD FRML MDRD: 88 ML/MIN/1.73SQ M
GLUCOSE P FAST SERPL-MCNC: 104 MG/DL (ref 65–99)
HDLC SERPL-MCNC: 52 MG/DL (ref 40–60)
LDLC SERPL CALC-MCNC: 113 MG/DL (ref 0–100)
MICROALBUMIN UR-MCNC: 6.4 MG/L (ref 0–20)
MICROALBUMIN/CREAT 24H UR: 15 MG/G CREATININE (ref 0–30)
POTASSIUM SERPL-SCNC: 4.4 MMOL/L (ref 3.5–5.3)
PROT SERPL-MCNC: 8.2 G/DL (ref 6.4–8.2)
SL AMB POCT HEMOGLOBIN AIC: 6.1 (ref ?–6.5)
SODIUM SERPL-SCNC: 140 MMOL/L (ref 136–145)
TRIGL SERPL-MCNC: 102 MG/DL

## 2019-10-02 PROCEDURE — 82570 ASSAY OF URINE CREATININE: CPT | Performed by: FAMILY MEDICINE

## 2019-10-02 PROCEDURE — 82043 UR ALBUMIN QUANTITATIVE: CPT | Performed by: FAMILY MEDICINE

## 2019-10-02 PROCEDURE — 80053 COMPREHEN METABOLIC PANEL: CPT

## 2019-10-02 PROCEDURE — 90732 PPSV23 VACC 2 YRS+ SUBQ/IM: CPT | Performed by: FAMILY MEDICINE

## 2019-10-02 PROCEDURE — 90471 IMMUNIZATION ADMIN: CPT | Performed by: FAMILY MEDICINE

## 2019-10-02 PROCEDURE — 83036 HEMOGLOBIN GLYCOSYLATED A1C: CPT | Performed by: FAMILY MEDICINE

## 2019-10-02 PROCEDURE — 36415 COLL VENOUS BLD VENIPUNCTURE: CPT

## 2019-10-02 PROCEDURE — 80061 LIPID PANEL: CPT

## 2019-10-02 PROCEDURE — 99214 OFFICE O/P EST MOD 30 MIN: CPT | Performed by: FAMILY MEDICINE

## 2019-10-02 RX ORDER — SERTRALINE HYDROCHLORIDE 100 MG/1
100 TABLET, FILM COATED ORAL DAILY
Qty: 90 TABLET | Refills: 0 | Status: SHIPPED | OUTPATIENT
Start: 2019-10-02 | End: 2019-12-24 | Stop reason: SDUPTHER

## 2019-10-02 RX ORDER — HYDROCHLOROTHIAZIDE 12.5 MG/1
12.5 TABLET ORAL DAILY
Qty: 90 TABLET | Refills: 0 | Status: SHIPPED | OUTPATIENT
Start: 2019-10-02 | End: 2019-12-24 | Stop reason: SDUPTHER

## 2019-10-02 RX ORDER — CALCIPOTRIENE 50 UG/G
CREAM TOPICAL
Refills: 0 | COMMUNITY
Start: 2019-06-28 | End: 2019-10-04

## 2019-10-02 RX ORDER — ADALIMUMAB 40MG/0.4ML
KIT SUBCUTANEOUS
COMMUNITY
Start: 2019-09-20 | End: 2019-11-22

## 2019-10-02 NOTE — PROGRESS NOTES
Assessment/Plan:    No problem-specific Assessment & Plan notes found for this encounter  Diagnoses and all orders for this visit:    Type 2 diabetes mellitus with microalbuminuria, without long-term current use of insulin (HCC)  -     POCT hemoglobin A1c  -     Comprehensive metabolic panel; Future  -     Microalbumin / creatinine urine ratio  Fairly well controlled but has worsened since last visit as in office A1c today was 6 1%  Continue same dose of metformin  Patient encouraged to utilize diet and exercise to help with weight loss  Check labs  Essential hypertension  -     hydrochlorothiazide (HYDRODIURIL) 12 5 mg tablet; Take 1 tablet (12 5 mg total) by mouth daily  -     Comprehensive metabolic panel; Future  -     Microalbumin / creatinine urine ratio  Elevated in the office today  Likely secondary to weight gain  Continue valsartan  Add hydrochlorothiazide  Check labs to assess renal function  Hypertriglyceridemia  -     Comprehensive metabolic panel; Future  -     Lipid Panel with Direct LDL reflex; Future  Check labs to assess triglyceride levels and lipid levels overall  Adjust medication if needed based on lipid levels and hepatic function  Anxiety  -     sertraline (ZOLOFT) 100 mg tablet; Take 1 tablet (100 mg total) by mouth daily  Has worsen  Increase Zoloft from 50 mg daily to 100 mg daily  Class 1 obesity due to excess calories with serious comorbidity and body mass index (BMI) of 34 0 to 34 9 in adult  -     Ambulatory referral to Weight Management; Future  BMI Counseling: Body mass index is 34 96 kg/m²  The BMI is above normal  Nutrition recommendations include decreasing overall calorie intake  Exercise recommendations include moderate aerobic physical activity for 150 minutes/week  Patient referred to weight management due to patient being obese      Encounter for immunization  -     PNEUMOCOCCAL POLYSACCHARIDE VACCINE 23-VALENT =>3YO SQ IM  Pneumococcal vaccine given tolerated well  Patient states she had the flu vaccine and will bring in a documentation showing proof of this  Other orders  -     HUMIRA PEN 40 MG/0 4ML PNKT  -     calcipotriene (DOVONEX) 0 005 % cream    Follow-up in one month or sooner if needed  Subjective:      Patient ID: Aimee Pedersen is a 62 y o  female  Patient presents for follow-up  She has type 2 diabetes  She is currently on metformin 500 mg twice daily  Admits to not being very compliant with diet and exercise and has gained some weight  She believes her A1c will be elevated today  She has hypertension  She is currently on valsartan 320 mg daily  She has noticed her blood pressure has been increasing  She believes is secondary to weight gain  She has elevated triglycerides  She is on pravastatin 10 mg daily  Tolerating medication well without any myalgias  She has anxiety  She is currently on Zoloft 50 mg daily  Her anxiety has been worsened recently particular with weight gain  She would like to increase her dose of medication if possible  The following portions of the patient's history were reviewed and updated as appropriate:   She  has a past medical history of Anemia, Anxiety, Closed head injury (6/11/2018), Closed rib fracture (6/11/2018), Concussion with loss of consciousness (6/22/2018), Depression, Diabetes 1 5, managed as type 2 ([facility] [address] ), Diarrhea, Fecal incontinence, Head injury, History of colon polyps, Hypertension, and SAH (subarachnoid hemorrhage) ([facility] [address] ) (6/11/2018)    She   Patient Active Problem List    Diagnosis Date Noted    Obesity 10/02/2019    Dizziness and giddiness 07/27/2018    Hypertriglyceridemia 07/05/2018    Hematuria 07/05/2018    Type 2 diabetes mellitus with microalbuminuria, without long-term current use of insulin ([address] ) 07/05/2018    Mild TBI ([facility] [address] ) 06/11/2018    Anxiety 08/25/2017    Hypertension 08/25/2017     She  has a past surgical history that includes Rotator cuff repair; Nasal septum surgery; Cholecystectomy; Hysterectomy; Colonoscopy (N/A, 10/24/2017); Colonoscopy w/ endoscopic US (N/A, 10/24/2017); Endometrial biopsy; Dilation and curettage of uterus; Other surgical history; and Nose surgery  Her family history includes Anxiety disorder in her family; Atrial fibrillation in her mother; Depression in her family; Diabetes in her family; Fibrocystic breast disease in her family; Heart disease in her family and father; Hiatal hernia in her family; Hypertension in her family; Irritable bowel syndrome in her family; Kidney disease in her family; Lung cancer in her father; No Known Problems in her brother and brother; Other in her family; Urinary tract infection in her family  She  reports that she has quit smoking  She has never used smokeless tobacco  She reports that she drinks alcohol  She reports that she does not use drugs  Current Outpatient Medications   Medication Sig Dispense Refill    acetaminophen (TYLENOL) 325 mg tablet Take 2 tablets (650 mg total) by mouth every 6 (six) hours as needed for mild pain 30 tablet 0    aspirin (LILIYA ASPIRIN EC LOW DOSE) 81 mg EC tablet Take 81 mg by mouth daily      calcipotriene (DOVONEX) 0 005 % cream   0    HUMIRA PEN 40 MG/0 4ML PNKT       metFORMIN (GLUCOPHAGE) 500 mg tablet take 1 tablet by mouth twice a day with meals 180 tablet 1    pravastatin (PRAVACHOL) 10 mg tablet take 1 tablet by mouth once daily 90 tablet 1    sertraline (ZOLOFT) 100 mg tablet Take 1 tablet (100 mg total) by mouth daily 90 tablet 0    valsartan (DIOVAN) 320 MG tablet take 1 tablet by mouth once daily 90 tablet 1    hydrochlorothiazide (HYDRODIURIL) 12 5 mg tablet Take 1 tablet (12 5 mg total) by mouth daily 90 tablet 0     No current facility-administered medications for this visit        Current Outpatient Medications on File Prior to Visit   Medication Sig    acetaminophen (TYLENOL) 325 mg tablet Take 2 tablets (650 mg total) by mouth every 6 (six) hours as needed for mild pain    aspirin (LILIYA ASPIRIN EC LOW DOSE) 81 mg EC tablet Take 81 mg by mouth daily    calcipotriene (DOVONEX) 0 005 % cream     HUMIRA PEN 40 MG/0 4ML PNKT     metFORMIN (GLUCOPHAGE) 500 mg tablet take 1 tablet by mouth twice a day with meals    pravastatin (PRAVACHOL) 10 mg tablet take 1 tablet by mouth once daily    valsartan (DIOVAN) 320 MG tablet take 1 tablet by mouth once daily    [DISCONTINUED] sertraline (ZOLOFT) 50 mg tablet Take 1 tablet (50 mg total) by mouth daily    [DISCONTINUED] betamethasone, augmented, (DIPROLENE-AF) 0 05 % cream APPLY TO AFFECTED AREA ON LEGS TWICE DAILY FOR 2 WEEKS, THEN 2 TO     (REFER TO PRESCRIPTION NOTES)  No current facility-administered medications on file prior to visit  She is allergic to vicodin [hydrocodone-acetaminophen]       Review of Systems   Constitutional: Negative for fever  Respiratory: Negative for shortness of breath  Cardiovascular: Negative for chest pain  Objective:      /90 (Cuff Size: Large)   Pulse 76   Resp 16   Wt 98 2 kg (216 lb 9 6 oz)   BMI 34 96 kg/m²          Physical Exam   Constitutional: She appears well-developed and well-nourished  Cardiovascular: Normal rate, regular rhythm and normal heart sounds  Pulses are no weak pulses  Pulses:       Dorsalis pedis pulses are 2+ on the right side, and 2+ on the left side  Posterior tibial pulses are 2+ on the right side, and 2+ on the left side  Pulmonary/Chest: Effort normal and breath sounds normal    Feet:   Right Foot:   Skin Integrity: Negative for ulcer, skin breakdown, erythema, warmth, callus or dry skin  Left Foot:   Skin Integrity: Negative for ulcer, skin breakdown, erythema, warmth, callus or dry skin  Diabetic Foot Exam    Patient's shoes and socks removed  Right Foot/Ankle   Right Foot Inspection  Skin Exam: skin normal and skin intact no dry skin, no warmth, no callus, no erythema, no maceration, no abnormal color, no pre-ulcer, no ulcer and no callus                          Toe Exam: ROM and strength within normal limits  Sensory   Vibration: intact    Monofilament testing: intact  Vascular  Capillary refills: < 3 seconds  The right DP pulse is 2+  The right PT pulse is 2+  Left Foot/Ankle  Left Foot Inspection  Skin Exam: skin normal and skin intactno dry skin, no warmth, no erythema, no maceration, normal color, no pre-ulcer, no ulcer and no callus                         Toe Exam: ROM and strength within normal limits                   Sensory   Vibration: intact    Monofilament: intact  Vascular  Capillary refills: < 3 seconds  The left DP pulse is 2+  The left PT pulse is 2+  Assign Risk Category:  No deformity present; No loss of protective sensation;  No weak pulses       Risk: 0

## 2019-10-04 ENCOUNTER — OFFICE VISIT (OUTPATIENT)
Dept: OBGYN CLINIC | Facility: MEDICAL CENTER | Age: 59
End: 2019-10-04
Payer: COMMERCIAL

## 2019-10-04 VITALS
HEART RATE: 76 BPM | BODY MASS INDEX: 34.46 KG/M2 | WEIGHT: 214.4 LBS | SYSTOLIC BLOOD PRESSURE: 152 MMHG | DIASTOLIC BLOOD PRESSURE: 97 MMHG | HEIGHT: 66 IN

## 2019-10-04 DIAGNOSIS — M75.41 IMPINGEMENT SYNDROME OF RIGHT SHOULDER: ICD-10-CM

## 2019-10-04 DIAGNOSIS — S46.011A TRAUMATIC INCOMPLETE TEAR OF RIGHT ROTATOR CUFF, INITIAL ENCOUNTER: Primary | ICD-10-CM

## 2019-10-04 DIAGNOSIS — M75.21 BICEPS TENDONITIS, RIGHT: ICD-10-CM

## 2019-10-04 PROCEDURE — 20605 DRAIN/INJ JOINT/BURSA W/O US: CPT | Performed by: ORTHOPAEDIC SURGERY

## 2019-10-04 PROCEDURE — 99213 OFFICE O/P EST LOW 20 MIN: CPT | Performed by: ORTHOPAEDIC SURGERY

## 2019-10-04 PROCEDURE — 20610 DRAIN/INJ JOINT/BURSA W/O US: CPT | Performed by: ORTHOPAEDIC SURGERY

## 2019-10-04 RX ORDER — BUPIVACAINE HYDROCHLORIDE 2.5 MG/ML
2 INJECTION, SOLUTION INFILTRATION; PERINEURAL
Status: COMPLETED | OUTPATIENT
Start: 2019-10-04 | End: 2019-10-04

## 2019-10-04 RX ORDER — BETAMETHASONE SODIUM PHOSPHATE AND BETAMETHASONE ACETATE 3; 3 MG/ML; MG/ML
12 INJECTION, SUSPENSION INTRA-ARTICULAR; INTRALESIONAL; INTRAMUSCULAR; SOFT TISSUE
Status: COMPLETED | OUTPATIENT
Start: 2019-10-04 | End: 2019-10-04

## 2019-10-04 RX ORDER — LIDOCAINE HYDROCHLORIDE 10 MG/ML
2 INJECTION, SOLUTION INFILTRATION; PERINEURAL
Status: COMPLETED | OUTPATIENT
Start: 2019-10-04 | End: 2019-10-04

## 2019-10-04 RX ORDER — BETAMETHASONE SODIUM PHOSPHATE AND BETAMETHASONE ACETATE 3; 3 MG/ML; MG/ML
6 INJECTION, SUSPENSION INTRA-ARTICULAR; INTRALESIONAL; INTRAMUSCULAR; SOFT TISSUE
Status: COMPLETED | OUTPATIENT
Start: 2019-10-04 | End: 2019-10-04

## 2019-10-04 RX ORDER — LIDOCAINE HYDROCHLORIDE 10 MG/ML
1 INJECTION, SOLUTION INFILTRATION; PERINEURAL
Status: COMPLETED | OUTPATIENT
Start: 2019-10-04 | End: 2019-10-04

## 2019-10-04 RX ORDER — BUPIVACAINE HYDROCHLORIDE 2.5 MG/ML
1 INJECTION, SOLUTION INFILTRATION; PERINEURAL
Status: COMPLETED | OUTPATIENT
Start: 2019-10-04 | End: 2019-10-04

## 2019-10-04 RX ADMIN — BUPIVACAINE HYDROCHLORIDE 2 ML: 2.5 INJECTION, SOLUTION INFILTRATION; PERINEURAL at 11:36

## 2019-10-04 RX ADMIN — LIDOCAINE HYDROCHLORIDE 1 ML: 10 INJECTION, SOLUTION INFILTRATION; PERINEURAL at 11:37

## 2019-10-04 RX ADMIN — BETAMETHASONE SODIUM PHOSPHATE AND BETAMETHASONE ACETATE 12 MG: 3; 3 INJECTION, SUSPENSION INTRA-ARTICULAR; INTRALESIONAL; INTRAMUSCULAR; SOFT TISSUE at 11:36

## 2019-10-04 RX ADMIN — BETAMETHASONE SODIUM PHOSPHATE AND BETAMETHASONE ACETATE 6 MG: 3; 3 INJECTION, SUSPENSION INTRA-ARTICULAR; INTRALESIONAL; INTRAMUSCULAR; SOFT TISSUE at 11:37

## 2019-10-04 RX ADMIN — BUPIVACAINE HYDROCHLORIDE 1 ML: 2.5 INJECTION, SOLUTION INFILTRATION; PERINEURAL at 11:37

## 2019-10-04 RX ADMIN — LIDOCAINE HYDROCHLORIDE 2 ML: 10 INJECTION, SOLUTION INFILTRATION; PERINEURAL at 11:36

## 2019-10-04 NOTE — PROGRESS NOTES
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  She previously had rotator cuff repair and acromioplasty at [facility] for the left shoulder, and she feels that the symptoms are similar to what she had done on the opposite side  Today her pain is well controlled, and she does not have any limitations for movement  She denies any numbness, tingling, or weakness to the shoulder  Review of Systems  Review of systems negative unless otherwise specified in HPI    Past Medical History  Past Medical History:   Diagnosis Date    Anemia     Anxiety     Closed head injury 6/11/2018    Closed rib fracture 6/11/2018    Right sided    Concussion with loss of consciousness 6/22/2018    Depression     [facility]     Diarrhea     Fecal incontinence     Head injury     6/10/11    History of colon polyps     Hypertension     [facility]ach[facility]oid hemorrhage) ([facility] ) 6/11/2018       Past Surgical History  Past Surgical History:   Procedure Laterality Date    CHOLECYSTECTOMY      COLONOSCOPY N/A 10/24/2017    Procedure: COLONOSCOPY;  Surgeon: Nikolas Melgar MD;  [facility]; Service: Colorectal    COLONOSCOPY W/ ENDOSCOPIC US N/A 10/24/2017    Procedure: ANAL ENDOSCOPIC U/S;  Surgeon: Nikolas Melgar MD;  [facility];   Service: Colorectal    DILATION AND CURETTAGE OF UTERUS      ENDOMETRIAL BIOPSY      WITHOUT CERVICAL DILATION    HYSTERECTOMY      NASAL SEPTUM SURGERY      NOSE SURGERY      NASAL SEPTAL  DEVIATION REPAIR    OTHER SURGICAL HISTORY      ENDOSCOPIC CONTROL OF GASTRIC BLEEDING, EPISIOTOMY    ROTATOR CUFF REPAIR         Current Medications  Current Outpatient Medications on File Prior to Visit   Medication Sig Dispense Refill    acetaminophen (TYLENOL) 325 mg tablet Take 2 tablets (650 mg total) by mouth every 6 (six) hours as needed for mild pain 30 tablet 0    aspirin (LILIYA ASPIRIN EC LOW DOSE) 81 mg EC tablet Take 81 mg by mouth daily      Halobetasol Prop-Tazarotene (DUOBRII) 0 01-0 045 % LOTN Apply topically daily      HUMIRA PEN 40 MG/0 4ML PNKT       hydrochlorothiazide (HYDRODIURIL) 12 5 mg tablet Take 1 tablet (12 5 mg total) by mouth daily 90 tablet 0    metFORMIN (GLUCOPHAGE) 500 mg tablet take 1 tablet by mouth twice a day with meals 180 tablet 1    pravastatin (PRAVACHOL) 10 mg tablet take 1 tablet by mouth once daily 90 tablet 1    sertraline (ZOLOFT) 100 mg tablet Take 1 tablet (100 mg total) by mouth daily 90 tablet 0    valsartan (DIOVAN) 320 MG tablet take 1 tablet by mouth once daily 90 tablet 1    [DISCONTINUED] calcipotriene (DOVONEX) 0 005 % cream   0     No current facility-administered medications on file prior to visit          Recent Labs [facility])  0   Lab Value Date/Time    HCT 43 8 08/27/2019 0744    HGB 13 6 08/27/2019 0744    WBC 6 77 08/27/2019 0744    INR 0 94 06/11/2018 1243    HGBA1C 6 1 10/02/2019 0848    HGBA1C 6 0 10/03/2018 6151         Physical exam  · General: Awake, Alert, Oriented  · Eyes: Pupils equal, round and reactive to light  · Heart: regular rate and rhythm  · Lungs: No audible wheezing  · Abdomen: soft  MSK C-spine  -skin intact over the cervical spine  -no tenderness to palpation over the spinous processes or paraspinal muscles  -some limitation to neck extension, but otherwise full neck range of motion  -motor 5/5 C5 to T1  -sensation intact C5-T1  -2+ radial pulse    MSK right shoulder  -skin intact with right shoulder without ecchymosis or erythema  -tender palpation over the biceps tendon, as well as some tenderness posteriorly inferior to the acromion  -full active and passive range of motion with 0-130 abduction, and 0 to 170 forward flexion, 45° external rotation, intact lift-off  -positive Trice's, positive Toledo's impingement    Imaging  Next reviewed with the patient  X-rays from 2018 of the right shoulder shows no fracture dislocation, and no significant degenerative changes    Procedure  Large joint arthrocentesis: R subacromial bursa  Date/Time: 10/4/2019 11:36 AM  Consent given by: patient  Site marked: site marked  Timeout: Immediately prior to procedure a time out was called to verify the correct patient, procedure, equipment, support staff and site/side marked as required   Supporting Documentation  Indications: pain   Procedure Details  Location: shoulder - R subacromial bursa  Preparation: Patient was prepped and draped in the usual sterile fashion  Needle size: 22 G  Approach: posterolateral  Medications administered: 2 mL bupivacaine 0 25 %; 2 mL lidocaine 1 %; 12 mg betamethasone acetate-betamethasone sodium phosphate 6 (3-3) mg/mL    Patient tolerance: patient tolerated the procedure well with no immediate complications  Dressing:  Sterile dressing applied    Medium joint arthrocentesis  Date/Time: 10/4/2019 11:37 AM  Consent given by: patient  Site marked: site marked  Timeout: Immediately prior to procedure a time out was called to verify the correct patient, procedure, equipment, support staff and site/side marked as required   Supporting Documentation  Indications: pain   Procedure Details  Location: shoulder - Acromioclavicular joint: biceps tendon    Preparation: Patient was prepped and draped in the usual sterile fashion  Needle size: 22 G  Approach: anteromedial  Medications administered: 1 mL bupivacaine 0 25 %; 1 mL lidocaine 1 %; 6 mg betamethasone acetate-betamethasone sodium phosphate 6 (3-3) mg/mL    Patient tolerance: patient tolerated the procedure well with no immediate complications  Dressing:  Sterile dressing applied            Assessment/Plan:   58 y o female presenting with right shoulder impingement syndrome, so stated rotator cuff tendinitis and biceps tendinitis  Patient was offered and in administered injections to the subacromial space, as well as the biceps tendon sheath  Additionally, the patient was given a prescription for physical therapy focus on strengthening her right shoulder muscles  Patient was instructed to follow up with us in the office in 6 weeks for repeat examination at that time

## 2019-10-15 ENCOUNTER — ANNUAL EXAM (OUTPATIENT)
Dept: OBGYN CLINIC | Facility: MEDICAL CENTER | Age: 59
End: 2019-10-15
Payer: COMMERCIAL

## 2019-10-15 VITALS
SYSTOLIC BLOOD PRESSURE: 138 MMHG | BODY MASS INDEX: 34.87 KG/M2 | DIASTOLIC BLOOD PRESSURE: 84 MMHG | WEIGHT: 217 LBS | HEIGHT: 66 IN

## 2019-10-15 DIAGNOSIS — Z12.31 ENCOUNTER FOR SCREENING MAMMOGRAM FOR MALIGNANT NEOPLASM OF BREAST: Primary | ICD-10-CM

## 2019-10-15 DIAGNOSIS — Z01.419 ROUTINE GYNECOLOGICAL EXAMINATION: ICD-10-CM

## 2019-10-15 PROCEDURE — 99396 PREV VISIT EST AGE 40-64: CPT | Performed by: PHYSICIAN ASSISTANT

## 2019-10-15 NOTE — PROGRESS NOTES
Assessment/Plan:    Routine gynecological examination  I have discussed the importance of monthly self-breast exams, exercise and healthy diet as well as adequate intake of calcium and vitamin D  The patient declines STD testing  The current ASCCP guidelines were reviewed  No PAP indicated, h/o hysterectomy  I emphasized the importance of an annual pelvic and breast exam  A yearly mammogram is recommended for breast cancer screening starting at age 36  All questions have been answered to her satisfaction  Diagnoses and all orders for this visit:    Encounter for screening mammogram for malignant neoplasm of breast  -     Mammo screening bilateral w cad; Future    Routine gynecological examination        Subjective:      Patient ID: Sirisha Hayden is a 62 y o  female  The patient comes in today for annual Gyn exam  The patient has no history of PMB, pelvic pain, abnormal vaginal discharge, breast mass or lumps, urinary symptoms, urinary incontinence, depression, and pelvic/vaginal pressure  Pt reports all additional systems reviewed are negative  The patient admits to monthly self breast exams, regular exercise, healthy diet,  and calcium and Vitamin D intake '  S/p hysterectomy 2005 d/t fibroids   Mammo, colonoscopy up to date  mx comorbidities managed by PCP    Is transportation /dispatcher        The following portions of the patient's history were reviewed and updated as appropriate: allergies, current medications, past family history, past medical history, past social history, past surgical history and problem list     Review of Systems   Constitutional: Negative for appetite change, fatigue and unexpected weight change  Respiratory: Negative for chest tightness and shortness of breath  Cardiovascular: Negative for chest pain, palpitations and leg swelling  Gastrointestinal: Negative for abdominal pain, constipation, diarrhea, nausea and vomiting     Genitourinary: Negative for difficulty urinating, dyspareunia, menstrual problem, pelvic pain and vaginal discharge  Musculoskeletal: Negative for arthralgias and myalgias  Neurological: Negative for dizziness, light-headedness and headaches  Psychiatric/Behavioral: Negative for dysphoric mood  The patient is not nervous/anxious  All other systems reviewed and are negative  Objective:      /84   Ht 5' 6" (1 676 m)   Wt 98 4 kg (217 lb)   Breastfeeding? No   BMI 35 02 kg/m²          Physical Exam   Constitutional: She is oriented to person, place, and time  She appears well-developed and well-nourished  HENT:   Head: Normocephalic and atraumatic  Neck: Neck supple  No thyromegaly present  Cardiovascular: Normal rate and regular rhythm  Pulmonary/Chest: Effort normal and breath sounds normal  Right breast exhibits no inverted nipple, no mass, no nipple discharge, no skin change and no tenderness  Left breast exhibits no inverted nipple, no mass, no nipple discharge, no skin change and no tenderness  No breast tenderness, discharge or bleeding  Abdominal: Soft  Bowel sounds are normal  Hernia confirmed negative in the right inguinal area and confirmed negative in the left inguinal area  Genitourinary: Vagina normal  No breast tenderness, discharge or bleeding  Pelvic exam was performed with patient supine  There is no rash, tenderness, lesion or injury on the right labia  There is no rash, tenderness, lesion or injury on the left labia  No vaginal discharge found  Genitourinary Comments: Uterus/cervix surgically absent     Neurological: She is alert and oriented to person, place, and time  Skin: Skin is warm and dry  Psychiatric: She has a normal mood and affect  Nursing note and vitals reviewed

## 2019-10-15 NOTE — ASSESSMENT & PLAN NOTE
I have discussed the importance of monthly self-breast exams, exercise and healthy diet as well as adequate intake of calcium and vitamin D  The patient declines STD testing  The current ASCCP guidelines were reviewed  No PAP indicated, h/o hysterectomy  I emphasized the importance of an annual pelvic and breast exam  A yearly mammogram is recommended for breast cancer screening starting at age 36  All questions have been answered to her satisfaction

## 2019-10-16 ENCOUNTER — EVALUATION (OUTPATIENT)
Dept: PHYSICAL THERAPY | Age: 59
End: 2019-10-16
Payer: COMMERCIAL

## 2019-10-16 DIAGNOSIS — S46.011A TRAUMATIC INCOMPLETE TEAR OF RIGHT ROTATOR CUFF, INITIAL ENCOUNTER: ICD-10-CM

## 2019-10-16 PROCEDURE — 97110 THERAPEUTIC EXERCISES: CPT | Performed by: PHYSICAL THERAPIST

## 2019-10-16 PROCEDURE — 97162 PT EVAL MOD COMPLEX 30 MIN: CPT | Performed by: PHYSICAL THERAPIST

## 2019-10-16 PROCEDURE — 97140 MANUAL THERAPY 1/> REGIONS: CPT | Performed by: PHYSICAL THERAPIST

## 2019-10-16 NOTE — PROGRESS NOTES
PT Evaluation     Today's date: 10/16/2019  Patient name: Shalonda Tiwari  : 1960  MRN: [id number]  Referring provider: Farida Croft MD  Dx:   Encounter Diagnosis     ICD-10-CM    1  Traumatic incomplete tear of right rotator cuff, initial encounter S46 011A Ambulatory referral to Physical Therapy                  Assessment  Assessment details: Shalonda Tiwari is a 62 y o  female who presents with progressive worsening of right shoulder pain, decreased overhead and ER strength, decreased A/PROM, decreased joint mobility and postural  dysfunction  Due to these impairments, Patient has difficulty performing a/iadls, recreational activities and engaging in social activities  Patient's clinical presentation is consistent with the referring diagnosis of right shoulder incomplete tear of RTC and a physical therapy diagnosis of right shoulder pain  Patient would benefit from skilled physical therapy to address her aforementioned impairments, improve her level of function and to improve her overall quality of life  Impairments: abnormal or restricted ROM, activity intolerance, impaired physical strength, lacks appropriate home exercise program, pain with function and poor posture   Functional limitations: difficulty reaching overhead and out to the side or behindUnderstanding of Dx/Px/POC: good   Prognosis: good    Goals  ST-3 WEEKS  1  Decrease pain in right shoulder by 2 points on VAS at its worst   2   Increase ROM by > 10 deg in all deficients planes  3   Increase UE by 1/2 MMT grade in all deficient planes  LT-6 WEEKS  1  Patient to be independent with a/iadls  2  Increase functional activities for leisure and home activities to previous LOF  3  Independent with HEP and/or fitness program   4  Improve FOTO by >10 points  Plan  Plan details: If patient continues with persistent pain, we will refer back to physician for further testing    Patient would benefit from: skilled physical therapy  Planned modality interventions: cryotherapy, thermotherapy: hydrocollator packs and unattended electrical stimulation  Planned therapy interventions: functional ROM exercises, home exercise program, joint mobilization, manual therapy, patient education, postural training, strengthening, stretching, therapeutic activities, therapeutic exercise, body mechanics training and neuromuscular re-education  Frequency: 2x week  Duration in weeks: 5  Plan of Care beginning date: 10/16/2019  Plan of Care expiration date: 2020  Treatment plan discussed with: patient        Subjective Evaluation    History of Present Illness  Mechanism of injury: Pt reports insidious onset of right shoulder pain for the past few months with progressive worsening  She was thrown form a horse last year which may have contributed to the right shoulder pain  She did receive a cortisone injection with some relief  She reports she has a "downward" acromion  She does have a prior left RTC repair  Pain  Current pain ratin  At best pain ratin  At worst pain ratin  Location: right shoulder  Quality: dull ache and sharp  Relieving factors: relaxation and rest  Aggravating factors: overhead activity and lifting    Hand dominance: right    Treatments  Current treatment: injection treatment and medication  Current treatment comments: ibuprofen  Patient Goals  Patient goals for therapy: decreased pain          Objective     Static Posture     Head  Forward  Shoulders  Rounded      Cervical/Thoracic Screen   Cervical range of motion within normal limits  Thoracic range of motion within normal limits    Neurological Testing     Additional Neurological Details  Intact to all sensations, no neuro deficits    Active Range of Motion   Left Shoulder   Normal active range of motion    Right Shoulder   Flexion: 150 degrees   Abduction: 120 degrees     Passive Range of Motion   Left Shoulder   Flexion: 165 degrees   Abduction: 170 degrees External rotation 90°: 90 degrees   Internal rotation 90°: 62 degrees     Right Shoulder   Flexion: 160 degrees with pain  Abduction: 120 degrees with pain  External rotation 90°: 90 degrees   Internal rotation 90°: 52 degrees     Additional Passive Range of Motion Details  Palpable clicking and catching with passive ROM    Strength/Myotome Testing     Left Shoulder     Planes of Motion   Flexion: 5   Abduction: 5   External rotation at 0°: 5   Internal rotation at 0°: 5     Isolated Muscles   Biceps: 5     Right Shoulder     Planes of Motion   Flexion: 4-   Abduction: 4-   External rotation at 0°: 4-   Internal rotation at 0°: 4     Isolated Muscles   Biceps: 5     Left Elbow   Normal strength    Right Elbow   Normal strength    Tests     Right Shoulder   Positive empty can       Additional Tests Details  Empty can produces pain and slight weakness             Precautions: standard      Manual  10/16            Right shoulder 15'                                                                    Exercise Diary  10/16            Ball closed chain CW/CCW/FF 30x ea            Ball press up (SA activation) NV            Row tband 20x blue            rtc exs Blue IR, ext  Green ER  20x ea            bicep 20#/ 20            tricep 40#/ 20                                                                                                                                                                                                      Modalities  10/16             ES 10'

## 2019-10-23 ENCOUNTER — OFFICE VISIT (OUTPATIENT)
Dept: PHYSICAL THERAPY | Age: 59
End: 2019-10-23
Payer: COMMERCIAL

## 2019-10-23 DIAGNOSIS — S46.011A TRAUMATIC INCOMPLETE TEAR OF RIGHT ROTATOR CUFF, INITIAL ENCOUNTER: Primary | ICD-10-CM

## 2019-10-23 PROCEDURE — 97110 THERAPEUTIC EXERCISES: CPT | Performed by: PHYSICAL THERAPIST

## 2019-10-23 PROCEDURE — 97014 ELECTRIC STIMULATION THERAPY: CPT | Performed by: PHYSICAL THERAPIST

## 2019-10-23 PROCEDURE — 97140 MANUAL THERAPY 1/> REGIONS: CPT | Performed by: PHYSICAL THERAPIST

## 2019-10-23 NOTE — PROGRESS NOTES
Daily Note     Today's date: 10/23/2019  Patient name: Allie Rodriguez  : 1960  MRN: [id number]  Referring provider: Yovany Garcia MD  Dx:   Encounter Diagnosis     ICD-10-CM    1  Traumatic incomplete tear of right rotator cuff, initial encounter S46 011A        Start Time: 630  Stop Time: 07  Total time in clinic (min): 50 minutes    Subjective: Pt report minimal pain today , pt does note clicking in shoulder with ROM  Objective: See treatment diary below      Assessment: Tolerated treatment fairly  well  Patient moreau shave clicking palpable with PROM and during AROM  Pain with ER  Plan: Continue per plan of care        Precautions: standard      Manual  10/16 10/23           Right shoulder 15' 15'                                                                   Exercise Diary  10/16 10/23           Ball closed chain CW/CCW/FF 30x ea 30x           Ball press up (SA activation) NV 20x           UBE  4'           Row tband 20x blue 30x           rtc exs Blue IR, ext  Green ER  20x ea 30x           bicep 20#/ 20 20#/ 20           tricep 40#/ 20 40#/ 30                                                                                                                                                                                                     Modalities  10/16 10/23           MH ES 10' 10'

## 2019-10-25 ENCOUNTER — OFFICE VISIT (OUTPATIENT)
Dept: PHYSICAL THERAPY | Age: 59
End: 2019-10-25
Payer: COMMERCIAL

## 2019-10-25 DIAGNOSIS — S46.011A TRAUMATIC INCOMPLETE TEAR OF RIGHT ROTATOR CUFF, INITIAL ENCOUNTER: Primary | ICD-10-CM

## 2019-10-25 PROCEDURE — 97014 ELECTRIC STIMULATION THERAPY: CPT | Performed by: PHYSICAL THERAPIST

## 2019-10-25 PROCEDURE — 97140 MANUAL THERAPY 1/> REGIONS: CPT | Performed by: PHYSICAL THERAPIST

## 2019-10-25 PROCEDURE — 97110 THERAPEUTIC EXERCISES: CPT | Performed by: PHYSICAL THERAPIST

## 2019-10-25 NOTE — PROGRESS NOTES
Daily Note     Today's date: 10/25/2019  Patient name: Bethany Kee  : 1960  MRN: [id number]  Referring provider: Donna Gomez MD  Dx:   Encounter Diagnosis     ICD-10-CM    1  Traumatic incomplete tear of right rotator cuff, initial encounter S46 011A        Start Time: 630  Stop Time: 728  Total time in clinic (min): 58 minutes    Subjective: Patient notes pain at 1-2/10 right shoulder that varies  Notes generally sore the day after PT  Objective: See treatment diary below      Assessment: Tolerated treatment well  Patient would benefit from continued PT Tightness left scapular musculature  Discomfort end ranges of all planes of motion today  Plan: Continue per plan of care        Precautions: standard      Modalities  10/16 10/23 10/25          MH ES 10' 10' 10                                             Manual  10/16 10/23 10/25          Right shoulder 15' 15' 15                                                                  Exercise Diary  10/16 10/23 10/25          Ball closed chain CW/CCW/FF 30x ea 30x 30x          Ball press up (SA activation) NV 20x 30x  red          UBE  4' 4'          Row tband 20x blue 30x 30x  blue          rtc exs Blue IR, ext  Green ER  20x ea 30x 30x          bicep 20#/ 20 20#/ 20 25/30          tricep 40#/ 20 40#/ 30 40/30

## 2019-10-30 ENCOUNTER — OFFICE VISIT (OUTPATIENT)
Dept: PHYSICAL THERAPY | Age: 59
End: 2019-10-30
Payer: COMMERCIAL

## 2019-10-30 DIAGNOSIS — S46.011A TRAUMATIC INCOMPLETE TEAR OF RIGHT ROTATOR CUFF, INITIAL ENCOUNTER: Primary | ICD-10-CM

## 2019-10-30 PROCEDURE — 97110 THERAPEUTIC EXERCISES: CPT | Performed by: PHYSICAL THERAPIST

## 2019-10-30 PROCEDURE — 97140 MANUAL THERAPY 1/> REGIONS: CPT | Performed by: PHYSICAL THERAPIST

## 2019-10-30 PROCEDURE — 97014 ELECTRIC STIMULATION THERAPY: CPT | Performed by: PHYSICAL THERAPIST

## 2019-10-30 NOTE — PROGRESS NOTES
Daily Note     Today's date: 10/30/2019  Patient name: Aimee Pedersen  : 1960  MRN: [id number]  Referring provider: Amy Galeana MD  Dx:   Encounter Diagnosis     ICD-10-CM    1  Traumatic incomplete tear of right rotator cuff, initial encounter S46 011A        Start Time: 630  Stop Time: 720  Total time in clinic (min): 50 minutes    Subjective: Pt continues to report pain with activity, increased soreness after PT sessions  Objective: See treatment diary below      Assessment: Tolerated treatment fair  Patient has pain with PROM at end ranges  Impingement sign positive  Suggested pt call physician to move up follow due to limited change symptoms and persistent pain  Plan: Continue until physcian follow-up       Precautions: standard      Modalities  10/16 10/23 10/25 10/30         MH ES 10' 10' 10 10                                            Manual  10/16 10/23 10/25 10/30         Right shoulder 15' 15' 15 15                                                                 Exercise Diary  10/16 10/23 10/25 10/30         Ball closed chain CW/CCW/FF 30x ea 30x 30x 30x         Ball press up (SA activation) NV 20x 30x  red 30x         UBE  4' 4' 4'         Row tband 20x blue 30x 30x  blue 30x         rtc exs Blue IR, ext  Green ER  20x ea 30x 30x 30x         bicep 20#/ 20 20#/ 20 25/30 25#/ 30         tricep 40#/ 20 40#/ 30 40/30 40#/ 30

## 2019-10-31 NOTE — PROGRESS NOTES
Daily Note     Today's date: 2019  Patient name: Jessy Lerma  : 1960  MRN: [id number]  Referring provider: Mi Oneal MD  Dx:   Encounter Diagnosis     ICD-10-CM    1  Traumatic incomplete tear of right rotator cuff, initial encounter S46 011A        Start Time: 0658  Stop Time: 0750  Total time in clinic (min): 52 minutes    Subjective: Patient reports about 1/10 right shoulder pain this morning  Objective: See treatment diary below      Assessment: Tolerated treatment fairly well,Tightness left scapular musculature  Discomfort end ranges of all planes of motion today  Patient demonstrated fatigue post treatment      Plan: Continue per plan of care        Precautions: standard      Modalities  10/16 10/23 10/25 10/30 11/01        MH ES 10' 10 10 10 10                                           Manual  10/16 10/23 10/25 10/30 11/01        Right shoulder 15' 15' 15 15 15                                                                Exercise Diary  10/16 10/23 10/25 10/30 11/01        Ball closed chain CW/CCW/FF 30x ea 30x 30x 30x 30x        Ball press up (SA activation) NV 20x 30x  red 30x 30x        UBE  4' 4' 4' 4'        Row tband 20x blue 30x 30x  blue 30x 30x        rtc exs Blue IR, ext  Green ER  20x ea 30x 30x 30x 30x        bicep 20#/ 20 20#/ 20 25/30 25#/ 30 25/30        tricep 40#/ 20 40#/ 30 40/30 40#/ 30 40/30

## 2019-11-01 ENCOUNTER — OFFICE VISIT (OUTPATIENT)
Dept: PHYSICAL THERAPY | Age: 59
End: 2019-11-01
Payer: COMMERCIAL

## 2019-11-01 DIAGNOSIS — S46.011A TRAUMATIC INCOMPLETE TEAR OF RIGHT ROTATOR CUFF, INITIAL ENCOUNTER: Primary | ICD-10-CM

## 2019-11-01 PROCEDURE — 97140 MANUAL THERAPY 1/> REGIONS: CPT

## 2019-11-01 PROCEDURE — 97110 THERAPEUTIC EXERCISES: CPT

## 2019-11-01 PROCEDURE — 97014 ELECTRIC STIMULATION THERAPY: CPT

## 2019-11-05 ENCOUNTER — OFFICE VISIT (OUTPATIENT)
Dept: FAMILY MEDICINE CLINIC | Facility: MEDICAL CENTER | Age: 59
End: 2019-11-05
Payer: COMMERCIAL

## 2019-11-05 ENCOUNTER — APPOINTMENT (OUTPATIENT)
Dept: LAB | Facility: MEDICAL CENTER | Age: 59
End: 2019-11-05
Payer: COMMERCIAL

## 2019-11-05 VITALS
BODY MASS INDEX: 34.86 KG/M2 | HEART RATE: 89 BPM | WEIGHT: 216 LBS | OXYGEN SATURATION: 98 % | DIASTOLIC BLOOD PRESSURE: 80 MMHG | SYSTOLIC BLOOD PRESSURE: 126 MMHG

## 2019-11-05 DIAGNOSIS — E78.1 HYPERTRIGLYCERIDEMIA: ICD-10-CM

## 2019-11-05 DIAGNOSIS — Z11.4 SCREENING FOR HIV (HUMAN IMMUNODEFICIENCY VIRUS): ICD-10-CM

## 2019-11-05 DIAGNOSIS — I10 ESSENTIAL HYPERTENSION: ICD-10-CM

## 2019-11-05 DIAGNOSIS — E11.29 TYPE 2 DIABETES MELLITUS WITH MICROALBUMINURIA, WITHOUT LONG-TERM CURRENT USE OF INSULIN (HCC): Primary | ICD-10-CM

## 2019-11-05 DIAGNOSIS — R80.9 TYPE 2 DIABETES MELLITUS WITH MICROALBUMINURIA, WITHOUT LONG-TERM CURRENT USE OF INSULIN (HCC): Primary | ICD-10-CM

## 2019-11-05 DIAGNOSIS — Z23 NEED FOR TDAP VACCINATION: ICD-10-CM

## 2019-11-05 PROCEDURE — 87389 HIV-1 AG W/HIV-1&-2 AB AG IA: CPT

## 2019-11-05 PROCEDURE — 99214 OFFICE O/P EST MOD 30 MIN: CPT | Performed by: FAMILY MEDICINE

## 2019-11-05 PROCEDURE — 3079F DIAST BP 80-89 MM HG: CPT | Performed by: FAMILY MEDICINE

## 2019-11-05 PROCEDURE — 90715 TDAP VACCINE 7 YRS/> IM: CPT | Performed by: FAMILY MEDICINE

## 2019-11-05 PROCEDURE — 3074F SYST BP LT 130 MM HG: CPT | Performed by: FAMILY MEDICINE

## 2019-11-05 PROCEDURE — 90471 IMMUNIZATION ADMIN: CPT | Performed by: FAMILY MEDICINE

## 2019-11-05 PROCEDURE — 36415 COLL VENOUS BLD VENIPUNCTURE: CPT

## 2019-11-05 NOTE — PROGRESS NOTES
Assessment/Plan:    No problem-specific Assessment & Plan notes found for this encounter  Diagnoses and all orders for this visit:    Type 2 diabetes mellitus with microalbuminuria, without long-term current use of insulin ([address] )  Fairly well controlled as most recent A1c from 10/2/2019 was 6 1%  Continue same dose of metformin  I suspect if patient does have weight loss surgery her diabetes will improve further and she may be able to stop her medication  Essential hypertension  Blood pressure is well controlled today  Adding hydrochlorothiazide has helped  Will continue both hydrochlorothiazide and valsartan  Hypertriglyceridemia  Continue statin therapy  Screening for HIV (human immunodeficiency virus)  -     HIV 1/2 AG-AB combo; Future  Patient did give consent for HIV screening  Need for Tdap vaccination  -     TDAP VACCINE GREATER THAN OR EQUAL TO 6YO IM  Tdap vaccine given and tolerated well  Follow-up in six months or sooner if needed  Subjective:      Patient ID: Sirisha Hayden is a 62 y o  female  Patient presents for follow-up  She has diabetes  She is currently on metformin 500 mg twice daily  Tolerating medication well without diarrhea  She has hypertension  She is on valsartan 320 mg daily and hydrochlorothiazide 12 5 mg daily  Hydrochlorothiazide was added at last visit to help get better blood pressure control  Patient is please see her blood pressure is normal in the office today  She is having no side effects from the medications  She has hypertriglyceridemia  She is on pravastatin 10 mg daily  She is tolerating the medication very well without any myalgias  She is obese  She has been to the weight management office  She is strongly considering bariatric surgery  She feels it will help her not only to lose weight but to get better control of her high blood pressure and her diabetes and she hopes to stop her medications in the future        The following portions of the patient's history were reviewed and updated as appropriate:   She  has a past medical history of Anemia, Anxiety, Closed head injury (6/11/2018), Closed rib fracture (6/11/2018), Concussion with loss of consciousness (6/22/2018), Depression, Diabetes 1 5, managed as type 2 ([facility] ), Diarrhea, Fecal incontinence, Head injury, History of colon polyps, Hypertension, Psoriasis, and SAH (subarachnoid hemorrhage) ([facility] ) (6/11/2018)  She   Patient Active Problem List    Diagnosis Date Noted    Routine gynecological examination 10/15/2019    Traumatic incomplete tear of right rotator cuff 10/04/2019    Biceps tendonitis, right 10/04/2019    Impingement syndrome of right shoulder 10/04/2019    Obesity 10/02/2019    Dizziness and giddiness 07/27/2018    Hypertriglyceridemia 07/05/2018    Hematuria 07/05/2018    Type 2 diabetes mellitus with microalbuminuria, without long-term current use of insulin ([facility] ) 07/05/2018    Mild TBI ([facility] ) 06/11/2018    Anxiety 08/25/2017    Hypertension 08/25/2017     She  has a past surgical history that includes Rotator cuff repair; Nasal septum surgery; Cholecystectomy; Hysterectomy; Colonoscopy (N/A, 10/24/2017); Colonoscopy w/ endoscopic US (N/A, 10/24/2017); Endometrial biopsy; Dilation and curettage of uterus; Other surgical history; and Nose surgery  Her family history includes Anxiety disorder in her family; Atrial fibrillation in her mother; Depression in her family; Diabetes in her family; Fibrocystic breast disease in her family; Heart disease in her family and father; Hiatal hernia in her family; Hypertension in her family; Irritable bowel syndrome in her family; Kidney disease in her family; Lung cancer in her father; No Known Problems in her brother and brother; Other in her family; Urinary tract infection in her family  She  reports that she has quit smoking  She has never used smokeless tobacco  She reports that she drinks alcohol   She reports that she does not use drugs  Current Outpatient Medications   Medication Sig Dispense Refill    acetaminophen (TYLENOL) 325 mg tablet Take 2 tablets (650 mg total) by mouth every 6 (six) hours as needed for mild pain 30 tablet 0    aspirin (LILIYA ASPIRIN EC LOW DOSE) 81 mg EC tablet Take 81 mg by mouth daily      Halobetasol Prop-Tazarotene (DUOBRII) 0 01-0 045 % LOTN Apply topically daily      HUMIRA PEN 40 MG/0 4ML PNKT       hydrochlorothiazide (HYDRODIURIL) 12 5 mg tablet Take 1 tablet (12 5 mg total) by mouth daily 90 tablet 0    metFORMIN (GLUCOPHAGE) 500 mg tablet take 1 tablet by mouth twice a day with meals 180 tablet 1    pravastatin (PRAVACHOL) 10 mg tablet take 1 tablet by mouth once daily 90 tablet 1    sertraline (ZOLOFT) 100 mg tablet Take 1 tablet (100 mg total) by mouth daily 90 tablet 0    valsartan (DIOVAN) 320 MG tablet take 1 tablet by mouth once daily 90 tablet 1     No current facility-administered medications for this visit  Current Outpatient Medications on File Prior to Visit   Medication Sig    acetaminophen (TYLENOL) 325 mg tablet Take 2 tablets (650 mg total) by mouth every 6 (six) hours as needed for mild pain    aspirin (LILIYA ASPIRIN EC LOW DOSE) 81 mg EC tablet Take 81 mg by mouth daily    Halobetasol Prop-Tazarotene (DUOBRII) 0 01-0 045 % LOTN Apply topically daily    HUMIRA PEN 40 MG/0 4ML PNKT     hydrochlorothiazide (HYDRODIURIL) 12 5 mg tablet Take 1 tablet (12 5 mg total) by mouth daily    metFORMIN (GLUCOPHAGE) 500 mg tablet take 1 tablet by mouth twice a day with meals    pravastatin (PRAVACHOL) 10 mg tablet take 1 tablet by mouth once daily    sertraline (ZOLOFT) 100 mg tablet Take 1 tablet (100 mg total) by mouth daily    valsartan (DIOVAN) 320 MG tablet take 1 tablet by mouth once daily     No current facility-administered medications on file prior to visit  She is allergic to vicodin [hydrocodone-acetaminophen]       Review of Systems Constitutional: Negative for fever  Respiratory: Negative for shortness of breath  Cardiovascular: Negative for chest pain  Neurological: Negative for headaches  Objective:      /80 (BP Location: Left arm, Patient Position: Sitting, Cuff Size: Large)   Pulse 89   Wt 98 kg (216 lb)   SpO2 98%   BMI 34 86 kg/m²          Physical Exam   Constitutional: She appears well-developed and well-nourished  Cardiovascular: Normal rate, regular rhythm and normal heart sounds  No murmur heard  Pulmonary/Chest: Effort normal and breath sounds normal  No respiratory distress

## 2019-11-06 ENCOUNTER — OFFICE VISIT (OUTPATIENT)
Dept: PHYSICAL THERAPY | Age: 59
End: 2019-11-06
Payer: COMMERCIAL

## 2019-11-06 DIAGNOSIS — S46.011A TRAUMATIC INCOMPLETE TEAR OF RIGHT ROTATOR CUFF, INITIAL ENCOUNTER: Primary | ICD-10-CM

## 2019-11-06 LAB — HIV 1+2 AB+HIV1 P24 AG SERPL QL IA: NORMAL

## 2019-11-06 PROCEDURE — 97140 MANUAL THERAPY 1/> REGIONS: CPT | Performed by: PHYSICAL THERAPIST

## 2019-11-06 PROCEDURE — 97110 THERAPEUTIC EXERCISES: CPT | Performed by: PHYSICAL THERAPIST

## 2019-11-06 PROCEDURE — 97014 ELECTRIC STIMULATION THERAPY: CPT | Performed by: PHYSICAL THERAPIST

## 2019-11-06 NOTE — PROGRESS NOTES
Daily Note     Today's date: 2019  Patient name: Leighton Jaeger  : 1960  MRN: [id number]  Referring provider: Jasiel Delgado MD  Dx:   Encounter Diagnosis     ICD-10-CM    1  Traumatic incomplete tear of right rotator cuff, initial encounter S46 011A        Start Time: 630  Stop Time: 723  Total time in clinic (min): 53 minutes    Subjective: Pt c/o clicking and pain with overhead and reaching  Pt unable to move up appt with ortho  Objective: See treatment diary below      Assessment: Tolerated treatment well  Patient has palpab;e popping/clicking during mobs and AROM  PROM is [facility]  Added additional scap exercises with good tolerance  Plan: Continue per plan of care        Precautions: standard      Modalities  10/16 10/23 10/25 10/30 11/01 11/6       MH ES 10' 10' 10 10 10 10                                          Manual  10/16 10/23 10/25 10/30 11/01 11/6       Right shoulder 15' 15' 15 15 15 15                                                               Exercise Diary  10/16 10/23 10/25 10/30 11/01 11/6       Ball closed chain CW/CCW/FF 30x ea 30x 30x 30x 30x 30x       Ball press up (SA activation) NV 20x 30x  red 30x 30x 30x       scap stabilizer   CW/CCW ball      Red   30x ea       UBE  4' 4' 4' 4' 5'       Row tband 20x blue 30x 30x  blue 30x 30x 30x       rtc exs Blue IR, ext  Green ER  20x ea 30x 30x 30x 30x 30x       bicep 20#/ 20 20#/ 20 25/30 25#/ 30 25/30 25#/ 30       tricep 40#/ 20 40#/ 30 40/30 40#/ 30 40/30 40#/ 30

## 2019-11-08 ENCOUNTER — OFFICE VISIT (OUTPATIENT)
Dept: PHYSICAL THERAPY | Age: 59
End: 2019-11-08
Payer: COMMERCIAL

## 2019-11-08 DIAGNOSIS — S46.011A TRAUMATIC INCOMPLETE TEAR OF RIGHT ROTATOR CUFF, INITIAL ENCOUNTER: Primary | ICD-10-CM

## 2019-11-08 PROCEDURE — 97110 THERAPEUTIC EXERCISES: CPT | Performed by: PHYSICAL THERAPIST

## 2019-11-08 PROCEDURE — 97014 ELECTRIC STIMULATION THERAPY: CPT | Performed by: PHYSICAL THERAPIST

## 2019-11-08 PROCEDURE — 97140 MANUAL THERAPY 1/> REGIONS: CPT | Performed by: PHYSICAL THERAPIST

## 2019-11-08 NOTE — PROGRESS NOTES
Daily Note     Today's date: 2019  Patient name: Bethany Kee  : 1960  MRN: [id number]  Referring provider: Donna Gomez MD  Dx:   Encounter Diagnosis     ICD-10-CM    1  Traumatic incomplete tear of right rotator cuff, initial encounter [id number]        Start Time: 0700  Stop Time: 0753  Total time in clinic (min): 53 minutes    Subjective: Pt reports increased pain in right shoulder "like a toothache" and notes it is radiating into the bicep  Pt initially injured shoulder on fall from horse in 2018  Objective: See treatment diary below      Assessment: Tolerated treatment fair  Patient having increased pain  Clicking and popping palpable with PROM/AROM and during mobs  Catching sensation produces sharp pain with ER/IR  Referred pt back to physician for further testing  Plan: Hold PT  Pt having increased pain  Pt to call physician and make a request for possible MRI prior to appt for follow-up  Precautions: standard      Modalities  10/16 10/23 10/25 10/30 11/01 11/6 11/8      MH ES 10' 10' 10 10 10 10 10                                         Manual  10/16 10/23 10/25 10/30 11/01 11/6 11/8      Right shoulder 15' 15' 15 15 15 15 15                                                              Exercise Diary  10/16 10/23 10/25 10/30 11/01 11/6 11/8      Ball closed chain CW/CCW/FF 30x ea 30x 30x 30x 30x 30x 30x      Ball press up (SA activation) NV 20x 30x  red 30x 30x 30x 30x      scap stabilizer   CW/CCW ball      Red   30x ea 30x      UBE  4' 4' 4' 4' 5' 5'      Row tband 20x blue 30x 30x  blue 30x 30x 30x 30x      rtc exs Blue IR, ext  Green ER  20x ea 30x 30x 30x 30x 30x 30x      bicep 20#/ 20 20#/ 20 25/30 25#/ 30 25/30 25#/ 30 25#/ 30      tricep 40#/ 20 40#/ 30 40/30 40#/ 30 40/30 40#/ 30 40#/ 30                                HEP       5'                                                HEP issued tband RTC exercises for pt continue at home  Pt to perform pendulums prn

## 2019-11-13 ENCOUNTER — APPOINTMENT (OUTPATIENT)
Dept: PHYSICAL THERAPY | Age: 59
End: 2019-11-13
Payer: COMMERCIAL

## 2019-11-15 ENCOUNTER — APPOINTMENT (OUTPATIENT)
Dept: PHYSICAL THERAPY | Age: 59
End: 2019-11-15
Payer: COMMERCIAL

## 2019-11-22 ENCOUNTER — OFFICE VISIT (OUTPATIENT)
Dept: OBGYN CLINIC | Facility: MEDICAL CENTER | Age: 59
End: 2019-11-22
Payer: COMMERCIAL

## 2019-11-22 VITALS
DIASTOLIC BLOOD PRESSURE: 83 MMHG | RESPIRATION RATE: 20 BRPM | SYSTOLIC BLOOD PRESSURE: 129 MMHG | HEART RATE: 82 BPM | HEIGHT: 66 IN | WEIGHT: 217.4 LBS | BODY MASS INDEX: 34.94 KG/M2

## 2019-11-22 DIAGNOSIS — S46.011A TRAUMATIC INCOMPLETE TEAR OF RIGHT ROTATOR CUFF, INITIAL ENCOUNTER: Primary | ICD-10-CM

## 2019-11-22 PROCEDURE — 99213 OFFICE O/P EST LOW 20 MIN: CPT | Performed by: ORTHOPAEDIC SURGERY

## 2019-11-22 RX ORDER — HYDROXYZINE HYDROCHLORIDE 10 MG/1
10 TABLET, FILM COATED ORAL EVERY 6 HOURS PRN
COMMUNITY
End: 2020-02-03 | Stop reason: ALTCHOICE

## 2019-11-22 NOTE — PROGRESS NOTES
62 y o female presents for evaluation  Despite injection of corticosteroid in the subacromial space and a program of physical therapy, she describes worsening of pain in the right shoulder articulation  She describes good motion, but describes considerable weakness with overhead activities involving the right arm complete as well as activities with the arm away from the plane of her body  She describes no neck pain, she describes no paresthesia events to the right upper extremity    Review of Systems  Review of systems negative unless otherwise specified in HPI    Past Medical History  Past Medical History:   Diagnosis Date    Anemia     Anxiety     Closed head injury 6/11/2018    Closed rib fracture 6/11/2018    Right sided    Concussion with loss of consciousness 6/22/2018    Depression     Diabetes 1 5, managed as type 2 ([facility] )     Diarrhea     Fecal incontinence     Head injury     6/10/11    History of colon polyps     Hypertension     Psoriasis     SAH (subarachnoid hemorrhage) ([facility] ) 6/11/2018    Skin disorder        Past Surgical History  Past Surgical History:   Procedure Laterality Date    CHOLECYSTECTOMY      COLONOSCOPY N/A 10/24/2017    Procedure: COLONOSCOPY;  Surgeon: Gadiel Jay MD;  [facility]: [facility]; Service: Colorectal    COLONOSCOPY W/ ENDOSCOPIC US N/A 10/24/2017    Procedure: ANAL ENDOSCOPIC U/S;  Surgeon: Gadiel Jay MD;  [facility];   Service: Colorectal    DILATION AND CURETTAGE OF UTERUS      ENDOMETRIAL BIOPSY      WITHOUT CERVICAL DILATION    HYSTERECTOMY      NASAL SEPTUM SURGERY      NOSE SURGERY      NASAL SEPTAL  DEVIATION REPAIR    OTHER SURGICAL HISTORY      ENDOSCOPIC CONTROL OF GASTRIC BLEEDING, EPISIOTOMY    ROTATOR CUFF REPAIR         Current Medications  Current Outpatient Medications on File Prior to Visit   Medication Sig Dispense Refill    acetaminophen (TYLENOL) 325 mg tablet Take 2 tablets (650 mg total) by mouth every 6 (six) hours as needed for mild pain 30 tablet 0    aspirin (LILIYA ASPIRIN EC LOW DOSE) 81 mg EC tablet Take 81 mg by mouth daily      Halobetasol Prop-Tazarotene (DUOBRII) 0 01-0 045 % LOTN Apply topically daily      hydrochlorothiazide (HYDRODIURIL) 12 5 mg tablet Take 1 tablet (12 5 mg total) by mouth daily 90 tablet 0    hydrOXYzine HCL (ATARAX) 10 mg tablet Take 10 mg by mouth every 6 (six) hours as needed for itching      metFORMIN (GLUCOPHAGE) 500 mg tablet take 1 tablet by mouth twice a day with meals 180 tablet 1    pravastatin (PRAVACHOL) 10 mg tablet take 1 tablet by mouth once daily 90 tablet 1    sertraline (ZOLOFT) 100 mg tablet Take 1 tablet (100 mg total) by mouth daily 90 tablet 0    valsartan (DIOVAN) 320 MG tablet take 1 tablet by mouth once daily 90 tablet 1    [DISCONTINUED] HUMIRA PEN 40 MG/0 4ML PNKT        No current facility-administered medications on file prior to visit  Recent Labs [facility]  0   Lab Value Date/Time    HCT 43 8 08/27/2019 0744    HGB 13 6 08/27/2019 0744    WBC 6 77 08/27/2019 0744    INR 0 94 06/11/2018 1243    HGBA1C 6 1 10/02/2019 0848    HGBA1C 6 0 10/03/2018 5056         Physical exam  · General: Awake, Alert, Oriented  · Eyes: Pupils equal, round and reactive to light  · Heart: regular rate and rhythm  · Lungs: No audible wheezing  · Abdomen: soft  Exam confirms a mobile neck  Right shoulder has intact soft tissues  [facility] joint is not enlarged nontender  The biceps tendon is palpably thickened but minimally tender  She has good forward flexion, abduction, internal, external rotation right shoulder  There is weakness of external rotation strength testing, softly positive drop-arm sign  The right elbows nonswollen nontender, the right wrist is nonswollen nontender    Fingers on the right hand are warm, sensate, mobile    Imaging  No new x-rays accompany her    Procedure  None indicated or performed today    Assessment/Plan:   58 y o female who has persistence of impingement symptomatology in the right shoulder  The likely culprit is tear of the rotator cuff    An MRI scan is soft diagnostic purposes, and follow-up visit with Dr Rocio Dominguez is warranted

## 2019-12-05 ENCOUNTER — HOSPITAL ENCOUNTER (OUTPATIENT)
Dept: MRI IMAGING | Facility: HOSPITAL | Age: 59
Discharge: HOME | End: 2019-12-05
Attending: ORTHOPAEDIC SURGERY
Payer: COMMERCIAL

## 2019-12-05 DIAGNOSIS — S46.011A TRAUMATIC INCOMPLETE TEAR OF RIGHT ROTATOR CUFF, INITIAL ENCOUNTER: ICD-10-CM

## 2019-12-05 PROCEDURE — 73221 MRI JOINT UPR EXTREM W/O DYE: CPT

## 2019-12-09 ENCOUNTER — CLINICAL SUPPORT (OUTPATIENT)
Dept: BARIATRICS | Facility: CLINIC | Age: 59
End: 2019-12-09

## 2019-12-09 VITALS
RESPIRATION RATE: 18 BRPM | HEART RATE: 72 BPM | TEMPERATURE: 97.6 F | SYSTOLIC BLOOD PRESSURE: 148 MMHG | HEIGHT: 66 IN | WEIGHT: 218.8 LBS | BODY MASS INDEX: 35.17 KG/M2 | DIASTOLIC BLOOD PRESSURE: 88 MMHG

## 2019-12-09 DIAGNOSIS — E66.01 MORBID OBESITY (HCC): Primary | ICD-10-CM

## 2019-12-09 DIAGNOSIS — E66.09 CLASS 1 OBESITY DUE TO EXCESS CALORIES WITH SERIOUS COMORBIDITY AND BODY MASS INDEX (BMI) OF 34.0 TO 34.9 IN ADULT: Primary | ICD-10-CM

## 2019-12-09 PROCEDURE — RECHECK

## 2019-12-09 NOTE — PROGRESS NOTES
Bariatric Nutrition Assessment Note    Type of surgery    Preop- 6 month program  Surgery Date: TBD- Tentative July 2020  Surgeon: Dr Tyra Hawley  62 y o   female     Wt with BMI of 25: 151 9lbs  Pre-Op Excess Wt: 66 9lbs  /88 (BP Location: Right arm, Patient Position: Sitting, Cuff Size: Standard)   Pulse 72   Temp 97 6 °F (36 4 °C) (Tympanic)   Resp 18   Ht 5' 5 5" (1 664 m)   Wt 99 2 kg (218 lb 12 8 oz)   BMI 35 86 kg/m²     [place]- [place] Equation:      Weight History   Onset of Obesity: Adult: after hysterectomy in 2005  Family history of obesity: Yes: mothers side  Wt Loss Attempts: Commercial Programs ([employer], [employer], etc )  Exercise  FAD Diets (Cabbage soup, Grapefruit, Cleanse, etc )  Meal Replacements (Medifast, Slim Fast, etc )  OTC meds/supplements  Patient has tried the above for 6 months or more with insufficient weight loss or weight regain, which is why patient has requested to be evaluated for weight loss surgery today  Maximum Wt Lost: 10-15lbs      Review of History and Medications   Past Medical History:   Diagnosis Date    Anemia     Anxiety     Closed head injury 6/11/2018    Closed rib fracture 6/11/2018    Right sided    Concussion with loss of consciousness 6/22/2018    Depression     Diabetes 1 5, managed as type 2 ([facility] [address] )     Diarrhea     Fecal incontinence     Head injury     6/10/11    History of colon polyps     Hypertension     Psoriasis     SAH (subarachnoid hemorrhage) ([facility] [address] ) 6/11/2018    Skin disorder      Past Surgical History:   Procedure Laterality Date    CHOLECYSTECTOMY      COLONOSCOPY N/A 10/24/2017    Procedure: COLONOSCOPY;  Surgeon: Broderick Odonnell MD;  [facility]; Service: Colorectal    COLONOSCOPY W/ ENDOSCOPIC US N/A 10/24/2017    Procedure: ANAL ENDOSCOPIC U/S;  Surgeon: Broderick Odonnell MD;  [facility];   Service: Colorectal    DILATION AND CURETTAGE OF UTERUS  ENDOMETRIAL BIOPSY      WITHOUT CERVICAL DILATION    HYSTERECTOMY      NASAL SEPTUM SURGERY      NOSE SURGERY      NASAL SEPTAL  DEVIATION REPAIR    OTHER SURGICAL HISTORY      ENDOSCOPIC CONTROL OF GASTRIC BLEEDING, EPISIOTOMY    ROTATOR CUFF REPAIR       Social History     Socioeconomic History    Marital status: /Civil Union     Spouse name: None    Number of children: None    Years of education: None    Highest education level: None   Occupational History    None   Social Needs    Financial resource strain: None    Food insecurity:     Worry: None     Inability: None    Transportation needs:     Medical: None     Non-medical: None   Tobacco Use    Smoking status: Former Smoker     Last attempt to quit:      Years since quittin 9    Smokeless tobacco: Never Used    Tobacco comment: Smoked for 10yrs     Substance and Sexual Activity    Alcohol use: Yes     Frequency: 2-3 times a week     Binge frequency: Never     Comment: social    Drug use: No    Sexual activity: Yes     Partners: Male     Comment: with    Lifestyle    Physical activity:     Days per week: None     Minutes per session: None    Stress: None   Relationships    Social connections:     Talks on phone: None     Gets together: None     Attends Jain service: None     Active member of club or organization: None     Attends meetings of clubs or organizations: None     Relationship status: None    Intimate partner violence:     Fear of current or ex partner: None     Emotionally abused: None     Physically abused: None     Forced sexual activity: None   Other Topics Concern    None   Social History Narrative    CAFFEINE USE    DAILY COFFEE CONSUMPTION    EXERCISES FREQUENTLY       Current Outpatient Medications:     aspirin (LILIYA ASPIRIN EC LOW DOSE) 81 mg EC tablet, Take 81 mg by mouth daily, Disp: , Rfl:     hydrochlorothiazide (HYDRODIURIL) 12 5 mg tablet, Take 1 tablet (12 5 mg total) by mouth daily, Disp: 90 tablet, Rfl: 0    metFORMIN (GLUCOPHAGE) 500 mg tablet, take 1 tablet by mouth twice a day with meals, Disp: 180 tablet, Rfl: 1    pravastatin (PRAVACHOL) 10 mg tablet, take 1 tablet by mouth once daily, Disp: 90 tablet, Rfl: 1    sertraline (ZOLOFT) 100 mg tablet, Take 1 tablet (100 mg total) by mouth daily, Disp: 90 tablet, Rfl: 0    valsartan (DIOVAN) 320 MG tablet, take 1 tablet by mouth once daily, Disp: 90 tablet, Rfl: 1    acetaminophen (TYLENOL) 325 mg tablet, Take 2 tablets (650 mg total) by mouth every 6 (six) hours as needed for mild pain (Patient not taking: Reported on 12/9/2019), Disp: 30 tablet, Rfl: 0    Halobetasol Prop-Tazarotene (DUOBRII) 0 01-0 045 % LOTN, Apply topically daily, Disp: , Rfl:     hydrOXYzine HCL (ATARAX) 10 mg tablet, Take 10 mg by mouth every 6 (six) hours as needed for itching, Disp: , Rfl:   Food Intake and Lifestyle Assessment   Food Intake Assessment completed via food log brought by patient  Breakfast: two cups half caf coffee with liquid creamer  Vegetable omemet OR breakfast quesadilla  Snack: occasionally grapes or an apple   Lunch: salad and beef vegetable soup  Snack: 0  Dinner: 1 pork chop, 1 cup mixed vegetables, 1 cup rice  Snack: 3-4 days per week 1 cup ice cream  Beverage intake: water, decaf coffee/tea and diet iced tea  Protein supplement: none  Estimated protein intake per day: 30-60g  Estimated fluid intake per day: 24oz x3  Meals eaten away from home: 1  Typical meal pattern: 3 meals per day and 1 snacks per day  Eating Behaviors: Consumption of high calorie/ high fat foods and Large portion sizes  Food allergies or intolerances:    Allergies   Allergen Reactions    Vicodin [Hydrocodone-Acetaminophen] Anaphylaxis     Reaction Date: [id number];      Cultural or Latter-day considerations: none    Physical Assessment  Physical Activity  Types of exercise: house chores, barn chores  Current physical limitations: rotator cuff injury, needs surgery    Psychosocial Assessment   Support systems: spouse and children  Socioeconomic factors: none    Nutrition Diagnosis  Diagnosis: Overweight / Obesity (NC-3 3) and Excessive energy intake (NI-1 5)  Related to: Physical inactivity and Excessive energy intake  As Evidenced by: BMI >25 and Excessive energy intake     Nutrition Prescription: Recommend the following diet  Diabetic/carbohydrate consistent    Interventions and Teaching   Discussed pre-op and post-op nutrition guidelines  Patient educated and handouts provided    Surgical changes to stomach / GI  Capacity of post-surgery stomach  Diet progression  Adequate hydration  Sugar and fat restriction to decrease "dumping syndrome"  Fat restriction to decrease steatorrhea  Expected weight loss  Weight loss plateaus/ possibility of weight regain  Exercise  Suggestions for pre-op diet  Nutrition considerations after surgery  Protein supplements  Meal planning and preparation  Appropriate carbohydrate, protein, and fat intake, and food/fluid choices to maximize safe weight loss, nutrient intake, and tolerance   Dietary and lifestyle changes  Possible problems with poor eating habits  Intuitive eating  Techniques for self monitoring and keeping daily food journal  Potential for food intolerance after surgery, and ways to deal with them including: lactose intolerance, nausea, reflux, vomiting, diarrhea, food intolerance, appetite changes, gas  Vitamin / Mineral supplementation of Multivitamin with minerals, Calcium and Vitamin D    Patient provided with gym coupon for [place] Assessment: Yes    Education provided to: patient    Barriers to learning: No barriers identified  Readiness to change: preparation    Prior research on procedure: books, internet, discussed with provider and friends or family    Comprehension: needs reinforcement and verbalizes understanding     Expected Compliance: good  Recommendations  Pt is an appropriate candidate for surgery   Yes    Evaluation / Monitoring  Dietitian to Monitor: Eating pattern as discussed Tolerance of nutrition prescription Body weight Lab values Physical activity    Goals  Eliminate sugar sweetened beverages, Food journal, Exercise 30 minutes 5 times per week, Complete lession plans 1-6, Eat 3 meals per day and Eliminate mindless snacking    Time Spent:   1 Hour

## 2019-12-09 NOTE — PROGRESS NOTES
Bariatric Behavioral Health Evaluation    Presenting Problem patient here to improve health, increase mobility, reduce chronic pain and prevent family disease  Is the patient seeking Bariatric Surgery Eval? Yes  If yes how long have you researched this surgery option  Her mother had surgery 12-15 years ago  Realizes Post- Op Requirements? Yes     Pre-morbid level of function and history of present illness: patient has medical issues  Psychiatric/Psychological Treatment Diagnosis: patient reports Axis I diagnosis of Anxiety  Patient has been prescribed medication by her PCP for the past two years  Patient feels much better now and has stronger coping skills  Outpatient Counselor No  Has used a therapist in the past      Psychiatrist No     Have you had Inpatient Treatment? No    Family Constellation (include relationship with each and Psych/Med HX)    Mother  obesity, Father  tobacco use and Siblings  obesity    Domestic Violence No    Additional comments/stressors related to family/relationships/peer support     Physical/Psychological Assessment:     Appearance: appropriate  Sociability: average  Affect: appropriate  Mood: calm  Thought Process: coherent  Speech: normal  Content: no impairment  Orientation: person  Yes , place  Yes , time  Yes , normal attention span  Yes , normal memory  Yes   and normal judgement  Yes   Insight: emotional  fair    Risk Assessment:     none    Recommendations: Recommended for surgery  yes    Risk of Harm to Self or Others: none reported  Observation:     Interviews this interview only  Access to weapons yes     Weapons secured by locked cabinet  Based on the previous information, the client presents the following risk of harm to self or others: low     Note Patient reports Axis I diagnosis of Anxiety  Patient is prescribed medication by her PCP    Patient educated regarding the impact of nicotine and alcohol on the post surgery bariatric patient  Patient has a positive family history for obesity and tobacco use  Patient meets criteria for surgery at this program and is referred to the surgeon  BARIATRIC SURGERY EDUCATION CHECKLIST    I have received education related to my bariatric surgery process and understand:    Patients may be required to complete a psychiatric evaluation and receive clearance for surgery from their psychiatrist     Patients who undergo weight loss surgery are at higher risk of increased mental health concerns and suicide attempts  Patients may be required to complete a full substance abuse evaluation and then complete all treatment recommendations prior to surgery  If diagnosis of abuse/dependence results, patient may be required to remain sober for one (1) year before having bariatric surgery  Patients on psychiatric medications should check with their provider to discuss psychiatric medications and the changes in absorption  Patient should discuss all time release medications with provider and take all medications as prescribed  The recommendation is that there is no use of  any tobacco products, Hookah or  vapes for the bariatric post-operation patient  Bariatric surgery patients should not consume alcohol as a post-operative patient as it may increase risk of numerous health conditions including but not limited to alcohol abuse and ulcers  There is a possibility of weight regain if patient does not follow all program guidelines and recommendations  Bariatric surgery patients should exercise thirty (30) to sixty (60) minutes per day to maintain post-surgical weight loss  Research indicates that bariatric patients are more successful when they see a therapist for up to two (2) years post-op  Patients will follow all medical and dietary recommendations provided  Patient will keep all scheduled appointments and follow up with their physician for a minimum of five (5) years      Patient will take all vitamins as recommended  Post-operative vitamins are life-long  Patient reviewed Bariatric Surgery Education Checklist and agrees they have received education on these issues

## 2019-12-16 ENCOUNTER — OFFICE VISIT (OUTPATIENT)
Dept: OBGYN CLINIC | Facility: HOSPITAL | Age: 59
End: 2019-12-16
Payer: COMMERCIAL

## 2019-12-16 VITALS
HEART RATE: 81 BPM | DIASTOLIC BLOOD PRESSURE: 85 MMHG | HEIGHT: 66 IN | BODY MASS INDEX: 33.11 KG/M2 | WEIGHT: 206 LBS | SYSTOLIC BLOOD PRESSURE: 158 MMHG

## 2019-12-16 DIAGNOSIS — M75.101 TEAR OF RIGHT SUPRASPINATUS TENDON: Primary | ICD-10-CM

## 2019-12-16 DIAGNOSIS — M75.21 BICEPS TENDONITIS, RIGHT: ICD-10-CM

## 2019-12-16 DIAGNOSIS — M25.511 ACUTE PAIN OF RIGHT SHOULDER: ICD-10-CM

## 2019-12-16 PROBLEM — S46.011A TRAUMATIC INCOMPLETE TEAR OF RIGHT ROTATOR CUFF: Status: RESOLVED | Noted: 2019-10-04 | Resolved: 2019-12-16

## 2019-12-16 PROCEDURE — 99214 OFFICE O/P EST MOD 30 MIN: CPT | Performed by: ORTHOPAEDIC SURGERY

## 2019-12-16 RX ORDER — CEFAZOLIN SODIUM 2 G/50ML
2000 SOLUTION INTRAVENOUS ONCE
Status: CANCELLED | OUTPATIENT
Start: 2019-12-16 | End: 2019-12-16

## 2019-12-16 RX ORDER — SECUKINUMAB 150 MG/ML
INJECTION SUBCUTANEOUS
COMMUNITY
Start: 2019-12-14

## 2019-12-16 NOTE — PATIENT INSTRUCTIONS
Pt would like to know if her dentist has sent clearance yet. Please call [phone] (work), she will be there until 430 and then [phone] (home) after.   You are being scheduled for a shoulder arthroscopy to treat your symptoms  Below are some instructions and information on what to expect before and after your surgery  Pre-Surgical Preparation for Arthroscopic Shoulder Surgery: You will be contacted the evening prior to your surgery to confirm the scheduled time of the procedure and when to arrive at the hospital    Do not eat or drink anything after midnight the night before your surgery  Since you are having out-patient surgery, make sure that you have someone who can drive you home later in the day  Also, prepare that person for a long day, as the process of safely preparing for and recovering from the procedure is more time consuming than the actual procedure! As you will be in a sling after surgery, please wear or bring a loose fitting button-down shirt so that you can easily place this over the sling when you leave the surgical suite  This avoids having to place the operative arm in a sleeve  Most patients find that this is the easiest outfit to wear for the first week or so after surgery so you may want to plan accordingly  Most patients find that lying down in bed after shoulder surgery accentuates their discomfort  This is likely related to the effect of gravity on the swelling in the shoulder  As a result, most patients sleep better in a recliner or in bed with pillows propped up behind their back for the first few days or weeks after surgery  It is a good idea to plan for this ahead of time so there will be less hassle getting things set up the night after surgery  What to Expect After Arthroscopic Shoulder Surgery: It is normal to have swelling and discomfort in the shoulder for several days or a week after surgery  It is also normal to have a small amount of drainage from the surgical wounds (especially the first few days after surgery), as we put fluid into the shoulder to visualize the structures during surgery  It is NOT normal to have foul smelling, purulent drainage and if this is noted, please contact the office immediately or proceed to the emergency room for evaluation as this may indicate an infection  Applying ice bags to the shoulder may help with pain that is not controlled by the regional block  Ice should be applied 20-30 minutes at a time, every hour or two  Make sure to put a thin towel or T-shirt next to your skin to avoid direct contact of the ice with the skin  Icing is most helpful in the first 48 hours, although many people find that continuing past this time frame lessens their postoperative pain  Please note that your post-operative dressing is not conductive to ice, so if you need to, it is okay to remove that dressing even the night after surgery and place band-aids over the wounds in order for the ice to take effect  Pain Control    Most patients will receive a nerve block, the local anesthetic may keep your whole arm numb for up to 4 days  You will be given a prescription for narcotic pain medication when you are discharged from the hospital   With the newer nerve block that is being utilized, patients are rarely requiring the use of this narcotic pain medication  If you find you do not tolerate that type of pain medicine well, call our office and we will try another one  In addition to the narcotic pain medication, it is safe to use an anti-inflammatory (unless the patient has a medical condition that would not allow safe use of this mediation)  This includes the Advil, Motrin, Ibuprofen and Alleve category of medications  Simply follow the over the counter dosing on the package and use as indicated as another adjunct  Importantly since these medications are all very similar, use only one of them  Tylenol is a separate medication that can be utilized as well and can be taken at the same time as the other medication or given in a "staggered" manner    Just make sure that you follow the dosing on the over the counter bottle instructions  Also make sure that the pain medication prescribed by Dr Cassie Maria team does not contain acetaminophen (this is found in Percocet and Vicodin)  Typically we do not prescribe those types of pain medications but if for some reason that has been prescribed DO NOT add more Tylenol (acetaminophen) as you could end up taking too much of that medication  As mentioned above, most patients find that lying down accentuates their discomfort  You might sleep better in a recliner, or propped up in bed  Dr Linus Garza encourages patients to safely ambulate around the house as much as possible in the first few days after the procedure as this can help with blood circulation in the legs  While the incidence of blood clots is very rare following shoulder surgery, early ambulation is a great way to help decrease the already low rate  24 hours after the surgery you may remove the bandage and cover incisions with Band-Aids if needed  At that time you may shower, the wounds will have a surgical glue that will protect them from shower water but do not submerge your incisions directly (bathing or swimming) until at least 2 weeks post-operatively  It is safe to let the arm hang at the side and take a shower and put on a shirt without the sling on  Just make sure that you do not use the operative are to reach out and grab anything as that may damage the repair  When you are done showering and getting dressed please return the operative arm to the sling  Unless noted otherwise in your discharge paperwork, Dr Linus Garza uses absorbable sutures so they do not need to be removed  Dr Kayce Temple physician assistant (PA) will see you in the office a few days after the procedure to review the intra-operative findings and to initiate physical therapy if appropriate    A post-operative appointment should have been scheduled for you already, but if for some reason this did not happen, please call the office to make one  Physical therapy is important after nearly all shoulder surgeries and a detailed rehabilitation plan based on the specific intra-operative findings and procedures will be provided to your therapist at the first post-operative office visit  Most patients have post-operative therapy appointments scheduled pre-operatively, but if you do not, that will be handled at the first post-operative office visit  Unless expressly directed otherwise it is safe to remove the sling even the first day after the surgery and let the arm hang by the side  This allows patients to shower and even put a shirt on (bad arm in the sleeve first)  It is also safe to flex and extend their wrist, hand and fingers as much as possible when the block wears off  These simple motions can serve to pump fluid out of the forearm and decrease swelling in the arm

## 2019-12-16 NOTE — PROGRESS NOTES
Assessment  Diagnoses and all orders for this visit:    Tear of right supraspinatus tendon    Biceps tendonitis, right    Acute pain of right shoulder          Discussion and Plan:    The patient continues to be symptomatic with a right partial thickness supraspinatus tendon bursal sided tear as well as suspected long-head biceps pathology  Since she has failed to improve with appropriate nonoperative care she is indicated for arthroscopic evaluation and debridement versus repair with likely possible biceps tenodesis  A thorough discussion was performed with the patient regarding the risks and benefit of operative and nonoperative treatment of their rotator cuff tear  Risks discussed include but were not limited to infection, neurovascular injury, recurrent tear, nonhealing of the repair, need for further surgery, need for biceps tenodesis or tenotomy, stiffness, need for prolonged rehabilitation, as well as the risk of anesthesia  After this discussion all questions were answered and informed consent was obtained in the office for arthroscopic rotator cuff repair of the right shoulder with biceps tenodesis  The patient will be scheduled for this procedure accordingly  Subjective:   Patient ID: Henri Solano is a 62 y o  female      HPI  The patient presents with a chief complaint of right shoulder pain  The pain began 4 month(s) ago and is not associated with an acute injury  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  The patient describes the pain as aching, dull and sharp in intensity,  intermittent in timing, and localizes the pain to the  right lateral shoulder  The pain is worse with overhead work and raising arm over head and relieved by rest   The pain is not associated with numbness and tingling  The pain is not associated with constitutional symptoms  The patient is awoken at night by the pain        The patient was seen by Dr Osmani Torres 10/04/19 provided with an injection and a referral to PT  On follow up on 11/22/19 symptoms did not improve so a MRI was ordered and patient was referred here today for orthopedic consultation  Patient states physical therapy made her feel worse  LEFT shoulder arthroscopic rotator cuff repair by Dr Naz Xiong in 2017      The following portions of the patient's history were reviewed and updated as appropriate: allergies, current medications, past family history, past medical history, past social history, past surgical history and problem list     Review of Systems   Constitutional: Negative for chills and fever  HENT: Negative for drooling and sneezing  Eyes: Negative for redness  Respiratory: Negative for cough and wheezing  Gastrointestinal: Negative for nausea and vomiting  Musculoskeletal:        Please see ortho exam   Psychiatric/Behavioral: Negative for behavioral problems  The patient is not nervous/anxious  Objective:  /85   Pulse 81   Ht 5' 6" (1 676 m)   Wt 93 4 kg (206 lb)   BMI 33 25 kg/m²       Right Shoulder Exam     Tenderness   The patient is experiencing tenderness in the biceps tendon (anterior deltoid)  Range of Motion   External rotation: 60   Forward flexion: 170   Internal rotation 0 degrees: Lumbar     Muscle Strength   Abduction: 3/5   External rotation: 4/5     Tests   Armstrong test: positive  Impingement: positive    Other   Erythema: absent  Sensation: normal  Pulse: present    Comments:    Positive empty can test            Physical Exam   Constitutional: She is oriented to person, place, and time  She appears well-developed and well-nourished  HENT:   Head: Normocephalic and atraumatic  Eyes: Pupils are equal, round, and reactive to light  Neck: Normal range of motion  Neck supple  Cardiovascular: Normal rate, regular rhythm and normal heart sounds  Pulmonary/Chest: Effort normal and breath sounds normal  No respiratory distress  Abdominal: Soft  She exhibits no distension  Neurological: She is alert and oriented to person, place, and time  Skin: Skin is warm and dry  Psychiatric: She has a normal mood and affect  Her behavior is normal  Judgment and thought content normal    Nursing note and vitals reviewed  I have personally reviewed pertinent films in PACS and my interpretation is as follows  Right shoulder MRI demonstrates: Partial bursal sided tear supraspinatus no full thickness tear, mild biceps tendonitis       Scribe Attestation    I,:   Maya Rankin am acting as a scribe while in the presence of the attending physician :        I,:   Sommer Peoples MD personally performed the services described in this documentation    as scribed in my presence :

## 2019-12-18 DIAGNOSIS — F41.9 ANXIETY: ICD-10-CM

## 2019-12-19 NOTE — PRE-PROCEDURE INSTRUCTIONS
Pre-Surgery Instructions:   Medication Instructions    acetaminophen (TYLENOL) 325 mg tablet Instructed patient per Anesthesia Guidelines   aspirin (LILIYA ASPIRIN EC LOW DOSE) 81 mg EC tablet Patient was instructed to contact Physician for medication instruction   hydrochlorothiazide (HYDRODIURIL) 12 5 mg tablet Instructed patient per Anesthesia Guidelines   hydrOXYzine HCL (ATARAX) 10 mg tablet Instructed patient per Anesthesia Guidelines   metFORMIN (GLUCOPHAGE) 500 mg tablet Instructed patient per Anesthesia Guidelines   pravastatin (PRAVACHOL) 10 mg tablet Instructed patient per Anesthesia Guidelines   Secukinumab, 300 MG Dose, (COSENTYX, 300 MG DOSE,) 150 MG/ML SOSY Patient was instructed to contact Physician for medication instruction   sertraline (ZOLOFT) 100 mg tablet Instructed patient per Anesthesia Guidelines   valsartan (DIOVAN) 320 MG tablet Instructed patient per Anesthesia Guidelines      Pre op,medications and showering instructions reviewed-Patient has hibiclens-Instructed to bring immobilizer DOS

## 2019-12-24 DIAGNOSIS — F41.9 ANXIETY: ICD-10-CM

## 2019-12-24 DIAGNOSIS — I10 ESSENTIAL HYPERTENSION: ICD-10-CM

## 2019-12-24 RX ORDER — SERTRALINE HYDROCHLORIDE 100 MG/1
TABLET, FILM COATED ORAL
Qty: 90 TABLET | Refills: 0 | Status: SHIPPED | OUTPATIENT
Start: 2019-12-24 | End: 2020-03-20

## 2019-12-24 RX ORDER — HYDROCHLOROTHIAZIDE 12.5 MG/1
TABLET ORAL
Qty: 90 TABLET | Refills: 0 | Status: SHIPPED | OUTPATIENT
Start: 2019-12-24 | End: 2020-03-20

## 2020-01-06 ENCOUNTER — ANESTHESIA EVENT (OUTPATIENT)
Dept: PERIOP | Facility: AMBULARY SURGERY CENTER | Age: 60
End: 2020-01-06
Payer: COMMERCIAL

## 2020-01-06 ENCOUNTER — OFFICE VISIT (OUTPATIENT)
Dept: BARIATRICS | Facility: CLINIC | Age: 60
End: 2020-01-06
Payer: COMMERCIAL

## 2020-01-06 VITALS
SYSTOLIC BLOOD PRESSURE: 140 MMHG | HEIGHT: 66 IN | HEART RATE: 79 BPM | BODY MASS INDEX: 35.76 KG/M2 | WEIGHT: 222.5 LBS | TEMPERATURE: 98 F | DIASTOLIC BLOOD PRESSURE: 60 MMHG

## 2020-01-06 DIAGNOSIS — E66.01 MORBID (SEVERE) OBESITY DUE TO EXCESS CALORIES (HCC): Primary | ICD-10-CM

## 2020-01-06 DIAGNOSIS — F41.9 ANXIETY: ICD-10-CM

## 2020-01-06 DIAGNOSIS — R80.9 TYPE 2 DIABETES MELLITUS WITH MICROALBUMINURIA, WITHOUT LONG-TERM CURRENT USE OF INSULIN (HCC): ICD-10-CM

## 2020-01-06 DIAGNOSIS — S06.9X9A MILD TRAUMATIC BRAIN INJURY WITH LOSS OF CONSCIOUSNESS, INITIAL ENCOUNTER (HCC): ICD-10-CM

## 2020-01-06 DIAGNOSIS — E11.29 TYPE 2 DIABETES MELLITUS WITH MICROALBUMINURIA, WITHOUT LONG-TERM CURRENT USE OF INSULIN (HCC): ICD-10-CM

## 2020-01-06 DIAGNOSIS — I10 ESSENTIAL HYPERTENSION: ICD-10-CM

## 2020-01-06 PROCEDURE — 99203 OFFICE O/P NEW LOW 30 MIN: CPT | Performed by: PHYSICIAN ASSISTANT

## 2020-01-06 NOTE — PROGRESS NOTES
Assessment/Plan:  Patient seen at [facility] office     Diagnoses and all orders for this visit:    Morbid (severe) obesity due to excess calories ([facility] [address] )  Interested in Itz-En-Y Gastric Bypass with Dr Kim Bolton  10% Weight loss-per dietician, should stay at around her current weight    Screening labs-results pending  Support Group-Completed  Cardiac Risk Assessment-Not Completed  Upper Endoscopy-Not Completed; H  Pylori biopsy-Not completed, H pylori stool antigen test ordered-  no  Sleep Medicine Assessment-Not applicable  Alcohol and nicotine use is not recommended following bariatric surgery  NSAIDs should be avoided following bariatric surgery  Advised that pregnancy should be avoided following bariatric surgery for 12-18 months and should not solely rely on OCP and consider additional/alternative sources for contraception due to potential absorption issues-Completed      Essential hypertension  - /60 today   - continue HCTZ and Losartan as per PCP     Type 2 diabetes mellitus with microalbuminuria, without long-term current use of insulin (HCC)  - continue Metformin  - last A1c 6 1    Anxiety  - On Zoloft and Atarax, continue per PCP     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, initial encounter ([facility] )  - history of fall off horse in 2018 with subsequent concussion and brain bleed per patient  - currently on daily Aspirin per Neurology - instructed patient to schedule follow up with Neurology for clearance on use of Aspirin preop and postop     Follow up in approximately 1 month for next weight check     Goals:  Food log (ie ) [http://www  My Top 10,sparkpeople  com,loseit com,calorieking  com,etc]www  myfitnesspal com,sparkpeople  com,loseit com,calorieking  com,etc  baritastic  No sugary beverages  At least 64oz of water daily  Increase physical activity by 10 minutes daily   Gradually increase physical activity to a goal of 5 days per week for 30 minutes of MODERATE intensity PLUS 2 days per week of FULL BODY resistance training  Follow lessons 1-6 in the manual  Follow the 30/60 minute rule  Goal protein intake of 60-80 grams per day  Alcohol and nicotine use is not recommended following bariatric surgery  NSAIDs (Motrin, Advil, Aleve, Naproxen, ibuprofen, etc) should be avoided following bariatric surgery)    Subjective:   Chief Complaint   Patient presents with    Weight Check     1/6     Patient ID: Rohith Soto is a 61 y o  female with excess weight/obesity here to pursue weight management  Past Medical History:   Diagnosis Date    Anemia     Anxiety     Closed head injury 6/11/2018    Closed rib fracture 6/11/2018    Right sided    Colon polyp     Concussion with loss of consciousness 6/22/2018    Depression     Diabetes 1 5, managed as type 2 ([place] [address] )     Diarrhea     Fatty liver     Fecal incontinence     GERD (gastroesophageal reflux disease)     Head injury     6/10/11    Hyperlipidemia     Hypertension     Psoriasis     SAH (subarachnoid hemorrhage) ([facility] ) 6/11/2018     HPI:  Obesity/Excess Weight:  Severity: moderate-severe  Onset: after hysterectomy in 2005   Modifiers: Diet and Exercise and Commercial Weight Loss Programs-ie  Weight Watchers, Sandra Carwin, Nutrisystem, etc   Contributing factors: overeating, hysterectomy   Associated symptoms: fatigue, increased shortness of breath and inability to do certain activities    The following portions of the patient's history were reviewed and updated as appropriate: allergies, current medications, past family history, past medical history, past social history, past surgical history and problem list   Review of Systems   Constitutional: Negative  HENT:        Reflux    Respiratory: Negative  Cardiovascular: Negative  Gastrointestinal: Negative  Musculoskeletal: Positive for arthralgias  Skin: Positive for rash (psoriasis )  Neurological: Positive for dizziness (occasional (hx of head injury) )   Negative for seizures and syncope  Hematological: Does not bruise/bleed easily  Psychiatric/Behavioral: Negative for suicidal ideas  History of anxiety depression - on Zoloft and Atarax   Does not follow with therapist or psychiatrist however symptoms are under control per patient      Objective:  /60 (BP Location: Right arm, Patient Position: Sitting)   Pulse 79   Temp 98 °F (36 7 °C) (Tympanic)   Ht 5' 5 5" (1 664 m)   Wt 101 kg (222 lb 8 oz)   BMI 36 46 kg/m²   Physical Exam   Constitutional: She is oriented to person, place, and time  She appears well-developed and well-nourished  Obese    HENT:   Head: Normocephalic and atraumatic  Eyes: Pupils are equal, round, and reactive to light  Conjunctivae and EOM are normal  No scleral icterus  Neck: Normal range of motion  Cardiovascular: Normal rate and regular rhythm  Pulmonary/Chest: Effort normal and breath sounds normal  No respiratory distress  She has no wheezes  Abdominal: Soft  Bowel sounds are normal  She exhibits no distension  There is no tenderness  Musculoskeletal: Normal range of motion  Lymphadenopathy:     She has no cervical adenopathy  Neurological: She is alert and oriented to person, place, and time  Skin: Skin is warm and dry  Psychiatric: She has a normal mood and affect  Nursing note and vitals reviewed

## 2020-01-15 ENCOUNTER — HOSPITAL ENCOUNTER (OUTPATIENT)
Facility: AMBULARY SURGERY CENTER | Age: 60
Setting detail: OUTPATIENT SURGERY
Discharge: HOME | End: 2020-01-15
Attending: ORTHOPAEDIC SURGERY | Admitting: ORTHOPAEDIC SURGERY
Payer: COMMERCIAL

## 2020-01-15 ENCOUNTER — ANESTHESIA (OUTPATIENT)
Dept: PERIOP | Facility: AMBULARY SURGERY CENTER | Age: 60
End: 2020-01-15
Payer: COMMERCIAL

## 2020-01-15 VITALS
OXYGEN SATURATION: 95 % | BODY MASS INDEX: 35.45 KG/M2 | SYSTOLIC BLOOD PRESSURE: 141 MMHG | HEART RATE: 72 BPM | DIASTOLIC BLOOD PRESSURE: 70 MMHG | WEIGHT: 220.6 LBS | RESPIRATION RATE: 18 BRPM | HEIGHT: 66 IN | TEMPERATURE: 98.4 F

## 2020-01-15 DIAGNOSIS — M75.41 IMPINGEMENT SYNDROME OF RIGHT SHOULDER: Primary | ICD-10-CM

## 2020-01-15 LAB — GLUCOSE SERPL-MCNC: 133 MG/DL (ref 65–140)

## 2020-01-15 PROCEDURE — 29822 SHO ARTHRS SRG LMTD DBRDMT: CPT | Performed by: PHYSICIAN ASSISTANT

## 2020-01-15 PROCEDURE — 82948 REAGENT STRIP/BLOOD GLUCOSE: CPT

## 2020-01-15 PROCEDURE — 29826 SHO ARTHRS SRG DECOMPRESSION: CPT | Performed by: ORTHOPAEDIC SURGERY

## 2020-01-15 PROCEDURE — 29822 SHO ARTHRS SRG LMTD DBRDMT: CPT | Performed by: ORTHOPAEDIC SURGERY

## 2020-01-15 PROCEDURE — 99024 POSTOP FOLLOW-UP VISIT: CPT | Performed by: ORTHOPAEDIC SURGERY

## 2020-01-15 PROCEDURE — 29826 SHO ARTHRS SRG DECOMPRESSION: CPT | Performed by: PHYSICIAN ASSISTANT

## 2020-01-15 PROCEDURE — C9290 INJ, BUPIVACAINE LIPOSOME: HCPCS | Performed by: ANESTHESIOLOGY

## 2020-01-15 RX ORDER — GLYCOPYRROLATE 0.2 MG/ML
INJECTION INTRAMUSCULAR; INTRAVENOUS AS NEEDED
Status: DISCONTINUED | OUTPATIENT
Start: 2020-01-15 | End: 2020-01-15 | Stop reason: SURG

## 2020-01-15 RX ORDER — LIDOCAINE HYDROCHLORIDE 10 MG/ML
0.5 INJECTION, SOLUTION EPIDURAL; INFILTRATION; INTRACAUDAL; PERINEURAL ONCE AS NEEDED
Status: DISCONTINUED | OUTPATIENT
Start: 2020-01-15 | End: 2020-01-15 | Stop reason: HOSPADM

## 2020-01-15 RX ORDER — EPHEDRINE SULFATE 50 MG/ML
INJECTION INTRAVENOUS AS NEEDED
Status: DISCONTINUED | OUTPATIENT
Start: 2020-01-15 | End: 2020-01-15 | Stop reason: SURG

## 2020-01-15 RX ORDER — MIDAZOLAM HYDROCHLORIDE 2 MG/2ML
INJECTION, SOLUTION INTRAMUSCULAR; INTRAVENOUS AS NEEDED
Status: DISCONTINUED | OUTPATIENT
Start: 2020-01-15 | End: 2020-01-15 | Stop reason: SURG

## 2020-01-15 RX ORDER — MEPERIDINE HYDROCHLORIDE 25 MG/ML
12.5 INJECTION INTRAMUSCULAR; INTRAVENOUS; SUBCUTANEOUS AS NEEDED
Status: DISCONTINUED | OUTPATIENT
Start: 2020-01-15 | End: 2020-01-15 | Stop reason: HOSPADM

## 2020-01-15 RX ORDER — ONDANSETRON 2 MG/ML
4 INJECTION INTRAMUSCULAR; INTRAVENOUS ONCE AS NEEDED
Status: DISCONTINUED | OUTPATIENT
Start: 2020-01-15 | End: 2020-01-15 | Stop reason: HOSPADM

## 2020-01-15 RX ORDER — LIDOCAINE HYDROCHLORIDE 10 MG/ML
INJECTION, SOLUTION EPIDURAL; INFILTRATION; INTRACAUDAL; PERINEURAL AS NEEDED
Status: DISCONTINUED | OUTPATIENT
Start: 2020-01-15 | End: 2020-01-15 | Stop reason: SURG

## 2020-01-15 RX ORDER — DEXAMETHASONE SODIUM PHOSPHATE 10 MG/ML
INJECTION, SOLUTION INTRAMUSCULAR; INTRAVENOUS AS NEEDED
Status: DISCONTINUED | OUTPATIENT
Start: 2020-01-15 | End: 2020-01-15 | Stop reason: SURG

## 2020-01-15 RX ORDER — CEFAZOLIN SODIUM 2 G/50ML
2000 SOLUTION INTRAVENOUS ONCE
Status: COMPLETED | OUTPATIENT
Start: 2020-01-15 | End: 2020-01-15

## 2020-01-15 RX ORDER — PROMETHAZINE HYDROCHLORIDE 25 MG/ML
12.5 INJECTION, SOLUTION INTRAMUSCULAR; INTRAVENOUS ONCE AS NEEDED
Status: DISCONTINUED | OUTPATIENT
Start: 2020-01-15 | End: 2020-01-15 | Stop reason: HOSPADM

## 2020-01-15 RX ORDER — ALBUTEROL SULFATE 2.5 MG/3ML
2.5 SOLUTION RESPIRATORY (INHALATION) ONCE AS NEEDED
Status: DISCONTINUED | OUTPATIENT
Start: 2020-01-15 | End: 2020-01-15 | Stop reason: HOSPADM

## 2020-01-15 RX ORDER — FENTANYL CITRATE 50 UG/ML
INJECTION, SOLUTION INTRAMUSCULAR; INTRAVENOUS AS NEEDED
Status: DISCONTINUED | OUTPATIENT
Start: 2020-01-15 | End: 2020-01-15 | Stop reason: SURG

## 2020-01-15 RX ORDER — LABETALOL 20 MG/4 ML (5 MG/ML) INTRAVENOUS SYRINGE
5
Status: DISCONTINUED | OUTPATIENT
Start: 2020-01-15 | End: 2020-01-15 | Stop reason: HOSPADM

## 2020-01-15 RX ORDER — FENTANYL CITRATE/PF 50 MCG/ML
25 SYRINGE (ML) INJECTION
Status: DISCONTINUED | OUTPATIENT
Start: 2020-01-15 | End: 2020-01-15 | Stop reason: HOSPADM

## 2020-01-15 RX ORDER — ONDANSETRON 2 MG/ML
INJECTION INTRAMUSCULAR; INTRAVENOUS AS NEEDED
Status: DISCONTINUED | OUTPATIENT
Start: 2020-01-15 | End: 2020-01-15 | Stop reason: SURG

## 2020-01-15 RX ORDER — BUPIVACAINE HYDROCHLORIDE 5 MG/ML
INJECTION, SOLUTION PERINEURAL
Status: COMPLETED | OUTPATIENT
Start: 2020-01-15 | End: 2020-01-15

## 2020-01-15 RX ORDER — SODIUM CHLORIDE, SODIUM LACTATE, POTASSIUM CHLORIDE, CALCIUM CHLORIDE 600; 310; 30; 20 MG/100ML; MG/100ML; MG/100ML; MG/100ML
125 INJECTION, SOLUTION INTRAVENOUS CONTINUOUS
Status: DISCONTINUED | OUTPATIENT
Start: 2020-01-15 | End: 2020-01-15 | Stop reason: HOSPADM

## 2020-01-15 RX ORDER — PROPOFOL 10 MG/ML
INJECTION, EMULSION INTRAVENOUS AS NEEDED
Status: DISCONTINUED | OUTPATIENT
Start: 2020-01-15 | End: 2020-01-15 | Stop reason: SURG

## 2020-01-15 RX ORDER — HYDROMORPHONE HCL/PF 1 MG/ML
0.5 SYRINGE (ML) INJECTION
Status: DISCONTINUED | OUTPATIENT
Start: 2020-01-15 | End: 2020-01-15 | Stop reason: HOSPADM

## 2020-01-15 RX ORDER — OXYCODONE HYDROCHLORIDE 5 MG/1
TABLET ORAL
Qty: 15 TABLET | Refills: 0 | Status: SHIPPED | OUTPATIENT
Start: 2020-01-15 | End: 2020-02-03 | Stop reason: ALTCHOICE

## 2020-01-15 RX ADMIN — EPHEDRINE SULFATE 2.5 MG: 50 INJECTION, SOLUTION INTRAVENOUS at 08:47

## 2020-01-15 RX ADMIN — EPHEDRINE SULFATE 5 MG: 50 INJECTION, SOLUTION INTRAVENOUS at 09:08

## 2020-01-15 RX ADMIN — EPHEDRINE SULFATE 5 MG: 50 INJECTION, SOLUTION INTRAVENOUS at 08:57

## 2020-01-15 RX ADMIN — ONDANSETRON 4 MG: 2 INJECTION INTRAMUSCULAR; INTRAVENOUS at 08:47

## 2020-01-15 RX ADMIN — MIDAZOLAM HYDROCHLORIDE 2 MG: 1 INJECTION, SOLUTION INTRAMUSCULAR; INTRAVENOUS at 07:58

## 2020-01-15 RX ADMIN — DEXAMETHASONE SODIUM PHOSPHATE 4 MG: 10 INJECTION, SOLUTION INTRAMUSCULAR; INTRAVENOUS at 08:47

## 2020-01-15 RX ADMIN — SODIUM CHLORIDE, SODIUM LACTATE, POTASSIUM CHLORIDE, AND CALCIUM CHLORIDE: .6; .31; .03; .02 INJECTION, SOLUTION INTRAVENOUS at 08:30

## 2020-01-15 RX ADMIN — BUPIVACAINE HYDROCHLORIDE 5 ML: 5 INJECTION, SOLUTION PERINEURAL at 07:58

## 2020-01-15 RX ADMIN — FENTANYL CITRATE 25 MCG: 50 INJECTION, SOLUTION INTRAMUSCULAR; INTRAVENOUS at 08:44

## 2020-01-15 RX ADMIN — CEFAZOLIN SODIUM 2000 MG: 2 SOLUTION INTRAVENOUS at 08:38

## 2020-01-15 RX ADMIN — PROPOFOL 250 MG: 10 INJECTION, EMULSION INTRAVENOUS at 08:36

## 2020-01-15 RX ADMIN — BUPIVACAINE 20 ML: 13.3 INJECTION, SUSPENSION, LIPOSOMAL INFILTRATION at 07:58

## 2020-01-15 RX ADMIN — GLYCOPYRROLATE 0.1 MG: 0.2 INJECTION, SOLUTION INTRAMUSCULAR; INTRAVENOUS at 08:44

## 2020-01-15 RX ADMIN — LIDOCAINE HYDROCHLORIDE 50 MG: 10 INJECTION, SOLUTION EPIDURAL; INFILTRATION; INTRACAUDAL; PERINEURAL at 08:36

## 2020-01-15 NOTE — H&P
I identified and marked the patient in the pre-op holding area after confirming the surgical consent  No changes to medical health since the H&P was preformed  The patient's [facility] was [facility] to ensure compliance with applicable state laws

## 2020-01-15 NOTE — ANESTHESIA PREPROCEDURE EVALUATION
Review of Systems/Medical History  Patient summary reviewed  Chart reviewed  No history of anesthetic complications     Cardiovascular  Exercise tolerance (METS): good,  Hyperlipidemia, Hypertension controlled,    Pulmonary       GI/Hepatic    GERD , Liver disease ,             Endo/Other  Diabetes well controlled type 2 ,      GYN       Hematology   Musculoskeletal       Neurology   Psychology   Anxiety, Depression ,              Physical Exam    Airway    Mallampati score: IV  TM Distance: >3 FB  Neck ROM: full     Dental   No notable dental hx     Cardiovascular      Pulmonary      Other Findings        Anesthesia Plan  ASA Score- 2     Anesthesia Type- general with ASA Monitors  Additional Monitors:   Airway Plan: LMA  Comment: Patient seen and examined  History reviewed  Patient to be done under general anesthesia with LMA and routine monitors  IS block with Exparel to be performed preoperatively for post-op pain  Risks discussed with the patient  Consent obtained        Plan Factors-    Induction- intravenous  Postoperative Plan- Plan for postoperative opioid use  Planned trial extubation    Informed Consent- Anesthetic plan and risks discussed with patient  I personally reviewed this patient with the CRNA  Discussed and agreed on the Anesthesia Plan with the CRNA  Phil Caballero

## 2020-01-15 NOTE — DISCHARGE INSTRUCTIONS
You are being scheduled for a shoulder arthroscopy to treat your symptoms  Below are some instructions and information on what to expect before and after your surgery  Pre-Surgical Preparation for Arthroscopic Shoulder Surgery: You will be contacted the evening prior to your surgery to confirm the scheduled time of the procedure and when to arrive at the hospital    Do not eat or drink anything after midnight the night before your surgery  Since you are having out-patient surgery, make sure that you have someone who can drive you home later in the day  Also, prepare that person for a long day, as the process of safely preparing for and recovering from the procedure is more time consuming than the actual procedure! As you will be in a sling after surgery, please wear or bring a loose fitting button-down shirt so that you can easily place this over the sling when you leave the surgical suite  This avoids having to place the operative arm in a sleeve  Most patients find that this is the easiest outfit to wear for the first week or so after surgery so you may want to plan accordingly  Most patients find that lying down in bed after shoulder surgery accentuates their discomfort  This is likely related to the effect of gravity on the swelling in the shoulder  As a result, most patients sleep better in a recliner or in bed with pillows propped up behind their back for the first few days or weeks after surgery  It is a good idea to plan for this ahead of time so there will be less hassle getting things set up the night after surgery  What to Expect After Arthroscopic Shoulder Surgery: It is normal to have swelling and discomfort in the shoulder for several days or a week after surgery  It is also normal to have a small amount of drainage from the surgical wounds (especially the first few days after surgery), as we put fluid into the shoulder to visualize the structures during surgery  It is NOT normal to have foul smelling, purulent drainage and if this is noted, please contact the office immediately or proceed to the emergency room for evaluation as this may indicate an infection  Applying ice bags to the shoulder may help with pain that is not controlled by the regional block  Ice should be applied 20-30 minutes at a time, every hour or two  Make sure to put a thin towel or T-shirt next to your skin to avoid direct contact of the ice with the skin  Icing is most helpful in the first 48 hours, although many people find that continuing past this time frame lessens their postoperative pain  Please note that your post-operative dressing is not conductive to ice, so if you need to, it is okay to remove that dressing even the night after surgery and place band-aids over the wounds in order for the ice to take effect  Pain Control    Most patients will receive a nerve block, the local anesthetic may keep your whole arm numb for up to 4 days  You will be given a prescription for narcotic pain medication when you are discharged from the hospital   With the newer nerve block that is being utilized, patients are rarely requiring the use of this narcotic pain medication  If you find you do not tolerate that type of pain medicine well, call our office and we will try another one  In addition to the narcotic pain medication, it is safe to use an anti-inflammatory (unless the patient has a medical condition that would not allow safe use of this mediation)  This includes the Advil, Motrin, Ibuprofen and Alleve category of medications  Simply follow the over the counter dosing on the package and use as indicated as another adjunct  Importantly since these medications are all very similar, use only one of them  Tylenol is a separate medication that can be utilized as well and can be taken at the same time as the other medication or given in a "staggered" manner    Just make sure that you follow the dosing on the over the counter bottle instructions  Also make sure that the pain medication prescribed by Dr George Lopez team does not contain acetaminophen (this is found in Percocet and Vicodin)  Typically we do not prescribe those types of pain medications but if for some reason that has been prescribed DO NOT add more Tylenol (acetaminophen) as you could end up taking too much of that medication  As mentioned above, most patients find that lying down accentuates their discomfort  You might sleep better in a recliner, or propped up in bed  Dr Doyle Encarnacion encourages patients to safely ambulate around the house as much as possible in the first few days after the procedure as this can help with blood circulation in the legs  While the incidence of blood clots is very rare following shoulder surgery, early ambulation is a great way to help decrease the already low rate  24 hours after the surgery you may remove the bandage and cover incisions with Band-Aids if needed  At that time you may shower, the wounds will have a surgical glue that will protect them from shower water but do not submerge your incisions directly (bathing or swimming) until at least 2 weeks post-operatively  It is safe to let the arm hang at the side and take a shower and put on a shirt without the sling on  Just make sure that you do not use the operative are to reach out and grab anything as that may damage the repair  When you are done showering and getting dressed please return the operative arm to the sling  Unless noted otherwise in your discharge paperwork, Dr Doyle Encarnacion uses absorbable sutures so they do not need to be removed  Dr Suzi Turpin physician assistant (PA) will see you in the office a few days after the procedure to review the intra-operative findings and to initiate physical therapy if appropriate    A post-operative appointment should have been scheduled for you already, but if for some reason this did not happen, please call the office to make one  Physical therapy is important after nearly all shoulder surgeries and a detailed rehabilitation plan based on the specific intra-operative findings and procedures will be provided to your therapist at the first post-operative office visit  Most patients have post-operative therapy appointments scheduled pre-operatively, but if you do not, that will be handled at the first post-operative office visit  Unless expressly directed otherwise it is safe to remove the sling even the first day after the surgery and let the arm hang by the side  This allows patients to shower and even put a shirt on (bad arm in the sleeve first)  It is also safe to flex and extend their wrist, hand and fingers as much as possible when the block wears off  These simple motions can serve to pump fluid out of the forearm and decrease swelling in the arm

## 2020-01-15 NOTE — ANESTHESIA POSTPROCEDURE EVALUATION
Post-Op Assessment Note    CV Status:  Stable  Pain Score: 0    Pain management: adequate     Mental Status:  Alert and awake   Hydration Status:  Euvolemic and stable   PONV Controlled:  Controlled   Airway Patency:  Patent and adequate   Post Op Vitals Reviewed: Yes      Staff: with CRNAs, CRNA           BP   151/70   Temp   97 1   Pulse  70   Resp   18   SpO2   98 RA

## 2020-01-15 NOTE — OP NOTE
OPERATIVE REPORT  PATIENT NAME: Daniela Anne    :  1960  MRN: [id number]  Pt Location: [facility] ROOM 05    SURGERY DATE: 1/15/2020     SURGEON: Quang Taylor MD     ASSISTANT: Corey Arevalo PA-C     NOTE: Corey Arevalo PA-C was present throughout the entire procedure and performed essential assistance with patient prepping, draping, positioning, suture management, wound closure, sterile dressing application and sling application, all under my direct supervision  NOTE: No qualified resident physician was available for assistance    PREOPERATIVE DIAGNOSIS:  Right Shoulder Bursal Sided Supraspinatus Tear    POSTOPERATIVE DIAGNOSIS: Same plus Type 1 SLAP Tear    PROCEDURES: Surgical Arthroscopy Right Shoulder with Extensive Debridement (Type 1 SLAP and Bursal Sided Supraspinatus) and Subacromial Decompression    ANESTHESIA STAFF: Jany Malik MD     ANESTHESIA TYPE: General LMA with ultrasound guided interscalene block (Exparel)  The interscalene block was provided by the anesthesia staff per my request for postoperative pain control and to decrease the use of postoperative narcotic medication for pain control  COMPLICATIONS: None    FINDINGS: Small Bursal Sided Supraspinatus Tear and Type 1 SLAP Tear    SPECIMEN(S):  None    ESTIMATED BLOOD LOSS: Minimal    INDICATION:  Briefly, the patient is a 61 y o   female with right shoulder pain  MRI scan confirmed a bursal sided partial thickness supraspinatus tear  The patient elected for arthroscopic treatment  Informed consent was obtained after a thorough discussion of the risks and benefits of the procedure, as well as alternatives to the procedure  OPERATIVE TECHNIQUE:  On the day of surgery, I identified the patients right shoulder and marked it with my initials  The patient was taken to the operating room where anesthesia was induced and 2 grams of IV Cefazolin were given   The patient was examined in the supine position and was found to have full range of motion of the right shoulder with no instability   The patient was then positioned in the [address] [place] [address] chair position  All bony prominences were padded  The shoulder was prepped and draped in normal sterile fashion  After a time-out for safety, a standard posterior arthroscopic portal was made  Glenohumeral evaluation revealed intact glenohumeral articular cartilage with no loose bodies  There was a Type 1 SLAP tear with a large flap, the superior labrum was not detached from the glenoid  The undersurface of the supraspinatus subscapularis and infraspinatus were without partial tear  The remainder of the glenoid labrum was intact and the long-head biceps was stable to probing without partial tear or instability  A shaving device was introduced through the anterior cannula and a debridement of the Type 1 SLAP tear was performed to a stable border  After the intra-articular work was completed, the scope was then placed in the subacromial space through the same portal where a thorough bursectomy was performed  The bursal sided of the supraspinatus tear was found to have partial thickness fraying, this is by definition partial-thickness tearing of the bursal side but it was very shallow and not something requiring repair as the tendon was well attached to the tuberosity  Reciprocally the CA ligament had excessive fraying, this had the appearance of true external impingement so a shaver device was introduced and debridement of the bursal surface of the supraspinatus was performed  Again this was confirmed not to require repair, the bursal side of the infraspinatus was intact without partial or full-thickness tearing  The CA ligament was released off the anterolateral edge of acromion and a gentle acromioplasty was performed  The area was then irrigated  Scope was withdrawn  Wounds were closed with 4-0 Monocryl and Histoacryl   Sterile dressings and a sling with an abduction pillow was placed  The patient was awoken without complication and returned to the recovery room in good condition  We will see the patient back in the office next week to initiate therapy following Subacromial decompression protocol  At the end of procedure, the counts were correct       PATIENT DISPOSITION:  Stable to PACU      SIGNATURE: Bryce Arteaga MD  DATE: January 15, 2020  TIME: 9:16 AM

## 2020-01-21 ENCOUNTER — EVALUATION (OUTPATIENT)
Dept: PHYSICAL THERAPY | Age: 60
End: 2020-01-21
Payer: COMMERCIAL

## 2020-01-21 ENCOUNTER — TELEPHONE (OUTPATIENT)
Dept: OBGYN CLINIC | Facility: HOSPITAL | Age: 60
End: 2020-01-21

## 2020-01-21 NOTE — TELEPHONE ENCOUNTER
Jo Rubio from [address] [place] [address] is calling to request a new script for physical therapy since patient recently had surgery

## 2020-01-21 NOTE — TELEPHONE ENCOUNTER
The doctor's operative note states that the patient will be seen back in the office prior to re-initiating therapy    You are welcome to review the doctor's note on epic    Please inform the Caller

## 2020-01-23 ENCOUNTER — EVALUATION (OUTPATIENT)
Dept: PHYSICAL THERAPY | Age: 60
End: 2020-01-23
Payer: COMMERCIAL

## 2020-01-23 DIAGNOSIS — G89.29 CHRONIC RIGHT SHOULDER PAIN: ICD-10-CM

## 2020-01-23 DIAGNOSIS — M25.511 CHRONIC RIGHT SHOULDER PAIN: ICD-10-CM

## 2020-01-23 DIAGNOSIS — Z98.890 S/P ARTHROSCOPY OF RIGHT SHOULDER: Primary | ICD-10-CM

## 2020-01-23 PROCEDURE — 97162 PT EVAL MOD COMPLEX 30 MIN: CPT | Performed by: PHYSICAL THERAPIST

## 2020-01-23 PROCEDURE — 97110 THERAPEUTIC EXERCISES: CPT | Performed by: PHYSICAL THERAPIST

## 2020-01-23 PROCEDURE — 97140 MANUAL THERAPY 1/> REGIONS: CPT | Performed by: PHYSICAL THERAPIST

## 2020-01-23 NOTE — PROGRESS NOTES
PT Evaluation     Today's date: 2020  Patient name: Valentina Gonzáles  : 1960  MRN: [id number]  Referring provider: Pk Bethea MD  Dx:   Encounter Diagnosis     ICD-10-CM    1  S/P arthroscopy of right shoulder Z98 890    2  Chronic right shoulder pain M25 511     G89 29                   Assessment  Assessment details: Valentina Gonzáles is a 61 y o  female who presents with right shoulder pain radiating into bicep region pain, decreased gross motor shoulder strength, decreased A/PROM and decreased joint mobility  Due to these impairments, Patient has difficulty performing a/iadls including taking care of farm animals, recreational activities, work-related activities and engaging in social activities  Patient's clinical presentation is consistent with the referring diagnosis of s/p right shoulder arthroscopy for acromioplasty and debridement  Patient would benefit from skilled physical therapy to address the aforementioned impairments, improve her level of function and to improve her overall quality of life  Impairments: abnormal or restricted ROM, activity intolerance, impaired physical strength, lacks appropriate home exercise program and pain with function  Functional limitations: pt is right handed and can not use right arm for adls/iadls, can not reach overheadUnderstanding of Dx/Px/POC: excellent  Goals  ST-3 WEEKS  1  Decrease pain to <5/10 on VAS  2   Increase PROM >150 FF,ABD;  ER>80; IR>50  3  Increase UE MMT to 3/5  LT WEEKS  1  Patient to be independent with a/iadls  2  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  3  Independent with HEP and/or fitness program   4  Improve FOTO score > 60      Plan  Patient would benefit from: skilled physical therapy  Planned modality interventions: cryotherapy, thermotherapy: hydrocollator packs and unattended electrical stimulation  Planned therapy interventions: functional ROM exercises, home exercise program, joint mobilization, manual therapy, neuromuscular re-education, patient education, strengthening, stretching, therapeutic activities and therapeutic exercise  Frequency: 2x week  Duration in weeks: 8  Plan of Care beginning date: 2020  Plan of Care expiration date: 2020  Treatment plan discussed with: patient        Subjective Evaluation    History of Present Illness  Date of surgery: 1/15/2020  Mechanism of injury: Pt had a history of right shoulder pain and had prior PT without relief  She underwent right shoulder arthroscopy on 1/15/2020 for acromioplasty with bursectomy and labral debridement     Quality of life: good    Pain  Current pain rating: 3  At best pain rating: 3  At worst pain ratin  Location: right shoulder to bicepital region  Quality: dull ache, sharp and radiating  Relieving factors: ice    Social Support  Lives with: spouse    Hand dominance: right    Treatments  Previous treatment: physical therapy  Patient Goals  Patient goals for therapy: decreased pain, increased motion, increased strength, independence with ADLs/IADLs, return to sport/leisure activities and return to work          Objective     Active Range of Motion   Left Shoulder   Normal active range of motion    Right Shoulder   Flexion: 85 degrees   Abduction: 60 degrees     Left Elbow   Normal active range of motion    Right Elbow   Normal active range of motion    Passive Range of Motion   Left Shoulder   Flexion: 162 degrees   Abduction: 165 degrees   External rotation 90°: 90 degrees   Internal rotation 90°: 68 degrees     Right Shoulder   Flexion: 120 degrees   Abduction: 95 degrees   External rotation 90°: 59 degrees   Internal rotation 90°: 39 degrees     Strength/Myotome Testing     Left Shoulder   Normal muscle strength    Right Shoulder     Planes of Motion   Flexion: 2   Abduction: 2   External rotation at 0°: 3-   Internal rotation at 0°: 3     Left Elbow   Normal strength    Right Elbow   Flexion: 3  Extension: 3  Forearm supination: 3  Forearm pronation: 3             Precautions: standard      Manual  1/23            Right UE                                                                     Exercise Diary  1/23            pendulums 30x ea            Ball closed chain CW/CCW/fwd&back                                                                                                                                                                                                                                                           Modalities  1/23            Ice ES 10

## 2020-01-27 ENCOUNTER — OFFICE VISIT (OUTPATIENT)
Dept: PHYSICAL THERAPY | Age: 60
End: 2020-01-27
Payer: COMMERCIAL

## 2020-01-27 DIAGNOSIS — Z98.890 S/P ARTHROSCOPY OF RIGHT SHOULDER: Primary | ICD-10-CM

## 2020-01-27 DIAGNOSIS — M25.511 CHRONIC RIGHT SHOULDER PAIN: ICD-10-CM

## 2020-01-27 DIAGNOSIS — G89.29 CHRONIC RIGHT SHOULDER PAIN: ICD-10-CM

## 2020-01-27 PROCEDURE — 97110 THERAPEUTIC EXERCISES: CPT | Performed by: PHYSICAL THERAPIST

## 2020-01-27 PROCEDURE — 97014 ELECTRIC STIMULATION THERAPY: CPT | Performed by: PHYSICAL THERAPIST

## 2020-01-27 PROCEDURE — 97140 MANUAL THERAPY 1/> REGIONS: CPT | Performed by: PHYSICAL THERAPIST

## 2020-01-27 NOTE — PROGRESS NOTES
Daily Note     Today's date: 2020  Patient name: Desmond Woods  : 1960  MRN: [id number]  Referring provider: Isaiah Jacinto MD  Dx:   Encounter Diagnosis     ICD-10-CM    1  S/P arthroscopy of right shoulder Z98 890    2  Chronic right shoulder pain M25 511     G89 29        Start Time: 0845          Subjective: Pt reports she is feeling much better, pain 1/10  Objective: See treatment diary below      ER 90  IR 53    Assessment: Tolerated treatment well  Patient had less pain, improved ROM  Increased TE with no issues  Plan: Continue per plan of care        Precautions: standard      Manual             Right UE 15 10                                                                   Exercise Diary             pendulums 30x ea 30x           Ball closed chain CW/CCW/fwd&back  30x ea           Cane press up/FF  30x ea           SA punch  30x           scap stabilization circles CW/CCW  30x ea           Wrist exs 30x 2#/ 30           gripper 30x 30           digiflex 30x 15x           RTC tband  Red 20x ea           Row Tband  Red 30                                                                                                                                                 Modalities             Ice ES 10 10 MH

## 2020-01-28 ENCOUNTER — OFFICE VISIT (OUTPATIENT)
Dept: OBGYN CLINIC | Facility: OTHER | Age: 60
End: 2020-01-28

## 2020-01-28 VITALS
SYSTOLIC BLOOD PRESSURE: 151 MMHG | HEIGHT: 66 IN | BODY MASS INDEX: 35.36 KG/M2 | DIASTOLIC BLOOD PRESSURE: 84 MMHG | HEART RATE: 80 BPM | WEIGHT: 220 LBS

## 2020-01-28 DIAGNOSIS — Z48.89 AFTERCARE FOLLOWING SURGERY: Primary | ICD-10-CM

## 2020-01-28 PROCEDURE — 99024 POSTOP FOLLOW-UP VISIT: CPT | Performed by: ORTHOPAEDIC SURGERY

## 2020-01-28 NOTE — LETTER
January 28, 2020     Patient: Tessa Luna   YOB: 1960   Date of Visit: 1/28/2020       To Whom it May Concern:    Randolph Prater is under my professional care  She was seen in my office on 1/28/2020  She is permitted to return to work on February 3rd , 2020    She has no restrictions    Thank you    If you have any questions or concerns, please don't hesitate to call  Sincerely,          Avelino Ulloa MD        CC: Tc Hawley

## 2020-01-28 NOTE — PROGRESS NOTES
Subjective;    70-year-old female who is 2 weeks status post right shoulder rotator cuff repair  She comes the office wearing a right shoulder immobilizer and minimal pain or discomfort  She describes difficulty with forward flexion past 60° and abduction past 45° 2nd to pain  She is doing her PT at [facility], and is very pleased with the healing of her incisions  Past Medical History:   Diagnosis Date    Anemia     Anxiety     Closed head injury 6/11/2018    Closed rib fracture 6/11/2018    Right sided    Colon polyp     Concussion with loss of consciousness 6/22/2018    Depression     Diabetes 1 5, managed as type 2 ([facility] )     Diarrhea     Fatty liver     Fecal incontinence     GERD (gastroesophageal reflux disease)     Head injury     6/10/11    Hyperlipidemia     Hypertension     Psoriasis     SAH (subarachnoid hemorrhage) ([facility] ) 6/11/2018       Past Surgical History:   Procedure Laterality Date    CHOLECYSTECTOMY      COLONOSCOPY N/A 10/24/2017    Procedure: COLONOSCOPY;  Surgeon: Taj Rodriguez MD;  Location: [facility]; Service: Colorectal    COLONOSCOPY W/ ENDOSCOPIC US N/A 10/24/2017    Procedure: ANAL ENDOSCOPIC U/S;  Surgeon: Taj Rodriguez MD;  Location: [facility];   Service: Colorectal    DILATION AND CURETTAGE OF UTERUS      EGD      ENDOMETRIAL BIOPSY      WITHOUT CERVICAL DILATION    HYSTERECTOMY      NASAL SEPTUM SURGERY      OTHER SURGICAL HISTORY      Episiotomy repair    CO SHLDR ARTHROSCOP,SURG,W/ROTAT CUFF REPR Right 1/15/2020    Procedure: SHOULDER ARTHROSCOPIC DEBRIDEMENT OF PARTIAL THICKNESS ROTATOR CUFF TEAR, SAD;  Surgeon: Darline Villalba MD;  [facility];  Service: Orthopedics    ROTATOR CUFF REPAIR Left        Family History   Problem Relation Age of Onset    Lung cancer Father     Heart disease Father     Diabetes Father     Other Family         ADENOCARCINOMA IN SIOBHAN IN VILLOUS ADENOMA OF THE BREAST, MIGRAINES, SKIN DISORDER    Depression Family     Anxiety disorder Family     Diabetes Family         MELLITUS    Fibrocystic breast disease Family     Heart disease Family     Hiatal hernia Family     Hypertension Family     Irritable bowel syndrome Family     Kidney disease Family     Urinary tract infection Family     Atrial fibrillation Mother     Hypertension Mother     Obesity Brother     No Known Problems Brother        Social History     Tobacco Use    Smoking status: Former Smoker     Last attempt to quit:      Years since quittin 0    Smokeless tobacco: Never Used    Tobacco comment: Smoked for 10yrs  Substance Use Topics    Alcohol use: Yes     Frequency: 2-4 times a month     Drinks per session: 1 or 2     Binge frequency: Never     Comment: socially    Drug use: No     Exam;    Adult female in no acute distress  Right shoulder immobilizer place  Right wrist dorsiflexion thumb extension and finger abduction intact and symmetrical to the left  She has palpable pain anterior proximal biceps and glenohumeral area she has lesser discomfort to palpation of the lateral aspect of her acromial edge  Impression; First postoperative visit status post arthroscopic rotator cuff repair right shoulder    Plan;    She may discontinue her shoulder immobilizer  She will continue physical therapy as directed  She may return to work without restrictions on , for this a note was written    We will see her again in the office in 6 weeks with the orthopedic shoulder PA, Ms Martin Nelson or Ms Jer Blankenship  Her evaluation was provided by the attending surgeon today was my privilege to assist him in its delivery

## 2020-01-29 ENCOUNTER — APPOINTMENT (OUTPATIENT)
Dept: PHYSICAL THERAPY | Age: 60
End: 2020-01-29
Payer: COMMERCIAL

## 2020-01-29 ENCOUNTER — OFFICE VISIT (OUTPATIENT)
Dept: PHYSICAL THERAPY | Age: 60
End: 2020-01-29
Payer: COMMERCIAL

## 2020-01-29 DIAGNOSIS — Z98.890 S/P ARTHROSCOPY OF RIGHT SHOULDER: Primary | ICD-10-CM

## 2020-01-29 DIAGNOSIS — M25.511 CHRONIC RIGHT SHOULDER PAIN: ICD-10-CM

## 2020-01-29 DIAGNOSIS — G89.29 CHRONIC RIGHT SHOULDER PAIN: ICD-10-CM

## 2020-01-29 PROCEDURE — 97014 ELECTRIC STIMULATION THERAPY: CPT | Performed by: PHYSICAL THERAPIST

## 2020-01-29 PROCEDURE — 97140 MANUAL THERAPY 1/> REGIONS: CPT | Performed by: PHYSICAL THERAPIST

## 2020-01-29 PROCEDURE — 97110 THERAPEUTIC EXERCISES: CPT | Performed by: PHYSICAL THERAPIST

## 2020-01-29 NOTE — PROGRESS NOTES
Daily Note     Today's date: 2020  Patient name: Aimee Pedersen  : 1960  MRN: [id number]  Referring provider: Amy Galeana MD  Dx:   Encounter Diagnosis     ICD-10-CM    1  S/P arthroscopy of right shoulder Z98 890    2  Chronic right shoulder pain M25 511     G89 29        Start Time: 0715  Stop Time: 0810  Total time in clinic (min): 55 minutes    Subjective: Pt had follow-up with Dr Juancho Blair, continue PT  Pt to RTW 2/3  Pt reports pain prior to PT 1/10  Objective: See treatment diary below      Assessment: Tolerated treatment well  Patient ROM gradually improving  Plan: Continue per plan of care        Precautions: standard      Manual            Right UE 15 10 10                                                                  Exercise Diary            pendulums 30x ea 30x 30x          Ball closed chain CW/CCW/fwd&back  30x ea 30x          Cane press up/FF  30x ea 30x          SA punch  30x 30x          scap stabilization circles CW/CCW  30x ea 30x          AROM FF, POS   20x ea                       Wrist exs 30x 2#/ 30 2#/ 30          gripper 30x 30 D/C          digiflex 30x 15x D/C          RTC tband  Red 20x ea 30x          Row Tband  Red 30 30x          bicep   20#/ 30          tricep   35#/ 30          UBE   4'                                                                                                         Modalities             Ice ES 10 10 MH

## 2020-02-03 ENCOUNTER — OFFICE VISIT (OUTPATIENT)
Dept: BARIATRICS | Facility: CLINIC | Age: 60
End: 2020-02-03
Payer: COMMERCIAL

## 2020-02-03 VITALS
WEIGHT: 223.5 LBS | HEART RATE: 86 BPM | HEIGHT: 66 IN | SYSTOLIC BLOOD PRESSURE: 140 MMHG | BODY MASS INDEX: 35.92 KG/M2 | DIASTOLIC BLOOD PRESSURE: 80 MMHG | TEMPERATURE: 96.7 F

## 2020-02-03 DIAGNOSIS — E11.29 TYPE 2 DIABETES MELLITUS WITH MICROALBUMINURIA, WITHOUT LONG-TERM CURRENT USE OF INSULIN (HCC): ICD-10-CM

## 2020-02-03 DIAGNOSIS — I10 ESSENTIAL HYPERTENSION: ICD-10-CM

## 2020-02-03 DIAGNOSIS — R80.9 TYPE 2 DIABETES MELLITUS WITH MICROALBUMINURIA, WITHOUT LONG-TERM CURRENT USE OF INSULIN (HCC): ICD-10-CM

## 2020-02-03 DIAGNOSIS — E66.01 MORBID (SEVERE) OBESITY DUE TO EXCESS CALORIES (HCC): Primary | ICD-10-CM

## 2020-02-03 PROCEDURE — 3079F DIAST BP 80-89 MM HG: CPT | Performed by: PHYSICIAN ASSISTANT

## 2020-02-03 PROCEDURE — 99213 OFFICE O/P EST LOW 20 MIN: CPT | Performed by: PHYSICIAN ASSISTANT

## 2020-02-03 PROCEDURE — 1036F TOBACCO NON-USER: CPT | Performed by: PHYSICIAN ASSISTANT

## 2020-02-03 PROCEDURE — 2022F DILAT RTA XM EVC RTNOPTHY: CPT | Performed by: PHYSICIAN ASSISTANT

## 2020-02-03 PROCEDURE — 3066F NEPHROPATHY DOC TX: CPT | Performed by: PHYSICIAN ASSISTANT

## 2020-02-03 PROCEDURE — 3077F SYST BP >= 140 MM HG: CPT | Performed by: PHYSICIAN ASSISTANT

## 2020-02-03 PROCEDURE — 3008F BODY MASS INDEX DOCD: CPT | Performed by: PHYSICIAN ASSISTANT

## 2020-02-03 NOTE — PROGRESS NOTES
Assessment/Plan:  Patient seen at [place] office     Diagnoses and all orders for this visit:    Morbid (severe) obesity due to excess calories ([facility] )    Interested in Itz-En-Y Gastric Bypass with Dr Jocelyn Mcgee  10% Weight loss-patient needs to maintain weight prior to surgery    Screening labs-Not Completed  Support Group-Completed  Cardiac Risk Assessment-Not Completed  Upper Endoscopy-Not Completed; H  Pylori biopsy-Not completed, H pylori stool antigen test ordered-  no  Sleep Medicine Assessment-Not applicable  Alcohol and nicotine use is not recommended following bariatric surgery  NSAIDs should be avoided following bariatric surgery  Advised that pregnancy should be avoided following bariatric surgery for 12-18 months and should not solely rely on OCP and consider additional/alternative sources for contraception due to potential absorption issues-Completed    Type 2 diabetes mellitus with microalbuminuria, without long-term current use of insulin ([facility] )  - continue medication/management per PCP     Essential hypertension  - 140/80 today  - continue medications       Follow up in approximately 1 month for next weight check   Goals:  Food log (ie ) [http://www  [place] [employer],sparkpeople  com,loseit com,calorieking  com,etc]www  myfitnesspal com,sparkpeople  com,loseit com,calorieking  com,etc  baritastic  No sugary beverages  At least 64oz of water daily  Increase physical activity by 10 minutes daily   Gradually increase physical activity to a goal of 5 days per week for 30 minutes of MODERATE intensity PLUS 2 days per week of FULL BODY resistance training  Follow lessons 1-6 in the manual  Follow the 30/60 minute rule  Goal protein intake of 60-80 grams per day  Alcohol and nicotine use is not recommended following bariatric surgery  NSAIDs (Motrin, Advil, Aleve, Naproxen, ibuprofen, etc) should be avoided following bariatric surgery)    Subjective:   Chief Complaint   Patient presents with    Weight Check 2/6     Patient ID: Raymond Greene is a 61 y o  female with excess weight/obesity here to pursue weight management  Past Medical History:   Diagnosis Date    Anemia     Anxiety     Closed head injury 6/11/2018    Closed rib fracture 6/11/2018    Right sided    Colon polyp     Concussion with loss of consciousness 6/22/2018    Depression     Diabetes 1 5, managed as type 2 ([facility] )     Diarrhea     Fatty liver     Fecal incontinence     GERD (gastroesophageal reflux disease)     Head injury     6/10/11    Hyperlipidemia     Hypertension     Psoriasis     SAH (subarachnoid hemorrhage) ([facility] ) 6/11/2018     HPI:  The patient has been:  Following the lessons in the manual- yes  Practicing the 30/60 minute rule- yes  Food logging- yes  Exercise- no; patient just had rotator cuff surgery Jan 13 is doing very well  Alcohol- socially   Tobacco- no  NSAIDs- no  The following portions of the patient's history were reviewed and updated as appropriate: allergies, current medications, past family history, past medical history, past social history, past surgical history and problem list   Review of Systems   Constitutional: Negative  HENT:        + heartburn   Respiratory: Negative  Cardiovascular: Negative  Gastrointestinal: Negative  Musculoskeletal: Positive for arthralgias  Skin: Positive for rash (+ psoriasis but controlled on medication)  Neurological: Negative      Psychiatric/Behavioral:        Denies concerns with mood      Objective:  /80 (BP Location: Left arm, Patient Position: Sitting)   Pulse 86   Temp (!) 96 7 °F (35 9 °C) (Tympanic)   Ht 5' 5 5" (1 664 m)   Wt 101 kg (223 lb 8 oz)   BMI 36 63 kg/m²   Physical Exam

## 2020-02-04 ENCOUNTER — OFFICE VISIT (OUTPATIENT)
Dept: PHYSICAL THERAPY | Age: 60
End: 2020-02-04
Payer: COMMERCIAL

## 2020-02-04 DIAGNOSIS — Z98.890 S/P ARTHROSCOPY OF RIGHT SHOULDER: Primary | ICD-10-CM

## 2020-02-04 DIAGNOSIS — G89.29 CHRONIC RIGHT SHOULDER PAIN: ICD-10-CM

## 2020-02-04 DIAGNOSIS — M25.511 CHRONIC RIGHT SHOULDER PAIN: ICD-10-CM

## 2020-02-04 PROCEDURE — 97014 ELECTRIC STIMULATION THERAPY: CPT | Performed by: PHYSICAL THERAPIST

## 2020-02-04 PROCEDURE — 97140 MANUAL THERAPY 1/> REGIONS: CPT | Performed by: PHYSICAL THERAPIST

## 2020-02-04 PROCEDURE — 97110 THERAPEUTIC EXERCISES: CPT | Performed by: PHYSICAL THERAPIST

## 2020-02-04 NOTE — PROGRESS NOTES
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, but it did dissipate quickly and did not persist   Pt RTW  Objective: See treatment diary below      ER WNL  IR 58    Assessment: Tolerated treatment well  Patient has ild clicking at times but not painful  Good ROM progression  Plan: Continue per plan of care        Precautions: standard      Manual   2/         Right UE 15 10 10 10                                                                 Exercise Diary   2/4         pendulums 30x ea 30x 30x 30x         Ball closed chain CW/CCW/fwd&back  30x ea 30x 30x         Cane press up/FF  30x ea 30x 30x         SA punch  30x 30x 30x         scap stabilization circles CW/CCW  30x ea 30x 30x  red         AROM FF, POS   20x ea 20x                      Wrist exs 30x 2#/ 30 2#/ 30 2#/ 30         gripper 30x 30 D/C          digiflex 30x 15x D/C          RTC tband  Red 20x ea 30x 30x         Row Tband  Red 30 30x 30x         bicep   20#/ 30 20#/ 30         tricep   35#/ 30 35#/ 30         UBE   4' 4'                                                                                                        Modalities   2/4         Ice ES 10 10 MH

## 2020-02-07 ENCOUNTER — OFFICE VISIT (OUTPATIENT)
Dept: PHYSICAL THERAPY | Age: 60
End: 2020-02-07
Payer: COMMERCIAL

## 2020-02-07 DIAGNOSIS — G89.29 CHRONIC RIGHT SHOULDER PAIN: ICD-10-CM

## 2020-02-07 DIAGNOSIS — M25.511 CHRONIC RIGHT SHOULDER PAIN: ICD-10-CM

## 2020-02-07 DIAGNOSIS — Z98.890 S/P ARTHROSCOPY OF RIGHT SHOULDER: Primary | ICD-10-CM

## 2020-02-07 PROCEDURE — 97014 ELECTRIC STIMULATION THERAPY: CPT | Performed by: PHYSICAL THERAPIST

## 2020-02-07 PROCEDURE — 97110 THERAPEUTIC EXERCISES: CPT | Performed by: PHYSICAL THERAPIST

## 2020-02-07 PROCEDURE — 97140 MANUAL THERAPY 1/> REGIONS: CPT | Performed by: PHYSICAL THERAPIST

## 2020-02-07 NOTE — PROGRESS NOTES
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  Plan: Continue per plan of care  Potential D/C next week       Precautions: standard      Manual          Right UE 15 10 10 10 10                                                                Exercise Diary          pendulums 30x ea 30x 30x 30x 30x        Ball closed chain CW/CCW/fwd&back  30x ea 30x 30x 30x        Cane press up/FF  30x ea 30x 30x 30x ea        SA punch  30x 30x 30x 30x red        scap stabilization circles CW/CCW  30x ea 30x 30x  red 30x  red        AROM FF, POS   20x ea 20x 20x green ball        IR towel stretch     5x10"        Wrist exs 30x 2#/ 30 2#/ 30 2#/ 30 2#/ 30        gripper 30x 30 D/C          digiflex 30x 15x D/C          RTC tband  Red 20x ea 30x 30x 30x  Green ER/IR  Blue ext        Row Tband  Red 30 30x 30x 30x        bicep   20#/ 30 20#/ 30 20#/ 30        tricep   35#/ 30 35#/ 30 35#/ 30        UBE   4' 4' 6        Blackburns 3,4     20x                                                                                          Modalities   2        Ice ES 10 10 MH   10 MH

## 2020-02-11 ENCOUNTER — OFFICE VISIT (OUTPATIENT)
Dept: PHYSICAL THERAPY | Age: 60
End: 2020-02-11
Payer: COMMERCIAL

## 2020-02-11 DIAGNOSIS — Z98.890 S/P ARTHROSCOPY OF RIGHT SHOULDER: Primary | ICD-10-CM

## 2020-02-11 DIAGNOSIS — I10 ESSENTIAL HYPERTENSION: ICD-10-CM

## 2020-02-11 DIAGNOSIS — G89.29 CHRONIC RIGHT SHOULDER PAIN: ICD-10-CM

## 2020-02-11 DIAGNOSIS — M25.511 CHRONIC RIGHT SHOULDER PAIN: ICD-10-CM

## 2020-02-11 PROCEDURE — 97140 MANUAL THERAPY 1/> REGIONS: CPT | Performed by: PHYSICAL THERAPIST

## 2020-02-11 PROCEDURE — 97110 THERAPEUTIC EXERCISES: CPT | Performed by: PHYSICAL THERAPIST

## 2020-02-11 PROCEDURE — 97014 ELECTRIC STIMULATION THERAPY: CPT | Performed by: PHYSICAL THERAPIST

## 2020-02-11 RX ORDER — VALSARTAN 320 MG/1
TABLET ORAL
Qty: 90 TABLET | Refills: 1 | Status: SHIPPED | OUTPATIENT
Start: 2020-02-11 | End: 2020-05-14

## 2020-02-11 NOTE — PROGRESS NOTES
Daily Note     Today's date: 2020  Patient name: Luz Sheth  : 1960  MRN: [id number]  Referring provider: Daksha Mejia MD  Dx:   Encounter Diagnosis     ICD-10-CM    1  S/P arthroscopy of right shoulder Z98 890    2  Chronic right shoulder pain M25 511     G89 29        Start Time: 0840  Stop Time: 0935  Total time in clinic (min): 55 minutes    Subjective: Pt reports she has no pain  Objective: See treatment diary below      Assessment: Tolerated treatment well  Patient PROM near full  IR behind back improving with addition of towel stretch  Pt can now get to bra line  Mild weakness in ABD  Plan: Potential discharge next visit       Precautions: standard      Manual         Right UE 15 10 10 10 10 10                                                               Exercise Diary         pendulums 30x ea 30x 30x 30x 30x        Ball closed chain CW/CCW/fwd&back  30x ea 30x 30x 30x 30x       Cane press up/FF  30x ea 30x 30x 30x ea 3#/ 30x ea       SA punch  30x 30x 30x 30x red 30x       scap stabilization circles CW/CCW  30x ea 30x 30x  red 30x  red 30x ea       AROM FF, POS   20x ea 20x 20x green ball 20x red ball       IR towel stretch     5x10" 5x10"       Wrist exs 30x 2#/ 30 2#/ 30 2#/ 30 2#/ 30 2#/ 30       gripper 30x 30 D/C          digiflex 30x 15x D/C          RTC tband  Red 20x ea 30x 30x 30x  Green ER/IR  Blue ext 30x blue       Row Tband  Red 30 30x 30x 30x 30x       bicep   20#/ 30 20#/ 30 20#/ 30 20#/ 30       tricep   35#/ 30 35#/ 30 35#/ 30 35#/ 30       UBE   4' 4' 6 6'       Blackburns 3,4     20x 10x                                                                                         Modalities         Ice ES 10 10 MH   10 MH 10

## 2020-02-14 ENCOUNTER — OFFICE VISIT (OUTPATIENT)
Dept: PHYSICAL THERAPY | Age: 60
End: 2020-02-14
Payer: COMMERCIAL

## 2020-02-14 DIAGNOSIS — Z98.890 S/P ARTHROSCOPY OF RIGHT SHOULDER: Primary | ICD-10-CM

## 2020-02-14 DIAGNOSIS — M25.511 CHRONIC RIGHT SHOULDER PAIN: ICD-10-CM

## 2020-02-14 DIAGNOSIS — G89.29 CHRONIC RIGHT SHOULDER PAIN: ICD-10-CM

## 2020-02-14 PROCEDURE — 97110 THERAPEUTIC EXERCISES: CPT | Performed by: PHYSICAL THERAPIST

## 2020-02-14 PROCEDURE — 97014 ELECTRIC STIMULATION THERAPY: CPT | Performed by: PHYSICAL THERAPIST

## 2020-02-14 PROCEDURE — 97140 MANUAL THERAPY 1/> REGIONS: CPT | Performed by: PHYSICAL THERAPIST

## 2020-02-14 NOTE — PROGRESS NOTES
Daily Note/Discharge    Today's date: 2020  Patient name: Kaylan Anderson  : 1960  MRN: [id number]  Referring provider: Aj Lau MD  Dx:   Encounter Diagnosis     ICD-10-CM    1  S/P arthroscopy of right shoulder Z98 890    2  Chronic right shoulder pain M25 511     G89 29        Start Time: 08  Stop Time: 925  Total time in clinic (min): 55 minutes    Subjective: Pt reports she generally has no pain except for an occasional twinge in the bicep region that is mainly with lifting during activity  Objective: See treatment diary below      Assessment: Tolerated treatment well  Patient has met all goals, ROM resotred to WNL, strength continues to improve and will likely increase with pt perfroming independent HEP/fitness program   Goals  ST-3 WEEKS  1  Decrease pain to <5/10 on VAS  Achieved  2  Increase PROM >150 FF,ABD;  ER>80; IR>50  AChieved  3  Increase UE MMT to 3/5  Achieved    LT WEEKS  1  Patient to be independent with a/iadls  Achieved  2  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  Achieved  3  Independent with HEP and/or fitness program   Achieved  4  Improve FOTO score > 60  AChieved    Plan: Discharge PT will all goals achieved       Precautions: standard      Manual   2      Right UE 15 10 10 10 10 10 10                                                              Exercise Diary   2/ 2      pendulums 30x ea 30x 30x 30x 30x        Ball closed chain CW/CCW/fwd&back  30x ea 30x 30x 30x 30x 30x      Cane press up/FF  30x ea 30x 30x 30x ea 3#/ 30x ea 3#/ 30      SA punch  30x 30x 30x 30x red 30x 30x      scap stabilization circles CW/CCW  30x ea 30x 30x  red 30x  red 30x ea 30x      AROM FF, POS   20x ea 20x 20x green ball 20x red ball 20x      IR towel stretch     5x10" 5x10" 5x10      Wrist exs 30x 2#/ 30 2#/ 30 2#/ 30 2#/ 30 2#/ 30 2#/ 30      gripper 30x 30 D/C digiflex 30x 15x D/C          RTC tband  Red 20x ea 30x 30x 30x  Green ER/IR  Blue ext 30x blue       Row Tband  Red 30 30x 30x 30x 30x 30X      bicep   20#/ 30 20#/ 30 20#/ 30 20#/ 30 20#/ 30      tricep   35#/ 30 35#/ 30 35#/ 30 35#/ 30 35#/ 30      UBE   4' 4' 6 6' 6      Blackburns 3,4     20x 10x 10                                                                                        Modalities  1/23 1/27 1/29 2/4 2/7 2/11 2/14      Ice ES 10 10 MH   10 MH 10 10

## 2020-02-18 ENCOUNTER — APPOINTMENT (OUTPATIENT)
Dept: PHYSICAL THERAPY | Age: 60
End: 2020-02-18
Payer: COMMERCIAL

## 2020-02-21 ENCOUNTER — APPOINTMENT (OUTPATIENT)
Dept: PHYSICAL THERAPY | Age: 60
End: 2020-02-21
Payer: COMMERCIAL

## 2020-02-25 ENCOUNTER — APPOINTMENT (OUTPATIENT)
Dept: PHYSICAL THERAPY | Age: 60
End: 2020-02-25
Payer: COMMERCIAL

## 2020-02-28 ENCOUNTER — APPOINTMENT (OUTPATIENT)
Dept: PHYSICAL THERAPY | Age: 60
End: 2020-02-28
Payer: COMMERCIAL

## 2020-03-05 ENCOUNTER — OFFICE VISIT (OUTPATIENT)
Dept: BARIATRICS | Facility: CLINIC | Age: 60
End: 2020-03-05
Payer: COMMERCIAL

## 2020-03-05 VITALS
HEIGHT: 66 IN | SYSTOLIC BLOOD PRESSURE: 138 MMHG | WEIGHT: 225 LBS | HEART RATE: 87 BPM | TEMPERATURE: 97.4 F | BODY MASS INDEX: 36.16 KG/M2 | DIASTOLIC BLOOD PRESSURE: 78 MMHG

## 2020-03-05 DIAGNOSIS — E66.01 MORBID (SEVERE) OBESITY DUE TO EXCESS CALORIES (HCC): Primary | ICD-10-CM

## 2020-03-05 PROCEDURE — 1036F TOBACCO NON-USER: CPT | Performed by: SURGERY

## 2020-03-05 PROCEDURE — 3066F NEPHROPATHY DOC TX: CPT | Performed by: SURGERY

## 2020-03-05 PROCEDURE — 3078F DIAST BP <80 MM HG: CPT | Performed by: SURGERY

## 2020-03-05 PROCEDURE — 2022F DILAT RTA XM EVC RTNOPTHY: CPT | Performed by: SURGERY

## 2020-03-05 PROCEDURE — 99204 OFFICE O/P NEW MOD 45 MIN: CPT | Performed by: SURGERY

## 2020-03-05 PROCEDURE — 3008F BODY MASS INDEX DOCD: CPT | Performed by: SURGERY

## 2020-03-05 PROCEDURE — 3075F SYST BP GE 130 - 139MM HG: CPT | Performed by: SURGERY

## 2020-03-05 NOTE — PROGRESS NOTES
BARIATRIC CONSULT-INITIAL - BARIATRIC SURGERY  Jeane Croft 61 y o  female MRN: [id number]  Unit/Bed#:  Encounter: [id number]      HPI:  Jeane Croft is a 61 y o  female who presents with morbid obesity to discuss weight loss options  Review of Systems    Historical Information   Past Medical History:   Diagnosis Date    Anemia     Anxiety     Closed head injury 6/11/2018    Closed rib fracture 6/11/2018    Right sided    Colon polyp     Concussion with loss of consciousness 6/22/2018    Depression     Diabetes 1 5, managed as type 2 ([facility] )     Diarrhea     Fatty liver     Fecal incontinence     GERD (gastroesophageal reflux disease)     Head injury     6/10/11    Hyperlipidemia     Hypertension     Psoriasis     SAH [facility]subarachnoid hemorrhage) ([facility] ) 6/11/2018     Past Surgical History:   Procedure Laterality Date    CHOLECYSTECTOMY      COLONOSCOPY N/A 10/24/2017    Procedure: COLONOSCOPY;  Surgeon: Marlon Lane MD;  Location: [facility]; Service: Colorectal    COLONOSCOPY W/ ENDOSCOPIC US N/A 10/24/2017    Procedure: ANAL ENDOSCOPIC U/S;  Surgeon: Marlon Lane MD;  Location: [facility];   Service: Colorectal    DILATION AND CURETTAGE OF UTERUS      EGD      ENDOMETRIAL BIOPSY      WITHOUT CERVICAL DILATION    HYSTERECTOMY      NASAL SEPTUM SURGERY      OTHER SURGICAL HISTORY      Episiotomy repair    AK SHLDR ARTHROSCOP,SURG,W/ROTAT CUFF REPR Right 1/15/2020    Procedure: SHOULDER ARTHROSCOPIC DEBRIDEMENT OF PARTIAL THICKNESS ROTATOR CUFF TEAR, SAD;  Surgeon: Fernando Avery MD;  [facility];  Service: Orthopedics    ROTATOR CUFF REPAIR Left      Social History   Social History     Substance and Sexual Activity   Alcohol Use Yes    Frequency: 2-4 times a month    Drinks per session: 1 or 2    Binge frequency: Never    Comment: socially     Social History     Substance and Sexual Activity   Drug Use No     Social History     Tobacco Use   Smoking Status Former Smoker    Last attempt to quit: Gayatri De Los Santos Years since quittin 1   Smokeless Tobacco Never Used   Tobacco Comment    Smoked for 10yrs  Family History: non-contributory    Meds/Allergies   all medications and allergies reviewed  Allergies   Allergen Reactions    Vicodin [Hydrocodone-Acetaminophen] Anaphylaxis       Objective       Current Vitals:   Blood Pressure: 138/78 (20)  Pulse: 87 (20)  Temperature: (!) 97 4 °F (36 3 °C) (20)  Temp Source: Tympanic (20)  Height: 5' 5 5" (166 4 cm) (20)  Weight - Scale: 102 kg (225 lb) (20)  Body mass index is 36 87 kg/m²  Invasive Devices     None                 Physical Exam    Lab Results: I have personally reviewed pertinent lab results  Imaging: I have personally reviewed pertinent reports  EKG, Pathology, and Other Studies: I have personally reviewed pertinent reports  Code Status: [unfilled]  Advance Directive and Living Will:      Power of :    POLST:      Assessment/PLAN:            Patient has a long history of morbid obesity and is presenting to discuss the surgical weight loss options  Despite the patient best efforts patient was unable to lose any meaningful or sustainable weight using nonsurgical means  We had a long discussion regarding all the surgical weight-loss options at our disposal at this point and reviewed the risks and benefits of each procedure in details as it relates to her age, BMI and medical conditions  Patient elected to undergo sleeve vs RYGB    Risks and benefits were explained to the patient  We also discussed the importance and need of a preoperative workup to make sure that the patient can undergo the procedure safely  Preoperative workup includes sleep apnea screening, cardiac evaluation, nutrition/psych and preoperative EGD   Risks and benefits of all the preoperative diagnostic tests were discussed with the patient including but not limited to the upper endoscopy  Alternatives to surgery and alternative forms of surgery were also explained  Postsurgical commitment and aftercare programs were discussed and explained to the patient in details   In terms of comorbidities patient suffers mostly of   Past Medical History:   Diagnosis Date    Anemia     Anxiety     Closed head injury 6/11/2018    Closed rib fracture 6/11/2018    Right sided    Colon polyp     Concussion with loss of consciousness 6/22/2018    Depression     Diabetes 1 5, managed as type 2 ([place] [address] )     Diarrhea     Fatty liver     Fecal incontinence     GERD (gastroesophageal reflux disease)     Head injury     6/10/11    Hyperlipidemia     Hypertension     Psoriasis     SAH (subarachnoid hemorrhage) ([facility] ) 6/11/2018       I informed the patient that the rate of resolution of comorbid conditions following weight loss surgery is between 60 and 90% depending on the severity of the specific medical condition  I discussed and educated the patient regarding the different components of our multidisciplinary program and the importance of compliance and follow-up in the postoperative period  All questions answered  Patient understands risks and benefits  A videotape of the procedure was also shown to the patient  After showing the video we discussed all the technical aspects of the procedure and also the potential complications including but not limited to gastrointestinal perforation, leak, obstruction, stricture and hemorrhage  I spent 45 min with the patient more than 50% of the time was spent educating the patient and coordinating care

## 2020-03-06 DIAGNOSIS — E11.9 TYPE 2 DIABETES MELLITUS WITHOUT COMPLICATION, WITHOUT LONG-TERM CURRENT USE OF INSULIN (HCC): ICD-10-CM

## 2020-03-12 ENCOUNTER — TELEPHONE (OUTPATIENT)
Dept: CARDIOLOGY CLINIC | Facility: MEDICAL CENTER | Age: 60
End: 2020-03-12

## 2020-03-16 DIAGNOSIS — E11.9 TYPE 2 DIABETES MELLITUS WITHOUT COMPLICATION, WITHOUT LONG-TERM CURRENT USE OF INSULIN (HCC): ICD-10-CM

## 2020-03-16 RX ORDER — PRAVASTATIN SODIUM 10 MG
TABLET ORAL
Qty: 90 TABLET | Refills: 0 | Status: SHIPPED | OUTPATIENT
Start: 2020-03-16 | End: 2020-06-19

## 2020-03-20 ENCOUNTER — TRANSCRIBE ORDERS (OUTPATIENT)
Dept: ADMINISTRATIVE | Facility: HOSPITAL | Age: 60
End: 2020-03-20

## 2020-03-20 ENCOUNTER — HOSPITAL ENCOUNTER (OUTPATIENT)
Dept: RADIOLOGY | Facility: MEDICAL CENTER | Age: 60
Discharge: HOME | End: 2020-03-20
Payer: COMMERCIAL

## 2020-03-20 ENCOUNTER — APPOINTMENT (OUTPATIENT)
Dept: LAB | Facility: MEDICAL CENTER | Age: 60
End: 2020-03-20
Payer: COMMERCIAL

## 2020-03-20 VITALS — WEIGHT: 225 LBS | HEIGHT: 66 IN | BODY MASS INDEX: 36.16 KG/M2

## 2020-03-20 DIAGNOSIS — Z79.899 HIGH RISK MEDICATIONS (NOT ANTICOAGULANTS) LONG-TERM USE: ICD-10-CM

## 2020-03-20 DIAGNOSIS — F41.9 ANXIETY: ICD-10-CM

## 2020-03-20 DIAGNOSIS — I10 ESSENTIAL HYPERTENSION: ICD-10-CM

## 2020-03-20 DIAGNOSIS — Z12.31 ENCOUNTER FOR SCREENING MAMMOGRAM FOR MALIGNANT NEOPLASM OF BREAST: ICD-10-CM

## 2020-03-20 DIAGNOSIS — L40.0 PSORIASIS VULGARIS: Primary | ICD-10-CM

## 2020-03-20 DIAGNOSIS — L40.0 PSORIASIS VULGARIS: ICD-10-CM

## 2020-03-20 LAB
ALBUMIN SERPL BCP-MCNC: 3.6 G/DL (ref 3.5–5)
ALP SERPL-CCNC: 88 U/L (ref 46–116)
ALT SERPL W P-5'-P-CCNC: 37 U/L (ref 12–78)
ANION GAP SERPL CALCULATED.3IONS-SCNC: 5 MMOL/L (ref 4–13)
AST SERPL W P-5'-P-CCNC: 10 U/L (ref 5–45)
BASOPHILS # BLD AUTO: 0.06 THOUSANDS/ΜL (ref 0–0.1)
BASOPHILS NFR BLD AUTO: 1 % (ref 0–1)
BILIRUB SERPL-MCNC: 0.39 MG/DL (ref 0.2–1)
BUN SERPL-MCNC: 20 MG/DL (ref 5–25)
CALCIUM SERPL-MCNC: 9.4 MG/DL (ref 8.3–10.1)
CHLORIDE SERPL-SCNC: 106 MMOL/L (ref 100–108)
CO2 SERPL-SCNC: 26 MMOL/L (ref 21–32)
CREAT SERPL-MCNC: 0.81 MG/DL (ref 0.6–1.3)
EOSINOPHIL # BLD AUTO: 0.32 THOUSAND/ΜL (ref 0–0.61)
EOSINOPHIL NFR BLD AUTO: 6 % (ref 0–6)
ERYTHROCYTE [DISTWIDTH] IN BLOOD BY AUTOMATED COUNT: 12.6 % (ref 11.6–15.1)
GFR SERPL CREATININE-BSD FRML MDRD: 80 ML/MIN/1.73SQ M
GLUCOSE P FAST SERPL-MCNC: 155 MG/DL (ref 65–99)
HCT VFR BLD AUTO: 43.3 % (ref 34.8–46.1)
HGB BLD-MCNC: 13.8 G/DL (ref 11.5–15.4)
IMM GRANULOCYTES # BLD AUTO: 0.03 THOUSAND/UL (ref 0–0.2)
IMM GRANULOCYTES NFR BLD AUTO: 1 % (ref 0–2)
LYMPHOCYTES # BLD AUTO: 1.52 THOUSANDS/ΜL (ref 0.6–4.47)
LYMPHOCYTES NFR BLD AUTO: 27 % (ref 14–44)
MCH RBC QN AUTO: 29 PG (ref 26.8–34.3)
MCHC RBC AUTO-ENTMCNC: 31.9 G/DL (ref 31.4–37.4)
MCV RBC AUTO: 91 FL (ref 82–98)
MONOCYTES # BLD AUTO: 0.34 THOUSAND/ΜL (ref 0.17–1.22)
MONOCYTES NFR BLD AUTO: 6 % (ref 4–12)
NEUTROPHILS # BLD AUTO: 3.38 THOUSANDS/ΜL (ref 1.85–7.62)
NEUTS SEG NFR BLD AUTO: 59 % (ref 43–75)
NRBC BLD AUTO-RTO: 0 /100 WBCS
PLATELET # BLD AUTO: 241 THOUSANDS/UL (ref 149–390)
PMV BLD AUTO: 10.5 FL (ref 8.9–12.7)
POTASSIUM SERPL-SCNC: 4 MMOL/L (ref 3.5–5.3)
PROT SERPL-MCNC: 7.9 G/DL (ref 6.4–8.2)
RBC # BLD AUTO: 4.76 MILLION/UL (ref 3.81–5.12)
SODIUM SERPL-SCNC: 137 MMOL/L (ref 136–145)
WBC # BLD AUTO: 5.65 THOUSAND/UL (ref 4.31–10.16)

## 2020-03-20 PROCEDURE — 85025 COMPLETE CBC W/AUTO DIFF WBC: CPT | Performed by: NURSE PRACTITIONER

## 2020-03-20 PROCEDURE — 36415 COLL VENOUS BLD VENIPUNCTURE: CPT | Performed by: NURSE PRACTITIONER

## 2020-03-20 PROCEDURE — 80053 COMPREHEN METABOLIC PANEL: CPT | Performed by: NURSE PRACTITIONER

## 2020-03-20 PROCEDURE — 86480 TB TEST CELL IMMUN MEASURE: CPT

## 2020-03-20 PROCEDURE — 77067 SCR MAMMO BI INCL CAD: CPT

## 2020-03-20 RX ORDER — SERTRALINE HYDROCHLORIDE 100 MG/1
TABLET, FILM COATED ORAL
Qty: 90 TABLET | Refills: 0 | Status: SHIPPED | OUTPATIENT
Start: 2020-03-20 | End: 2020-06-19

## 2020-03-20 RX ORDER — HYDROCHLOROTHIAZIDE 12.5 MG/1
TABLET ORAL
Qty: 90 TABLET | Refills: 0 | Status: SHIPPED | OUTPATIENT
Start: 2020-03-20 | End: 2020-04-29 | Stop reason: SDUPTHER

## 2020-03-23 LAB
GAMMA INTERFERON BACKGROUND BLD IA-ACNC: 0.03 IU/ML
M TB IFN-G BLD-IMP: NEGATIVE
M TB IFN-G CD4+ BCKGRND COR BLD-ACNC: 0.01 IU/ML
M TB IFN-G CD4+ BCKGRND COR BLD-ACNC: 0.01 IU/ML
MITOGEN IGNF BCKGRD COR BLD-ACNC: >10 IU/ML

## 2020-03-26 ENCOUNTER — OFFICE VISIT (OUTPATIENT)
Dept: OBGYN CLINIC | Facility: OTHER | Age: 60
End: 2020-03-26

## 2020-03-26 VITALS
HEART RATE: 69 BPM | SYSTOLIC BLOOD PRESSURE: 131 MMHG | WEIGHT: 227 LBS | DIASTOLIC BLOOD PRESSURE: 85 MMHG | HEIGHT: 66 IN | BODY MASS INDEX: 36.48 KG/M2

## 2020-03-26 DIAGNOSIS — Z98.890 S/P ARTHROSCOPY OF RIGHT SHOULDER: Primary | ICD-10-CM

## 2020-03-26 PROCEDURE — 3075F SYST BP GE 130 - 139MM HG: CPT | Performed by: ORTHOPAEDIC SURGERY

## 2020-03-26 PROCEDURE — 3079F DIAST BP 80-89 MM HG: CPT | Performed by: ORTHOPAEDIC SURGERY

## 2020-03-26 PROCEDURE — 3008F BODY MASS INDEX DOCD: CPT | Performed by: ORTHOPAEDIC SURGERY

## 2020-03-26 PROCEDURE — 2022F DILAT RTA XM EVC RTNOPTHY: CPT | Performed by: ORTHOPAEDIC SURGERY

## 2020-03-26 PROCEDURE — 3066F NEPHROPATHY DOC TX: CPT | Performed by: ORTHOPAEDIC SURGERY

## 2020-03-26 PROCEDURE — 99024 POSTOP FOLLOW-UP VISIT: CPT | Performed by: ORTHOPAEDIC SURGERY

## 2020-03-26 NOTE — PROGRESS NOTES
I personally examined the patient and reviewed the history provided  I agree with the note and the assessment and plan by Dr Neeru Anderson MD      Assessment:    Excellent Progress Following Right Shoulder Scope    Plan:    Patient is very happy with her outcome and will continue to perform home exercises and return to normal activities without restriction  We can see her back on an as-needed basis          Assessment  Diagnoses and all orders for this visit:    S/P arthroscopy of right shoulder    with rotator cuff debridement and SAD    Discussion and Plan:    WBAT RUE  Continue home exercises as prescribed  Tylenol and NSAIDS as needed for any pain relief  Will see patient back as needed     Subjective:   Patient ID: Brandin Pete is a 61 y o  female      Patient presents for follow up 10 weeks sp R shoulder arthroscopic rotator cuff debridement and SAD  Patient has been doing very well overall since her procedure, she has graduated from formal physical therapy to a [facility] for which she has continued  Her only issue was an incident one week ago where she lifted a bag of feed with her right arm and sustained a muscle strain that has been improving  She is very happy with her progress overall  The following portions of the patient's history were reviewed and updated as appropriate: allergies, current medications, past family history, past medical history, past social history, past surgical history and problem list     Review of Systems   Constitutional: Negative for chills  HENT: Negative for hearing loss  Eyes: Negative for discharge  Respiratory: Negative for cough  Cardiovascular: Negative for chest pain  Skin: Negative for pallor  Neurological: Negative for numbness  Psychiatric/Behavioral: Negative for agitation         Objective:  /85   Pulse 69   Ht 5' 5 5" (1 664 m)   Wt 103 kg (227 lb)   BMI 37 20 kg/m²       Right Shoulder Exam     Range of Motion   The patient has normal right shoulder ROM  Muscle Strength   The patient has normal right shoulder strength  Tests   Armstrong test: negative  Impingement: negative  Drop arm: negative    Other   Erythema: absent  Sensation: normal  Pulse: present            Physical Exam   Constitutional: She is oriented to person, place, and time  She appears well-developed and well-nourished  HENT:   Head: Normocephalic and atraumatic  Eyes: Pupils are equal, round, and reactive to light  EOM are normal    Cardiovascular: Normal rate and intact distal pulses  Pulmonary/Chest: Effort normal  No respiratory distress  Neurological: She is alert and oriented to person, place, and time  Skin: Skin is warm and dry  Psychiatric: She has a normal mood and affect  Her behavior is normal          I have personally reviewed pertinent films in PACS and my interpretation is as follows      No imaging obtained at this visit

## 2020-03-31 ENCOUNTER — TELEPHONE (OUTPATIENT)
Dept: CARDIOLOGY CLINIC | Facility: MEDICAL CENTER | Age: 60
End: 2020-03-31

## 2020-03-31 NOTE — TELEPHONE ENCOUNTER
Due to the current pandemic of COVID-19 patient was given the option to par take in a video/telephone call, which was accepted by the patient  Patient was informed via telephone the following: There is a possibility of aCOPAY to participate in this Virtual Visit  This is depending on your insurance company if the will cover that cost     Patients preferred phone number to contact is ____[phone]_________________  Video option - Patients preferred [email or web address]_____________  Patient informed that they will receive an e-mail 15 minutes before their scheduled time as a reminder

## 2020-04-06 ENCOUNTER — TELEMEDICINE (OUTPATIENT)
Dept: CARDIOLOGY CLINIC | Facility: CLINIC | Age: 60
End: 2020-04-06
Payer: COMMERCIAL

## 2020-04-06 ENCOUNTER — TELEMEDICINE (OUTPATIENT)
Dept: BARIATRICS | Facility: CLINIC | Age: 60
End: 2020-04-06
Payer: COMMERCIAL

## 2020-04-06 VITALS — SYSTOLIC BLOOD PRESSURE: 171 MMHG | DIASTOLIC BLOOD PRESSURE: 84 MMHG

## 2020-04-06 DIAGNOSIS — E11.29 TYPE 2 DIABETES MELLITUS WITH MICROALBUMINURIA, WITHOUT LONG-TERM CURRENT USE OF INSULIN (HCC): ICD-10-CM

## 2020-04-06 DIAGNOSIS — E66.01 MORBID OBESITY (HCC): ICD-10-CM

## 2020-04-06 DIAGNOSIS — R80.9 TYPE 2 DIABETES MELLITUS WITH MICROALBUMINURIA, WITHOUT LONG-TERM CURRENT USE OF INSULIN (HCC): ICD-10-CM

## 2020-04-06 DIAGNOSIS — Z01.810 PRE-OPERATIVE CARDIOVASCULAR EXAMINATION: Primary | ICD-10-CM

## 2020-04-06 DIAGNOSIS — E78.1 HYPERTRIGLYCERIDEMIA: ICD-10-CM

## 2020-04-06 DIAGNOSIS — Z01.818 PREOP TESTING: ICD-10-CM

## 2020-04-06 DIAGNOSIS — I10 ESSENTIAL HYPERTENSION: ICD-10-CM

## 2020-04-06 DIAGNOSIS — E66.9 OBESITY, CLASS II, BMI 35-39.9: Primary | ICD-10-CM

## 2020-04-06 PROCEDURE — 99214 OFFICE O/P EST MOD 30 MIN: CPT | Performed by: PHYSICIAN ASSISTANT

## 2020-04-06 PROCEDURE — G2012 BRIEF CHECK IN BY MD/QHP: HCPCS | Performed by: INTERNAL MEDICINE

## 2020-04-29 DIAGNOSIS — I10 ESSENTIAL HYPERTENSION: ICD-10-CM

## 2020-04-29 RX ORDER — HYDROCHLOROTHIAZIDE 12.5 MG/1
12.5 TABLET ORAL DAILY
Qty: 30 TABLET | Refills: 0 | Status: SHIPPED | OUTPATIENT
Start: 2020-04-29 | End: 2020-05-04 | Stop reason: SDUPTHER

## 2020-04-30 DIAGNOSIS — I10 ESSENTIAL HYPERTENSION: ICD-10-CM

## 2020-05-01 RX ORDER — HYDROCHLOROTHIAZIDE 12.5 MG/1
12.5 TABLET ORAL DAILY
Qty: 30 TABLET | Refills: 0 | OUTPATIENT
Start: 2020-05-01

## 2020-05-04 DIAGNOSIS — I10 ESSENTIAL HYPERTENSION: ICD-10-CM

## 2020-05-04 RX ORDER — HYDROCHLOROTHIAZIDE 12.5 MG/1
12.5 TABLET ORAL DAILY
Qty: 30 TABLET | Refills: 0 | Status: SHIPPED | OUTPATIENT
Start: 2020-05-04 | End: 2020-06-17 | Stop reason: HOSPADM

## 2020-05-05 ENCOUNTER — HOSPITAL ENCOUNTER (OUTPATIENT)
Dept: NON INVASIVE DIAGNOSTICS | Facility: CLINIC | Age: 60
Discharge: HOME | End: 2020-05-05
Payer: COMMERCIAL

## 2020-05-05 DIAGNOSIS — Z01.810 PRE-OPERATIVE CARDIOVASCULAR EXAMINATION: ICD-10-CM

## 2020-05-05 DIAGNOSIS — E66.01 MORBID OBESITY (HCC): ICD-10-CM

## 2020-05-05 DIAGNOSIS — I10 ESSENTIAL HYPERTENSION: ICD-10-CM

## 2020-05-05 DIAGNOSIS — E78.1 HYPERTRIGLYCERIDEMIA: ICD-10-CM

## 2020-05-05 DIAGNOSIS — R80.9 TYPE 2 DIABETES MELLITUS WITH MICROALBUMINURIA, WITHOUT LONG-TERM CURRENT USE OF INSULIN (HCC): ICD-10-CM

## 2020-05-05 DIAGNOSIS — Z11.59 SPECIAL SCREENING EXAMINATION FOR UNSPECIFIED VIRAL DISEASE: Primary | ICD-10-CM

## 2020-05-05 DIAGNOSIS — E11.29 TYPE 2 DIABETES MELLITUS WITH MICROALBUMINURIA, WITHOUT LONG-TERM CURRENT USE OF INSULIN (HCC): ICD-10-CM

## 2020-05-05 PROCEDURE — 93018 CV STRESS TEST I&R ONLY: CPT | Performed by: INTERNAL MEDICINE

## 2020-05-05 PROCEDURE — A9502 TC99M TETROFOSMIN: HCPCS

## 2020-05-05 PROCEDURE — 78452 HT MUSCLE IMAGE SPECT MULT: CPT | Performed by: INTERNAL MEDICINE

## 2020-05-05 PROCEDURE — 93017 CV STRESS TEST TRACING ONLY: CPT

## 2020-05-05 PROCEDURE — 93016 CV STRESS TEST SUPVJ ONLY: CPT | Performed by: INTERNAL MEDICINE

## 2020-05-05 PROCEDURE — U0003 INFECTIOUS AGENT DETECTION BY NUCLEIC ACID (DNA OR RNA); SEVERE ACUTE RESPIRATORY SYNDROME CORONAVIRUS 2 (SARS-COV-2) (CORONAVIRUS DISEASE [COVID-19]), AMPLIFIED PROBE TECHNIQUE, MAKING USE OF HIGH THROUGHPUT TECHNOLOGIES AS DESCRIBED BY CMS-2020-01-R: HCPCS

## 2020-05-05 PROCEDURE — 78452 HT MUSCLE IMAGE SPECT MULT: CPT

## 2020-05-06 ENCOUNTER — TELEPHONE (OUTPATIENT)
Dept: CARDIOLOGY CLINIC | Facility: CLINIC | Age: 60
End: 2020-05-06

## 2020-05-06 ENCOUNTER — OFFICE VISIT (OUTPATIENT)
Dept: BARIATRICS | Facility: CLINIC | Age: 60
End: 2020-05-06

## 2020-05-06 VITALS — WEIGHT: 224.2 LBS | BODY MASS INDEX: 36.74 KG/M2

## 2020-05-06 DIAGNOSIS — E66.01 MORBID OBESITY (HCC): Primary | ICD-10-CM

## 2020-05-06 PROCEDURE — RECHECK: Performed by: DIETITIAN, REGISTERED

## 2020-05-07 LAB — SARS-COV-2 RNA SPEC QL NAA+PROBE: NOT DETECTED

## 2020-05-08 LAB
MAX DIASTOLIC BP: 96 MMHG
MAX HEART RATE: 139 BPM
MAX PREDICTED HEART RATE: 161 BPM
MAX. SYSTOLIC BP: 258 MMHG
PROTOCOL NAME: NORMAL
TARGET HR FORMULA: NORMAL
TEST INDICATION: NORMAL
TIME IN EXERCISE PHASE: NORMAL

## 2020-05-10 DIAGNOSIS — I10 ESSENTIAL HYPERTENSION: ICD-10-CM

## 2020-05-12 ENCOUNTER — ANESTHESIA EVENT (OUTPATIENT)
Dept: GASTROENTEROLOGY | Facility: HOSPITAL | Age: 60
End: 2020-05-12

## 2020-05-13 ENCOUNTER — TELEPHONE (OUTPATIENT)
Dept: BARIATRICS | Facility: CLINIC | Age: 60
End: 2020-05-13

## 2020-05-13 ENCOUNTER — ANESTHESIA (OUTPATIENT)
Dept: GASTROENTEROLOGY | Facility: HOSPITAL | Age: 60
End: 2020-05-13

## 2020-05-13 ENCOUNTER — HOSPITAL ENCOUNTER (OUTPATIENT)
Dept: GASTROENTEROLOGY | Facility: HOSPITAL | Age: 60
Setting detail: OUTPATIENT SURGERY
Discharge: HOME | End: 2020-05-13
Attending: SURGERY | Admitting: SURGERY
Payer: COMMERCIAL

## 2020-05-13 VITALS
SYSTOLIC BLOOD PRESSURE: 115 MMHG | TEMPERATURE: 99.9 F | DIASTOLIC BLOOD PRESSURE: 64 MMHG | BODY MASS INDEX: 36 KG/M2 | OXYGEN SATURATION: 96 % | HEIGHT: 66 IN | HEART RATE: 70 BPM | WEIGHT: 224 LBS | RESPIRATION RATE: 14 BRPM

## 2020-05-13 DIAGNOSIS — E66.01 MORBID OBESITY (HCC): ICD-10-CM

## 2020-05-13 LAB — GLUCOSE SERPL-MCNC: 132 MG/DL (ref 65–140)

## 2020-05-13 PROCEDURE — 88305 TISSUE EXAM BY PATHOLOGIST: CPT | Performed by: PATHOLOGY

## 2020-05-13 PROCEDURE — 82948 REAGENT STRIP/BLOOD GLUCOSE: CPT

## 2020-05-13 PROCEDURE — 43239 EGD BIOPSY SINGLE/MULTIPLE: CPT | Performed by: SURGERY

## 2020-05-13 RX ORDER — LIDOCAINE HYDROCHLORIDE 20 MG/ML
INJECTION, SOLUTION EPIDURAL; INFILTRATION; INTRACAUDAL; PERINEURAL AS NEEDED
Status: DISCONTINUED | OUTPATIENT
Start: 2020-05-13 | End: 2020-05-13 | Stop reason: SURG

## 2020-05-13 RX ORDER — SODIUM CHLORIDE 9 MG/ML
125 INJECTION, SOLUTION INTRAVENOUS CONTINUOUS
Status: DISCONTINUED | OUTPATIENT
Start: 2020-05-13 | End: 2020-05-17 | Stop reason: HOSPADM

## 2020-05-13 RX ORDER — PROPOFOL 10 MG/ML
INJECTION, EMULSION INTRAVENOUS AS NEEDED
Status: DISCONTINUED | OUTPATIENT
Start: 2020-05-13 | End: 2020-05-13 | Stop reason: SURG

## 2020-05-13 RX ADMIN — PROPOFOL 50 MG: 10 INJECTION, EMULSION INTRAVENOUS at 09:41

## 2020-05-13 RX ADMIN — PROPOFOL 200 MG: 10 INJECTION, EMULSION INTRAVENOUS at 09:37

## 2020-05-13 RX ADMIN — LIDOCAINE HYDROCHLORIDE 5 ML: 20 INJECTION, SOLUTION EPIDURAL; INFILTRATION; INTRACAUDAL; PERINEURAL at 09:37

## 2020-05-13 RX ADMIN — SODIUM CHLORIDE 125 ML/HR: 0.9 INJECTION, SOLUTION INTRAVENOUS at 09:13

## 2020-05-14 RX ORDER — VALSARTAN 320 MG/1
TABLET ORAL
Qty: 30 TABLET | Refills: 0 | Status: SHIPPED | OUTPATIENT
Start: 2020-05-14 | End: 2020-07-15 | Stop reason: SINTOL

## 2020-05-18 ENCOUNTER — OFFICE VISIT (OUTPATIENT)
Dept: FAMILY MEDICINE CLINIC | Facility: MEDICAL CENTER | Age: 60
End: 2020-05-18
Payer: COMMERCIAL

## 2020-05-18 VITALS
SYSTOLIC BLOOD PRESSURE: 132 MMHG | DIASTOLIC BLOOD PRESSURE: 78 MMHG | BODY MASS INDEX: 36.4 KG/M2 | HEART RATE: 78 BPM | TEMPERATURE: 98.3 F | OXYGEN SATURATION: 96 % | HEIGHT: 66 IN | WEIGHT: 226.5 LBS | RESPIRATION RATE: 14 BRPM

## 2020-05-18 DIAGNOSIS — F41.9 ANXIETY: ICD-10-CM

## 2020-05-18 DIAGNOSIS — I10 ESSENTIAL HYPERTENSION: ICD-10-CM

## 2020-05-18 DIAGNOSIS — E78.5 HYPERLIPIDEMIA, UNSPECIFIED HYPERLIPIDEMIA TYPE: ICD-10-CM

## 2020-05-18 DIAGNOSIS — K92.1 BLOOD IN STOOL: ICD-10-CM

## 2020-05-18 DIAGNOSIS — R80.9 TYPE 2 DIABETES MELLITUS WITH MICROALBUMINURIA, WITHOUT LONG-TERM CURRENT USE OF INSULIN (HCC): Primary | ICD-10-CM

## 2020-05-18 DIAGNOSIS — E11.29 TYPE 2 DIABETES MELLITUS WITH MICROALBUMINURIA, WITHOUT LONG-TERM CURRENT USE OF INSULIN (HCC): Primary | ICD-10-CM

## 2020-05-18 LAB — SL AMB POCT HEMOGLOBIN AIC: 6.8 (ref ?–6.5)

## 2020-05-18 PROCEDURE — 3066F NEPHROPATHY DOC TX: CPT | Performed by: FAMILY MEDICINE

## 2020-05-18 PROCEDURE — 99214 OFFICE O/P EST MOD 30 MIN: CPT | Performed by: FAMILY MEDICINE

## 2020-05-18 PROCEDURE — 3008F BODY MASS INDEX DOCD: CPT | Performed by: FAMILY MEDICINE

## 2020-05-18 PROCEDURE — 3075F SYST BP GE 130 - 139MM HG: CPT | Performed by: FAMILY MEDICINE

## 2020-05-18 PROCEDURE — 2022F DILAT RTA XM EVC RTNOPTHY: CPT | Performed by: FAMILY MEDICINE

## 2020-05-18 PROCEDURE — 3078F DIAST BP <80 MM HG: CPT | Performed by: FAMILY MEDICINE

## 2020-05-18 PROCEDURE — 83036 HEMOGLOBIN GLYCOSYLATED A1C: CPT | Performed by: FAMILY MEDICINE

## 2020-05-18 PROCEDURE — 3044F HG A1C LEVEL LT 7.0%: CPT | Performed by: FAMILY MEDICINE

## 2020-05-18 PROCEDURE — 1036F TOBACCO NON-USER: CPT | Performed by: FAMILY MEDICINE

## 2020-05-21 LAB
LEFT EYE DIABETIC RETINOPATHY: NORMAL
RIGHT EYE DIABETIC RETINOPATHY: NORMAL

## 2020-05-27 ENCOUNTER — APPOINTMENT (OUTPATIENT)
Dept: LAB | Facility: MEDICAL CENTER | Age: 60
End: 2020-05-27
Payer: COMMERCIAL

## 2020-05-27 DIAGNOSIS — Z01.818 PREOP TESTING: ICD-10-CM

## 2020-05-27 DIAGNOSIS — E66.9 OBESITY, CLASS II, BMI 35-39.9: ICD-10-CM

## 2020-05-27 LAB — TSH SERPL DL<=0.05 MIU/L-ACNC: 1.77 UIU/ML (ref 0.36–3.74)

## 2020-05-27 PROCEDURE — 36415 COLL VENOUS BLD VENIPUNCTURE: CPT

## 2020-05-27 PROCEDURE — 84443 ASSAY THYROID STIM HORMONE: CPT

## 2020-06-05 ENCOUNTER — OFFICE VISIT (OUTPATIENT)
Dept: BARIATRICS | Facility: CLINIC | Age: 60
End: 2020-06-05

## 2020-06-05 VITALS — BODY MASS INDEX: 36.35 KG/M2 | WEIGHT: 221.8 LBS

## 2020-06-05 DIAGNOSIS — E66.01 MORBID OBESITY (HCC): Primary | ICD-10-CM

## 2020-06-05 PROCEDURE — RECHECK: Performed by: DIETITIAN, REGISTERED

## 2020-06-11 ENCOUNTER — OFFICE VISIT (OUTPATIENT)
Dept: BARIATRICS | Facility: CLINIC | Age: 60
End: 2020-06-11

## 2020-06-11 ENCOUNTER — OFFICE VISIT (OUTPATIENT)
Dept: BARIATRICS | Facility: CLINIC | Age: 60
End: 2020-06-11
Payer: COMMERCIAL

## 2020-06-11 VITALS
DIASTOLIC BLOOD PRESSURE: 78 MMHG | WEIGHT: 221.5 LBS | BODY MASS INDEX: 35.6 KG/M2 | SYSTOLIC BLOOD PRESSURE: 124 MMHG | HEART RATE: 81 BPM | HEIGHT: 66 IN | TEMPERATURE: 96 F

## 2020-06-11 DIAGNOSIS — E66.9 OBESITY, CLASS II, BMI 35-39.9: Primary | ICD-10-CM

## 2020-06-11 DIAGNOSIS — F41.9 ANXIETY: ICD-10-CM

## 2020-06-11 DIAGNOSIS — R80.9 TYPE 2 DIABETES MELLITUS WITH MICROALBUMINURIA, WITHOUT LONG-TERM CURRENT USE OF INSULIN (HCC): ICD-10-CM

## 2020-06-11 DIAGNOSIS — E11.29 TYPE 2 DIABETES MELLITUS WITH MICROALBUMINURIA, WITHOUT LONG-TERM CURRENT USE OF INSULIN (HCC): ICD-10-CM

## 2020-06-11 DIAGNOSIS — E66.9 LIFELONG OBESITY: ICD-10-CM

## 2020-06-11 DIAGNOSIS — I10 ESSENTIAL HYPERTENSION: ICD-10-CM

## 2020-06-11 DIAGNOSIS — E78.5 HYPERLIPIDEMIA, UNSPECIFIED HYPERLIPIDEMIA TYPE: ICD-10-CM

## 2020-06-11 PROBLEM — E66.812 OBESITY, CLASS II, BMI 35-39.9: Status: ACTIVE | Noted: 2020-06-11

## 2020-06-11 PROCEDURE — 3066F NEPHROPATHY DOC TX: CPT | Performed by: SURGERY

## 2020-06-11 PROCEDURE — 99213 OFFICE O/P EST LOW 20 MIN: CPT | Performed by: SURGERY

## 2020-06-11 PROCEDURE — 3008F BODY MASS INDEX DOCD: CPT | Performed by: SURGERY

## 2020-06-11 PROCEDURE — 3074F SYST BP LT 130 MM HG: CPT | Performed by: SURGERY

## 2020-06-11 PROCEDURE — 3044F HG A1C LEVEL LT 7.0%: CPT | Performed by: SURGERY

## 2020-06-11 PROCEDURE — 1036F TOBACCO NON-USER: CPT | Performed by: SURGERY

## 2020-06-11 PROCEDURE — 2022F DILAT RTA XM EVC RTNOPTHY: CPT | Performed by: SURGERY

## 2020-06-11 PROCEDURE — RECHECK: Performed by: DIETITIAN, REGISTERED

## 2020-06-11 PROCEDURE — 3078F DIAST BP <80 MM HG: CPT | Performed by: SURGERY

## 2020-06-11 PROCEDURE — U0003 INFECTIOUS AGENT DETECTION BY NUCLEIC ACID (DNA OR RNA); SEVERE ACUTE RESPIRATORY SYNDROME CORONAVIRUS 2 (SARS-COV-2) (CORONAVIRUS DISEASE [COVID-19]), AMPLIFIED PROBE TECHNIQUE, MAKING USE OF HIGH THROUGHPUT TECHNOLOGIES AS DESCRIBED BY CMS-2020-01-R: HCPCS

## 2020-06-11 RX ORDER — CEFAZOLIN SODIUM 2 G/50ML
2000 SOLUTION INTRAVENOUS ONCE
Status: CANCELLED | OUTPATIENT
Start: 2020-06-16 | End: 2020-06-11

## 2020-06-11 RX ORDER — GABAPENTIN 300 MG/1
300 CAPSULE ORAL ONCE
Status: CANCELLED | OUTPATIENT
Start: 2020-06-16 | End: 2020-06-11

## 2020-06-11 RX ORDER — CELECOXIB 200 MG/1
200 CAPSULE ORAL ONCE
Status: CANCELLED | OUTPATIENT
Start: 2020-06-16 | End: 2020-06-11

## 2020-06-11 RX ORDER — SCOLOPAMINE TRANSDERMAL SYSTEM 1 MG/1
1 PATCH, EXTENDED RELEASE TRANSDERMAL ONCE
Status: CANCELLED | OUTPATIENT
Start: 2020-06-16 | End: 2020-06-11

## 2020-06-12 LAB — SARS-COV-2 RNA SPEC QL NAA+PROBE: NOT DETECTED

## 2020-06-13 DIAGNOSIS — E11.9 TYPE 2 DIABETES MELLITUS WITHOUT COMPLICATION, WITHOUT LONG-TERM CURRENT USE OF INSULIN (HCC): ICD-10-CM

## 2020-06-15 ENCOUNTER — ANESTHESIA EVENT (OUTPATIENT)
Dept: PERIOP | Facility: HOSPITAL | Age: 60
DRG: 621 | End: 2020-06-15
Payer: COMMERCIAL

## 2020-06-15 DIAGNOSIS — E66.01 MORBID (SEVERE) OBESITY DUE TO EXCESS CALORIES (HCC): Primary | ICD-10-CM

## 2020-06-15 RX ORDER — OMEPRAZOLE 20 MG/1
20 CAPSULE, DELAYED RELEASE ORAL DAILY
Qty: 90 CAPSULE | Refills: 1 | Status: SHIPPED | OUTPATIENT
Start: 2020-06-15 | End: 2021-02-22

## 2020-06-16 ENCOUNTER — ANESTHESIA (OUTPATIENT)
Dept: PERIOP | Facility: HOSPITAL | Age: 60
DRG: 621 | End: 2020-06-16
Payer: COMMERCIAL

## 2020-06-16 ENCOUNTER — HOSPITAL ENCOUNTER (INPATIENT)
Facility: HOSPITAL | Age: 60
LOS: 1 days | Discharge: HOME | DRG: 621 | End: 2020-06-17
Attending: SURGERY | Admitting: SURGERY
Payer: COMMERCIAL

## 2020-06-16 DIAGNOSIS — I10 BENIGN ESSENTIAL HYPERTENSION: ICD-10-CM

## 2020-06-16 DIAGNOSIS — Z98.84 BARIATRIC SURGERY STATUS: Primary | ICD-10-CM

## 2020-06-16 DIAGNOSIS — E11.29 TYPE II DIABETES MELLITUS WITH RENAL MANIFESTATIONS (HCC): ICD-10-CM

## 2020-06-16 DIAGNOSIS — E78.1 PURE HYPERGLYCERIDEMIA: ICD-10-CM

## 2020-06-16 DIAGNOSIS — E66.9 LIFELONG OBESITY: ICD-10-CM

## 2020-06-16 LAB
GLUCOSE SERPL-MCNC: 132 MG/DL (ref 65–140)
GLUCOSE SERPL-MCNC: 169 MG/DL (ref 65–140)

## 2020-06-16 PROCEDURE — 43775 LAP SLEEVE GASTRECTOMY: CPT | Performed by: SURGERY

## 2020-06-16 PROCEDURE — 88307 TISSUE EXAM BY PATHOLOGIST: CPT | Performed by: PATHOLOGY

## 2020-06-16 PROCEDURE — C9290 INJ, BUPIVACAINE LIPOSOME: HCPCS | Performed by: SURGERY

## 2020-06-16 PROCEDURE — 0DJ08ZZ INSPECTION OF UPPER INTESTINAL TRACT, VIA NATURAL OR ARTIFICIAL OPENING ENDOSCOPIC: ICD-10-PCS | Performed by: SURGERY

## 2020-06-16 PROCEDURE — 82948 REAGENT STRIP/BLOOD GLUCOSE: CPT

## 2020-06-16 PROCEDURE — C9113 INJ PANTOPRAZOLE SODIUM, VIA: HCPCS | Performed by: SURGERY

## 2020-06-16 PROCEDURE — 0DB64Z3 EXCISION OF STOMACH, PERCUTANEOUS ENDOSCOPIC APPROACH, VERTICAL: ICD-10-PCS | Performed by: SURGERY

## 2020-06-16 DEVICE — SEAMGUARD STPL REINF ENDO GIA ULTRA UNIV 60 PURPLE: Type: IMPLANTABLE DEVICE | Site: ABDOMEN | Status: FUNCTIONAL

## 2020-06-16 DEVICE — SEAMGUARD STPL REINF ENDO GIA ULTRA UNV 60 BLACK: Type: IMPLANTABLE DEVICE | Site: ABDOMEN | Status: FUNCTIONAL

## 2020-06-16 RX ORDER — FENTANYL CITRATE 50 UG/ML
INJECTION, SOLUTION INTRAMUSCULAR; INTRAVENOUS AS NEEDED
Status: DISCONTINUED | OUTPATIENT
Start: 2020-06-16 | End: 2020-06-16 | Stop reason: SURG

## 2020-06-16 RX ORDER — OXYCODONE HCL 5 MG/5 ML
5 SOLUTION, ORAL ORAL EVERY 4 HOURS PRN
Status: DISCONTINUED | OUTPATIENT
Start: 2020-06-16 | End: 2020-06-17 | Stop reason: HOSPADM

## 2020-06-16 RX ORDER — EPHEDRINE SULFATE 50 MG/ML
INJECTION INTRAVENOUS AS NEEDED
Status: DISCONTINUED | OUTPATIENT
Start: 2020-06-16 | End: 2020-06-16 | Stop reason: SURG

## 2020-06-16 RX ORDER — ONDANSETRON 2 MG/ML
4 INJECTION INTRAMUSCULAR; INTRAVENOUS ONCE AS NEEDED
Status: DISCONTINUED | OUTPATIENT
Start: 2020-06-16 | End: 2020-06-16 | Stop reason: HOSPADM

## 2020-06-16 RX ORDER — PANTOPRAZOLE SODIUM 40 MG/1
40 INJECTION, POWDER, FOR SOLUTION INTRAVENOUS
Status: DISCONTINUED | OUTPATIENT
Start: 2020-06-16 | End: 2020-06-17 | Stop reason: HOSPADM

## 2020-06-16 RX ORDER — HYDROMORPHONE HCL/PF 1 MG/ML
0.5 SYRINGE (ML) INJECTION
Status: DISCONTINUED | OUTPATIENT
Start: 2020-06-16 | End: 2020-06-16 | Stop reason: HOSPADM

## 2020-06-16 RX ORDER — ONDANSETRON 2 MG/ML
INJECTION INTRAMUSCULAR; INTRAVENOUS AS NEEDED
Status: DISCONTINUED | OUTPATIENT
Start: 2020-06-16 | End: 2020-06-16 | Stop reason: SURG

## 2020-06-16 RX ORDER — GABAPENTIN 300 MG/1
300 CAPSULE ORAL ONCE
Status: COMPLETED | OUTPATIENT
Start: 2020-06-16 | End: 2020-06-16

## 2020-06-16 RX ORDER — SIMETHICONE 80 MG
80 TABLET,CHEWABLE ORAL EVERY 12 HOURS SCHEDULED
Status: DISCONTINUED | OUTPATIENT
Start: 2020-06-16 | End: 2020-06-17 | Stop reason: HOSPADM

## 2020-06-16 RX ORDER — HYDROMORPHONE HCL/PF 1 MG/ML
SYRINGE (ML) INJECTION AS NEEDED
Status: DISCONTINUED | OUTPATIENT
Start: 2020-06-16 | End: 2020-06-16 | Stop reason: SURG

## 2020-06-16 RX ORDER — FENTANYL CITRATE/PF 50 MCG/ML
25 SYRINGE (ML) INJECTION
Status: DISCONTINUED | OUTPATIENT
Start: 2020-06-16 | End: 2020-06-16 | Stop reason: HOSPADM

## 2020-06-16 RX ORDER — HYDROMORPHONE HCL/PF 1 MG/ML
1 SYRINGE (ML) INJECTION EVERY 4 HOURS PRN
Status: DISCONTINUED | OUTPATIENT
Start: 2020-06-16 | End: 2020-06-17 | Stop reason: HOSPADM

## 2020-06-16 RX ORDER — PROMETHAZINE HYDROCHLORIDE 25 MG/ML
25 INJECTION, SOLUTION INTRAMUSCULAR; INTRAVENOUS EVERY 4 HOURS PRN
Status: DISCONTINUED | OUTPATIENT
Start: 2020-06-16 | End: 2020-06-17 | Stop reason: HOSPADM

## 2020-06-16 RX ORDER — CELECOXIB 200 MG/1
200 CAPSULE ORAL ONCE
Status: COMPLETED | OUTPATIENT
Start: 2020-06-16 | End: 2020-06-16

## 2020-06-16 RX ORDER — KETOROLAC TROMETHAMINE 30 MG/ML
15 INJECTION, SOLUTION INTRAMUSCULAR; INTRAVENOUS EVERY 6 HOURS SCHEDULED
Status: COMPLETED | OUTPATIENT
Start: 2020-06-16 | End: 2020-06-17

## 2020-06-16 RX ORDER — PROPOFOL 10 MG/ML
INJECTION, EMULSION INTRAVENOUS AS NEEDED
Status: DISCONTINUED | OUTPATIENT
Start: 2020-06-16 | End: 2020-06-16 | Stop reason: SURG

## 2020-06-16 RX ORDER — SODIUM CHLORIDE 9 MG/ML
125 INJECTION, SOLUTION INTRAVENOUS CONTINUOUS
Status: DISCONTINUED | OUTPATIENT
Start: 2020-06-16 | End: 2020-06-16 | Stop reason: HOSPADM

## 2020-06-16 RX ORDER — OXYCODONE HCL 5 MG/5 ML
10 SOLUTION, ORAL ORAL EVERY 4 HOURS PRN
Status: DISCONTINUED | OUTPATIENT
Start: 2020-06-16 | End: 2020-06-17 | Stop reason: HOSPADM

## 2020-06-16 RX ORDER — ACETAMINOPHEN 160 MG/5ML
325 SUSPENSION, ORAL (FINAL DOSE FORM) ORAL EVERY 8 HOURS PRN
Status: DISCONTINUED | OUTPATIENT
Start: 2020-06-16 | End: 2020-06-17 | Stop reason: HOSPADM

## 2020-06-16 RX ORDER — MAGNESIUM HYDROXIDE 1200 MG/15ML
LIQUID ORAL AS NEEDED
Status: DISCONTINUED | OUTPATIENT
Start: 2020-06-16 | End: 2020-06-16 | Stop reason: HOSPADM

## 2020-06-16 RX ORDER — SODIUM CHLORIDE, SODIUM LACTATE, POTASSIUM CHLORIDE, CALCIUM CHLORIDE 600; 310; 30; 20 MG/100ML; MG/100ML; MG/100ML; MG/100ML
INJECTION, SOLUTION INTRAVENOUS CONTINUOUS PRN
Status: DISCONTINUED | OUTPATIENT
Start: 2020-06-16 | End: 2020-06-16 | Stop reason: SURG

## 2020-06-16 RX ORDER — LIDOCAINE HYDROCHLORIDE 10 MG/ML
INJECTION, SOLUTION EPIDURAL; INFILTRATION; INTRACAUDAL; PERINEURAL AS NEEDED
Status: DISCONTINUED | OUTPATIENT
Start: 2020-06-16 | End: 2020-06-16 | Stop reason: SURG

## 2020-06-16 RX ORDER — MIDAZOLAM HYDROCHLORIDE 2 MG/2ML
INJECTION, SOLUTION INTRAMUSCULAR; INTRAVENOUS AS NEEDED
Status: DISCONTINUED | OUTPATIENT
Start: 2020-06-16 | End: 2020-06-16 | Stop reason: SURG

## 2020-06-16 RX ORDER — METOCLOPRAMIDE HYDROCHLORIDE 5 MG/ML
10 INJECTION INTRAMUSCULAR; INTRAVENOUS EVERY 6 HOURS PRN
Status: DISCONTINUED | OUTPATIENT
Start: 2020-06-16 | End: 2020-06-17 | Stop reason: HOSPADM

## 2020-06-16 RX ORDER — ONDANSETRON 2 MG/ML
4 INJECTION INTRAMUSCULAR; INTRAVENOUS EVERY 4 HOURS PRN
Status: DISCONTINUED | OUTPATIENT
Start: 2020-06-16 | End: 2020-06-17 | Stop reason: HOSPADM

## 2020-06-16 RX ORDER — MEPERIDINE HYDROCHLORIDE 25 MG/ML
12.5 INJECTION INTRAMUSCULAR; INTRAVENOUS; SUBCUTANEOUS
Status: DISCONTINUED | OUTPATIENT
Start: 2020-06-16 | End: 2020-06-16 | Stop reason: HOSPADM

## 2020-06-16 RX ORDER — VECURONIUM BROMIDE 1 MG/ML
INJECTION, POWDER, LYOPHILIZED, FOR SOLUTION INTRAVENOUS AS NEEDED
Status: DISCONTINUED | OUTPATIENT
Start: 2020-06-16 | End: 2020-06-16 | Stop reason: SURG

## 2020-06-16 RX ORDER — CEFAZOLIN SODIUM 2 G/50ML
2000 SOLUTION INTRAVENOUS ONCE
Status: COMPLETED | OUTPATIENT
Start: 2020-06-16 | End: 2020-06-16

## 2020-06-16 RX ORDER — SCOLOPAMINE TRANSDERMAL SYSTEM 1 MG/1
1 PATCH, EXTENDED RELEASE TRANSDERMAL ONCE AS NEEDED
Status: DISCONTINUED | OUTPATIENT
Start: 2020-06-16 | End: 2020-06-16 | Stop reason: HOSPADM

## 2020-06-16 RX ORDER — ACETAMINOPHEN 325 MG/1
975 TABLET ORAL EVERY 8 HOURS SCHEDULED
Status: DISCONTINUED | OUTPATIENT
Start: 2020-06-16 | End: 2020-06-17 | Stop reason: HOSPADM

## 2020-06-16 RX ORDER — GLYCOPYRROLATE 0.2 MG/ML
INJECTION INTRAMUSCULAR; INTRAVENOUS AS NEEDED
Status: DISCONTINUED | OUTPATIENT
Start: 2020-06-16 | End: 2020-06-16 | Stop reason: SURG

## 2020-06-16 RX ORDER — SODIUM CHLORIDE, SODIUM LACTATE, POTASSIUM CHLORIDE, CALCIUM CHLORIDE 600; 310; 30; 20 MG/100ML; MG/100ML; MG/100ML; MG/100ML
75 INJECTION, SOLUTION INTRAVENOUS CONTINUOUS
Status: DISCONTINUED | OUTPATIENT
Start: 2020-06-16 | End: 2020-06-17 | Stop reason: HOSPADM

## 2020-06-16 RX ORDER — SCOLOPAMINE TRANSDERMAL SYSTEM 1 MG/1
1 PATCH, EXTENDED RELEASE TRANSDERMAL ONCE
Status: DISCONTINUED | OUTPATIENT
Start: 2020-06-16 | End: 2020-06-16

## 2020-06-16 RX ORDER — ROCURONIUM BROMIDE 10 MG/ML
INJECTION, SOLUTION INTRAVENOUS AS NEEDED
Status: DISCONTINUED | OUTPATIENT
Start: 2020-06-16 | End: 2020-06-16 | Stop reason: SURG

## 2020-06-16 RX ADMIN — EPHEDRINE SULFATE 10 MG: 50 INJECTION, SOLUTION INTRAVENOUS at 10:01

## 2020-06-16 RX ADMIN — LIDOCAINE HYDROCHLORIDE 100 MG: 10 INJECTION, SOLUTION EPIDURAL; INFILTRATION; INTRACAUDAL; PERINEURAL at 09:34

## 2020-06-16 RX ADMIN — SCOPALAMINE 1 PATCH: 1 PATCH, EXTENDED RELEASE TRANSDERMAL at 08:13

## 2020-06-16 RX ADMIN — PHENYLEPHRINE HYDROCHLORIDE 100 MCG: 10 INJECTION INTRAVENOUS at 09:43

## 2020-06-16 RX ADMIN — ACETAMINOPHEN 975 MG: 325 TABLET ORAL at 21:32

## 2020-06-16 RX ADMIN — SUGAMMADEX 400 MG: 100 INJECTION, SOLUTION INTRAVENOUS at 10:55

## 2020-06-16 RX ADMIN — ONDANSETRON 4 MG: 2 INJECTION INTRAMUSCULAR; INTRAVENOUS at 10:59

## 2020-06-16 RX ADMIN — PHENYLEPHRINE HYDROCHLORIDE 100 MCG: 10 INJECTION INTRAVENOUS at 09:49

## 2020-06-16 RX ADMIN — VECURONIUM BROMIDE 7 MG: 1 INJECTION, POWDER, LYOPHILIZED, FOR SOLUTION INTRAVENOUS at 10:07

## 2020-06-16 RX ADMIN — CELECOXIB 200 MG: 200 CAPSULE ORAL at 08:12

## 2020-06-16 RX ADMIN — FENTANYL CITRATE 100 MCG: 50 INJECTION, SOLUTION INTRAMUSCULAR; INTRAVENOUS at 09:34

## 2020-06-16 RX ADMIN — KETOROLAC TROMETHAMINE 15 MG: 30 INJECTION, SOLUTION INTRAMUSCULAR at 23:28

## 2020-06-16 RX ADMIN — PROPOFOL 100 MG: 10 INJECTION, EMULSION INTRAVENOUS at 10:06

## 2020-06-16 RX ADMIN — HYDROMORPHONE HYDROCHLORIDE 0.5 MG: 1 INJECTION, SOLUTION INTRAMUSCULAR; INTRAVENOUS; SUBCUTANEOUS at 11:15

## 2020-06-16 RX ADMIN — CEFAZOLIN SODIUM 2000 MG: 2 SOLUTION INTRAVENOUS at 09:17

## 2020-06-16 RX ADMIN — SODIUM CHLORIDE, SODIUM LACTATE, POTASSIUM CHLORIDE, AND CALCIUM CHLORIDE: .6; .31; .03; .02 INJECTION, SOLUTION INTRAVENOUS at 10:00

## 2020-06-16 RX ADMIN — SODIUM CHLORIDE, SODIUM LACTATE, POTASSIUM CHLORIDE, AND CALCIUM CHLORIDE 75 ML/HR: .6; .31; .03; .02 INJECTION, SOLUTION INTRAVENOUS at 17:07

## 2020-06-16 RX ADMIN — SIMETHICONE 80 MG: 80 TABLET, CHEWABLE ORAL at 21:30

## 2020-06-16 RX ADMIN — EPHEDRINE SULFATE 10 MG: 50 INJECTION, SOLUTION INTRAVENOUS at 09:59

## 2020-06-16 RX ADMIN — SIMETHICONE 80 MG: 80 TABLET, CHEWABLE ORAL at 12:45

## 2020-06-16 RX ADMIN — SODIUM CHLORIDE, SODIUM LACTATE, POTASSIUM CHLORIDE, AND CALCIUM CHLORIDE: .6; .31; .03; .02 INJECTION, SOLUTION INTRAVENOUS at 10:42

## 2020-06-16 RX ADMIN — ONDANSETRON 4 MG: 2 INJECTION INTRAMUSCULAR; INTRAVENOUS at 09:50

## 2020-06-16 RX ADMIN — PHENYLEPHRINE HYDROCHLORIDE 100 MCG: 10 INJECTION INTRAVENOUS at 10:06

## 2020-06-16 RX ADMIN — EPHEDRINE SULFATE 10 MG: 50 INJECTION, SOLUTION INTRAVENOUS at 09:40

## 2020-06-16 RX ADMIN — EPHEDRINE SULFATE 10 MG: 50 INJECTION, SOLUTION INTRAVENOUS at 10:06

## 2020-06-16 RX ADMIN — OXYCODONE HYDROCHLORIDE 10 MG: 5 SOLUTION ORAL at 17:07

## 2020-06-16 RX ADMIN — SODIUM CHLORIDE, SODIUM LACTATE, POTASSIUM CHLORIDE, AND CALCIUM CHLORIDE 75 ML/HR: .6; .31; .03; .02 INJECTION, SOLUTION INTRAVENOUS at 12:26

## 2020-06-16 RX ADMIN — PANTOPRAZOLE SODIUM 40 MG: 40 INJECTION, POWDER, FOR SOLUTION INTRAVENOUS at 12:45

## 2020-06-16 RX ADMIN — GABAPENTIN 300 MG: 300 CAPSULE ORAL at 08:12

## 2020-06-16 RX ADMIN — KETOROLAC TROMETHAMINE 15 MG: 30 INJECTION, SOLUTION INTRAMUSCULAR at 12:45

## 2020-06-16 RX ADMIN — SODIUM CHLORIDE 125 ML/HR: 0.9 INJECTION, SOLUTION INTRAVENOUS at 08:25

## 2020-06-16 RX ADMIN — PROPOFOL 200 MG: 10 INJECTION, EMULSION INTRAVENOUS at 09:34

## 2020-06-16 RX ADMIN — ROCURONIUM BROMIDE 50 MG: 10 INJECTION, SOLUTION INTRAVENOUS at 09:34

## 2020-06-16 RX ADMIN — MIDAZOLAM 2 MG: 1 INJECTION INTRAMUSCULAR; INTRAVENOUS at 09:21

## 2020-06-16 RX ADMIN — TRIMETHOBENZAMIDE HYDROCHLORIDE 200 MG: 100 INJECTION INTRAMUSCULAR at 11:40

## 2020-06-16 RX ADMIN — GLYCOPYRROLATE 0.3 MG: 0.2 INJECTION, SOLUTION INTRAMUSCULAR; INTRAVENOUS at 11:05

## 2020-06-16 RX ADMIN — ENOXAPARIN SODIUM 40 MG: 40 INJECTION SUBCUTANEOUS at 09:10

## 2020-06-16 RX ADMIN — KETOROLAC TROMETHAMINE 15 MG: 30 INJECTION, SOLUTION INTRAMUSCULAR at 17:07

## 2020-06-17 VITALS
OXYGEN SATURATION: 97 % | HEIGHT: 66 IN | BODY MASS INDEX: 35.08 KG/M2 | HEART RATE: 70 BPM | SYSTOLIC BLOOD PRESSURE: 173 MMHG | DIASTOLIC BLOOD PRESSURE: 86 MMHG | TEMPERATURE: 97.4 F | RESPIRATION RATE: 18 BRPM | WEIGHT: 218.26 LBS

## 2020-06-17 DIAGNOSIS — E66.01 MORBID (SEVERE) OBESITY DUE TO EXCESS CALORIES (HCC): Primary | ICD-10-CM

## 2020-06-17 DIAGNOSIS — F41.9 ANXIETY: ICD-10-CM

## 2020-06-17 PROBLEM — Z98.84 STATUS POST LAPAROSCOPIC SLEEVE GASTRECTOMY: Status: ACTIVE | Noted: 2020-06-17

## 2020-06-17 LAB
ANION GAP SERPL CALCULATED.3IONS-SCNC: 9 MMOL/L (ref 4–13)
BUN SERPL-MCNC: 14 MG/DL (ref 5–25)
CALCIUM SERPL-MCNC: 8.5 MG/DL (ref 8.3–10.1)
CHLORIDE SERPL-SCNC: 101 MMOL/L (ref 100–108)
CO2 SERPL-SCNC: 28 MMOL/L (ref 21–32)
CREAT SERPL-MCNC: 0.86 MG/DL (ref 0.6–1.3)
ERYTHROCYTE [DISTWIDTH] IN BLOOD BY AUTOMATED COUNT: 13 % (ref 11.6–15.1)
GFR SERPL CREATININE-BSD FRML MDRD: 74 ML/MIN/1.73SQ M
GLUCOSE SERPL-MCNC: 134 MG/DL (ref 65–140)
HCT VFR BLD AUTO: 39.3 % (ref 34.8–46.1)
HGB BLD-MCNC: 12.2 G/DL (ref 11.5–15.4)
MCH RBC QN AUTO: 29.3 PG (ref 26.8–34.3)
MCHC RBC AUTO-ENTMCNC: 31 G/DL (ref 31.4–37.4)
MCV RBC AUTO: 94 FL (ref 82–98)
PLATELET # BLD AUTO: 228 THOUSANDS/UL (ref 149–390)
PMV BLD AUTO: 10.2 FL (ref 8.9–12.7)
POTASSIUM SERPL-SCNC: 3.8 MMOL/L (ref 3.5–5.3)
RBC # BLD AUTO: 4.17 MILLION/UL (ref 3.81–5.12)
SODIUM SERPL-SCNC: 138 MMOL/L (ref 136–145)
WBC # BLD AUTO: 8.83 THOUSAND/UL (ref 4.31–10.16)

## 2020-06-17 PROCEDURE — 85027 COMPLETE CBC AUTOMATED: CPT | Performed by: SURGERY

## 2020-06-17 PROCEDURE — C9113 INJ PANTOPRAZOLE SODIUM, VIA: HCPCS | Performed by: SURGERY

## 2020-06-17 PROCEDURE — 80048 BASIC METABOLIC PNL TOTAL CA: CPT | Performed by: SURGERY

## 2020-06-17 PROCEDURE — 99024 POSTOP FOLLOW-UP VISIT: CPT | Performed by: SURGERY

## 2020-06-17 PROCEDURE — 99254 IP/OBS CNSLTJ NEW/EST MOD 60: CPT | Performed by: STUDENT IN AN ORGANIZED HEALTH CARE EDUCATION/TRAINING PROGRAM

## 2020-06-17 PROCEDURE — NC001 PR NO CHARGE: Performed by: SURGERY

## 2020-06-17 RX ORDER — OXYCODONE HYDROCHLORIDE 5 MG/1
5 TABLET ORAL EVERY 4 HOURS PRN
Qty: 15 TABLET | Refills: 0 | Status: CANCELLED | OUTPATIENT
Start: 2020-06-17

## 2020-06-17 RX ORDER — OXYCODONE HYDROCHLORIDE 5 MG/1
5 TABLET ORAL EVERY 4 HOURS PRN
Qty: 10 TABLET | Refills: 0 | Status: SHIPPED | OUTPATIENT
Start: 2020-06-17 | End: 2020-06-25 | Stop reason: ALTCHOICE

## 2020-06-17 RX ORDER — HYDRALAZINE HYDROCHLORIDE 20 MG/ML
10 INJECTION INTRAMUSCULAR; INTRAVENOUS ONCE
Status: COMPLETED | OUTPATIENT
Start: 2020-06-17 | End: 2020-06-17

## 2020-06-17 RX ORDER — KETOROLAC TROMETHAMINE 30 MG/ML
15 INJECTION, SOLUTION INTRAMUSCULAR; INTRAVENOUS EVERY 6 HOURS SCHEDULED
Status: DISCONTINUED | OUTPATIENT
Start: 2020-06-17 | End: 2020-06-17 | Stop reason: HOSPADM

## 2020-06-17 RX ADMIN — ACETAMINOPHEN 975 MG: 325 TABLET ORAL at 05:15

## 2020-06-17 RX ADMIN — HYDRALAZINE HYDROCHLORIDE 10 MG: 20 INJECTION INTRAMUSCULAR; INTRAVENOUS at 00:53

## 2020-06-17 RX ADMIN — ENOXAPARIN SODIUM 40 MG: 40 INJECTION SUBCUTANEOUS at 08:39

## 2020-06-17 RX ADMIN — SODIUM CHLORIDE, SODIUM LACTATE, POTASSIUM CHLORIDE, AND CALCIUM CHLORIDE 75 ML/HR: .6; .31; .03; .02 INJECTION, SOLUTION INTRAVENOUS at 05:19

## 2020-06-17 RX ADMIN — PANTOPRAZOLE SODIUM 40 MG: 40 INJECTION, POWDER, FOR SOLUTION INTRAVENOUS at 08:39

## 2020-06-17 RX ADMIN — KETOROLAC TROMETHAMINE 15 MG: 30 INJECTION, SOLUTION INTRAMUSCULAR at 08:39

## 2020-06-17 RX ADMIN — KETOROLAC TROMETHAMINE 15 MG: 30 INJECTION, SOLUTION INTRAMUSCULAR at 05:16

## 2020-06-17 RX ADMIN — KETOROLAC TROMETHAMINE 15 MG: 30 INJECTION, SOLUTION INTRAMUSCULAR at 12:02

## 2020-06-17 RX ADMIN — SIMETHICONE 80 MG: 80 TABLET, CHEWABLE ORAL at 08:39

## 2020-06-18 ENCOUNTER — TELEPHONE (OUTPATIENT)
Dept: MEDSURG UNIT | Facility: HOSPITAL | Age: 60
End: 2020-06-18

## 2020-06-18 ENCOUNTER — TRANSITIONAL CARE MANAGEMENT (OUTPATIENT)
Dept: FAMILY MEDICINE CLINIC | Facility: MEDICAL CENTER | Age: 60
End: 2020-06-18

## 2020-06-18 DIAGNOSIS — I10 ESSENTIAL HYPERTENSION: ICD-10-CM

## 2020-06-19 ENCOUNTER — TELEPHONE (OUTPATIENT)
Dept: FAMILY MEDICINE CLINIC | Facility: MEDICAL CENTER | Age: 60
End: 2020-06-19

## 2020-06-19 ENCOUNTER — OFFICE VISIT (OUTPATIENT)
Dept: FAMILY MEDICINE CLINIC | Facility: MEDICAL CENTER | Age: 60
End: 2020-06-19
Payer: COMMERCIAL

## 2020-06-19 VITALS
TEMPERATURE: 97.8 F | BODY MASS INDEX: 34.87 KG/M2 | SYSTOLIC BLOOD PRESSURE: 142 MMHG | HEIGHT: 66 IN | WEIGHT: 217 LBS | HEART RATE: 67 BPM | DIASTOLIC BLOOD PRESSURE: 82 MMHG | OXYGEN SATURATION: 97 %

## 2020-06-19 DIAGNOSIS — E66.01 CLASS 2 SEVERE OBESITY DUE TO EXCESS CALORIES WITH SERIOUS COMORBIDITY AND BODY MASS INDEX (BMI) OF 35.0 TO 35.9 IN ADULT (HCC): Primary | ICD-10-CM

## 2020-06-19 DIAGNOSIS — E11.29 TYPE 2 DIABETES MELLITUS WITH MICROALBUMINURIA, WITHOUT LONG-TERM CURRENT USE OF INSULIN (HCC): ICD-10-CM

## 2020-06-19 DIAGNOSIS — L98.9 SKIN ABNORMALITY: ICD-10-CM

## 2020-06-19 DIAGNOSIS — I10 ESSENTIAL HYPERTENSION: ICD-10-CM

## 2020-06-19 DIAGNOSIS — R80.9 TYPE 2 DIABETES MELLITUS WITH MICROALBUMINURIA, WITHOUT LONG-TERM CURRENT USE OF INSULIN (HCC): ICD-10-CM

## 2020-06-19 DIAGNOSIS — Z98.84 STATUS POST LAPAROSCOPIC SLEEVE GASTRECTOMY: ICD-10-CM

## 2020-06-19 DIAGNOSIS — E78.5 HYPERLIPIDEMIA, UNSPECIFIED HYPERLIPIDEMIA TYPE: ICD-10-CM

## 2020-06-19 DIAGNOSIS — F41.9 ANXIETY: ICD-10-CM

## 2020-06-19 PROBLEM — K92.1 BLOOD IN STOOL: Status: RESOLVED | Noted: 2020-05-18 | Resolved: 2020-06-19

## 2020-06-19 PROCEDURE — 99495 TRANSJ CARE MGMT MOD F2F 14D: CPT | Performed by: FAMILY MEDICINE

## 2020-06-19 PROCEDURE — 1111F DSCHRG MED/CURRENT MED MERGE: CPT | Performed by: FAMILY MEDICINE

## 2020-06-19 RX ORDER — SERTRALINE HYDROCHLORIDE 100 MG/1
TABLET, FILM COATED ORAL
Qty: 90 TABLET | Refills: 0 | Status: SHIPPED | OUTPATIENT
Start: 2020-06-19 | End: 2020-08-19

## 2020-06-19 RX ORDER — HYDROCHLOROTHIAZIDE 12.5 MG/1
TABLET ORAL
Qty: 90 TABLET | Refills: 0 | OUTPATIENT
Start: 2020-06-19

## 2020-06-19 RX ORDER — PRAVASTATIN SODIUM 10 MG
TABLET ORAL
Qty: 90 TABLET | Refills: 0 | Status: SHIPPED | OUTPATIENT
Start: 2020-06-19 | End: 2020-09-10

## 2020-06-23 ENCOUNTER — TELEPHONE (OUTPATIENT)
Dept: FAMILY MEDICINE CLINIC | Facility: MEDICAL CENTER | Age: 60
End: 2020-06-23

## 2020-06-25 ENCOUNTER — OFFICE VISIT (OUTPATIENT)
Dept: BARIATRICS | Facility: CLINIC | Age: 60
End: 2020-06-25

## 2020-06-25 VITALS
WEIGHT: 207 LBS | BODY MASS INDEX: 33.27 KG/M2 | SYSTOLIC BLOOD PRESSURE: 130 MMHG | HEIGHT: 66 IN | TEMPERATURE: 98 F | DIASTOLIC BLOOD PRESSURE: 76 MMHG | HEART RATE: 73 BPM

## 2020-06-25 DIAGNOSIS — I10 ESSENTIAL HYPERTENSION: ICD-10-CM

## 2020-06-25 DIAGNOSIS — Z98.84 BARIATRIC SURGERY STATUS: Primary | ICD-10-CM

## 2020-06-25 DIAGNOSIS — E66.9 OBESITY, CLASS II, BMI 35-39.9: Primary | ICD-10-CM

## 2020-06-25 PROCEDURE — 3066F NEPHROPATHY DOC TX: CPT | Performed by: SURGERY

## 2020-06-25 PROCEDURE — RECHECK: Performed by: DIETITIAN, REGISTERED

## 2020-06-25 PROCEDURE — 3078F DIAST BP <80 MM HG: CPT | Performed by: SURGERY

## 2020-06-25 PROCEDURE — 3008F BODY MASS INDEX DOCD: CPT | Performed by: SURGERY

## 2020-06-25 PROCEDURE — 99024 POSTOP FOLLOW-UP VISIT: CPT | Performed by: SURGERY

## 2020-06-25 PROCEDURE — 1111F DSCHRG MED/CURRENT MED MERGE: CPT | Performed by: SURGERY

## 2020-06-25 PROCEDURE — 3075F SYST BP GE 130 - 139MM HG: CPT | Performed by: SURGERY

## 2020-06-25 PROCEDURE — 2022F DILAT RTA XM EVC RTNOPTHY: CPT | Performed by: SURGERY

## 2020-06-25 RX ORDER — HYDROCHLOROTHIAZIDE 12.5 MG/1
TABLET ORAL
Qty: 30 TABLET | Refills: 0 | OUTPATIENT
Start: 2020-06-25

## 2020-07-15 ENCOUNTER — OFFICE VISIT (OUTPATIENT)
Dept: CARDIOLOGY CLINIC | Facility: CLINIC | Age: 60
End: 2020-07-15
Payer: COMMERCIAL

## 2020-07-15 VITALS
HEART RATE: 77 BPM | BODY MASS INDEX: 31.66 KG/M2 | SYSTOLIC BLOOD PRESSURE: 124 MMHG | HEIGHT: 66 IN | WEIGHT: 197 LBS | DIASTOLIC BLOOD PRESSURE: 76 MMHG | OXYGEN SATURATION: 97 %

## 2020-07-15 DIAGNOSIS — I10 ESSENTIAL HYPERTENSION: Primary | ICD-10-CM

## 2020-07-15 DIAGNOSIS — E78.2 MIXED HYPERLIPIDEMIA: ICD-10-CM

## 2020-07-15 PROCEDURE — 99214 OFFICE O/P EST MOD 30 MIN: CPT | Performed by: INTERNAL MEDICINE

## 2020-07-15 PROCEDURE — 4010F ACE/ARB THERAPY RXD/TAKEN: CPT | Performed by: INTERNAL MEDICINE

## 2020-07-15 PROCEDURE — 3044F HG A1C LEVEL LT 7.0%: CPT | Performed by: INTERNAL MEDICINE

## 2020-07-15 PROCEDURE — 3074F SYST BP LT 130 MM HG: CPT | Performed by: INTERNAL MEDICINE

## 2020-07-15 PROCEDURE — 3008F BODY MASS INDEX DOCD: CPT | Performed by: INTERNAL MEDICINE

## 2020-07-15 PROCEDURE — 3066F NEPHROPATHY DOC TX: CPT | Performed by: INTERNAL MEDICINE

## 2020-07-15 PROCEDURE — 1111F DSCHRG MED/CURRENT MED MERGE: CPT | Performed by: INTERNAL MEDICINE

## 2020-07-15 PROCEDURE — 2022F DILAT RTA XM EVC RTNOPTHY: CPT | Performed by: INTERNAL MEDICINE

## 2020-07-15 PROCEDURE — 1036F TOBACCO NON-USER: CPT | Performed by: INTERNAL MEDICINE

## 2020-07-15 PROCEDURE — 3078F DIAST BP <80 MM HG: CPT | Performed by: INTERNAL MEDICINE

## 2020-07-15 RX ORDER — LISINOPRIL 20 MG/1
20 TABLET ORAL DAILY
Qty: 30 TABLET | Refills: 3 | Status: SHIPPED | OUTPATIENT
Start: 2020-07-15 | End: 2020-11-05 | Stop reason: SDUPTHER

## 2020-07-15 NOTE — PROGRESS NOTES
Cardiology Follow Up    Tracie Hawley  1960  [id number]  [facility]  [address]  [address]  [facility] [id number] [phone]    1  Essential hypertension     2  Mixed hyperlipidemia       Chief Complaint   Patient presents with    Follow-up     4 wk follow up  no cardiac complaints at this time  Interval History: Patient feels well, without complaints  S/p surgery last month  No reported chest pain, shortness of breath, palpitations, lightheadedness, syncope, LE edema, orthopnea, PND, or significant weight changes  Patient remains active without any increased fatigue out of the ordinary          Patient Active Problem List   Diagnosis    Anxiety    Hypertension    Mild TBI ([facility] )    Hyperlipidemia    Hematuria    Type 2 diabetes mellitus with microalbuminuria, without long-term current use of insulin (HCC)    Dizziness and giddiness    Morbid obesity (HCC)    Biceps tendonitis, right    Impingement syndrome of right shoulder    Routine gynecological examination    Tear of right supraspinatus tendon    S/P arthroscopy of right shoulder    Pre-operative cardiovascular examination    Obesity, Class II, BMI 35-39 9    Status post laparoscopic sleeve gastrectomy     Past Medical History:   Diagnosis Date    Anxiety     Arthritis     Bariatric surgery status     Closed head injury 06/11/2018 2018    Colon polyp     Concussion with loss of consciousness 06/22/2018 2018    Depression     Diabetes mellitus (HCC)     NIDDM    Diarrhea     chronic    Fatty liver     Fecal incontinence     GERD (gastroesophageal reflux disease)     Head injury     6/10/11    Hiatal hernia     Hyperlipidemia     Hypertension     Lifelong obesity     Postsurgical malabsorption     Psoriasis     legs    SAH (subarachnoid hemorrhage) ([facility] ) 06/11/2018    2018    Wears glasses      Social History Socioeconomic History    Marital status: /Civil Union     Spouse name: Not on file    Number of children: Not on file    Years of education: Not on file    Highest education level: Not on file   Occupational History    Not on file   Social Needs    Financial resource strain: Not on file    Food insecurity:     Worry: Not on file     Inability: Not on file    Transportation needs:     Medical: Not on file     Non-medical: Not on file   Tobacco Use    Smoking status: Former Smoker     Last attempt to quit: Lenin Rasmussen     Years since quittin 5    Smokeless tobacco: Never Used    Tobacco comment: Smoked for 10yrs     Substance and Sexual Activity    Alcohol use: Not Currently     Frequency: 2-4 times a month     Drinks per session: 1 or 2     Binge frequency: Never     Comment: wine    Drug use: No    Sexual activity: Yes     Partners: Male     Comment: with    Lifestyle    Physical activity:     Days per week: Not on file     Minutes per session: Not on file    Stress: Not on file   Relationships    Social connections:     Talks on phone: Not on file     Gets together: Not on file     Attends [employer] service: Not on file     Active member of club or organization: Not on file     Attends meetings of clubs or organizations: Not on file     Relationship status: Not on file    Intimate partner violence:     Fear of current or ex partner: Not on file     Emotionally abused: Not on file     Physically abused: Not on file     Forced sexual activity: Not on file   Other Topics Concern    Not on file   Social History Narrative    CAFFEINE USE    DAILY COFFEE CONSUMPTION    EXERCISES FREQUENTLY      Family History   Problem Relation Age of Onset    Lung cancer Father     Heart disease Father     Diabetes Father     Other Family         ADENOCARCINOMA IN SIOBHAN IN VILLOUS ADENOMA OF THE BREAST, MIGRAINES, SKIN DISORDER    Depression Family     Anxiety disorder Family     Diabetes Family MELLITUS    Fibrocystic breast disease Family     Heart disease Family     Hiatal hernia Family     Hypertension Family     Irritable bowel syndrome Family     Kidney disease Family     Urinary tract infection Family     Atrial fibrillation Mother     Hypertension Mother     Obesity Brother     No Known Problems Brother     Breast cancer Maternal Grandmother 48    Cancer Paternal Grandfather     Breast cancer Family     No Known Problems Son     No Known Problems Son     Lung cancer Maternal Aunt     No Known Problems Maternal Aunt     No Known Problems Maternal Aunt     No Known Problems Maternal Aunt      Past Surgical History:   Procedure Laterality Date    CHOLECYSTECTOMY      COLONOSCOPY N/A 10/24/2017    Procedure: COLONOSCOPY;  Surgeon: Tiffanie Boland MD;  Location: [facility]; Service: Colorectal    COLONOSCOPY W/ ENDOSCOPIC US N/A 10/24/2017    Procedure: ANAL ENDOSCOPIC U/S;  Surgeon: Tiffanie Boland MD;  [facility]: [facility];   Service: Colorectal    DILATION AND CURETTAGE OF UTERUS      EGD      ENDOMETRIAL BIOPSY      WITHOUT CERVICAL DILATION    HYSTERECTOMY      NASAL SEPTUM SURGERY      OTHER SURGICAL HISTORY      Episiotomy repair    NY LAP, LYDIA RESTRICT PROC, LONGITUDINAL GASTRECTOMY N/A 6/16/2020    Procedure: GASTRECTOMY SLEEVE LAPAROSCOPIC AND INTRAOPERATIVE EGD ;  Surgeon: Cinthia Potter MD;  Location: [facility];  Service: Bariatrics    NY SHLDR ARTHROSCOP,SURG,W/ROTAT CUFF REPR Right 1/15/2020    Procedure: SHOULDER ARTHROSCOPIC DEBRIDEMENT OF PARTIAL THICKNESS ROTATOR CUFF TEAR, SAD;  Surgeon: Sally Rojas MD;  [facility];  Service: Orthopedics    ROTATOR CUFF REPAIR Left        Current Outpatient Medications:     omeprazole (PriLOSEC) 20 mg delayed release capsule, Take 1 capsule (20 mg total) by mouth daily, Disp: 90 capsule, Rfl: 1    pravastatin (PRAVACHOL) 10 mg tablet, take 1 tablet by mouth once daily, Disp: 90 tablet, Rfl: 0    Secukinumab, 300 MG Dose, (Cosentyx, 300 MG Dose,) 150 MG/ML SOSY, 1X a week on Saturday's  X 5 weeks (12/14/19 started) then Monthly, Disp: , Rfl:     sertraline (ZOLOFT) 100 mg tablet, take 1 tablet by mouth once daily, Disp: 90 tablet, Rfl: 0  Allergies   Allergen Reactions    Vicodin [Hydrocodone-Acetaminophen] Anaphylaxis       Labs:  Admission on 06/16/2020, Discharged on 06/17/2020   Component Date Value    POC Glucose 06/16/2020 169*    Case Report 06/16/2020                      Value:Surgical Pathology Report                         Case: [id number]                                   Authorizing Provider:  Orlando Hudson MD        Collected:           06/16/2020 1029              Ordering Location:     [facility]        Received:            06/16/2020 [facility] Operating Room                                                     Pathologist:           Christine Mendoza MD                                                               Specimen:    Stomach, Portion of stomach                                                                Final Diagnosis 06/16/2020                      Value: This result contains rich text formatting which cannot be displayed here   Additional Information 06/16/2020                      Value: This result contains rich text formatting which cannot be displayed here  Allie Ambrosio Gross Description 06/16/2020                      Value: This result contains rich text formatting which cannot be displayed here      Clinical Information 06/16/2020                      Value:No HP on prior bx    POC Glucose 06/16/2020 132     Sodium 06/17/2020 138     Potassium 06/17/2020 3 8     Chloride 06/17/2020 101     CO2 06/17/2020 28     ANION GAP 06/17/2020 9     BUN 06/17/2020 14     Creatinine 06/17/2020 0 86     Glucose 06/17/2020 134     Calcium 06/17/2020 8 5     eGFR 06/17/2020 74     WBC 06/17/2020 8 83     RBC 06/17/2020 4 17  Hemoglobin 06/17/2020 12 2     Hematocrit 06/17/2020 39 3     MCV 06/17/2020 94     MCH 06/17/2020 29 3     MCHC 06/17/2020 31 0*    RDW 06/17/2020 13 0     Platelets 49/26/9627 228     MPV 06/17/2020 10 2    Orders Only on 06/11/2020   Component Date Value    SARS-CoV-2  06/11/2020 Not Detected    Appointment on 05/27/2020   Component Date Value    TSH 3RD GENERATON 05/27/2020 1 770    Orders Only on 05/21/2020   Component Date Value    Right Eye Diabetic Retin* 05/21/2020 None     Left Eye Diabetic Retino* 05/21/2020 None    Office Visit on 05/18/2020   Component Date Value    Hemoglobin A1C 05/18/2020 [facility] on 05/13/2020   Component Date Value    POC Glucose 05/13/2020 [address] Case Report 05/13/2020                      Value:Surgical Pathology Report                         Case: [id number]                                   Authorizing Provider:  Qiana Gannon MD         Collected:           05/13/2020 0944              Ordering Location[facility]     [facility]        Received:            05/13/2020 [address] [place] [address] Endoscopy                                                          Pathologist:           Ciaran Colby MD                                                               Specimen:    Stomach, gastric bx, R/O H  pylori                                                         Final Diagnosis 05/13/2020                      Value: This result contains rich text formatting which cannot be displayed here   Additional Information 05/13/2020                      Value: This result contains rich text formatting which cannot be displayed here  Laney Lopez Gross Description 05/13/2020                      Value: This result contains rich text formatting which cannot be displayed here      Clinical Information 05/13/2020                      Value:FINDINGS:  Small sliding hiatal hernia (type I hiatal hernia)  The esophagus, stomach and duodenum appeared normal  Performed single biopsy in the stomach   Orders Only on 05/05/2020   Component Date Value    SARS-CoV-2  05/05/2020 Not [address] Outpatient Visit on 05/05/2020   Component Date Value    Protocol Name 05/05/2020 JAYLIN     Time In Exercise Phase 05/05/2020 00:07:00     MAX   SYSTOLIC BP 75/49/5568 489     Max Diastolic Bp 63/43/0079 96     Max Heart Rate 05/05/2020 139     Max Predicted Heart Rate 05/05/2020 161     Reason for Termination 05/05/2020                      Value:Target Heart Rate Achieved  Maximal exercise (symptom limited)  Exaggerated BP increase      Test Indication 05/05/2020 Pre-Op Evaluation     Target Hr Formular 05/05/2020 (220 - Age)*85%    Appointment on 03/20/2020   Component Date Value    QFT Nil 03/20/2020 0 03     QFT TB1-NIL 03/20/2020 0 01     QFT TB2-NIL 03/20/2020 0 01     QFT Mitogen-NIL 03/20/2020 >10 00     QFT Final Interpretation 03/20/2020 Negative    Transcribe Orders on 03/20/2020   Component Date Value    WBC 03/20/2020 5 65     RBC 03/20/2020 4 76     Hemoglobin 03/20/2020 13 8     Hematocrit 03/20/2020 43 3     MCV 03/20/2020 91     MCH 03/20/2020 29 0     MCHC 03/20/2020 31 9     RDW 03/20/2020 12 6     MPV 03/20/2020 10 5     Platelets 16/94/9185 241     nRBC 03/20/2020 0     Neutrophils Relative 03/20/2020 59     Immat GRANS % 03/20/2020 1     Lymphocytes Relative 03/20/2020 27     Monocytes Relative 03/20/2020 6     Eosinophils Relative 03/20/2020 6     Basophils Relative 03/20/2020 1     Neutrophils Absolute 03/20/2020 3 38     Immature Grans Absolute 03/20/2020 0 03     Lymphocytes Absolute 03/20/2020 1 52     Monocytes Absolute 03/20/2020 0 34     Eosinophils Absolute 03/20/2020 0 32     Basophils Absolute 03/20/2020 0 06     Sodium 03/20/2020 137     Potassium 03/20/2020 4 0     Chloride 03/20/2020 106     CO2 03/20/2020 26     ANION GAP 03/20/2020 5     BUN 03/20/2020 20     Creatinine 03/20/2020 0 81     Glucose, Fasting 03/20/2020 155*    Calcium 03/20/2020 9 4     AST 03/20/2020 10     ALT 03/20/2020 37     Alkaline Phosphatase 03/20/2020 88     Total Protein 03/20/2020 7 9     Albumin 03/20/2020 3 6     Total Bilirubin 03/20/2020 0 39     eGFR 03/20/2020 80    There may be more visits with results that are not included  Lab Results   Component Value Date    TRIG 102 10/02/2019    HDL 52 10/02/2019     Imaging: No results found  Review of Systems:  Review of Systems   Constitutional: Negative for activity change, appetite change, chills, diaphoresis, fatigue and unexpected weight change  HENT: Negative for hearing loss, nosebleeds and sore throat  Eyes: Negative for photophobia and visual disturbance  Respiratory: Negative for cough, chest tightness, shortness of breath and wheezing  Cardiovascular: Negative for chest pain, palpitations and leg swelling  Gastrointestinal: Negative for abdominal pain, diarrhea, nausea and vomiting  Endocrine: Negative for polyuria  Genitourinary: Negative for dysuria, frequency and hematuria  Musculoskeletal: Negative for arthralgias, back pain, gait problem and neck pain  Skin: Negative for pallor and rash  Neurological: Negative for dizziness, syncope and headaches  Hematological: Does not bruise/bleed easily  Psychiatric/Behavioral: Negative for behavioral problems and confusion  Physical Exam:  Physical Exam   Constitutional: She is oriented to person, place, and time  She appears well-developed and well-nourished  HENT:   Head: Normocephalic and atraumatic  Nose: Nose normal    Eyes: Pupils are equal, round, and reactive to light  EOM are normal  No scleral icterus  Neck: Normal range of motion  Neck supple  No JVD present  Cardiovascular: Normal rate, regular rhythm and normal heart sounds  Exam reveals no gallop and no friction rub  No murmur heard    Pulmonary/Chest: Effort normal and breath sounds normal  No respiratory distress  She has no wheezes  She has no rales  Abdominal: Soft  Bowel sounds are normal  She exhibits no distension  There is no tenderness  Musculoskeletal: Normal range of motion  She exhibits no edema or deformity  Neurological: She is alert and oriented to person, place, and time  No cranial nerve deficit  Skin: Skin is warm and dry  No rash noted  She is not diaphoretic  Psychiatric: She has a normal mood and affect  Her behavior is normal    Vitals reviewed  Blood pressure 124/76, pulse 77, height 5' 5 5" (1 664 m), weight 89 4 kg (197 lb), SpO2 97 %, not currently breastfeeding  Discussion/Summary:  Post-op cardiac eval: with risk factors for CAD including obesity, family history of CAD in her father (triple bypass) and paternal uncle with CAD, former smoker, post-menopausal, HTN, DM, HLD and no active symptoms suggesting active ischemia, arrhythmia, or CHF  With significant risk factors, we checked a stress test that ruled out ischemic disease  Most recent EKG was from June 2018, which I personally reviewed and have not seen any changes to suggest cardiac problems  Echocardiogram was also done then revealing normal LV function, normal RV function, and no significant valvular problems  Proceed with planned surgery without further cardiac work-up  HTN: usually well controlled at home on current regimen  She has trouble with valsartan size of the pill  Will switch to lisinopril 20mg instead  HLD: continued on statin  , which is at goal of <120 in Oct 2019  DM: continued on metformin

## 2020-07-15 NOTE — PATIENT INSTRUCTIONS

## 2020-07-24 ENCOUNTER — OFFICE VISIT (OUTPATIENT)
Dept: BARIATRICS | Facility: CLINIC | Age: 60
End: 2020-07-24

## 2020-07-24 VITALS — TEMPERATURE: 97.3 F | BODY MASS INDEX: 31.83 KG/M2 | WEIGHT: 194.2 LBS

## 2020-07-24 DIAGNOSIS — Z98.84 STATUS POST LAPAROSCOPIC SLEEVE GASTRECTOMY: ICD-10-CM

## 2020-07-24 DIAGNOSIS — E66.01 MORBID OBESITY (HCC): Primary | ICD-10-CM

## 2020-07-24 DIAGNOSIS — E66.9 OBESITY, CLASS I, BMI 30-34.9: ICD-10-CM

## 2020-07-24 PROBLEM — E66.811 OBESITY, CLASS I, BMI 30-34.9: Status: ACTIVE | Noted: 2020-07-24

## 2020-07-24 PROCEDURE — RECHECK: Performed by: DIETITIAN, REGISTERED

## 2020-07-24 NOTE — PROGRESS NOTES
Weight Management Nutrition Class     Diagnosis: Morbid Obesity    Bariatric Surgeon: Dr Rochelle Mejía    Surgery: Vertical Sleeve Gastrectomy    Class: 5 week post op     Topics discussed today include:     fluid goals post op, protein goals post op, constipation, chew food well, exercise, avoidance of alcohol, PPI use, diet progression, protein supplems, vitamin/mineral supplements, calcium supplements and iron supplements    Patient was able to verbalize basic diet (protein, fluid, vitamin and mineral) recommendations and possible nutrition-related complications  Yes     Pt is using Bariatric Advantage Soft Chew calcium citrate TID  Bariatric Advantage Soft Chew Multivitamin BID  Bariatric Advantage capsule Iron/Thiamine/Copper once daily

## 2020-08-03 DIAGNOSIS — I10 ESSENTIAL HYPERTENSION: ICD-10-CM

## 2020-08-03 PROCEDURE — 4010F ACE/ARB THERAPY RXD/TAKEN: CPT | Performed by: FAMILY MEDICINE

## 2020-08-03 RX ORDER — VALSARTAN 320 MG/1
TABLET ORAL
Qty: 90 TABLET | Refills: 1 | OUTPATIENT
Start: 2020-08-03

## 2020-08-19 ENCOUNTER — OFFICE VISIT (OUTPATIENT)
Dept: FAMILY MEDICINE CLINIC | Facility: MEDICAL CENTER | Age: 60
End: 2020-08-19
Payer: COMMERCIAL

## 2020-08-19 VITALS
BODY MASS INDEX: 29.09 KG/M2 | HEIGHT: 66 IN | DIASTOLIC BLOOD PRESSURE: 76 MMHG | HEART RATE: 80 BPM | SYSTOLIC BLOOD PRESSURE: 120 MMHG | WEIGHT: 181 LBS | TEMPERATURE: 97.1 F

## 2020-08-19 DIAGNOSIS — R80.9 TYPE 2 DIABETES MELLITUS WITH MICROALBUMINURIA, WITHOUT LONG-TERM CURRENT USE OF INSULIN (HCC): Primary | ICD-10-CM

## 2020-08-19 DIAGNOSIS — F41.9 ANXIETY: ICD-10-CM

## 2020-08-19 DIAGNOSIS — I10 ESSENTIAL HYPERTENSION: ICD-10-CM

## 2020-08-19 DIAGNOSIS — E11.29 TYPE 2 DIABETES MELLITUS WITH MICROALBUMINURIA, WITHOUT LONG-TERM CURRENT USE OF INSULIN (HCC): Primary | ICD-10-CM

## 2020-08-19 DIAGNOSIS — E78.2 MIXED HYPERLIPIDEMIA: ICD-10-CM

## 2020-08-19 PROBLEM — M75.21 BICEPS TENDONITIS, RIGHT: Status: RESOLVED | Noted: 2019-10-04 | Resolved: 2020-08-19

## 2020-08-19 PROBLEM — M75.41 IMPINGEMENT SYNDROME OF RIGHT SHOULDER: Status: RESOLVED | Noted: 2019-10-04 | Resolved: 2020-08-19

## 2020-08-19 PROBLEM — R42 DIZZINESS AND GIDDINESS: Status: RESOLVED | Noted: 2018-07-27 | Resolved: 2020-08-19

## 2020-08-19 PROBLEM — L40.9 PSORIASIS: Status: ACTIVE | Noted: 2020-08-19

## 2020-08-19 LAB — SL AMB POCT HEMOGLOBIN AIC: 5.5 (ref ?–6.5)

## 2020-08-19 PROCEDURE — 3008F BODY MASS INDEX DOCD: CPT | Performed by: FAMILY MEDICINE

## 2020-08-19 PROCEDURE — 3074F SYST BP LT 130 MM HG: CPT | Performed by: FAMILY MEDICINE

## 2020-08-19 PROCEDURE — 99214 OFFICE O/P EST MOD 30 MIN: CPT | Performed by: FAMILY MEDICINE

## 2020-08-19 PROCEDURE — 3044F HG A1C LEVEL LT 7.0%: CPT | Performed by: FAMILY MEDICINE

## 2020-08-19 PROCEDURE — 3066F NEPHROPATHY DOC TX: CPT | Performed by: FAMILY MEDICINE

## 2020-08-19 PROCEDURE — 2022F DILAT RTA XM EVC RTNOPTHY: CPT | Performed by: FAMILY MEDICINE

## 2020-08-19 PROCEDURE — 1036F TOBACCO NON-USER: CPT | Performed by: FAMILY MEDICINE

## 2020-08-19 PROCEDURE — 3078F DIAST BP <80 MM HG: CPT | Performed by: FAMILY MEDICINE

## 2020-08-19 PROCEDURE — 83036 HEMOGLOBIN GLYCOSYLATED A1C: CPT | Performed by: FAMILY MEDICINE

## 2020-08-19 NOTE — PROGRESS NOTES
Assessment/Plan:       Diagnoses and all orders for this visit:    Type 2 diabetes mellitus with microalbuminuria, without long-term current use of insulin ([facility])  -     POCT hemoglobin A1c  Well controlled without medication  A1c is 5 5%  Continue with diet and exercise  Essential hypertension  Blood pressure well controlled  Continue lisinopril  Mixed hyperlipidemia  Continue pravastatin  Anxiety  Well controlled  Patient wishes to stop Zoloft  Will have her take the medication every other day for two weeks then every two days for two weeks and then stop  She was educated about withdrawal symptoms  If she has any problems with the tapering she should contact me  Follow-up in six months or sooner if needed  Subjective:      Patient ID: Geronimo Ortiz is a 61 y o  female  Patient presents for follow-up  She has type 2 diabetes  Not on any medication at this time  Did have bariatric surgery and is utilizing diet and exercise to help keep glucose levels controlled  Is due for an in office A1c  She has hypertension  Treatment includes lisinopril 20 mg daily  Tolerating medication well  She has hyperlipidemia  She is on pravastatin 10 mg daily  No myalgias from the medication  She has anxiety  Currently on sertraline 100 mg daily  Anxiety is well controlled  She would like to stop the medication  The following portions of the patient's history were reviewed and updated as appropriate: She  has a past medical history of Anxiety, Arthritis, Bariatric surgery status, Closed head injury (06/11/2018), Colon polyp, Concussion with loss of consciousness (06/22/2018), Depression, Diabetes mellitus ([facility] ), Diarrhea, Fatty liver, Fecal incontinence, GERD (gastroesophageal reflux disease), Head injury, Hiatal hernia, Hyperlipidemia, Hypertension, Lifelong obesity, Postsurgical malabsorption, Psoriasis, SAH (subarachnoid hemorrhage) ([facility] ) (06/11/2018), and Wears glasses    She Patient Active Problem List    Diagnosis Date Noted    Psoriasis 08/19/2020    Obesity, Class I, BMI 30-34 9 07/24/2020    Status post laparoscopic sleeve gastrectomy 06/17/2020    Obesity, Class II, BMI 35-39 9 06/11/2020    Pre-operative cardiovascular examination 04/06/2020    S/P arthroscopy of right shoulder 03/26/2020    Tear of right supraspinatus tendon 12/16/2019    Routine gynecological examination 10/15/2019    Morbid obesity ([facility] ) 10/02/2019    Hyperlipidemia 07/05/2018    Hematuria 07/05/2018    Type 2 diabetes mellitus with microalbuminuria, without long-term current use of insulin ([facility] ) 07/05/2018    Mild TBI ([facility] ) 06/11/2018    Anxiety 08/25/2017    Hypertension 08/25/2017     She  has a past surgical history that includes Nasal septum surgery; Cholecystectomy; Hysterectomy; Colonoscopy (N/A, 10/24/2017); Colonoscopy w/ endoscopic US (N/A, 10/24/2017); Endometrial biopsy; Dilation and curettage of uterus; EGD; Rotator cuff repair (Left); Other surgical history; pr shldr arthroscop,surg,w/rotat cuff repr (Right, 1/15/2020); and pr lap, francisca restrict proc, longitudinal gastrectomy (N/A, 6/16/2020)  Her family history includes Anxiety disorder in her family; Atrial fibrillation in her mother; Breast cancer in her family; Breast cancer (age of onset: 48) in her maternal grandmother; Cancer in her paternal grandfather; Depression in her family; Diabetes in her family and father; Fibrocystic breast disease in her family; Heart disease in her family and father; Hiatal hernia in her family; Hypertension in her family and mother; Irritable bowel syndrome in her family; Kidney disease in her family; Lung cancer in her father and maternal aunt; No Known Problems in her brother, maternal aunt, maternal aunt, maternal aunt, son, and son; Obesity in her brother; Other in her family; Urinary tract infection in her family  She  reports that she quit smoking about 28 years ago   She has never used smokeless tobacco  She reports previous alcohol use  She reports that she does not use drugs  Current Outpatient Medications   Medication Sig Dispense Refill    lisinopril (ZESTRIL) 20 mg tablet Take 1 tablet (20 mg total) by mouth daily 30 tablet 3    omeprazole (PriLOSEC) 20 mg delayed release capsule Take 1 capsule (20 mg total) by mouth daily 90 capsule 1    pravastatin (PRAVACHOL) 10 mg tablet take 1 tablet by mouth once daily 90 tablet 0    Secukinumab, 300 MG Dose, (Cosentyx, 300 MG Dose,) 150 MG/ML SOSY 1X a week on Saturday's  X 5 weeks (12/14/19 started) then Monthly       No current facility-administered medications for this visit  Current Outpatient Medications on File Prior to Visit   Medication Sig    lisinopril (ZESTRIL) 20 mg tablet Take 1 tablet (20 mg total) by mouth daily    omeprazole (PriLOSEC) 20 mg delayed release capsule Take 1 capsule (20 mg total) by mouth daily    pravastatin (PRAVACHOL) 10 mg tablet take 1 tablet by mouth once daily    Secukinumab, 300 MG Dose, (Cosentyx, 300 MG Dose,) 150 MG/ML SOSY 1X a week on Saturday's  X 5 weeks (12/14/19 started) then Monthly    [DISCONTINUED] sertraline (ZOLOFT) 100 mg tablet take 1 tablet by mouth once daily     No current facility-administered medications on file prior to visit  She is allergic to vicodin [hydrocodone-acetaminophen]       Review of Systems   Constitutional: Negative for fever  Respiratory: Negative for shortness of breath  Cardiovascular: Negative for chest pain  Objective:      /76 (BP Location: Left arm, Patient Position: Sitting, Cuff Size: Large)   Pulse 80   Temp (!) 97 1 °F (36 2 °C)   Ht 5' 5 5" (1 664 m)   Wt 82 1 kg (181 lb)   BMI 29 66 kg/m²          Physical Exam  Constitutional:       Appearance: Normal appearance  She is well-developed  Cardiovascular:      Rate and Rhythm: Normal rate and regular rhythm        Pulses: no weak pulses          Dorsalis pedis pulses are 2+ on the right side and 2+ on the left side  Posterior tibial pulses are 2+ on the right side and 2+ on the left side  Heart sounds: Normal heart sounds  Pulmonary:      Effort: Pulmonary effort is normal       Breath sounds: Normal breath sounds  Feet:      Right foot:      Skin integrity: No ulcer, skin breakdown, erythema, warmth, callus or dry skin  Left foot:      Skin integrity: No ulcer, skin breakdown, erythema, warmth, callus or dry skin  Diabetic Foot Exam    Patient's shoes and socks removed  Right Foot/Ankle   Right Foot Inspection  Skin Exam: skin normal and skin intact no dry skin, no warmth, no callus, no erythema, no maceration, no abnormal color, no pre-ulcer, no ulcer and no callus                          Toe Exam: ROM and strength within normal limits  Sensory   Vibration: intact    Monofilament testing: intact  Vascular  Capillary refills: < 3 seconds  The right DP pulse is 2+  The right PT pulse is 2+  Left Foot/Ankle  Left Foot Inspection  Skin Exam: skin normal and skin intactno dry skin, no warmth, no erythema, no maceration, normal color, no pre-ulcer, no ulcer and no callus                         Toe Exam: ROM and strength within normal limits                   Sensory   Vibration: intact    Monofilament: intact  Vascular  Capillary refills: < 3 seconds  The left DP pulse is 2+  The left PT pulse is 2+  Assign Risk Category:  No deformity present; No loss of protective sensation;  No weak pulses       Risk: 0

## 2020-09-10 DIAGNOSIS — E11.9 TYPE 2 DIABETES MELLITUS WITHOUT COMPLICATION, WITHOUT LONG-TERM CURRENT USE OF INSULIN (HCC): ICD-10-CM

## 2020-09-10 RX ORDER — PRAVASTATIN SODIUM 10 MG
TABLET ORAL
Qty: 90 TABLET | Refills: 0 | Status: SHIPPED | OUTPATIENT
Start: 2020-09-10 | End: 2020-12-07

## 2020-09-14 DIAGNOSIS — E11.9 TYPE 2 DIABETES MELLITUS WITHOUT COMPLICATION, WITHOUT LONG-TERM CURRENT USE OF INSULIN (HCC): ICD-10-CM

## 2020-09-14 RX ORDER — PRAVASTATIN SODIUM 10 MG
TABLET ORAL
Qty: 90 TABLET | Refills: 0 | OUTPATIENT
Start: 2020-09-14

## 2020-09-29 ENCOUNTER — OFFICE VISIT (OUTPATIENT)
Dept: BARIATRICS | Facility: CLINIC | Age: 60
End: 2020-09-29
Payer: COMMERCIAL

## 2020-09-29 VITALS
BODY MASS INDEX: 26.68 KG/M2 | DIASTOLIC BLOOD PRESSURE: 62 MMHG | WEIGHT: 166 LBS | HEIGHT: 66 IN | SYSTOLIC BLOOD PRESSURE: 136 MMHG | TEMPERATURE: 97 F | HEART RATE: 68 BPM

## 2020-09-29 DIAGNOSIS — E11.29 TYPE 2 DIABETES MELLITUS WITH MICROALBUMINURIA, WITHOUT LONG-TERM CURRENT USE OF INSULIN (HCC): ICD-10-CM

## 2020-09-29 DIAGNOSIS — I10 ESSENTIAL HYPERTENSION: ICD-10-CM

## 2020-09-29 DIAGNOSIS — Z98.84 BARIATRIC SURGERY STATUS: ICD-10-CM

## 2020-09-29 DIAGNOSIS — Z48.815 ENCOUNTER FOR SURGICAL AFTERCARE FOLLOWING SURGERY OF DIGESTIVE SYSTEM: ICD-10-CM

## 2020-09-29 DIAGNOSIS — R80.9 TYPE 2 DIABETES MELLITUS WITH MICROALBUMINURIA, WITHOUT LONG-TERM CURRENT USE OF INSULIN (HCC): ICD-10-CM

## 2020-09-29 DIAGNOSIS — E78.2 MIXED HYPERLIPIDEMIA: ICD-10-CM

## 2020-09-29 DIAGNOSIS — K91.2 POSTSURGICAL MALABSORPTION: ICD-10-CM

## 2020-09-29 DIAGNOSIS — E66.3 OVERWEIGHT: Primary | ICD-10-CM

## 2020-09-29 PROBLEM — E66.811 OBESITY, CLASS I, BMI 30-34.9: Status: RESOLVED | Noted: 2020-07-24 | Resolved: 2020-09-29

## 2020-09-29 PROBLEM — E66.9 OBESITY, CLASS II, BMI 35-39.9: Status: RESOLVED | Noted: 2020-06-11 | Resolved: 2020-09-29

## 2020-09-29 PROBLEM — E66.01 MORBID OBESITY (HCC): Status: RESOLVED | Noted: 2019-10-02 | Resolved: 2020-09-29

## 2020-09-29 PROBLEM — E66.9 OBESITY, CLASS I, BMI 30-34.9: Status: RESOLVED | Noted: 2020-07-24 | Resolved: 2020-09-29

## 2020-09-29 PROBLEM — E66.812 OBESITY, CLASS II, BMI 35-39.9: Status: RESOLVED | Noted: 2020-06-11 | Resolved: 2020-09-29

## 2020-09-29 PROCEDURE — 3075F SYST BP GE 130 - 139MM HG: CPT | Performed by: PHYSICIAN ASSISTANT

## 2020-09-29 PROCEDURE — 99214 OFFICE O/P EST MOD 30 MIN: CPT | Performed by: PHYSICIAN ASSISTANT

## 2020-09-29 PROCEDURE — 1036F TOBACCO NON-USER: CPT | Performed by: PHYSICIAN ASSISTANT

## 2020-09-29 PROCEDURE — 3078F DIAST BP <80 MM HG: CPT | Performed by: PHYSICIAN ASSISTANT

## 2020-09-29 PROCEDURE — 3066F NEPHROPATHY DOC TX: CPT | Performed by: PHYSICIAN ASSISTANT

## 2020-09-29 NOTE — ASSESSMENT & PLAN NOTE
Improved from obesity I with bmi of 33 9 at her first post-op visit with her surgeon to current overweight with bmi of 27 2

## 2020-09-29 NOTE — PATIENT INSTRUCTIONS
It was great to meet you today  Keep up the good work! Follow-up in 3 months  We kindly ask that you arrive 15 minutes before  your scheduled appointment time with your provider to allow for our staff to room you and check your vital signs and update your chart  We thank you for your patience at your visit  Your appointment time is reserved only for you  If you are unable to make your scheduled visit, we would request that you call to cancel and reschedule it at your earliest convenience  Follow diet as discussed  Get lab work done prior to next office visit  It is recommended to check with your insurance BEFORE getting labs done to make sure they are covered by your policy  Make sure to HOLD any multivitamins that may contain biotin and any biotin supplements FOR 5 DAYS before any labs since it can affect the results  IMPORTANT if you have a [facility] "[employer]" account, you will receive a letter of your vitamin/mineral results through the computer  Please watch for an update to your chart-since recommendations for supplement adjustments will be sent to you this way  Follow vitamin  and mineral recommendations as reviewed with you  Bariatric vitamins are highly recommended  Vitamins are important for a life-time to avoid low levels which can lead to other medical problems  Exercise as tolerated  Call our office if you have any problems with abdominal pain especially associated with fever, chills, nausea, vomiting or any other concerns  All  Post-bariatric surgery patients should be aware that very small quantities of any alcohol  can cause impairment and it is very possible not to feel the effect  The effect can be in the system for several hours  It is also a stomach irritant  It is advised to AVOID alcohol, Nonsteroidal antiinflammatory drugs (NSAIDS) and nicotine of all forms   Any of these can cause stomach irritation/pain      Recommendation : Most, if not all post-gastric surgery patients would benefit from having a regular counselor for at least 2 years post-op to help with need for multiple life-style changes  If you want to do this and need help finding a regular counselor you can also make a follow-up with our  to help you find one

## 2020-09-29 NOTE — ASSESSMENT & PLAN NOTE
Controlled on the office on lisinopril -she notes she came off one of her antihypertensives since surgery    Advised that with continued weight loss blood pressure can come down  Advised on symptoms of hypotension and if this occurs to follow-up with cardiologist/ PCP for further management since blood pressure medications may need to be adjusted at that time

## 2020-09-29 NOTE — ASSESSMENT & PLAN NOTE
Malabsorption- patient is at risk for malabsorption of vitamins/minerals secondary to malabsorption from her procedure and restriction of intakes  Reviewed current supplements and advised on same  She is taking 2 bariatric chewable mvi and taking 3-500 mg calcium citrate with D-she is due for routine labs-will order same

## 2020-09-29 NOTE — ASSESSMENT & PLAN NOTE
Prior lipids with mildly high LDL-on statin-followed by PCP-her good weight loss likely to further improve this level

## 2020-09-29 NOTE — PROGRESS NOTES
Assessment/Plan:    Overweight  Improved from obesity I with bmi of 33 9 at her first post-op visit with her surgeon to current overweight with bmi of 27 2    Type 2 diabetes mellitus with microalbuminuria, without long-term current use of insulin ([facility] [address] )    Lab Results   Component Value Date    HGBA1C 5 5 08/19/2020     Appears diet controlled    Hypertension  Controlled on the office on lisinopril -she notes she came off one of her antihypertensives since surgery    Advised that with continued weight loss blood pressure can come down  Advised on symptoms of hypotension and if this occurs to follow-up with cardiologist/ PCP for further management since blood pressure medications may need to be adjusted at that time  Postsurgical malabsorption  Malabsorption- patient is at risk for malabsorption of vitamins/minerals secondary to malabsorption from her procedure and restriction of intakes  Reviewed current supplements and advised on same  She is taking 2 bariatric chewable mvi and taking 3-500 mg calcium citrate with D-she is due for routine labs-will order same    Hyperlipidemia  Prior lipids with mildly high LDL-on statin-followed by PCP-her good weight loss likely to further improve this level    Encounter for surgical aftercare following surgery of digestive system  -s/p Vertical Sleeve Gastrectomy with Dr Joy Malone on 6/16/2020  Overall doing Well  Initial:228 lb  Current:166 lb  EWL:79% which is above average for this time frame  [place]:Current  Current BMI is Body mass index is 27 2 kg/m²      Tolerating a regular diet-yes  Eating at least 60 grams of protein per day-yes  Following 30/60 minute rule with liquids-yes  Drinking at least 64 ounces of fluid per day-close-advised on ways to boost this and importance of same  Drinking carbonated beverages-no  Sufficient exercise-yes  Using NSAIDs regularly-no  Using nicotine-no  Using alcohol-no    Colonsocopy/colon cancer screening is up-to-date as reported by patient           Diagnoses and all orders for this visit:    Overweight  -     CBC and Platelet; Future  -     Comprehensive metabolic panel; Future  -     Ferritin; Future  -     Folate; Future  -     Iron Saturation %; Future  -     PTH, intact; Future  -     Vitamin A; Future  -     Vitamin B1, whole blood; Future  -     Vitamin B12; Future  -     Vitamin D 25 hydroxy; Future  -     Zinc; Future    Type 2 diabetes mellitus with microalbuminuria, without long-term current use of insulin (HCC)  -     CBC and Platelet; Future  -     Comprehensive metabolic panel; Future  -     Ferritin; Future  -     Folate; Future  -     Iron Saturation %; Future  -     PTH, intact; Future  -     Vitamin A; Future  -     Vitamin B1, whole blood; Future  -     Vitamin B12; Future  -     Vitamin D 25 hydroxy; Future  -     Zinc; Future    Encounter for surgical aftercare following surgery of digestive system  -     CBC and Platelet; Future  -     Comprehensive metabolic panel; Future  -     Ferritin; Future  -     Folate; Future  -     Iron Saturation %; Future  -     PTH, intact; Future  -     Vitamin A; Future  -     Vitamin B1, whole blood; Future  -     Vitamin B12; Future  -     Vitamin D 25 hydroxy; Future  -     Zinc; Future    Postsurgical malabsorption  -     CBC and Platelet; Future  -     Comprehensive metabolic panel; Future  -     Ferritin; Future  -     Folate; Future  -     Iron Saturation %; Future  -     PTH, intact; Future  -     Vitamin A; Future  -     Vitamin B1, whole blood; Future  -     Vitamin B12; Future  -     Vitamin D 25 hydroxy; Future  -     Zinc; Future    Mixed hyperlipidemia  -     CBC and Platelet; Future  -     Comprehensive metabolic panel; Future  -     Ferritin; Future  -     Folate; Future  -     Iron Saturation %; Future  -     PTH, intact; Future  -     Vitamin A; Future  -     Vitamin B1, whole blood; Future  -     Vitamin B12; Future  -     Vitamin D 25 hydroxy;  Future  -     Zinc; Future    Essential hypertension  -     CBC and Platelet; Future  -     Comprehensive metabolic panel; Future  -     Ferritin; Future  -     Folate; Future  -     Iron Saturation %; Future  -     PTH, intact; Future  -     Vitamin A; Future  -     Vitamin B1, whole blood; Future  -     Vitamin B12; Future  -     Vitamin D 25 hydroxy; Future  -     Zinc; Future    Bariatric surgery status  -     CBC and Platelet; Future  -     Comprehensive metabolic panel; Future  -     Ferritin; Future  -     Folate; Future  -     Iron Saturation %; Future  -     PTH, intact; Future  -     Vitamin A; Future  -     Vitamin B1, whole blood; Future  -     Vitamin B12; Future  -     Vitamin D 25 hydroxy; Future  -     Zinc; Future          Subjective:      Patient ID: Peter Marti is a 61 y o  female  61year old female status post laparoscopic sleeve gastrectomy 6/16/2020 by Dr Win Beck is here in routine follow-up to assess diet, weight and vitamin/mineral status  She has lost weight and is tolerating a regular diet  She is taking bariatric supplements and is doing regular exercise  She feels good and has no complaints today      The following portions of the patient's history were reviewed and updated as appropriate: allergies, current medications, past family history, past medical history, past social history, past surgical history and problem list     Review of Systems   Constitutional: Negative for chills and fever  Unexpected weight change: planned weight loss  Respiratory: Negative for cough, shortness of breath and wheezing  Cardiovascular: Negative for chest pain and palpitations  Gastrointestinal: Negative for abdominal pain, constipation, diarrhea, nausea and vomiting  Psychiatric/Behavioral: Suicidal ideas: no complait of anxiety or depression           Objective:      /62 (BP Location: Left arm, Patient Position: Sitting)   Pulse 68   Temp (!) 97 °F (36 1 °C)   Ht 5' 5 5" (1 664 m)   Wt 75 3 kg (166 lb)   BMI 27 20 kg/m²          Physical Exam  Constitutional:       Comments: overweight   Pulmonary:      Effort: Pulmonary effort is normal    Abdominal:      Comments: Overweight abdomen; incision sites appear well-healed   Musculoskeletal:      Comments: Normal gait   Neurological:      Mental Status: She is alert     Psychiatric:         Mood and Affect: Mood normal

## 2020-09-29 NOTE — ASSESSMENT & PLAN NOTE
-s/p Vertical Sleeve Gastrectomy with Dr Aurleio Beltre on 6/16/2020  Overall doing Well  Initial:228 lb  Current:166 lb  EWL:79% which is above average for this time frame  [place]:Current  Current BMI is Body mass index is 27 2 kg/m²      Tolerating a regular diet-yes  Eating at least 60 grams of protein per day-yes  Following 30/60 minute rule with liquids-yes  Drinking at least 64 ounces of fluid per day-close-advised on ways to boost this and importance of same  Drinking carbonated beverages-no  Sufficient exercise-yes  Using NSAIDs regularly-no  Using nicotine-no  Using alcohol-no    Colonsocopy/colon cancer screening is up-to-date as reported by patient

## 2020-10-19 DIAGNOSIS — F41.9 ANXIETY: ICD-10-CM

## 2020-10-20 ENCOUNTER — ANNUAL EXAM (OUTPATIENT)
Dept: OBGYN CLINIC | Facility: MEDICAL CENTER | Age: 60
End: 2020-10-20
Payer: COMMERCIAL

## 2020-10-20 VITALS
WEIGHT: 167.6 LBS | BODY MASS INDEX: 26.93 KG/M2 | SYSTOLIC BLOOD PRESSURE: 120 MMHG | HEIGHT: 66 IN | DIASTOLIC BLOOD PRESSURE: 76 MMHG

## 2020-10-20 DIAGNOSIS — Z01.419 ROUTINE GYNECOLOGICAL EXAMINATION: Primary | ICD-10-CM

## 2020-10-20 DIAGNOSIS — Z12.31 ENCOUNTER FOR SCREENING MAMMOGRAM FOR MALIGNANT NEOPLASM OF BREAST: ICD-10-CM

## 2020-10-20 DIAGNOSIS — Z98.84 STATUS POST LAPAROSCOPIC SLEEVE GASTRECTOMY: ICD-10-CM

## 2020-10-20 DIAGNOSIS — R39.15 URINARY URGENCY: ICD-10-CM

## 2020-10-20 PROCEDURE — S0612 ANNUAL GYNECOLOGICAL EXAMINA: HCPCS | Performed by: STUDENT IN AN ORGANIZED HEALTH CARE EDUCATION/TRAINING PROGRAM

## 2020-10-20 RX ORDER — PHENOL 1.4 %
600 AEROSOL, SPRAY (ML) MUCOUS MEMBRANE 2 TIMES DAILY WITH MEALS
COMMUNITY

## 2020-10-20 RX ORDER — MULTIVIT-MIN/IRON FUM/FOLIC AC 7.5 MG-4
1 TABLET ORAL DAILY
COMMUNITY

## 2020-10-20 RX ORDER — SERTRALINE HYDROCHLORIDE 100 MG/1
TABLET, FILM COATED ORAL
Qty: 90 TABLET | Refills: 0 | Status: SHIPPED | OUTPATIENT
Start: 2020-10-20 | End: 2021-01-15

## 2020-10-20 RX ORDER — DOXYCYCLINE HYCLATE 50 MG/1
324 CAPSULE, GELATIN COATED ORAL
COMMUNITY
End: 2021-07-08

## 2020-11-05 DIAGNOSIS — I10 ESSENTIAL HYPERTENSION: ICD-10-CM

## 2020-11-05 RX ORDER — LISINOPRIL 20 MG/1
20 TABLET ORAL DAILY
Qty: 30 TABLET | Refills: 3 | Status: SHIPPED | OUTPATIENT
Start: 2020-11-05 | End: 2020-11-05 | Stop reason: SDUPTHER

## 2020-11-05 RX ORDER — LISINOPRIL 20 MG/1
20 TABLET ORAL DAILY
Qty: 30 TABLET | Refills: 3 | Status: SHIPPED | OUTPATIENT
Start: 2020-11-05 | End: 2021-02-22

## 2020-11-23 ENCOUNTER — LAB (OUTPATIENT)
Dept: LAB | Facility: MEDICAL CENTER | Age: 60
End: 2020-11-23
Payer: COMMERCIAL

## 2020-11-23 DIAGNOSIS — E66.3 OVERWEIGHT: ICD-10-CM

## 2020-11-23 DIAGNOSIS — E11.29 TYPE 2 DIABETES MELLITUS WITH MICROALBUMINURIA, WITHOUT LONG-TERM CURRENT USE OF INSULIN (HCC): ICD-10-CM

## 2020-11-23 DIAGNOSIS — E78.2 MIXED HYPERLIPIDEMIA: ICD-10-CM

## 2020-11-23 DIAGNOSIS — Z98.84 BARIATRIC SURGERY STATUS: ICD-10-CM

## 2020-11-23 DIAGNOSIS — K91.2 POSTSURGICAL MALABSORPTION: ICD-10-CM

## 2020-11-23 DIAGNOSIS — R80.9 TYPE 2 DIABETES MELLITUS WITH MICROALBUMINURIA, WITHOUT LONG-TERM CURRENT USE OF INSULIN (HCC): ICD-10-CM

## 2020-11-23 DIAGNOSIS — Z48.815 ENCOUNTER FOR SURGICAL AFTERCARE FOLLOWING SURGERY OF DIGESTIVE SYSTEM: ICD-10-CM

## 2020-11-23 DIAGNOSIS — I10 ESSENTIAL HYPERTENSION: ICD-10-CM

## 2020-11-23 LAB
25(OH)D3 SERPL-MCNC: 53.6 NG/ML (ref 30–100)
ALBUMIN SERPL BCP-MCNC: 3.6 G/DL (ref 3.5–5)
ALP SERPL-CCNC: 90 U/L (ref 46–116)
ALT SERPL W P-5'-P-CCNC: 69 U/L (ref 12–78)
ANION GAP SERPL CALCULATED.3IONS-SCNC: 3 MMOL/L (ref 4–13)
AST SERPL W P-5'-P-CCNC: 11 U/L (ref 5–45)
BILIRUB SERPL-MCNC: 0.61 MG/DL (ref 0.2–1)
BUN SERPL-MCNC: 19 MG/DL (ref 5–25)
CALCIUM SERPL-MCNC: 9.7 MG/DL (ref 8.3–10.1)
CHLORIDE SERPL-SCNC: 110 MMOL/L (ref 100–108)
CO2 SERPL-SCNC: 29 MMOL/L (ref 21–32)
CREAT SERPL-MCNC: 0.65 MG/DL (ref 0.6–1.3)
ERYTHROCYTE [DISTWIDTH] IN BLOOD BY AUTOMATED COUNT: 12.8 % (ref 11.6–15.1)
FERRITIN SERPL-MCNC: 204 NG/ML (ref 8–388)
FOLATE SERPL-MCNC: 11.6 NG/ML (ref 3.1–17.5)
GFR SERPL CREATININE-BSD FRML MDRD: 97 ML/MIN/1.73SQ M
GLUCOSE P FAST SERPL-MCNC: 89 MG/DL (ref 65–99)
HCT VFR BLD AUTO: 40.3 % (ref 34.8–46.1)
HGB BLD-MCNC: 12.8 G/DL (ref 11.5–15.4)
IRON SATN MFR SERPL: 34 %
IRON SERPL-MCNC: 92 UG/DL (ref 50–170)
MCH RBC QN AUTO: 29.8 PG (ref 26.8–34.3)
MCHC RBC AUTO-ENTMCNC: 31.8 G/DL (ref 31.4–37.4)
MCV RBC AUTO: 94 FL (ref 82–98)
PLATELET # BLD AUTO: 258 THOUSANDS/UL (ref 149–390)
PMV BLD AUTO: 10.9 FL (ref 8.9–12.7)
POTASSIUM SERPL-SCNC: 4 MMOL/L (ref 3.5–5.3)
PROT SERPL-MCNC: 7.2 G/DL (ref 6.4–8.2)
PTH-INTACT SERPL-MCNC: 28.1 PG/ML (ref 18.4–80.1)
RBC # BLD AUTO: 4.3 MILLION/UL (ref 3.81–5.12)
SODIUM SERPL-SCNC: 142 MMOL/L (ref 136–145)
TIBC SERPL-MCNC: 270 UG/DL (ref 250–450)
VIT B12 SERPL-MCNC: 528 PG/ML (ref 100–900)
WBC # BLD AUTO: 4.27 THOUSAND/UL (ref 4.31–10.16)

## 2020-11-23 PROCEDURE — 80053 COMPREHEN METABOLIC PANEL: CPT

## 2020-11-23 PROCEDURE — 83550 IRON BINDING TEST: CPT

## 2020-11-23 PROCEDURE — 82306 VITAMIN D 25 HYDROXY: CPT

## 2020-11-23 PROCEDURE — 36415 COLL VENOUS BLD VENIPUNCTURE: CPT

## 2020-11-23 PROCEDURE — 83970 ASSAY OF PARATHORMONE: CPT

## 2020-11-23 PROCEDURE — 84630 ASSAY OF ZINC: CPT

## 2020-11-23 PROCEDURE — 84590 ASSAY OF VITAMIN A: CPT

## 2020-11-23 PROCEDURE — 82607 VITAMIN B-12: CPT

## 2020-11-23 PROCEDURE — 84425 ASSAY OF VITAMIN B-1: CPT

## 2020-11-23 PROCEDURE — 85027 COMPLETE CBC AUTOMATED: CPT

## 2020-11-23 PROCEDURE — 82746 ASSAY OF FOLIC ACID SERUM: CPT

## 2020-11-23 PROCEDURE — 82728 ASSAY OF FERRITIN: CPT

## 2020-11-23 PROCEDURE — 83540 ASSAY OF IRON: CPT

## 2020-11-26 LAB — ZINC SERPL-MCNC: 75 UG/DL (ref 56–134)

## 2020-11-29 LAB
VIT A SERPL-MCNC: 31.4 UG/DL (ref 20.1–62)
VIT B1 BLD-SCNC: 114.5 NMOL/L (ref 66.5–200)

## 2020-12-06 DIAGNOSIS — E11.9 TYPE 2 DIABETES MELLITUS WITHOUT COMPLICATION, WITHOUT LONG-TERM CURRENT USE OF INSULIN (HCC): ICD-10-CM

## 2020-12-07 ENCOUNTER — TELEPHONE (OUTPATIENT)
Dept: FAMILY MEDICINE CLINIC | Facility: MEDICAL CENTER | Age: 60
End: 2020-12-07

## 2020-12-07 DIAGNOSIS — Z20.822 SUSPECTED COVID-19 VIRUS INFECTION: ICD-10-CM

## 2020-12-07 DIAGNOSIS — Z20.822 SUSPECTED COVID-19 VIRUS INFECTION: Primary | ICD-10-CM

## 2020-12-07 DIAGNOSIS — E66.01 MORBID (SEVERE) OBESITY DUE TO EXCESS CALORIES (HCC): ICD-10-CM

## 2020-12-07 PROCEDURE — U0003 INFECTIOUS AGENT DETECTION BY NUCLEIC ACID (DNA OR RNA); SEVERE ACUTE RESPIRATORY SYNDROME CORONAVIRUS 2 (SARS-COV-2) (CORONAVIRUS DISEASE [COVID-19]), AMPLIFIED PROBE TECHNIQUE, MAKING USE OF HIGH THROUGHPUT TECHNOLOGIES AS DESCRIBED BY CMS-2020-01-R: HCPCS | Performed by: FAMILY MEDICINE

## 2020-12-07 RX ORDER — OMEPRAZOLE 20 MG/1
CAPSULE, DELAYED RELEASE ORAL
Qty: 90 CAPSULE | Refills: 1 | OUTPATIENT
Start: 2020-12-07

## 2020-12-07 RX ORDER — PRAVASTATIN SODIUM 10 MG
TABLET ORAL
Qty: 90 TABLET | Refills: 0 | Status: SHIPPED | OUTPATIENT
Start: 2020-12-07 | End: 2021-03-05

## 2020-12-08 LAB — SARS-COV-2 RNA SPEC QL NAA+PROBE: NOT DETECTED

## 2020-12-29 ENCOUNTER — OFFICE VISIT (OUTPATIENT)
Dept: BARIATRICS | Facility: CLINIC | Age: 60
End: 2020-12-29
Payer: COMMERCIAL

## 2020-12-29 VITALS
HEART RATE: 61 BPM | DIASTOLIC BLOOD PRESSURE: 82 MMHG | BODY MASS INDEX: 23.7 KG/M2 | WEIGHT: 147.5 LBS | RESPIRATION RATE: 20 BRPM | SYSTOLIC BLOOD PRESSURE: 124 MMHG | HEIGHT: 66 IN | TEMPERATURE: 98.7 F

## 2020-12-29 DIAGNOSIS — E78.2 MIXED HYPERLIPIDEMIA: ICD-10-CM

## 2020-12-29 DIAGNOSIS — K91.2 POSTSURGICAL MALABSORPTION: ICD-10-CM

## 2020-12-29 DIAGNOSIS — Z48.815 ENCOUNTER FOR SURGICAL AFTERCARE FOLLOWING SURGERY OF DIGESTIVE SYSTEM: ICD-10-CM

## 2020-12-29 DIAGNOSIS — I10 ESSENTIAL HYPERTENSION: ICD-10-CM

## 2020-12-29 DIAGNOSIS — Z98.84 BARIATRIC SURGERY STATUS: ICD-10-CM

## 2020-12-29 DIAGNOSIS — Z98.84 STATUS POST LAPAROSCOPIC SLEEVE GASTRECTOMY: ICD-10-CM

## 2020-12-29 DIAGNOSIS — R80.9 TYPE 2 DIABETES MELLITUS WITH MICROALBUMINURIA, WITHOUT LONG-TERM CURRENT USE OF INSULIN (HCC): ICD-10-CM

## 2020-12-29 DIAGNOSIS — E66.3 OVERWEIGHT: Primary | ICD-10-CM

## 2020-12-29 DIAGNOSIS — E11.29 TYPE 2 DIABETES MELLITUS WITH MICROALBUMINURIA, WITHOUT LONG-TERM CURRENT USE OF INSULIN (HCC): ICD-10-CM

## 2020-12-29 PROCEDURE — 3008F BODY MASS INDEX DOCD: CPT | Performed by: PHYSICIAN ASSISTANT

## 2020-12-29 PROCEDURE — 3074F SYST BP LT 130 MM HG: CPT | Performed by: PHYSICIAN ASSISTANT

## 2020-12-29 PROCEDURE — 1036F TOBACCO NON-USER: CPT | Performed by: PHYSICIAN ASSISTANT

## 2020-12-29 PROCEDURE — 3079F DIAST BP 80-89 MM HG: CPT | Performed by: PHYSICIAN ASSISTANT

## 2020-12-29 PROCEDURE — 99214 OFFICE O/P EST MOD 30 MIN: CPT | Performed by: PHYSICIAN ASSISTANT

## 2020-12-29 PROCEDURE — 3066F NEPHROPATHY DOC TX: CPT | Performed by: PHYSICIAN ASSISTANT

## 2021-01-15 DIAGNOSIS — F41.9 ANXIETY: ICD-10-CM

## 2021-01-15 RX ORDER — SERTRALINE HYDROCHLORIDE 100 MG/1
TABLET, FILM COATED ORAL
Qty: 90 TABLET | Refills: 0 | Status: SHIPPED | OUTPATIENT
Start: 2021-01-15 | End: 2021-02-22

## 2021-02-22 ENCOUNTER — OFFICE VISIT (OUTPATIENT)
Dept: FAMILY MEDICINE CLINIC | Facility: MEDICAL CENTER | Age: 61
End: 2021-02-22
Payer: COMMERCIAL

## 2021-02-22 VITALS
BODY MASS INDEX: 22.98 KG/M2 | DIASTOLIC BLOOD PRESSURE: 78 MMHG | WEIGHT: 143 LBS | HEART RATE: 55 BPM | SYSTOLIC BLOOD PRESSURE: 124 MMHG | OXYGEN SATURATION: 98 % | HEIGHT: 66 IN | TEMPERATURE: 97.9 F

## 2021-02-22 DIAGNOSIS — E78.2 MIXED HYPERLIPIDEMIA: ICD-10-CM

## 2021-02-22 DIAGNOSIS — E11.29 TYPE 2 DIABETES MELLITUS WITH MICROALBUMINURIA, WITHOUT LONG-TERM CURRENT USE OF INSULIN (HCC): Primary | ICD-10-CM

## 2021-02-22 DIAGNOSIS — S06.9X9A MILD TRAUMATIC BRAIN INJURY WITH LOSS OF CONSCIOUSNESS, INITIAL ENCOUNTER (HCC): ICD-10-CM

## 2021-02-22 DIAGNOSIS — I10 ESSENTIAL HYPERTENSION: ICD-10-CM

## 2021-02-22 DIAGNOSIS — Z00.00 PHYSICAL EXAM, ANNUAL: ICD-10-CM

## 2021-02-22 DIAGNOSIS — F41.9 ANXIETY: ICD-10-CM

## 2021-02-22 DIAGNOSIS — R80.9 TYPE 2 DIABETES MELLITUS WITH MICROALBUMINURIA, WITHOUT LONG-TERM CURRENT USE OF INSULIN (HCC): Primary | ICD-10-CM

## 2021-02-22 PROBLEM — Z01.810 PRE-OPERATIVE CARDIOVASCULAR EXAMINATION: Status: RESOLVED | Noted: 2020-04-06 | Resolved: 2021-02-22

## 2021-02-22 PROBLEM — M75.101 TEAR OF RIGHT SUPRASPINATUS TENDON: Status: RESOLVED | Noted: 2019-12-16 | Resolved: 2021-02-22

## 2021-02-22 PROBLEM — Z98.890 S/P ARTHROSCOPY OF RIGHT SHOULDER: Status: RESOLVED | Noted: 2020-03-26 | Resolved: 2021-02-22

## 2021-02-22 LAB — SL AMB POCT HEMOGLOBIN AIC: 5.5 (ref ?–6.5)

## 2021-02-22 PROCEDURE — 3725F SCREEN DEPRESSION PERFORMED: CPT | Performed by: FAMILY MEDICINE

## 2021-02-22 PROCEDURE — 99396 PREV VISIT EST AGE 40-64: CPT | Performed by: FAMILY MEDICINE

## 2021-02-22 PROCEDURE — 1036F TOBACCO NON-USER: CPT | Performed by: FAMILY MEDICINE

## 2021-02-22 PROCEDURE — 83036 HEMOGLOBIN GLYCOSYLATED A1C: CPT | Performed by: FAMILY MEDICINE

## 2021-02-22 PROCEDURE — 3078F DIAST BP <80 MM HG: CPT | Performed by: FAMILY MEDICINE

## 2021-02-22 PROCEDURE — 3044F HG A1C LEVEL LT 7.0%: CPT | Performed by: FAMILY MEDICINE

## 2021-02-22 PROCEDURE — 3008F BODY MASS INDEX DOCD: CPT | Performed by: FAMILY MEDICINE

## 2021-02-22 PROCEDURE — 99214 OFFICE O/P EST MOD 30 MIN: CPT | Performed by: FAMILY MEDICINE

## 2021-02-22 PROCEDURE — 3074F SYST BP LT 130 MM HG: CPT | Performed by: FAMILY MEDICINE

## 2021-02-22 PROCEDURE — 3066F NEPHROPATHY DOC TX: CPT | Performed by: FAMILY MEDICINE

## 2021-02-22 RX ORDER — AMOXICILLIN 500 MG/1
CAPSULE ORAL
COMMUNITY
Start: 2021-02-17 | End: 2021-07-08

## 2021-02-22 RX ORDER — CLOBETASOL PROPIONATE 0.5 MG/G
OINTMENT TOPICAL
COMMUNITY
Start: 2021-02-05 | End: 2021-07-08

## 2021-02-22 NOTE — PROGRESS NOTES
Assessment/Plan:       Diagnoses and all orders for this visit:    Physical exam, annual  -     Lipid panel; Future  -     LDL cholesterol, direct; Future  69-year-old female presenting for a physical exam     Continue regular follow-up with Gynecology  Patient encouraged to have her mammogram performed in March  Colonoscopy is up-to-date  Review of epic shows patient has multiple labs already ordered by her bariatric surgeon  Additional labs from me as well as ordered  Continuation of healthy lifestyle encouraged  Follow-up in six months or sooner if needed  Subjective:      Patient ID: Laurent Escobedo is a 61 y o  female  Patient presents for a physical exam     She has been eating healthier and exercising more regularly  She did have gastric bypass and feels great  No current tobacco use  No current alcohol use  No drug use  Patient has had her colonoscopy  Patient does have a gynecologist     Patient is due for mammography next month and has an order  Agreeable labs  The following portions of the patient's history were reviewed and updated as appropriate: She  has a past medical history of Anxiety, Arthritis, Bariatric surgery status, Closed head injury (06/11/2018), Colon polyp, Concussion with loss of consciousness (06/22/2018), Depression, Diabetes mellitus ([address] ), Diarrhea, Fatty liver, Fecal incontinence, GERD (gastroesophageal reflux disease), Head injury, Hiatal hernia, Hyperlipidemia, Hypertension, Lifelong obesity, Postsurgical malabsorption, Psoriasis, SAH (subarachnoid hemorrhage) ([address] ) (06/11/2018), Tear of right supraspinatus tendon (12/16/2019), and Wears glasses    She   Patient Active Problem List    Diagnosis Date Noted    Encounter for surgical aftercare following surgery of digestive system 09/29/2020    Postsurgical malabsorption 09/29/2020    Bariatric surgery status 09/29/2020    Psoriasis 08/19/2020    Status post laparoscopic sleeve gastrectomy 06/17/2020    Routine gynecological examination 10/15/2019    Hyperlipidemia 07/05/2018    Hematuria 07/05/2018    Type 2 diabetes mellitus with microalbuminuria, without long-term current use of insulin ([facility] [address] ) 07/05/2018    Mild TBI ([facility] [address] ) 06/11/2018    Anxiety 08/25/2017    Hypertension 08/25/2017     She  has a past surgical history that includes Nasal septum surgery; Cholecystectomy; Hysterectomy; Colonoscopy (N/A, 10/24/2017); Colonoscopy w/ endoscopic US (N/A, 10/24/2017); Endometrial biopsy; Dilation and curettage of uterus; EGD; Rotator cuff repair (Left); Other surgical history; pr shldr arthroscop,surg,w/rotat cuff repr (Right, 1/15/2020); and pr lap, francisca restrict proc, longitudinal gastrectomy (N/A, 6/16/2020)  Her family history includes Anxiety disorder in her family; Atrial fibrillation in her mother; Breast cancer in her family; Breast cancer (age of onset: 48) in her maternal grandmother; Cancer in her paternal grandfather; Depression in her family; Diabetes in her family and father; Fibrocystic breast disease in her family; Heart disease in her family and father; Hiatal hernia in her family; Hypertension in her family and mother; Irritable bowel syndrome in her family; Kidney disease in her family; Lung cancer in her father and maternal aunt; No Known Problems in her brother, maternal aunt, maternal aunt, maternal aunt, son, and son; Obesity in her brother; Other in her family; Urinary tract infection in her family  She  reports that she quit smoking about 29 years ago  She has never used smokeless tobacco  She reports previous alcohol use  She reports that she does not use drugs    Current Outpatient Medications   Medication Sig Dispense Refill    calcium carbonate (OS-YONG) 600 MG tablet Take 600 mg by mouth 2 (two) times a day with meals      ferrous gluconate (FERGON) 324 mg tablet Take 324 mg by mouth daily with breakfast      Multiple Vitamins-Minerals (multivitamin with minerals) tablet Take 1 tablet by mouth daily      pravastatin (PRAVACHOL) 10 mg tablet take 1 tablet by mouth once daily 90 tablet 0    Secukinumab, 300 MG Dose, (Cosentyx, 300 MG Dose,) 150 MG/ML SOSY 1X a week on Saturday's  X 5 weeks (12/14/19 started) then Monthly      sertraline (ZOLOFT) 50 mg tablet Take 1 tablet (50 mg total) by mouth daily 90 tablet 1    amoxicillin (AMOXIL) 500 mg capsule       clobetasol (TEMOVATE) 0 05 % ointment apply to PSORIASIS ON ARMS AND LEGS twice a day for 2 to 3 days A   (REFER TO PRESCRIPTION NOTES)  No current facility-administered medications for this visit  Current Outpatient Medications on File Prior to Visit   Medication Sig    calcium carbonate (OS-YONG) 600 MG tablet Take 600 mg by mouth 2 (two) times a day with meals    ferrous gluconate (FERGON) 324 mg tablet Take 324 mg by mouth daily with breakfast    Multiple Vitamins-Minerals (multivitamin with minerals) tablet Take 1 tablet by mouth daily    pravastatin (PRAVACHOL) 10 mg tablet take 1 tablet by mouth once daily    Secukinumab, 300 MG Dose, (Cosentyx, 300 MG Dose,) 150 MG/ML SOSY 1X a week on Saturday's  X 5 weeks (12/14/19 started) then Monthly    [DISCONTINUED] lisinopril (ZESTRIL) 20 mg tablet Take 1 tablet (20 mg total) by mouth daily (Patient taking differently: Take 10 mg by mouth daily )    [DISCONTINUED] sertraline (ZOLOFT) 100 mg tablet take 1 tablet by mouth once daily    amoxicillin (AMOXIL) 500 mg capsule     clobetasol (TEMOVATE) 0 05 % ointment apply to PSORIASIS ON ARMS AND LEGS twice a day for 2 to 3 days A   (REFER TO PRESCRIPTION NOTES)   [DISCONTINUED] omeprazole (PriLOSEC) 20 mg delayed release capsule Take 1 capsule (20 mg total) by mouth daily     No current facility-administered medications on file prior to visit  She is allergic to vicodin [hydrocodone-acetaminophen]       Review of Systems   Constitutional: Negative for fever     Respiratory: Negative for shortness of breath  Cardiovascular: Negative for chest pain  Gastrointestinal: Negative for abdominal pain and blood in stool  Genitourinary: Negative for dysuria and hematuria  Musculoskeletal: Negative for arthralgias and myalgias  Skin: Negative for rash  Neurological: Negative for headaches  Objective:      /78 (BP Location: Left arm, Patient Position: Sitting, Cuff Size: Adult)   Pulse 55   Temp 97 9 °F (36 6 °C)   Ht 5' 5 5" (1 664 m)   Wt 64 9 kg (143 lb)   SpO2 98%   BMI 23 43 kg/m²          Physical Exam  Constitutional:       General: She is not in acute distress  Appearance: She is not ill-appearing  Comments: No nose, mouth or throat complaints  Nose, mouth and throat not examined  Mask in place  COVID-19 pandemic  HENT:      Head: Normocephalic and atraumatic  Right Ear: Tympanic membrane, ear canal and external ear normal       Left Ear: Tympanic membrane, ear canal and external ear normal    Eyes:      General: Lids are normal       Conjunctiva/sclera: Conjunctivae normal       Pupils: Pupils are equal, round, and reactive to light  Neck:      Trachea: Trachea normal    Cardiovascular:      Rate and Rhythm: Normal rate and regular rhythm  Heart sounds: S1 normal and S2 normal  No murmur  Pulmonary:      Effort: Pulmonary effort is normal       Breath sounds: Normal breath sounds  Abdominal:      General: Bowel sounds are normal       Palpations: Abdomen is soft  Tenderness: There is no abdominal tenderness  Musculoskeletal: Normal range of motion  Skin:     General: Skin is warm and dry  Neurological:      Mental Status: She is alert and oriented to person, place, and time  Psychiatric:         Speech: Speech normal          Behavior: Behavior normal          Thought Content:  Thought content normal

## 2021-02-22 NOTE — PROGRESS NOTES
Assessment/Plan:       Diagnoses and all orders for this visit:    Type 2 diabetes mellitus with microalbuminuria, without long-term current use of insulin (HCC)  -     POCT hemoglobin A1c  -     UA (URINE) with reflex to Scope  -     Microalbumin / creatinine urine ratio  Diabetes is very well controlled without the need for medication as in office A1c today was 5 5%  Continue with lifestyle modification  Check additional labs  Follow-up on labs ordered by bariatric surgeon as well  Essential hypertension  -     UA (URINE) with reflex to Scope  -     Microalbumin / creatinine urine ratio  Blood pressure is well controlled  I discussed with patient stopping her lisinopril altogether to see if her blood pressure stays normal   She has agreed to check her blood pressure daily and to send me a message or call the office if her blood pressure starts to rise  Lisinopril will be discontinued  Check labs in addition to those ordered by bariatric surgery  Mixed hyperlipidemia  -     Lipid panel; Future  -     LDL cholesterol, direct; Future  Check lipid levels  Metabolic profile already ordered by bariatric surgery  Continue pravastatin  Anxiety  -     sertraline (ZOLOFT) 50 mg tablet; Take 1 tablet (50 mg total) by mouth daily  Anxiety is currently well controlled  Patient wishes to stop the medication at some point  I recommended cutting the dose in half from 100 mg daily to 50 mg daily of Zoloft  Patient in agreement  She will contact me if the lower dose is not helping control her anxiety  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, initial encounter [facility])  Currently asymptomatic  Monitor  Follow-up in six months or sooner if needed  Subjective:      Patient ID: Luz Sheth is a 61 y o  female  Patient presents for follow-up  She has type 2 diabetes  Currently it is controlled via lifestyle  Patient has undergone bariatric surgery    She has lost weight  She is eating healthier  She is more physically active  She feels great  She is due for an in office A1c today  She has hypertension  Currently she is on lisinopril 20 mg and takes half a tablet daily  She states she was getting lightheaded when she took the full dose and it was recommended she cut the dose in half at one of her bariatric appointments  She has hyperlipidemia  Currently she is on pravastatin 10 mg daily  Tolerating medication well without any myalgias  She has anxiety  Currently she is on sertraline 100 mg daily  She has tried cutting back on the medication and slowly tapering it but her anxiety symptoms return  She would like to try and get off the medication if possible  She has mild TBI  Currently asymptomatic  She is back to work  The following portions of the patient's history were reviewed and updated as appropriate: She  has a past medical history of Anxiety, Arthritis, Bariatric surgery status, Closed head injury (06/11/2018), Colon polyp, Concussion with loss of consciousness (06/22/2018), Depression, Diabetes mellitus ([address] ), Diarrhea, Fatty liver, Fecal incontinence, GERD (gastroesophageal reflux disease), Head injury, Hiatal hernia, Hyperlipidemia, Hypertension, Lifelong obesity, Postsurgical malabsorption, Psoriasis, SAH (subarachnoid hemorrhage) ([address] ) (06/11/2018), Tear of right supraspinatus tendon (12/16/2019), and Wears glasses    She   Patient Active Problem List    Diagnosis Date Noted    Encounter for surgical aftercare following surgery of digestive system 09/29/2020    Postsurgical malabsorption 09/29/2020    Bariatric surgery status 09/29/2020    Psoriasis 08/19/2020    Status post laparoscopic sleeve gastrectomy 06/17/2020    Routine gynecological examination 10/15/2019    Hyperlipidemia 07/05/2018    Hematuria 07/05/2018    Type 2 diabetes mellitus with microalbuminuria, without long-term current use of insulin ([address] ) 07/05/2018    Mild TBI ([facility] ) 06/11/2018    Anxiety 08/25/2017    Hypertension 08/25/2017     She  has a past surgical history that includes Nasal septum surgery; Cholecystectomy; Hysterectomy; Colonoscopy (N/A, 10/24/2017); Colonoscopy w/ endoscopic US (N/A, 10/24/2017); Endometrial biopsy; Dilation and curettage of uterus; EGD; Rotator cuff repair (Left); Other surgical history; pr shldr arthroscop,surg,w/rotat cuff repr (Right, 1/15/2020); and pr lap, francisca restrict proc, longitudinal gastrectomy (N/A, 6/16/2020)  Her family history includes Anxiety disorder in her family; Atrial fibrillation in her mother; Breast cancer in her family; Breast cancer (age of onset: 48) in her maternal grandmother; Cancer in her paternal grandfather; Depression in her family; Diabetes in her family and father; Fibrocystic breast disease in her family; Heart disease in her family and father; Hiatal hernia in her family; Hypertension in her family and mother; Irritable bowel syndrome in her family; Kidney disease in her family; Lung cancer in her father and maternal aunt; No Known Problems in her brother, maternal aunt, maternal aunt, maternal aunt, son, and son; Obesity in her brother; Other in her family; Urinary tract infection in her family  She  reports that she quit smoking about 29 years ago  She has never used smokeless tobacco  She reports previous alcohol use  She reports that she does not use drugs    Current Outpatient Medications   Medication Sig Dispense Refill    calcium carbonate (OS-YONG) 600 MG tablet Take 600 mg by mouth 2 (two) times a day with meals      ferrous gluconate (FERGON) 324 mg tablet Take 324 mg by mouth daily with breakfast      Multiple Vitamins-Minerals (multivitamin with minerals) tablet Take 1 tablet by mouth daily      pravastatin (PRAVACHOL) 10 mg tablet take 1 tablet by mouth once daily 90 tablet 0    Secukinumab, 300 MG Dose, (Cosentyx, 300 MG Dose,) 150 MG/ML SOSY 1X a week on Saturday's  X 5 weeks (12/14/19 started) then Monthly      sertraline (ZOLOFT) 50 mg tablet Take 1 tablet (50 mg total) by mouth daily 90 tablet 1    amoxicillin (AMOXIL) 500 mg capsule       clobetasol (TEMOVATE) 0 05 % ointment apply to PSORIASIS ON ARMS AND LEGS twice a day for 2 to 3 days A   (REFER TO PRESCRIPTION NOTES)  No current facility-administered medications for this visit  Current Outpatient Medications on File Prior to Visit   Medication Sig    calcium carbonate (OS-YONG) 600 MG tablet Take 600 mg by mouth 2 (two) times a day with meals    ferrous gluconate (FERGON) 324 mg tablet Take 324 mg by mouth daily with breakfast    Multiple Vitamins-Minerals (multivitamin with minerals) tablet Take 1 tablet by mouth daily    pravastatin (PRAVACHOL) 10 mg tablet take 1 tablet by mouth once daily    Secukinumab, 300 MG Dose, (Cosentyx, 300 MG Dose,) 150 MG/ML SOSY 1X a week on Saturday's  X 5 weeks (12/14/19 started) then Monthly    [DISCONTINUED] lisinopril (ZESTRIL) 20 mg tablet Take 1 tablet (20 mg total) by mouth daily (Patient taking differently: Take 10 mg by mouth daily )    [DISCONTINUED] sertraline (ZOLOFT) 100 mg tablet take 1 tablet by mouth once daily    amoxicillin (AMOXIL) 500 mg capsule     clobetasol (TEMOVATE) 0 05 % ointment apply to PSORIASIS ON ARMS AND LEGS twice a day for 2 to 3 days A   (REFER TO PRESCRIPTION NOTES)   [DISCONTINUED] omeprazole (PriLOSEC) 20 mg delayed release capsule Take 1 capsule (20 mg total) by mouth daily     No current facility-administered medications on file prior to visit  She is allergic to vicodin [hydrocodone-acetaminophen]       Review of Systems   Constitutional: Negative for fever  Respiratory: Negative for shortness of breath  Cardiovascular: Negative for chest pain  Gastrointestinal: Negative for abdominal pain and blood in stool  Genitourinary: Negative for dysuria and hematuria     Musculoskeletal: Negative for arthralgias and myalgias  Skin: Negative for rash  Neurological: Negative for headaches  Objective:      /78 (BP Location: Left arm, Patient Position: Sitting, Cuff Size: Adult)   Pulse 55   Temp 97 9 °F (36 6 °C)   Ht 5' 5 5" (1 664 m)   Wt 64 9 kg (143 lb)   SpO2 98%   BMI 23 43 kg/m²          Physical Exam  Constitutional:       General: She is not in acute distress  Appearance: She is not ill-appearing  Comments: No nose, mouth or throat complaints  Nose, mouth and throat not examined  Mask in place  COVID-19 pandemic  HENT:      Head: Normocephalic and atraumatic  Right Ear: Tympanic membrane, ear canal and external ear normal       Left Ear: Tympanic membrane, ear canal and external ear normal    Eyes:      General: Lids are normal       Conjunctiva/sclera: Conjunctivae normal       Pupils: Pupils are equal, round, and reactive to light  Neck:      Trachea: Trachea normal    Cardiovascular:      Rate and Rhythm: Normal rate and regular rhythm  Heart sounds: S1 normal and S2 normal  No murmur  Pulmonary:      Effort: Pulmonary effort is normal       Breath sounds: Normal breath sounds  Abdominal:      General: Bowel sounds are normal       Palpations: Abdomen is soft  Tenderness: There is no abdominal tenderness  Musculoskeletal: Normal range of motion  Skin:     General: Skin is warm and dry  Neurological:      Mental Status: She is alert and oriented to person, place, and time  Psychiatric:         Speech: Speech normal          Behavior: Behavior normal          Thought Content:  Thought content normal

## 2021-02-27 ENCOUNTER — LAB (OUTPATIENT)
Dept: LAB | Facility: MEDICAL CENTER | Age: 61
End: 2021-02-27
Payer: COMMERCIAL

## 2021-02-27 ENCOUNTER — TRANSCRIBE ORDERS (OUTPATIENT)
Dept: ADMINISTRATIVE | Facility: HOSPITAL | Age: 61
End: 2021-02-27

## 2021-02-27 DIAGNOSIS — Z79.899 HIGH RISK MEDICATION USE: ICD-10-CM

## 2021-02-27 DIAGNOSIS — Z00.00 PHYSICAL EXAM, ANNUAL: ICD-10-CM

## 2021-02-27 DIAGNOSIS — E78.2 MIXED HYPERLIPIDEMIA: ICD-10-CM

## 2021-02-27 DIAGNOSIS — L40.0 PSORIASIS VULGARIS: Primary | ICD-10-CM

## 2021-02-27 DIAGNOSIS — L40.0 PSORIASIS VULGARIS: ICD-10-CM

## 2021-02-27 LAB
ALBUMIN SERPL BCP-MCNC: 3.5 G/DL (ref 3.5–5)
ALP SERPL-CCNC: 81 U/L (ref 46–116)
ALT SERPL W P-5'-P-CCNC: 63 U/L (ref 12–78)
ANION GAP SERPL CALCULATED.3IONS-SCNC: 1 MMOL/L (ref 4–13)
AST SERPL W P-5'-P-CCNC: 10 U/L (ref 5–45)
BASOPHILS # BLD AUTO: 0.05 THOUSANDS/ΜL (ref 0–0.1)
BASOPHILS NFR BLD AUTO: 1 % (ref 0–1)
BILIRUB SERPL-MCNC: 0.48 MG/DL (ref 0.2–1)
BILIRUB UR QL STRIP: NEGATIVE
BUN SERPL-MCNC: 21 MG/DL (ref 5–25)
CALCIUM SERPL-MCNC: 9.5 MG/DL (ref 8.3–10.1)
CHLORIDE SERPL-SCNC: 109 MMOL/L (ref 100–108)
CHOLEST SERPL-MCNC: 179 MG/DL (ref 50–200)
CLARITY UR: CLEAR
CO2 SERPL-SCNC: 31 MMOL/L (ref 21–32)
COLOR UR: YELLOW
CREAT SERPL-MCNC: 0.69 MG/DL (ref 0.6–1.3)
CREAT UR-MCNC: 163 MG/DL
EOSINOPHIL # BLD AUTO: 0.09 THOUSAND/ΜL (ref 0–0.61)
EOSINOPHIL NFR BLD AUTO: 2 % (ref 0–6)
ERYTHROCYTE [DISTWIDTH] IN BLOOD BY AUTOMATED COUNT: 12.1 % (ref 11.6–15.1)
GFR SERPL CREATININE-BSD FRML MDRD: 95 ML/MIN/1.73SQ M
GLUCOSE P FAST SERPL-MCNC: 82 MG/DL (ref 65–99)
GLUCOSE UR STRIP-MCNC: NEGATIVE MG/DL
HCT VFR BLD AUTO: 40.1 % (ref 34.8–46.1)
HDLC SERPL-MCNC: 63 MG/DL
HGB BLD-MCNC: 12.7 G/DL (ref 11.5–15.4)
HGB UR QL STRIP.AUTO: NEGATIVE
IMM GRANULOCYTES # BLD AUTO: 0 THOUSAND/UL (ref 0–0.2)
IMM GRANULOCYTES NFR BLD AUTO: 0 % (ref 0–2)
KETONES UR STRIP-MCNC: NEGATIVE MG/DL
LDLC SERPL CALC-MCNC: 106 MG/DL (ref 0–100)
LDLC SERPL DIRECT ASSAY-MCNC: 101 MG/DL (ref 0–100)
LEUKOCYTE ESTERASE UR QL STRIP: NEGATIVE
LYMPHOCYTES # BLD AUTO: 2 THOUSANDS/ΜL (ref 0.6–4.47)
LYMPHOCYTES NFR BLD AUTO: 40 % (ref 14–44)
MCH RBC QN AUTO: 29.8 PG (ref 26.8–34.3)
MCHC RBC AUTO-ENTMCNC: 31.7 G/DL (ref 31.4–37.4)
MCV RBC AUTO: 94 FL (ref 82–98)
MICROALBUMIN UR-MCNC: 10.3 MG/L (ref 0–20)
MICROALBUMIN/CREAT 24H UR: 6 MG/G CREATININE (ref 0–30)
MONOCYTES # BLD AUTO: 0.25 THOUSAND/ΜL (ref 0.17–1.22)
MONOCYTES NFR BLD AUTO: 5 % (ref 4–12)
NEUTROPHILS # BLD AUTO: 2.57 THOUSANDS/ΜL (ref 1.85–7.62)
NEUTS SEG NFR BLD AUTO: 52 % (ref 43–75)
NITRITE UR QL STRIP: NEGATIVE
NONHDLC SERPL-MCNC: 116 MG/DL
NRBC BLD AUTO-RTO: 0 /100 WBCS
PH UR STRIP.AUTO: 6 [PH]
PLATELET # BLD AUTO: 216 THOUSANDS/UL (ref 149–390)
PMV BLD AUTO: 10.9 FL (ref 8.9–12.7)
POTASSIUM SERPL-SCNC: 3.9 MMOL/L (ref 3.5–5.3)
PROT SERPL-MCNC: 7 G/DL (ref 6.4–8.2)
PROT UR STRIP-MCNC: NEGATIVE MG/DL
RBC # BLD AUTO: 4.26 MILLION/UL (ref 3.81–5.12)
SODIUM SERPL-SCNC: 141 MMOL/L (ref 136–145)
SP GR UR STRIP.AUTO: 1.03 (ref 1–1.03)
TRIGL SERPL-MCNC: 52 MG/DL
UROBILINOGEN UR QL STRIP.AUTO: 0.2 E.U./DL
WBC # BLD AUTO: 4.96 THOUSAND/UL (ref 4.31–10.16)

## 2021-02-27 PROCEDURE — 86480 TB TEST CELL IMMUN MEASURE: CPT

## 2021-02-27 PROCEDURE — 81003 URINALYSIS AUTO W/O SCOPE: CPT | Performed by: FAMILY MEDICINE

## 2021-02-27 PROCEDURE — 80061 LIPID PANEL: CPT

## 2021-02-27 PROCEDURE — 85025 COMPLETE CBC W/AUTO DIFF WBC: CPT

## 2021-02-27 PROCEDURE — 83721 ASSAY OF BLOOD LIPOPROTEIN: CPT

## 2021-02-27 PROCEDURE — 82043 UR ALBUMIN QUANTITATIVE: CPT | Performed by: FAMILY MEDICINE

## 2021-02-27 PROCEDURE — 3061F NEG MICROALBUMINURIA REV: CPT | Performed by: FAMILY MEDICINE

## 2021-02-27 PROCEDURE — 80053 COMPREHEN METABOLIC PANEL: CPT

## 2021-02-27 PROCEDURE — 82570 ASSAY OF URINE CREATININE: CPT | Performed by: FAMILY MEDICINE

## 2021-02-27 PROCEDURE — 36415 COLL VENOUS BLD VENIPUNCTURE: CPT

## 2021-03-01 DIAGNOSIS — I10 ESSENTIAL HYPERTENSION: ICD-10-CM

## 2021-03-01 PROCEDURE — 4010F ACE/ARB THERAPY RXD/TAKEN: CPT | Performed by: FAMILY MEDICINE

## 2021-03-01 RX ORDER — LISINOPRIL 20 MG/1
TABLET ORAL
Qty: 30 TABLET | Refills: 3 | Status: SHIPPED | OUTPATIENT
Start: 2021-03-01 | End: 2021-07-08

## 2021-03-03 LAB
GAMMA INTERFERON BACKGROUND BLD IA-ACNC: 0.05 IU/ML
M TB IFN-G BLD-IMP: NEGATIVE
M TB IFN-G CD4+ BCKGRND COR BLD-ACNC: 0.01 IU/ML
M TB IFN-G CD4+ BCKGRND COR BLD-ACNC: 0.03 IU/ML
MITOGEN IGNF BCKGRD COR BLD-ACNC: 6.01 IU/ML

## 2021-03-05 DIAGNOSIS — E11.9 TYPE 2 DIABETES MELLITUS WITHOUT COMPLICATION, WITHOUT LONG-TERM CURRENT USE OF INSULIN (HCC): ICD-10-CM

## 2021-03-05 RX ORDER — PRAVASTATIN SODIUM 10 MG
TABLET ORAL
Qty: 90 TABLET | Refills: 1 | Status: SHIPPED | OUTPATIENT
Start: 2021-03-05 | End: 2021-09-02

## 2021-03-08 ENCOUNTER — TELEPHONE (OUTPATIENT)
Dept: FAMILY MEDICINE CLINIC | Facility: MEDICAL CENTER | Age: 61
End: 2021-03-08

## 2021-03-08 NOTE — TELEPHONE ENCOUNTER
----- Message from Robyn Jiménez DO sent at 3/8/2021  7:44 AM EST -----  Lipid level stable  Continue pravastatin  Remainder of labs unremarkable  Did patient stop her blood pressure medication as we discussed? How are patient's blood pressure readings at home?

## 2021-03-08 NOTE — TELEPHONE ENCOUNTER
Called pt and informed  She noted that her BP has been running around 120/60's the highest reading she has had was 125/70

## 2021-03-10 DIAGNOSIS — Z23 ENCOUNTER FOR IMMUNIZATION: ICD-10-CM

## 2021-04-13 DIAGNOSIS — F41.9 ANXIETY: ICD-10-CM

## 2021-04-15 RX ORDER — SERTRALINE HYDROCHLORIDE 100 MG/1
TABLET, FILM COATED ORAL
Qty: 90 TABLET | Refills: 0 | OUTPATIENT
Start: 2021-04-15

## 2021-05-11 ENCOUNTER — OFFICE VISIT (OUTPATIENT)
Dept: OBGYN CLINIC | Facility: HOSPITAL | Age: 61
End: 2021-05-11
Payer: COMMERCIAL

## 2021-05-11 VITALS
WEIGHT: 143 LBS | HEIGHT: 66 IN | SYSTOLIC BLOOD PRESSURE: 181 MMHG | DIASTOLIC BLOOD PRESSURE: 96 MMHG | HEART RATE: 67 BPM | BODY MASS INDEX: 22.98 KG/M2

## 2021-05-11 DIAGNOSIS — M75.01 ADHESIVE CAPSULITIS OF RIGHT SHOULDER: Primary | ICD-10-CM

## 2021-05-11 PROCEDURE — 3080F DIAST BP >= 90 MM HG: CPT | Performed by: ORTHOPAEDIC SURGERY

## 2021-05-11 PROCEDURE — 99214 OFFICE O/P EST MOD 30 MIN: CPT | Performed by: ORTHOPAEDIC SURGERY

## 2021-05-11 PROCEDURE — 20610 DRAIN/INJ JOINT/BURSA W/O US: CPT | Performed by: ORTHOPAEDIC SURGERY

## 2021-05-11 PROCEDURE — 3008F BODY MASS INDEX DOCD: CPT | Performed by: ORTHOPAEDIC SURGERY

## 2021-05-11 PROCEDURE — 1036F TOBACCO NON-USER: CPT | Performed by: ORTHOPAEDIC SURGERY

## 2021-05-11 PROCEDURE — 3077F SYST BP >= 140 MM HG: CPT | Performed by: ORTHOPAEDIC SURGERY

## 2021-05-11 RX ORDER — BUPIVACAINE HYDROCHLORIDE 5 MG/ML
6 INJECTION, SOLUTION EPIDURAL; INTRACAUDAL
Status: COMPLETED | OUTPATIENT
Start: 2021-05-11 | End: 2021-05-11

## 2021-05-11 RX ORDER — BETAMETHASONE SODIUM PHOSPHATE AND BETAMETHASONE ACETATE 3; 3 MG/ML; MG/ML
6 INJECTION, SUSPENSION INTRA-ARTICULAR; INTRALESIONAL; INTRAMUSCULAR; SOFT TISSUE
Status: COMPLETED | OUTPATIENT
Start: 2021-05-11 | End: 2021-05-11

## 2021-05-11 RX ADMIN — BUPIVACAINE HYDROCHLORIDE 6 ML: 5 INJECTION, SOLUTION EPIDURAL; INTRACAUDAL at 16:11

## 2021-05-11 RX ADMIN — BETAMETHASONE SODIUM PHOSPHATE AND BETAMETHASONE ACETATE 6 MG: 3; 3 INJECTION, SUSPENSION INTRA-ARTICULAR; INTRALESIONAL; INTRAMUSCULAR; SOFT TISSUE at 16:11

## 2021-05-11 NOTE — PROGRESS NOTES
Assessment    Adhesive capsulitis of right shoulder        Discussion and Plan:    Discussed with patient that she has an examination and clinical history consistent with adhesive capsulitis of the right shoulder  She should return to PT for a few sessions to work on ROM only to start and should avoid strengthening until her range of motion is full  I have discussed with the patient the pathophysiology of this diagnosis and reviewed how the examination correlates with this diagnosis  Treatment options were discussed at length and after discussing these treatment options, the patient elected for and received a glenohumeral injection of corticosteroid (as described in the procedure note) with a prescription for referral to physical therapy  We will reevaluate the patient in 3 months  There is some question as to the onset of her symptoms but they were E do appear to have begun after her 2nd COVID vaccination which I have seen handful of times in other patients  Subjective:   Patient ID: Pamela Christensen is a 61 y o  female        Patient returns for right shoulder  Patient surgical arthroscopy 1 5 years ago for rotator cuff  Debridement and subacromial decompression  Patient is she is doing well until few weeks ago when she went bowling after receiving her vaccines  Patient is denies any specific injury to the shoulder  She is having difficulty moving her shoulder at this time and is experiencing a lot of pain especially at night  Most of her pain is in the anterior aspect of her shoulder  She has a history of type 2 diabetes, though she no longer is diabetic  The following portions of the patient's history were reviewed and updated as appropriate: allergies, current medications, past family history, past medical history, past social history, past surgical history and problem list     Review of Systems   Constitutional: Negative for chills and fever  HENT: Negative for ear pain and sore throat  Eyes: Negative for pain and visual disturbance  Respiratory: Negative for cough and shortness of breath  Cardiovascular: Negative for chest pain and palpitations  Gastrointestinal: Negative for abdominal pain and vomiting  Genitourinary: Negative for dysuria and hematuria  Musculoskeletal: Negative for arthralgias and back pain  Skin: Negative for color change and rash  Neurological: Negative for seizures and syncope  All other systems reviewed and are negative  Objective:  BP (!) 181/96   Pulse 67   Ht 5' 5 5" (1 664 m)   Wt 64 9 kg (143 lb)   BMI 23 43 kg/m²       Right Shoulder Exam     Range of Motion   External rotation: 20 (passive limit)   Forward flexion: 120 (passive limit)   Internal rotation 0 degrees: Sacrum     Muscle Strength   The patient has normal right shoulder strength  Tests   Apprehension: negative  Drop arm: negative    Other   Erythema: absent  Sensation: normal  Pulse: present              Physical Exam  Constitutional:       Appearance: Normal appearance  HENT:      Head: Normocephalic and atraumatic  Right Ear: External ear normal       Left Ear: External ear normal    Eyes:      Extraocular Movements: Extraocular movements intact  Conjunctiva/sclera: Conjunctivae normal    Skin:     General: Skin is warm and dry  Neurological:      Mental Status: She is alert and oriented to person, place, and time  Psychiatric:         Behavior: Behavior normal          Thought Content: Thought content normal          Judgment: Judgment normal        Large joint arthrocentesis: L glenohumeral  Universal Protocol:  Risks and benefits: risks, benefits and alternatives were discussed  Consent given by: patient and parent  Time out: Immediately prior to procedure a "time out" was called to verify the correct patient, procedure, equipment, support staff and site/side marked as required    Patient understanding: patient states understanding of the procedure being performed  Patient consent: the patient's understanding of the procedure matches consent given  Procedure consent: procedure consent matches procedure scheduled  Relevant documents: relevant documents present and verified  Test results: test results available and properly labeled  Site marked: the operative site was marked  Radiology Images displayed and confirmed  If images not available, report reviewed: imaging studies available  Patient identity confirmed: verbally with patient    Supporting Documentation  Indications: pain and joint swelling   Procedure Details  Location: shoulder - L glenohumeral  Needle size: 22 G  Ultrasound guidance: no  Approach: posterior  Medications administered: 6 mL bupivacaine (PF) 0 5 %; 6 mg betamethasone acetate-betamethasone sodium phosphate 6 (3-3) mg/mL    Patient tolerance: patient tolerated the procedure well with no immediate complications  Dressing:  Sterile dressing applied          I have personally reviewed pertinent films in PACS and my interpretation is as follows  X-ray of the right shoulder demonstrates no acute fractures or osseous abnormalities        Scribe Attestation    I,:  Mariola Cordova am acting as a scribe while in the presence of the attending physician :       I,:  Ray Zuniga MD personally performed the services described in this documentation    as scribed in my presence :

## 2021-05-25 ENCOUNTER — EVALUATION (OUTPATIENT)
Dept: PHYSICAL THERAPY | Age: 61
End: 2021-05-25
Payer: COMMERCIAL

## 2021-05-25 DIAGNOSIS — M75.01 ADHESIVE CAPSULITIS OF RIGHT SHOULDER: ICD-10-CM

## 2021-05-25 PROCEDURE — 97110 THERAPEUTIC EXERCISES: CPT | Performed by: PHYSICAL THERAPIST

## 2021-05-25 PROCEDURE — 97140 MANUAL THERAPY 1/> REGIONS: CPT | Performed by: PHYSICAL THERAPIST

## 2021-05-25 PROCEDURE — 97161 PT EVAL LOW COMPLEX 20 MIN: CPT | Performed by: PHYSICAL THERAPIST

## 2021-05-25 NOTE — PROGRESS NOTES
PT Evaluation     Today's date: 2021  Patient name: Sadia Sanabria  : 1960  MRN: [id number]  Referring provider: Kale Barrera MD  Dx:   Encounter Diagnosis     ICD-10-CM    1  Adhesive capsulitis of right shoulder  M75 01 Ambulatory referral to Physical Therapy       Start Time: 0800  Stop Time: 0900  Total time in clinic (min): 60 minutes    Assessment  Assessment details: Sadia Sanabria is a 61 y o  female who presents with acute onset of right shoulder pain, decreased right shoulder strength, decreased A/PROM and decreased joint mobility  Due to these impairments, Patient has difficulty performing a/iadls, recreational activities and work-related activities  Patient's clinical presentation is consistent with the referring diagnosis of adhesive capsulitis right shoulder  Patient would benefit from skilled physical therapy to address the aforementioned impairments, improve her level of function and to improve her overall quality of life  Impairments: abnormal or restricted ROM, activity intolerance, impaired physical strength, lacks appropriate home exercise program, pain with function and scapular dyskinesis  Functional limitations: difficulty reaching overhead, out to side, behind; difficutly caring for orses, can not lay on right side to sleep, unable to reach across to put deodorant under left armUnderstanding of Dx/Px/POC: good   Prognosis: good    Goals  ST-3 WEEKS  1  Decrease pain by 2 points on VAS at its worst   2   Increase ROM by > 10 deg in all deficients planes  3   Independent HEP  LT WEEKS  1  Patient to be able to put deodorant on opposite arm with ease  2  Increase functional activities for leisure and home activities to previous LOF    3  Independent with HEP and/or fitness program     Plan  Patient would benefit from: skilled physical therapy  Planned modality interventions: cryotherapy  Planned therapy interventions: functional ROM exercises, home exercise program, joint mobilization, manual therapy, neuromuscular re-education, patient education, postural training, strengthening, stretching, therapeutic activities, therapeutic exercise and activity modification  Frequency: 2x week  Duration in weeks: 8  Plan of Care beginning date: 2021  Plan of Care expiration date: 2021  Treatment plan discussed with: patient        Subjective Evaluation    History of Present Illness  Mechanism of injury: Pt has a 6 week history of right shoulder pain since receiving her Covid injection on   She also reports she was bowling which may have attributed  She does have a PMH of right shoulder decompression  She was unable to lift her arm until 2 weeks ago when she received an injection from Dr Mali Soliman  Pain  Current pain ratin  At best pain rating: 3 (post-injection)  At worst pain rating: 10  Location: right shoulder  Quality: sharp  Aggravating factors: overhead activity and lifting    Hand dominance: right    Treatments  Current treatment: injection treatment  Patient Goals  Patient goals for therapy: increased strength, decreased pain and increased motion  Patient goal: be able to take care of horses        Objective     Tenderness     Right Shoulder  Tenderness in the bicipital groove and subacromial bursa       Additional Tenderness Details  Anterior shoulder tenderness    Neurological Testing     Additional Neurological Details  No nuero signs    Active Range of Motion     Right Shoulder   Flexion: 140 degrees   Abduction: 120 degrees     Additional Active Range of Motion Details  Apleys ER WFL  Apleys IR to lumbar spine    Right scapula elevates at 120 of elevation, lacks upward rotation    Passive Range of Motion   Left Shoulder   Flexion: 165 degrees   Abduction: 155 degrees   External rotation 90°: 85 degrees   Internal rotation 90°: 58 degrees     Right Shoulder   Flexion: 152 degrees   Abduction: 125 degrees   External rotation 90°: 82 degrees Internal rotation 90°: 60 degrees     Strength/Myotome Testing     Left Shoulder   Normal muscle strength    Right Shoulder     Planes of Motion   Flexion: 4-   Abduction: 4-   External rotation at 0°: 4-   Internal rotation at 0°: 5     Isolated Muscles   Biceps: 5   Triceps: 5     Tests     Right Shoulder   Positive empty can                Precautions: standard      Manuals 5/24            Right shoulder with mobs 15'                                                   TherEx             Ball closed chain FF with trunk lean 20x            Pulleys FF/POS 20x ea            Tband ext 20x green            isometrics FF/ABD/ER/IR 20x            Tband Row NV            SCap stabilization press up/circles NV                                                                                                                                              Ther Activity                                       Gait Training                                       Modalities

## 2021-05-27 ENCOUNTER — OFFICE VISIT (OUTPATIENT)
Dept: PHYSICAL THERAPY | Age: 61
End: 2021-05-27
Payer: COMMERCIAL

## 2021-05-27 DIAGNOSIS — M75.01 ADHESIVE CAPSULITIS OF RIGHT SHOULDER: Primary | ICD-10-CM

## 2021-05-27 PROCEDURE — 97140 MANUAL THERAPY 1/> REGIONS: CPT | Performed by: PHYSICAL THERAPIST

## 2021-05-27 PROCEDURE — 97110 THERAPEUTIC EXERCISES: CPT | Performed by: PHYSICAL THERAPIST

## 2021-05-27 NOTE — PROGRESS NOTES
Daily Note     Today's date: 2021  Patient name: Mala Villegas  : 1960  MRN: [id number]  Referring provider: Philomena Parks MD  Dx:   Encounter Diagnosis     ICD-10-CM    1  Adhesive capsulitis of right shoulder  M75 01        Start Time: 0710  Stop Time: 0800  Total time in clinic (min): 50 minutes    Subjective: Sore after intial visit  Objective: See treatment diary below      Assessment: Tolerated treatment well  Patient has poor scapular control with palpable clunking  Initiated scap stabilization exercises  Plan: Continue per plan of care        Precautions: standard      Manuals            Right shoulder with mobs 15' 15'                                                  TherEx             Ball closed chain FF with trunk lean 20x 30x ea           Pulleys FF/POS 20x ea 30x ea           Tband ext 20x green 30x           Tband IR  30x green           isometrics FF/ABD/ER/IR 20x 20x ER/FF/ABD           Tband Row NV 30x blue           SCap stabilization press up/circles NV 20x ea                        Prone ext  20x                                                                                                                   Ther Activity                                       Gait Training                                       Modalities             ice  10'

## 2021-06-02 ENCOUNTER — OFFICE VISIT (OUTPATIENT)
Dept: PHYSICAL THERAPY | Age: 61
End: 2021-06-02
Payer: COMMERCIAL

## 2021-06-02 DIAGNOSIS — M75.01 ADHESIVE CAPSULITIS OF RIGHT SHOULDER: Primary | ICD-10-CM

## 2021-06-02 PROCEDURE — 97140 MANUAL THERAPY 1/> REGIONS: CPT | Performed by: PHYSICAL THERAPIST

## 2021-06-02 PROCEDURE — 97110 THERAPEUTIC EXERCISES: CPT | Performed by: PHYSICAL THERAPIST

## 2021-06-02 NOTE — PROGRESS NOTES
Daily Note     Today's date: 2021  Patient name: Neeta Manning  : 1960  MRN: [id number]  Referring provider: Luis Ruiz MD  Dx:   Encounter Diagnosis     ICD-10-CM    1  Adhesive capsulitis of right shoulder  M75 01                   Subjective: Pt reports she had a burning pain in her shoulder after last session  Objective: See treatment diary below      Assessment: Tolerated treatment well  Patient ROM improving but pain consistent at end range  Plan: Continue per plan of care        Precautions: standard      Manuals           Right shoulder with mobs 15' 15'                                                  TherEx             Ball closed chain FF with trunk lean 20x 30x ea 30x ea           Ball CW/CCW   30x ea          Pulleys FF/POS 20x ea 30x ea 30x ea          Tband ext 20x green 30x 30x          Tband IR  30x green 30x          isometrics FF/ABD/ER/IR 20x 20x ER/FF/ABD 30x          Tband Row NV 30x blue 30x          SCap stabilization press up/circles NV 20x ea 2#/ 30                       Prone ext  20x                                                                                                                   Ther Activity                                       Gait Training                                       Modalities             ice  10'

## 2021-06-03 ENCOUNTER — HOSPITAL ENCOUNTER (OUTPATIENT)
Dept: RADIOLOGY | Facility: MEDICAL CENTER | Age: 61
Discharge: HOME | End: 2021-06-03
Payer: COMMERCIAL

## 2021-06-03 VITALS — BODY MASS INDEX: 22.98 KG/M2 | WEIGHT: 143 LBS | HEIGHT: 66 IN

## 2021-06-03 DIAGNOSIS — Z12.31 ENCOUNTER FOR SCREENING MAMMOGRAM FOR MALIGNANT NEOPLASM OF BREAST: ICD-10-CM

## 2021-06-03 PROCEDURE — 77063 BREAST TOMOSYNTHESIS BI: CPT

## 2021-06-03 PROCEDURE — 77067 SCR MAMMO BI INCL CAD: CPT

## 2021-06-04 ENCOUNTER — OFFICE VISIT (OUTPATIENT)
Dept: PHYSICAL THERAPY | Age: 61
End: 2021-06-04
Payer: COMMERCIAL

## 2021-06-04 DIAGNOSIS — M75.01 ADHESIVE CAPSULITIS OF RIGHT SHOULDER: Primary | ICD-10-CM

## 2021-06-04 PROCEDURE — 97110 THERAPEUTIC EXERCISES: CPT | Performed by: PHYSICAL THERAPIST

## 2021-06-04 PROCEDURE — 97140 MANUAL THERAPY 1/> REGIONS: CPT | Performed by: PHYSICAL THERAPIST

## 2021-06-04 NOTE — PROGRESS NOTES
Daily Note     Today's date: 2021  Patient name: Rudine Opitz  : 1960  MRN: [id number]  Referring provider: Siria Olmedo MD  Dx:   Encounter Diagnosis     ICD-10-CM    1  Adhesive capsulitis of right shoulder  M75 01        Start Time: 0700  Stop Time: 07  Total time in clinic (min): 45 minutes    Subjective: No new complaints, end range pain persists  Objective: See treatment diary below      Assessment: Tolerated treatment well  Patient PROM improving with decreased end range tightness  Plan: Continue per plan of care        Precautions: standard      Manuals          Right shoulder with mobs 15' 15' 15 15                                                TherEx             Ball closed chain FF with trunk lean 20x 30x ea 30x ea  30x         Ball CW/CCW   30x ea 30xea         Pulleys FF/POS 20x ea 30x ea 30x ea 30x ea         Tband ext 20x green 30x 30x 30         Tband IR  30x green 30x 30x         isometrics FF/ABD/ER/IR 20x 20x ER/FF/ABD 30x 30x ea         Tband Row NV 30x blue 30x 30x         SCap stabilization press up/circles NV 20x ea 2#/ 30 2#/ 30         Prone IYT    20x ea         Prone ext  20x  20x                                                                                                                 Ther Activity                                       Gait Training                                       Modalities             ice  10'

## 2021-06-08 LAB
LEFT EYE DIABETIC RETINOPATHY: NORMAL
RIGHT EYE DIABETIC RETINOPATHY: NORMAL

## 2021-06-09 ENCOUNTER — OFFICE VISIT (OUTPATIENT)
Dept: PHYSICAL THERAPY | Age: 61
End: 2021-06-09
Payer: COMMERCIAL

## 2021-06-09 DIAGNOSIS — M75.01 ADHESIVE CAPSULITIS OF RIGHT SHOULDER: Primary | ICD-10-CM

## 2021-06-09 PROCEDURE — 97140 MANUAL THERAPY 1/> REGIONS: CPT | Performed by: PHYSICAL THERAPIST

## 2021-06-09 PROCEDURE — 97110 THERAPEUTIC EXERCISES: CPT | Performed by: PHYSICAL THERAPIST

## 2021-06-09 NOTE — PROGRESS NOTES
Daily Note     Today's date: 2021  Patient name: Bhavya Rogers  : 1960  MRN: [id number]  Referring provider: Brenda Walters MD  Dx:   Encounter Diagnosis     ICD-10-CM    1  Adhesive capsulitis of right shoulder  M75 01                   Subjective: Less pain but continues to have clicking  Objective: See treatment diary below  1:1 with PT 25 min    Assessment: Tolerated treatment well  Patient ROm imrpoving  Better scapular control with forward elevation  Plan: Continue per plan of care        Precautions: standard      Manuals         Right shoulder with mobs 15' 15' 15 15 15'                                               TherEx             Ball closed chain FF with trunk lean 20x 30x ea 30x ea  30x 30x        Ball CW/CCW   30x ea 30xea 30x        Pulleys FF/POS 20x ea 30x ea 30x ea 30x ea 30x        Tband ext 20x green 30x 30x 30 30x        Tband IR  30x green 30x 30x 30x        isometrics FF/ABD/ER/IR 20x 20x ER/FF/ABD 30x 30x ea 30x        Tband Row NV 30x blue 30x 30x 30x        SCap stabilization press up/circles NV 20x ea 2#/ 30 2#/ 30 2#/ 30        Prone IYT    20x ea 30x        Prone ext  20x  20x 30x                                                                                                                Ther Activity                                       Gait Training                                       Modalities             ice  10'

## 2021-06-10 ENCOUNTER — APPOINTMENT (OUTPATIENT)
Dept: LAB | Facility: MEDICAL CENTER | Age: 61
End: 2021-06-10
Payer: COMMERCIAL

## 2021-06-10 DIAGNOSIS — E66.3 OVERWEIGHT: ICD-10-CM

## 2021-06-10 DIAGNOSIS — I10 ESSENTIAL HYPERTENSION: ICD-10-CM

## 2021-06-10 DIAGNOSIS — Z98.84 STATUS POST LAPAROSCOPIC SLEEVE GASTRECTOMY: ICD-10-CM

## 2021-06-10 DIAGNOSIS — E11.29 TYPE 2 DIABETES MELLITUS WITH MICROALBUMINURIA, WITHOUT LONG-TERM CURRENT USE OF INSULIN (HCC): ICD-10-CM

## 2021-06-10 DIAGNOSIS — Z98.84 BARIATRIC SURGERY STATUS: ICD-10-CM

## 2021-06-10 DIAGNOSIS — K91.2 POSTSURGICAL MALABSORPTION: ICD-10-CM

## 2021-06-10 DIAGNOSIS — E78.2 MIXED HYPERLIPIDEMIA: ICD-10-CM

## 2021-06-10 DIAGNOSIS — Z48.815 ENCOUNTER FOR SURGICAL AFTERCARE FOLLOWING SURGERY OF DIGESTIVE SYSTEM: ICD-10-CM

## 2021-06-10 DIAGNOSIS — R80.9 TYPE 2 DIABETES MELLITUS WITH MICROALBUMINURIA, WITHOUT LONG-TERM CURRENT USE OF INSULIN (HCC): ICD-10-CM

## 2021-06-10 LAB
25(OH)D3 SERPL-MCNC: 39.4 NG/ML (ref 30–100)
ALBUMIN SERPL BCP-MCNC: 3.5 G/DL (ref 3.5–5)
ALP SERPL-CCNC: 80 U/L (ref 46–116)
ALT SERPL W P-5'-P-CCNC: 78 U/L (ref 12–78)
ANION GAP SERPL CALCULATED.3IONS-SCNC: 2 MMOL/L (ref 4–13)
AST SERPL W P-5'-P-CCNC: 25 U/L (ref 5–45)
BILIRUB SERPL-MCNC: 0.34 MG/DL (ref 0.2–1)
BUN SERPL-MCNC: 23 MG/DL (ref 5–25)
CALCIUM SERPL-MCNC: 9.2 MG/DL (ref 8.3–10.1)
CHLORIDE SERPL-SCNC: 111 MMOL/L (ref 100–108)
CO2 SERPL-SCNC: 29 MMOL/L (ref 21–32)
CREAT SERPL-MCNC: 0.51 MG/DL (ref 0.6–1.3)
ERYTHROCYTE [DISTWIDTH] IN BLOOD BY AUTOMATED COUNT: 12.2 % (ref 11.6–15.1)
FERRITIN SERPL-MCNC: 154 NG/ML (ref 8–388)
GFR SERPL CREATININE-BSD FRML MDRD: 105 ML/MIN/1.73SQ M
GLUCOSE P FAST SERPL-MCNC: 82 MG/DL (ref 65–99)
HCT VFR BLD AUTO: 40.1 % (ref 34.8–46.1)
HGB BLD-MCNC: 12.7 G/DL (ref 11.5–15.4)
IRON SATN MFR SERPL: 23 %
IRON SERPL-MCNC: 64 UG/DL (ref 50–170)
MCH RBC QN AUTO: 30.3 PG (ref 26.8–34.3)
MCHC RBC AUTO-ENTMCNC: 31.7 G/DL (ref 31.4–37.4)
MCV RBC AUTO: 96 FL (ref 82–98)
PLATELET # BLD AUTO: 216 THOUSANDS/UL (ref 149–390)
PMV BLD AUTO: 10.5 FL (ref 8.9–12.7)
POTASSIUM SERPL-SCNC: 4 MMOL/L (ref 3.5–5.3)
PROT SERPL-MCNC: 7.3 G/DL (ref 6.4–8.2)
PTH-INTACT SERPL-MCNC: 22.5 PG/ML (ref 18.4–80.1)
RBC # BLD AUTO: 4.19 MILLION/UL (ref 3.81–5.12)
SODIUM SERPL-SCNC: 142 MMOL/L (ref 136–145)
TIBC SERPL-MCNC: 282 UG/DL (ref 250–450)
VIT B12 SERPL-MCNC: 513 PG/ML (ref 100–900)
WBC # BLD AUTO: 4.73 THOUSAND/UL (ref 4.31–10.16)

## 2021-06-10 PROCEDURE — 84590 ASSAY OF VITAMIN A: CPT

## 2021-06-10 PROCEDURE — 83550 IRON BINDING TEST: CPT

## 2021-06-10 PROCEDURE — 85027 COMPLETE CBC AUTOMATED: CPT

## 2021-06-10 PROCEDURE — 80053 COMPREHEN METABOLIC PANEL: CPT

## 2021-06-10 PROCEDURE — 84630 ASSAY OF ZINC: CPT

## 2021-06-10 PROCEDURE — 82607 VITAMIN B-12: CPT

## 2021-06-10 PROCEDURE — 83540 ASSAY OF IRON: CPT

## 2021-06-10 PROCEDURE — 84425 ASSAY OF VITAMIN B-1: CPT

## 2021-06-10 PROCEDURE — 82728 ASSAY OF FERRITIN: CPT

## 2021-06-10 PROCEDURE — 83970 ASSAY OF PARATHORMONE: CPT

## 2021-06-10 PROCEDURE — 36415 COLL VENOUS BLD VENIPUNCTURE: CPT

## 2021-06-10 PROCEDURE — 3066F NEPHROPATHY DOC TX: CPT | Performed by: ORTHOPAEDIC SURGERY

## 2021-06-10 PROCEDURE — 82306 VITAMIN D 25 HYDROXY: CPT

## 2021-06-11 ENCOUNTER — OFFICE VISIT (OUTPATIENT)
Dept: PHYSICAL THERAPY | Age: 61
End: 2021-06-11
Payer: COMMERCIAL

## 2021-06-11 DIAGNOSIS — M75.01 ADHESIVE CAPSULITIS OF RIGHT SHOULDER: Primary | ICD-10-CM

## 2021-06-11 PROCEDURE — 97110 THERAPEUTIC EXERCISES: CPT | Performed by: PHYSICAL THERAPIST

## 2021-06-11 PROCEDURE — 97140 MANUAL THERAPY 1/> REGIONS: CPT | Performed by: PHYSICAL THERAPIST

## 2021-06-11 NOTE — PROGRESS NOTES
Daily Note     Today's date: 2021  Patient name: Madelaine Edge  : 1960  MRN: [id number]  Referring provider: Shawna Martinez MD  Dx:   Encounter Diagnosis     ICD-10-CM    1  Adhesive capsulitis of right shoulder  M75 01        Start Time: 0700  Stop Time: 0745  Total time in clinic (min): 45 minutes    Subjective: Pt reports decreased pain throughout the day  Occasional clicking  Objective: See treatment diary below      Assessment: Tolerated treatment well  Patient ROM markedly improved with no pain at end range  Pt no longer displaying abnormal upward elevation of scapula  Plan: Continue per plan of care        Precautions: standard      Manuals        Right shoulder with mobs 15' 15' 15 15 15' 15'                                              TherEx             Ball closed chain FF with trunk lean 20x 30x ea 30x ea  30x 30x 30x       Ball CW/CCW   30x ea 30xea 30x 30x       Pulleys FF/POS 20x ea 30x ea 30x ea 30x ea 30x 30x       Tband ext 20x green 30x 30x 30 30x 30x       Tband IR  30x green 30x 30x 30x 30x       isometrics FF/ABD/ER/IR 20x 20x ER/FF/ABD 30x 30x ea 30x 30x       Tband Row NV 30x blue 30x 30x 30x 30x       SCap stabilization press up/circles NV 20x ea 2#/ 30 2#/ 30 2#/ 30 2#/ 30       Prone IYT    20x ea 30x 30x       Prone ext  20x  20x 30x 30x                                                                                                               Ther Activity                                       Gait Training                                       Modalities             ice  10'

## 2021-06-14 ENCOUNTER — OFFICE VISIT (OUTPATIENT)
Dept: PHYSICAL THERAPY | Age: 61
End: 2021-06-14
Payer: COMMERCIAL

## 2021-06-14 DIAGNOSIS — M75.01 ADHESIVE CAPSULITIS OF RIGHT SHOULDER: Primary | ICD-10-CM

## 2021-06-14 PROCEDURE — 97110 THERAPEUTIC EXERCISES: CPT | Performed by: PHYSICAL THERAPIST

## 2021-06-14 NOTE — PROGRESS NOTES
Daily Note     Today's date: 2021  Patient name: Katherine Hastings  : 1960  MRN: [id number]  Referring provider: Avelino Bradley MD  Dx:   Encounter Diagnosis     ICD-10-CM    1  Adhesive capsulitis of right shoulder  M75 01        Start Time: 0700  Stop Time: 409  Total time in clinic (min): 50 minutes    Subjective: Pt reports she feels her arm is moving better, less pain  Objective: See treatment diary below      Assessment: Tolerated treatment well  Patient PROM near full with minimal end range pain at [facility]  Added Tband ER robbers and pulsing horizontal ABD with FF  Plan: Continue per plan of care        Precautions: standard      Manuals       Right shoulder with mobs 15' 15' 15 15 15' 15' 15'                                             TherEx             Ball closed chain FF with trunk lean 20x 30x ea 30x ea  30x 30x 30x 30x      Ball CW/CCW   30x ea 30xea 30x 30x 30x ea      Pulleys FF/POS 20x ea 30x ea 30x ea 30x ea 30x 30x 30x ea      Tband ext 20x green 30x 30x 30 30x 30x 30x blue      Tband IR  30x green 30x 30x 30x 30x 30x blue      isometrics FF/ABD/ER/IR 20x 20x ER/FF/ABD 30x 30x ea 30x 30x 30x ea      Tband Row NV 30x blue 30x 30x 30x 30x 30x      SCap stabilization press up/circles NV 20x ea 2#/ 30 2#/ 30 2#/ 30 2#/ 30 3#/ 30      Prone IYT    20x ea 30x 30x 2#/ 30      Prone ext  20x  20x 30x 30x 30x                                                                                                              Ther Activity                                       Gait Training                                       Modalities             ice  10'

## 2021-06-15 LAB
VIT B1 BLD-SCNC: 131.5 NMOL/L (ref 66.5–200)
ZINC SERPL-MCNC: 82 UG/DL (ref 44–115)

## 2021-06-16 LAB — VIT A SERPL-MCNC: 36.2 UG/DL (ref 22–69.5)

## 2021-06-18 ENCOUNTER — OFFICE VISIT (OUTPATIENT)
Dept: PHYSICAL THERAPY | Age: 61
End: 2021-06-18
Payer: COMMERCIAL

## 2021-06-18 DIAGNOSIS — M75.01 ADHESIVE CAPSULITIS OF RIGHT SHOULDER: Primary | ICD-10-CM

## 2021-06-18 PROCEDURE — 97110 THERAPEUTIC EXERCISES: CPT | Performed by: PHYSICAL THERAPIST

## 2021-06-18 PROCEDURE — 97140 MANUAL THERAPY 1/> REGIONS: CPT | Performed by: PHYSICAL THERAPIST

## 2021-06-18 NOTE — PROGRESS NOTES
Daily Note     Today's date: 2021  Patient name: Ivan Jackson  : 1960  MRN: [id number]  Referring provider: Carolina Vivas MD  Dx:   Encounter Diagnosis     ICD-10-CM    1  Adhesive capsulitis of right shoulder  M75 01                   Subjective: The patient reports that she has no pain at start of session but typically is sore after therapy  She feel that therapy is helping  Objective: See treatment diary below  Assessment: Good tolerance to all TE today  She continues to demonstrate decreased ROM and strength t/o her shoulder  Discomfort was noted in her shoulder and pain at end range for all motions with stretching  Continued PT would be beneficial to improve function  Plan: Continue per plan of care  Progress as able in upcoming visits         Precautions: standard      Manuals      Right shoulder with mobs 15' 15' 15 15 15' 15' 15' 15'                                            TherEx             Ball closed chain FF with trunk lean 20x 30x ea 30x ea  30x 30x 30x 30x 30x     Ball CW/CCW   30x ea 30xea 30x 30x 30x ea 30x ea     Pulleys FF/POS 20x ea 30x ea 30x ea 30x ea 30x 30x 30x ea 30x ea     Tband ext 20x green 30x 30x 30 30x 30x 30x blue 30x blue     Tband IR  30x green 30x 30x 30x 30x 30x blue 30x blue     Isometrics FF/ABD/ER/IR 20x 20x ER/FF/ABD 30x 30x ea 30x 30x 30x ea 30x ea     Tband Row NV 30x blue 30x 30x 30x 30x 30x 30x  blue     Scap stabilization press up/circles NV 20x ea 2#/ 30 2#/ 30 2#/ 30 2#/ 30 3#/ 30 3#/30     Prone IYT    20x ea 30x 30x 2#/ 30 2#/30     Prone ext  20x  20x 30x 30x 30x 30x     UBE        6'     Bicep Curls        Plate 4 D33     Tricep Ext        Plate 4 Z88                                                                      Ther Activity                                       Gait Training                                       Modalities             Ice  10'

## 2021-06-21 ENCOUNTER — OFFICE VISIT (OUTPATIENT)
Dept: PHYSICAL THERAPY | Age: 61
End: 2021-06-21
Payer: COMMERCIAL

## 2021-06-21 DIAGNOSIS — M75.01 ADHESIVE CAPSULITIS OF RIGHT SHOULDER: Primary | ICD-10-CM

## 2021-06-21 PROCEDURE — 97140 MANUAL THERAPY 1/> REGIONS: CPT

## 2021-06-21 PROCEDURE — 97112 NEUROMUSCULAR REEDUCATION: CPT

## 2021-06-21 PROCEDURE — 97110 THERAPEUTIC EXERCISES: CPT

## 2021-06-21 NOTE — PROGRESS NOTES
Daily Note     Today's date: 2021  Patient name: Kellen Lugo  : 1960  MRN: [id number]  Referring provider: Brandin Jain MD  Dx:   Encounter Diagnosis     ICD-10-CM    1  Adhesive capsulitis of right shoulder  M75 01                   Subjective: Patient repots her shoulder has improved since attending PT  Improved function noted at home with patient able to complete ADLS  Pt reprots residual soreness following PT  Objective: See treatment diary below  Assessment: Patient tolerated treatment well, able to complete outline program without complaints  Pt ROM progressing nicely at this time  Mild discomfort present in R shoulder when nearing end range flexion, continued limitations in IR with empty end feel  Patient demonstrated good scapular setting with improved scapular depression following VC  Patient offers no complaints post session, would benefit form continued PT to improve shoulder strength and motion in order to maximize function  Plan: Continue per plan of care  Progress as able in upcoming visits         Precautions: standard      Manuals     Right shoulder with mobs 15' 15' 15 15 15' 15' 15' 15'                                            TherEx             Ball closed chain FF with trunk lean 20x 30x ea 30x ea  30x 30x 30x 30x 30x 30x ea    Ball CW/CCW   30x ea 30xea 30x 30x 30x ea 30x ea     Pulleys FF/POS 20x ea 30x ea 30x ea 30x ea 30x 30x 30x ea 30x ea 30x ea    Tband ext 20x green 30x 30x 30 30x 30x 30x blue 30x blue 30x blue    Tband IR  30x green 30x 30x 30x 30x 30x blue 30x blue 30x blue    Isometrics FF/ABD/ER/IR 20x 20x ER/FF/ABD 30x 30x ea 30x 30x 30x ea 30x ea 30xea    Tband Row NV 30x blue 30x 30x 30x 30x 30x 30x  blue 30x blue    Scap stabilization press up/circles NV 20x ea 2#/ 30 2#/ 30 2#/ 30 2#/ 30 3#/ 30 3#/30 3# 30x    Prone IYT    20x ea 30x 30x 2#/ 30 2#/30 2#/30    Prone ext  20x  20x 30x 30x 30x 30x 30x UBE        6' 6'    Bicep Curls        Plate 4 U91 4 plates A12    Tricep Ext        Plate 4 I73 4 plates U79    Cone stacking          Consider NV    Half wall lifts         Consider NV    IR stretch at wall         Consider NV                              Ther Activity                                       Gait Training                                       Modalities             Ice  10'

## 2021-06-23 ENCOUNTER — VBI (OUTPATIENT)
Dept: ADMINISTRATIVE | Facility: OTHER | Age: 61
End: 2021-06-23

## 2021-06-25 ENCOUNTER — OFFICE VISIT (OUTPATIENT)
Dept: PHYSICAL THERAPY | Age: 61
End: 2021-06-25
Payer: COMMERCIAL

## 2021-06-25 DIAGNOSIS — M75.01 ADHESIVE CAPSULITIS OF RIGHT SHOULDER: Primary | ICD-10-CM

## 2021-06-25 PROCEDURE — 97110 THERAPEUTIC EXERCISES: CPT

## 2021-06-25 PROCEDURE — 97140 MANUAL THERAPY 1/> REGIONS: CPT

## 2021-06-25 NOTE — PROGRESS NOTES
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  Objective: See treatment diary below  Assessment: Patient tolerated treatment well, able to complete outline program without complaints  Patient with good tolerance to additional TE this visit  Eccentric lowering trialed, click demonstrated at 90*, click abolished with scapular setting during eccentric phase  Remainder of TE with focus on scapular setting and emphasis on appropriate recruitment patterns  Consider additional Scap TE NV and transition to eccentrics  Limitations persisting in IR nearing end range  Patient offers no complaints post session  Plan: Continue per plan of care  Progress as able in upcoming visits         Precautions: standard      Manuals    Right shoulder with mobs 15' 15' 15 15 15' 15' 15' 15' 15' 10''                                          TherEx             Ball closed chain FF with trunk lean 20x 30x ea 30x ea  30x 30x 30x 30x 30x 30x ea 30x ea   Ball CW/CCW   30x ea 30xea 30x 30x 30x ea 30x ea  Green  20x    Pulleys FF/POS 20x ea 30x ea 30x ea 30x ea 30x 30x 30x ea 30x ea 30x ea 30x   Tband ext 20x green 30x 30x 30 30x 30x 30x blue 30x blue 30x blue 30x blue   Tband IR  30x green 30x 30x 30x 30x 30x blue 30x blue 30x blue 30x blue   Isometrics FF/ABD/ER/IR 20x 20x ER/FF/ABD 30x 30x ea 30x 30x 30x ea 30x ea 30xea HEP    Tband Row NV 30x blue 30x 30x 30x 30x 30x 30x  blue 30x blue 30x blue   Scap stabilization press up/circles NV 20x ea 2#/ 30 2#/ 30 2#/ 30 2#/ 30 3#/ 30 3#/30 3# 30x 3# 30x   Prone IYT    20x ea 30x 30x 2#/ 30 2#/30 2#/30 2# 30x   Prone ext  20x  20x 30x 30x 30x 30x 30x 30x   UBE        6' 6' 6'   Bicep Curls        Plate 4 J45 4 plates L30 4 plates M08   Tricep Ext        Plate 4 M10 4 plates K65 4 plates A77   End range wall lifts          Consider NV 10x   Scap wall clocks with TB          Consider   IR stretch at wall- gentle         Consider NV 10"x4   Bong          Consider   Scap Star with T band          Consier   Ther Activity                                       Gait Training                                       Modalities             Ice  10'

## 2021-06-27 NOTE — ANESTHESIA PROCEDURE NOTES
Peripheral Block    Patient location during procedure: holding area  Start time: 1/15/2020 7:50 AM  Reason for block: at surgeon's request and post-op pain management  Staffing  Anesthesiologist: Fransisco Lopez MD  Performed: anesthesiologist   Preanesthetic Checklist  Completed: patient identified, site marked, surgical consent, pre-op evaluation, timeout performed, IV checked, risks and benefits discussed and monitors and equipment checked  Peripheral Block  Patient position: sitting  Prep: ChloraPrep  Patient monitoring: continuous pulse ox, frequent blood pressure checks, cardiac monitor and heart rate  Block type: interscalene  Laterality: right  Injection technique: single-shot  Procedures: ultrasound guided, Ultrasound guidance required for the procedure to increase accuracy and safety of medication placement and decrease risk of complications    Ultrasound permanent image savedbupivacaine (MARCAINE) 0 5 % perineural infiltration, 5 mL  Needle  Needle type: Stimuplex   Needle gauge: 22 G  Needle length: 10 cm  Needle localization: ultrasound guidance  Needle insertion depth: 4 cm  Test dose: negative  Assessment  Injection assessment: incremental injection, local visualized surrounding nerve on ultrasound, negative aspiration for CSF, negative aspiration for heme and no paresthesia on injection  Paresthesia pain: none  Heart rate change: no  Slow fractionated injection: yes  Post-procedure:  site cleaned  patient tolerated the procedure well with no immediate complications no

## 2021-06-28 ENCOUNTER — TELEPHONE (OUTPATIENT)
Dept: BARIATRICS | Facility: CLINIC | Age: 61
End: 2021-06-28

## 2021-06-28 NOTE — TELEPHONE ENCOUNTER
[facility] for patient that her appointment on 6/29 with Aniyah Hernandes has been cancelled and to call the office back to reschedule  Return information left for patient to call at convenience

## 2021-07-02 ENCOUNTER — OFFICE VISIT (OUTPATIENT)
Dept: PHYSICAL THERAPY | Age: 61
End: 2021-07-02
Payer: COMMERCIAL

## 2021-07-02 DIAGNOSIS — M75.01 ADHESIVE CAPSULITIS OF RIGHT SHOULDER: Primary | ICD-10-CM

## 2021-07-02 PROCEDURE — 97110 THERAPEUTIC EXERCISES: CPT | Performed by: PHYSICAL THERAPIST

## 2021-07-02 PROCEDURE — 97140 MANUAL THERAPY 1/> REGIONS: CPT | Performed by: PHYSICAL THERAPIST

## 2021-07-02 NOTE — PROGRESS NOTES
Daily Note     Today's date: 2021  Patient name: Criss Davila  : 1960  MRN: [id number]  Referring provider: David Nieto MD  Dx:   Encounter Diagnosis     ICD-10-CM    1  Adhesive capsulitis of right shoulder  M75 01        Start Time: 0700  Stop Time: 8733  Total time in clinic (min): 55 minutes    Subjective: Pt reports she has some anterior shoulder soreness after lifting 4003 of horse feed and 200# of shavings  Objective: See treatment diary below      Assessment: Tolerated treatment well  Added Tbad robbers and horizontal ABD pulsing  with FF for scap satbilization      Plan: Continue per plan of care        Precautions: standard      Manuals    Right shoulder with mobs 15' 15' 15 15 15' 15' 15' 15' 15' 10''                                          TherEx             Ball closed chain FF with trunk lean 20x 30x ea 30x ea  30x 30x 30x 30x 30x 30x ea 30x ea   Ball CW/CCW   30x ea 30xea 30x 30x 30x ea 30x ea  Green  20x    Pulleys FF/POS 20x ea 30x ea 30x ea 30x ea 30x 30x 30x ea 30x ea 30x ea 30x   Tband ext 20x blue 30x 30x 30 30x 30x 30x blue 30x blue 30x blue 30x blue   Tband IR 30x blue 30x green 30x 30x 30x 30x 30x blue 30x blue 30x blue 30x blue   Isometrics FF/ABD/ER/IR  20x ER/FF/ABD 30x 30x ea 30x 30x 30x ea 30x ea 30xea HEP    Tband Row 30x 30x blue 30x 30x 30x 30x 30x 30x  blue 30x blue 30x blue   Scap stabilization press up/circles 4# 20x ea 2#/ 30 2#/ 30 2#/ 30 2#/ 30 3#/ 30 3#/30 3# 30x 3# 30x   Prone IYT 2#   20x ea 30x 30x 2#/ 30 2#/30 2#/30 2# 30x   Prone ext  20x  20x 30x 30x 30x 30x 30x 30x   UBE 6'       6' 6 6'   Bicep Curls [id number]       Plate 4 V26 4 plates H50 4 plates U87   Tricep Ext 4 Plate 00H       Plate 4 Y95 4 plates L49 4 plates T51   End range wall lifts          Consider NV 10x   Scap wall clocks with TB          Consider   IR stretch at wall- gentle         Consider NV 10"x4   Biong 20"x2         Consider Scap Star with T band          Consier   ER robbers 30x            pulsing horizontal ABD with flex 10x            Ther Activity                                       Gait Training                                       Modalities             Ice  10'

## 2021-07-07 ENCOUNTER — OFFICE VISIT (OUTPATIENT)
Dept: PHYSICAL THERAPY | Age: 61
End: 2021-07-07
Payer: COMMERCIAL

## 2021-07-07 DIAGNOSIS — M75.01 ADHESIVE CAPSULITIS OF RIGHT SHOULDER: Primary | ICD-10-CM

## 2021-07-07 PROCEDURE — 97110 THERAPEUTIC EXERCISES: CPT | Performed by: PHYSICAL THERAPIST

## 2021-07-07 NOTE — PROGRESS NOTES
Daily Note     Today's date: 2021  Patient name: Sheree Dunlap  : 1960  MRN: [id number]  Referring provider: Grady Zepeda MD  Dx:   Encounter Diagnosis     ICD-10-CM    1  Adhesive capsulitis of right shoulder  M75 01        Start Time: 0700  Stop Time: 0745  Total time in clinic (min): 45 minutes    Subjective: Pt reports she has not had much clicking or popping  Objective: See treatment diary below  PROM full    Assessment: Tolerated treatment well  Patient contiues to make progreess  Stengh and endurance imrpvng in shoulder  Full ROM achieved  Plan: Continue per plan of care        Precautions: standard      Manuals    Right shoulder with mobs 15' 15' 15 15 15' 15' 15' 15' 15' 10'                                          TherEx             Ball closed chain FF with trunk lean 20x 30x ea 30x ea  30x 30x 30x 30x 30x 30x ea 30x ea   Ball CW/CCW   30x ea 30xea 30x 30x 30x ea 30x ea  Green  20x    Pulleys FF/POS 20x ea 30x ea 30x ea 30x ea 30x 30x 30x ea 30x ea 30x ea 30x   Tband ext 20x blue 30x 30x 30 30x 30x 30x blue 30x blue 30x blue 30x blue   Tband IR 30x blue 30x green 30x 30x 30x 30x 30x blue 30x blue 30x blue 30x blue   Isometrics FF/ABD/ER/IR   30x 30x ea 30x 30x 30x ea 30x ea 30xea HEP    Tband Row 30x 30x blue 30x 30x 30x 30x 30x 30x  blue 30x blue 30x blue   Scap stabilization press up/circles 4# 4#/30x ea 2#/ 30 2#/ 30 2#/ 30 2#/ 30 3#/ 30 3#/30 3# 30x 3# 30x   Prone IYT 2#   20x ea 30x 30x 2#/ 30 2#/30 2#/30 2# 30x   Prone ext  20x  20x 30x 30x 30x 30x 30x 30x   UBE 6'       6' 6' 6'   Bicep Curls [id number]       Plate 4 K35 4 plates F02 4 plates Y19   Tricep Ext 4 Plate 56W       Plate 4 B27 4 plates Q87 4 plates L98   End range wall lifts          Consider NV 10x   Scap wall clocks with TB          Consider   IR stretch at wall- gentle         Consider NV 10"x4   Biong 20"x2         Consider   Scap Star with T band Consier   ANA somers 30x            pulsing horizontal ABD with flex 10x            Ther Activity                                       Gait Training                                       Modalities             Ice  10'

## 2021-07-09 ENCOUNTER — OFFICE VISIT (OUTPATIENT)
Dept: FAMILY MEDICINE CLINIC | Facility: MEDICAL CENTER | Age: 61
End: 2021-07-09
Payer: COMMERCIAL

## 2021-07-09 ENCOUNTER — OFFICE VISIT (OUTPATIENT)
Dept: PHYSICAL THERAPY | Age: 61
End: 2021-07-09
Payer: COMMERCIAL

## 2021-07-09 VITALS
RESPIRATION RATE: 16 BRPM | TEMPERATURE: 96.8 F | HEART RATE: 62 BPM | DIASTOLIC BLOOD PRESSURE: 78 MMHG | HEIGHT: 66 IN | WEIGHT: 145 LBS | SYSTOLIC BLOOD PRESSURE: 138 MMHG | BODY MASS INDEX: 23.3 KG/M2

## 2021-07-09 DIAGNOSIS — J34.89 SINUS PAIN: Primary | ICD-10-CM

## 2021-07-09 DIAGNOSIS — M75.01 ADHESIVE CAPSULITIS OF RIGHT SHOULDER: Primary | ICD-10-CM

## 2021-07-09 PROCEDURE — 3078F DIAST BP <80 MM HG: CPT | Performed by: FAMILY MEDICINE

## 2021-07-09 PROCEDURE — 3008F BODY MASS INDEX DOCD: CPT | Performed by: FAMILY MEDICINE

## 2021-07-09 PROCEDURE — 97110 THERAPEUTIC EXERCISES: CPT | Performed by: PHYSICAL THERAPIST

## 2021-07-09 PROCEDURE — 3075F SYST BP GE 130 - 139MM HG: CPT | Performed by: FAMILY MEDICINE

## 2021-07-09 PROCEDURE — 1036F TOBACCO NON-USER: CPT | Performed by: FAMILY MEDICINE

## 2021-07-09 PROCEDURE — 99213 OFFICE O/P EST LOW 20 MIN: CPT | Performed by: FAMILY MEDICINE

## 2021-07-09 NOTE — PROGRESS NOTES
Daily Note     Today's date: 2021  Patient name: Chaim Hurt  : 1960  MRN: [id number]  Referring provider: Parveen Gan MD  Dx:   Encounter Diagnosis     ICD-10-CM    1  Adhesive capsulitis of right shoulder  M75 01        Start Time: 0700  Stop Time: 0800  Total time in clinic (min): 60 minutes    Subjective: Less clicking and popping, less pain  Objective: See treatment diary below  ROM full    Assessment: Tolerated treatment well  Patient demonstrating improved scap stabilization  Strength porgressing  Plan: Progress to potential discharge in 1 week       Precautions: standard      Manuals    Right shoulder with mobs 15' 15' 15 15 15' 15' 15' 15' 15' 10''                                          TherEx             Ball closed chain FF with trunk lean 20x 30x ea  30x 30x 30x 30x 30x 30x ea 30x ea   Ball CW/CCW    30xea 30x 30x 30x ea 30x ea  Green  20x    Pulleys FF/POS 20x ea 30x ea 30x ea 30x ea 30x 30x 30x ea 30x ea 30x ea 30x   Tband ext 20x blue 30x 30x black 30 30x 30x 30x blue 30x blue 30x blue 30x blue   Tband IR 30x blue 30x 30x black 30x 30x 30x 30x blue 30x blue 30x blue 30x blue   Isometrics FF/ABD/ER/IR   30x 30x ea 30x 30x 30x ea 30x ea 30xea HEP    Tband Row 30x 30x blue 30x black 30x 30x 30x 30x 30x  blue 30x blue 30x blue   Scap stabilization press up/circles 4# 4#/30x ea 4#/ 30 2#/ 30 2#/ 30 2#/ 30 3#/ 30 3#/30 3# 30x 3# 30x   Prone IYT 2#  2#/ 30 20x ea 30x 30x 2#/ 30 2#/30 2#/30 2# 30x   Prone ext  20x  20x 30x 30x 30x 30x 30x 30x   UBE 6'  6'     6' 6' 6'   Bicep Curls [id number]  75#     Plate 4 K13 4 plates S26 4 plates Z91   Tricep Ext 4 Plate 31X  04#     Plate 4 D59 4 plates Z93 4 plates B12   End range wall lifts    10x      Consider NV 10x   Scap wall clocks with TB   5x       Consider   IR stretch at wall- gentle         Consider NV 10"x4   Biong 20"x2  20"x3       Consider   Scap Star with T band          Consier ER yonis 30x  30x          pulsing horizontal ABD with flex 10x  10x          Ther Activity                                       Gait Training                                       Modalities             Ice  10'

## 2021-07-09 NOTE — PROGRESS NOTES
Assessment/Plan:       Diagnoses and all orders for this visit:    Sinus pain    Suspect allergy related  Symptoms have started to improve with loratadine use  Patient encouraged to continue her generic loratadine  She may want to add generic Flonase as well to her treatment regimen  Follow-up next month as previously scheduled or sooner if needed  Subjective:      Patient ID: Vikas Kidd is a 61 y o  female  Patient presents with a complaint of left-sided facial pain that is extending to her neck  She believes she has a sinus infection  She started taking generic Claritin about three days ago  Although symptoms persist she states they have improved  No fevers  The following portions of the patient's history were reviewed and updated as appropriate: She  has a past medical history of Anxiety, Arthritis, Bariatric surgery status, Closed head injury (06/11/2018), Colon polyp, Concussion with loss of consciousness (06/22/2018), Depression, Diabetes mellitus ([address] ), Diarrhea, Fatty liver, Fecal incontinence, GERD (gastroesophageal reflux disease), Head injury, Hiatal hernia, Hyperlipidemia, Hypertension, Lifelong obesity, Postsurgical malabsorption, Psoriasis, SAH (subarachnoid hemorrhage) ([address] ) (06/11/2018), Tear of right supraspinatus tendon (12/16/2019), and Wears glasses    She   Patient Active Problem List    Diagnosis Date Noted    Adhesive capsulitis of right shoulder 05/11/2021    Encounter for surgical aftercare following surgery of digestive system 09/29/2020    Postsurgical malabsorption 09/29/2020    Bariatric surgery status 09/29/2020    Psoriasis 08/19/2020    Status post laparoscopic sleeve gastrectomy 06/17/2020    Routine gynecological examination 10/15/2019    Hyperlipidemia 07/05/2018    Hematuria 07/05/2018    Type 2 diabetes mellitus with microalbuminuria, without long-term current use of insulin ([address] ) 07/05/2018    Mild TBI ([address] ) 06/11/2018    Anxiety 08/25/2017    Hypertension 08/25/2017     She  has a past surgical history that includes Nasal septum surgery; Cholecystectomy; Hysterectomy; Colonoscopy (N/A, 10/24/2017); Colonoscopy w/ endoscopic US (N/A, 10/24/2017); Endometrial biopsy; Dilation and curettage of uterus; EGD; Rotator cuff repair (Left); Other surgical history; pr shldr arthroscop,surg,w/rotat cuff repr (Right, 1/15/2020); and pr lap, francisca restrict proc, longitudinal gastrectomy (N/A, 6/16/2020)  Her family history includes Anxiety disorder in her family; Atrial fibrillation in her mother; Breast cancer in her family; Breast cancer (age of onset: 48) in her maternal grandmother; Cancer in her paternal grandfather; Depression in her family; Diabetes in her family and father; Fibrocystic breast disease in her family; Heart disease in her family and father; Hiatal hernia in her family; Hypertension in her family and mother; Irritable bowel syndrome in her family; Kidney disease in her family; Lung cancer in her father and maternal aunt; No Known Problems in her brother, maternal aunt, maternal aunt, maternal aunt, son, and son; Obesity in her brother; Other in her family; Urinary tract infection in her family  She  reports that she quit smoking about 29 years ago  She has never used smokeless tobacco  She reports previous alcohol use  She reports that she does not use drugs    Current Outpatient Medications   Medication Sig Dispense Refill    calcium carbonate (OS-YONG) 600 MG tablet Take 600 mg by mouth 2 (two) times a day with meals      Multiple Vitamins-Minerals (multivitamin with minerals) tablet Take 1 tablet by mouth daily      pravastatin (PRAVACHOL) 10 mg tablet take 1 tablet by mouth once daily 90 tablet 1    Secukinumab, 300 MG Dose, (Cosentyx, 300 MG Dose,) 150 MG/ML SOSY 1X a week on Saturday's  X 5 weeks (12/14/19 started) then Monthly      sertraline (ZOLOFT) 50 mg tablet Take 1 tablet (50 mg total) by mouth daily 90 tablet 1 No current facility-administered medications for this visit  Current Outpatient Medications on File Prior to Visit   Medication Sig    calcium carbonate (OS-YONG) 600 MG tablet Take 600 mg by mouth 2 (two) times a day with meals    Multiple Vitamins-Minerals (multivitamin with minerals) tablet Take 1 tablet by mouth daily    pravastatin (PRAVACHOL) 10 mg tablet take 1 tablet by mouth once daily    Secukinumab, 300 MG Dose, (Cosentyx, 300 MG Dose,) 150 MG/ML SOSY 1X a week on Saturday's  X 5 weeks (12/14/19 started) then Monthly    sertraline (ZOLOFT) 50 mg tablet Take 1 tablet (50 mg total) by mouth daily     No current facility-administered medications on file prior to visit  She is allergic to vicodin [hydrocodone-acetaminophen]       Review of Systems   Constitutional: Negative for fever  Respiratory: Negative for shortness of breath  Cardiovascular: Negative for chest pain  Objective:      /78 (BP Location: Left arm, Patient Position: Sitting, Cuff Size: Adult)   Pulse 62   Temp (!) 96 8 °F (36 °C)   Resp 16   Ht 5' 5 5" (1 664 m)   Wt 65 8 kg (145 lb)   BMI 23 76 kg/m²          Physical Exam  Constitutional:       General: She is not in acute distress  Appearance: She is not ill-appearing  HENT:      Head: Normocephalic and atraumatic  Right Ear: Tympanic membrane, ear canal and external ear normal       Left Ear: Tympanic membrane, ear canal and external ear normal    Cardiovascular:      Rate and Rhythm: Normal rate and regular rhythm  Heart sounds: Normal heart sounds  Pulmonary:      Effort: Pulmonary effort is normal  No respiratory distress  Breath sounds: Normal breath sounds  Lymphadenopathy:      Cervical: Cervical adenopathy present  Left cervical: Superficial cervical adenopathy present

## 2021-07-12 ENCOUNTER — OFFICE VISIT (OUTPATIENT)
Dept: PHYSICAL THERAPY | Age: 61
End: 2021-07-12
Payer: COMMERCIAL

## 2021-07-12 DIAGNOSIS — M75.01 ADHESIVE CAPSULITIS OF RIGHT SHOULDER: Primary | ICD-10-CM

## 2021-07-12 PROCEDURE — 97140 MANUAL THERAPY 1/> REGIONS: CPT | Performed by: PHYSICAL THERAPIST

## 2021-07-12 PROCEDURE — 97110 THERAPEUTIC EXERCISES: CPT | Performed by: PHYSICAL THERAPIST

## 2021-07-12 NOTE — PROGRESS NOTES
Daily Note     Today's date: 2021  Patient name: Radha Wall  : 1960  MRN: [id number]  Referring provider: Gabo Nolasco MD  Dx:   Encounter Diagnosis     ICD-10-CM    1  Adhesive capsulitis of right shoulder  M75 01        Start Time: 0700  Stop Time: 62  Total time in clinic (min): 55 minutes    Subjective: Pt able to go white water rafting and paddles yesterday and had no pain  Objective: See treatment diary below      Assessment: Tolerated treatment well  Patient has improved scap control, no clicking or popping today  Plan: Begin progression to discharge       Precautions: standard      Manuals    Right shoulder with mobs 15' 15' 15 8   15' 15' 15' 15' 15' 10'                                          TherEx             Ball closed chain FF with trunk lean 20x 30x ea   30x 30x 30x 30x 30x ea 30x ea   Ball CW/CCW     30x 30x 30x ea 30x ea  Green  20x    Pulleys FF/POS 20x ea 30x ea 30x ea 30x ea 30x 30x 30x ea 30x ea 30x ea 30x   Tband ext 20x blue 30x 30x black 30 30x 30x 30x blue 30x blue 30x blue 30x blue   Tband IR 30x blue 30x 30x black 30x 30x 30x 30x blue 30x blue 30x blue 30x blue   Isometrics FF/ABD/ER/IR     30x 30x 30x ea 30x ea 30xea HEP    Tband Row 30x 30x blue 30x black 30x 30x 30x 30x 30x  blue 30x blue 30x blue   Scap stabilization press up/circles 4# 4#/30x ea 4#/ 30 4#/ 30 2#/ 30 2#/ 30 3#/ 30 3#/30 3# 30x 3# 30x   Prone IYT 2#  2#/ 30 2#/30x ea 30x 30x 2#/ 30 2#/30 2#/30 2# 30x   Prone ext  20x 4#/ 30 30x 30x 30x 30x 30x 30x 30x   UBE 6'  6' 6'    6' 6' 6'   Bicep Curls [id number]  23# 56#/ 30    Plate 4 E64 4 plates M55 4 plates B19   Tricep Ext 4 Plate 90P  05# 76#/ 30    Plate 4 Z34 4 plates N96 4 plates D01   End range wall lifts    10x 10x     Consider NV 10x   Scap wall clocks with TB   5x       Consider   IR stretch at wall- gentle         Consider NV 10"x4   Biong 20"x2  20"x3 30"x3      Consider   Scap Star with T band          Consier   ER robbers 30x  30x 30x         pulsing horizontal ABD with flex 10x  10x 10x         Ther Activity                                       Gait Training                                       Modalities             Ice  10'

## 2021-07-16 ENCOUNTER — OFFICE VISIT (OUTPATIENT)
Dept: PHYSICAL THERAPY | Age: 61
End: 2021-07-16
Payer: COMMERCIAL

## 2021-07-16 DIAGNOSIS — M75.01 ADHESIVE CAPSULITIS OF RIGHT SHOULDER: Primary | ICD-10-CM

## 2021-07-16 PROCEDURE — 97110 THERAPEUTIC EXERCISES: CPT | Performed by: PHYSICAL THERAPIST

## 2021-07-16 NOTE — PROGRESS NOTES
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  Objective: See treatment diary below  ROM [facility]  Strength       Assessment: Tolerated treatment well  Patient has good ROM, strength improved, improved scapular control  At this time, patient is generally pain free and has achieved all goals  Goals  ST-3 WEEKS  1  Decrease pain by 2 points on VAS at its worst  Achieved  2  Increase ROM by > 10 deg in all deficients planes  Full ROM achieved  3  Independent HEP  AChieved    LT WEEKS  1  Patient to be able to put deodorant on opposite arm with ease  AChieved  2  Increase functional activities for leisure and home activities to previous LOF  Achieved, able to care for horses  3  Independent with HEP and/or fitness program     Plan: DIscharge phycsial therapy       Precautions: standard      Manuals    Right shoulder with mobs 15' 15' 15 8   8' 15' 15' 15' 15' 10''                                          TherEx             Ball closed chain FF with trunk lean 20x 30x ea    30x 30x 30x 30x ea 30x ea   Ball CW/CCW      30x 30x ea 30x ea  Green  20x    Pulleys FF/POS 20x ea 30x ea 30x ea 30x ea 30x 30x 30x ea 30x ea 30x ea 30x   Tband ext 20x blue 30x 30x black 30 30x 30x 30x blue 30x blue 30x blue 30x blue   Tband IR 30x blue 30x 30x black 30x 30x 30x 30x blue 30x blue 30x blue 30x blue   Isometrics FF/ABD/ER/IR      30x 30x ea 30x ea 30xea HEP    Tband Row 30x 30x blue 30x black 30x 30x 30x 30x 30x  blue 30x blue 30x blue   Scap stabilization press up/circles 4# 4#/30x ea 4#/ 30 4#/ 30 4#/ 30 2#/ 30 3#/ 30 3#/30 3# 30x 3# 30x   Prone IYT 2#  2#/ 30 2#/30x ea 3#/ 30x 30x 2#/ 30 2#/30 2#/30 2# 30x   Prone ext  20x 4#/ 30 30x 4#/30x 30x 30x 30x 30x 30x   UBE 6'  6' 6' 6'   6' 6' 6'   Bicep Curls [id number]  28# 90#/ 30 40#/ 30   Plate 4 Z88 4 plates X14 4 plates T52   Tricep Ext 4 Plate 85T  93# 73#/ 30 50#/ 30   Plate 4 R25 4 plates V76 4 plates E49   End range wall lifts    10x 10x     Consider NV 10x   Scap wall clocks with TB   5x       Consider   IR stretch at wall- gentle         Consider NV 10"x4   Biong 20"x2  20"x3 30"x3 30"x3     Consider   Scap Star with T band          Consier   ER robbers 30x  30x 30x 30x        pulsing horizontal ABD with flex 10x  10x 10x 10x        Ther Activity                                       Gait Training                                       Modalities             Ice  10'

## 2021-07-29 ENCOUNTER — OFFICE VISIT (OUTPATIENT)
Dept: URGENT CARE | Facility: CLINIC | Age: 61
End: 2021-07-29
Payer: COMMERCIAL

## 2021-07-29 VITALS
OXYGEN SATURATION: 97 % | RESPIRATION RATE: 16 BRPM | DIASTOLIC BLOOD PRESSURE: 76 MMHG | BODY MASS INDEX: 22.98 KG/M2 | TEMPERATURE: 98.9 F | SYSTOLIC BLOOD PRESSURE: 136 MMHG | HEART RATE: 70 BPM | WEIGHT: 143 LBS | HEIGHT: 66 IN

## 2021-07-29 DIAGNOSIS — L03.115 CELLULITIS OF RIGHT LOWER EXTREMITY: Primary | ICD-10-CM

## 2021-07-29 PROCEDURE — 99213 OFFICE O/P EST LOW 20 MIN: CPT | Performed by: PHYSICIAN ASSISTANT

## 2021-07-29 RX ORDER — CEPHALEXIN 500 MG/1
500 CAPSULE ORAL EVERY 6 HOURS SCHEDULED
Qty: 28 CAPSULE | Refills: 0 | Status: SHIPPED | OUTPATIENT
Start: 2021-07-29 | End: 2021-08-05

## 2021-07-29 NOTE — PATIENT INSTRUCTIONS
Cellulitis   WHAT YOU NEED TO KNOW:   Cellulitis is a skin infection caused by bacteria  Cellulitis is common and can become severe  Cellulitis usually appears on the lower legs  It can also appear on the arms, face, and other areas  Cellulitis develops when bacteria enter a crack or break in your skin, such as a scratch, bite, or cut  DISCHARGE INSTRUCTIONS:   Return to the emergency department if:   · Your wound gets larger and more painful  · You feel a crackling under your skin when you touch it  · You have purple dots or bumps on your skin, or you see bleeding under your skin  · You see red streaks coming from the infected area  Call your doctor if:   · The red, warm, swollen area gets larger  · Your fever or pain does not go away or gets worse  · The area does not get smaller after 3 days of antibiotics  · You have questions or concerns about your condition or care  Medicines: You should start to see improvement in 3 days  If cellulitis is not treated, the infection can spread through your body and become life-threatening  You may need any of the following medicines:  · Antibiotics  help treat the bacterial infection  · Acetaminophen  decreases pain and fever  It is available without a doctor's order  Ask how much to take and how often to take it  Follow directions  Read the labels of all other medicines you are using to see if they also contain acetaminophen, or ask your doctor or pharmacist  Acetaminophen can cause liver damage if not taken correctly  Do not use more than 4 grams (4,000 milligrams) total of acetaminophen in one day  · NSAIDs , such as ibuprofen, help decrease swelling, pain, and fever  This medicine is available with or without a doctor's order  NSAIDs can cause stomach bleeding or kidney problems in certain people  If you take blood thinner medicine, always ask your healthcare provider if NSAIDs are safe for you   Always read the medicine label and follow directions  · Take your medicine as directed  Contact your healthcare provider if you think your medicine is not helping or if you have side effects  Tell him or her if you are allergic to any medicine  Keep a list of the medicines, vitamins, and herbs you take  Include the amounts, and when and why you take them  Bring the list or the pill bottles to follow-up visits  Carry your medicine list with you in case of an emergency  Self-care:   · Wash the area with soap and water every day  Gently pat dry  Use bandages if directed by your healthcare provider  · Elevate the area above the level of your heart  as often as you can  This will help decrease swelling and pain  Prop the area on pillows or blankets to keep it elevated comfortably  · Place a cool, damp cloth on the area  Use clean cloths and clean water  You can do this as often as you need to  Cool, damp cloths may help decrease pain  · Apply cream or ointment as directed  These help protect the area  Most over-the-counter products, such as petroleum jelly, are good to use  Ask your healthcare provider about specific creams or ointments you should use  Prevent cellulitis:   · Do not scratch bug bites or areas of injury  You increase your risk for cellulitis by scratching these areas  · Do not share personal items, such as towels, clothing, and razors  · Clean exercise equipment  with germ-killing  before and after you use it  · Treat athlete's foot  This can help prevent the spread of a bacterial skin infection  · Wash your hands often  Use soap and water  Wash your hands after you use the bathroom, change a child's diapers, or sneeze  Wash your hands before you prepare or eat food  Use lotion to prevent dry, cracked skin  Follow up with your doctor within 3 days, or as directed:  He or she will check if your cellulitis is getting better   Write down your questions so you remember to ask them during your visits  © Copyright [employer] 2021 Information is for End User's use only and may not be sold, redistributed or otherwise used for commercial purposes  All illustrations and images included in CareNotes® are the copyrighted property of [employer]  or Delio Galindo  The above information is an  only  It is not intended as medical advice for individual conditions or treatments  Talk to your doctor, nurse or pharmacist before following any medical regimen to see if it is safe and effective for you

## 2021-07-29 NOTE — PROGRESS NOTES
[facility]        NAME: Bryce Gomez is a 61 y o  female  : 1960    MRN: [id number]  DATE: 2021  TIME: 7:31 PM    Assessment and Plan   Cellulitis of right lower extremity [L03 115]  1  Cellulitis of right lower extremity  cephalexin (KEFLEX) 500 mg capsule         Patient Instructions     Patient Instructions     Cellulitis   WHAT YOU NEED TO KNOW:   Cellulitis is a skin infection caused by bacteria  Cellulitis is common and can become severe  Cellulitis usually appears on the lower legs  It can also appear on the arms, face, and other areas  Cellulitis develops when bacteria enter a crack or break in your skin, such as a scratch, bite, or cut  DISCHARGE INSTRUCTIONS:   Return to the emergency department if:   · Your wound gets larger and more painful  · You feel a crackling under your skin when you touch it  · You have purple dots or bumps on your skin, or you see bleeding under your skin  · You see red streaks coming from the infected area  Call your doctor if:   · The red, warm, swollen area gets larger  · Your fever or pain does not go away or gets worse  · The area does not get smaller after 3 days of antibiotics  · You have questions or concerns about your condition or care  Medicines: You should start to see improvement in 3 days  If cellulitis is not treated, the infection can spread through your body and become life-threatening  You may need any of the following medicines:  · Antibiotics  help treat the bacterial infection  · Acetaminophen  decreases pain and fever  It is available without a doctor's order  Ask how much to take and how often to take it  Follow directions  Read the labels of all other medicines you are using to see if they also contain acetaminophen, or ask your doctor or pharmacist  Acetaminophen can cause liver damage if not taken correctly  Do not use more than 4 grams (4,000 milligrams) total of acetaminophen in one day  · NSAIDs , such as ibuprofen, help decrease swelling, pain, and fever  This medicine is available with or without a doctor's order  NSAIDs can cause stomach bleeding or kidney problems in certain people  If you take blood thinner medicine, always ask your healthcare provider if NSAIDs are safe for you  Always read the medicine label and follow directions  · Take your medicine as directed  Contact your healthcare provider if you think your medicine is not helping or if you have side effects  Tell him or her if you are allergic to any medicine  Keep a list of the medicines, vitamins, and herbs you take  Include the amounts, and when and why you take them  Bring the list or the pill bottles to follow-up visits  Carry your medicine list with you in case of an emergency  Self-care:   · Wash the area with soap and water every day  Gently pat dry  Use bandages if directed by your healthcare provider  · Elevate the area above the level of your heart  as often as you can  This will help decrease swelling and pain  Prop the area on pillows or blankets to keep it elevated comfortably  · Place a cool, damp cloth on the area  Use clean cloths and clean water  You can do this as often as you need to  Cool, damp cloths may help decrease pain  · Apply cream or ointment as directed  These help protect the area  Most over-the-counter products, such as petroleum jelly, are good to use  Ask your healthcare provider about specific creams or ointments you should use  Prevent cellulitis:   · Do not scratch bug bites or areas of injury  You increase your risk for cellulitis by scratching these areas  · Do not share personal items, such as towels, clothing, and razors  · Clean exercise equipment  with germ-killing  before and after you use it  · Treat athlete's foot  This can help prevent the spread of a bacterial skin infection  · Wash your hands often  Use soap and water   Wash your hands after you use the bathroom, change a child's diapers, or sneeze  Wash your hands before you prepare or eat food  Use lotion to prevent dry, cracked skin  Follow up with your doctor within 3 days, or as directed:  He or she will check if your cellulitis is getting better  Write down your questions so you remember to ask them during your visits  © Copyright [address] 2021 Information is for End User's use only and may not be sold, redistributed or otherwise used for commercial purposes  All illustrations and images included in CareNotes® are the copyrighted property of [employer]  or [address]   The above information is an  only  It is not intended as medical advice for individual conditions or treatments  Talk to your doctor, nurse or pharmacist before following any medical regimen to see if it is safe and effective for you  Follow up with PCP in 3-5 days  Proceed to  ER if symptoms worsen  Chief Complaint     Chief Complaint   Patient presents with    Leg Swelling     R lower leg swelling with medial ankle redness and warmth to touch x 5 days         History of Present Illness       The patient is a 19-year-old female presenting with cellulitis of the right ankle  She noticed 5 days ago spreading erythema as well as pitting edema  The ankle is  beginning to swell  She reports pain to palpation and with movement  It is warm to the touch  No fevers or chills  Review of Systems   Review of Systems   Constitutional: Negative for activity change, appetite change, chills, fatigue and fever  HENT: Negative for congestion, rhinorrhea, sinus pressure, sinus pain and sore throat  Respiratory: Negative for cough, chest tightness and shortness of breath  Cardiovascular: Positive for leg swelling (R ankle)  Negative for chest pain and palpitations  Gastrointestinal: Negative for diarrhea, nausea and vomiting     Musculoskeletal: Negative for arthralgias and myalgias  Skin: Positive for color change and pallor  Neurological: Negative for headaches           Current Medications       Current Outpatient Medications:     calcium carbonate (OS-YONG) 600 MG tablet, Take 600 mg by mouth 2 (two) times a day with meals, Disp: , Rfl:     Multiple Vitamins-Minerals (multivitamin with minerals) tablet, Take 1 tablet by mouth daily, Disp: , Rfl:     pravastatin (PRAVACHOL) 10 mg tablet, take 1 tablet by mouth once daily, Disp: 90 tablet, Rfl: 1    Secukinumab, 300 MG Dose, (Cosentyx, 300 MG Dose,) 150 MG/ML SOSY, 1X a week on Saturday's  X 5 weeks (12/14/19 started) then Monthly, Disp: , Rfl:     sertraline (ZOLOFT) 50 mg tablet, Take 1 tablet (50 mg total) by mouth daily, Disp: 90 tablet, Rfl: 1    cephalexin (KEFLEX) 500 mg capsule, Take 1 capsule (500 mg total) by mouth every 6 (six) hours for 7 days, Disp: 28 capsule, Rfl: 0    Current Allergies     Allergies as of 07/29/2021 - Reviewed 07/29/2021   Allergen Reaction Noted    Vicodin [hydrocodone-acetaminophen] Anaphylaxis 04/21/2012            The following portions of the patient's history were reviewed and updated as appropriate: allergies, current medications, past family history, past medical history, past social history, past surgical history and problem list      Past Medical History:   Diagnosis Date    Anxiety     Arthritis     Bariatric surgery status     Closed head injury 06/11/2018 2018    Colon polyp     Concussion with loss of consciousness 06/22/2018 2018    Depression     Diabetes mellitus ([facility] [address] )     NIDDM    Diarrhea     chronic    Fatty liver     Fecal incontinence     GERD (gastroesophageal reflux disease)     Head injury     6/10/11    Hiatal hernia     Hyperlipidemia     Hypertension     Lifelong obesity     Postsurgical malabsorption     Psoriasis     legs    SAH (subarachnoid hemorrhage) ([address] ) 06/11/2018 2018    Tear of right supraspinatus tendon 12/16/2019    Wears glasses        Past Surgical History:   Procedure Laterality Date    CHOLECYSTECTOMY      COLONOSCOPY N/A 10/24/2017    Procedure: COLONOSCOPY;  Surgeon: Natalya Smith MD;  [facility]: [facility]; Service: Colorectal    COLONOSCOPY W/ ENDOSCOPIC US N/A 10/24/2017    Procedure: ANAL ENDOSCOPIC U/S;  Surgeon: Natalya Smith MD;  [facility]: [facility];   Service: Colorectal    DILATION AND CURETTAGE OF UTERUS      EGD      ENDOMETRIAL BIOPSY      WITHOUT CERVICAL DILATION    HYSTERECTOMY      NASAL SEPTUM SURGERY      OTHER SURGICAL HISTORY      Episiotomy repair    NM LAP, LYDIA RESTRICT PROC, LONGITUDINAL GASTRECTOMY N/A 6/16/2020    Procedure: GASTRECTOMY SLEEVE LAPAROSCOPIC AND INTRAOPERATIVE EGD ;  Surgeon: Sherrell Perales MD;  Location: [facility];  Service: Bariatrics    NM SHLDR ARTHROSCOP,SURG,W/ROTAT CUFF REPR Right 1/15/2020    Procedure: SHOULDER ARTHROSCOPIC DEBRIDEMENT OF PARTIAL THICKNESS ROTATOR CUFF TEAR, SAD;  Surgeon: Elvia Urena MD;  [facility];  Service: Orthopedics    ROTATOR CUFF REPAIR Left        Family History   Problem Relation Age of Onset    Lung cancer Father     Heart disease Father     Diabetes Father     Other Family         ADENOCARCINOMA IN SIOBHAN IN VILLOUS ADENOMA OF THE BREAST, MIGRAINES, SKIN DISORDER    Depression Family     Anxiety disorder Family     Diabetes Family         MELLITUS    Fibrocystic breast disease Family     Heart disease Family     Hiatal hernia Family     Hypertension Family     Irritable bowel syndrome Family     Kidney disease Family     Urinary tract infection Family     Atrial fibrillation Mother     Hypertension Mother     Obesity Brother     No Known Problems Brother     Breast cancer Maternal Grandmother 48    Cancer Paternal Grandfather     Breast cancer Family     No Known Problems Son     No Known Problems Son     Lung cancer Maternal Aunt     No Known Problems Maternal Aunt     No Known Problems Maternal Aunt     No Known Problems Maternal Aunt          Medications have been verified  Objective   /76   Pulse 70   Temp 98 9 °F (37 2 °C) (Temporal)   Resp 16   Ht 5' 6" (1 676 m)   Wt 64 9 kg (143 lb)   SpO2 97%   BMI 23 08 kg/m²        Physical Exam     Physical Exam  Vitals reviewed  Constitutional:       General: She is not in acute distress  Appearance: Normal appearance  She is normal weight  She is not ill-appearing, toxic-appearing or diaphoretic  HENT:      Head: Normocephalic and atraumatic  Cardiovascular:      Rate and Rhythm: Normal rate and regular rhythm  Heart sounds: Normal heart sounds  No murmur heard  No friction rub  No gallop  Pulmonary:      Effort: Pulmonary effort is normal  No respiratory distress  Breath sounds: Normal breath sounds  No stridor  No wheezing, rhonchi or rales  Chest:      Chest wall: No tenderness  Musculoskeletal:      Right lower leg: Edema present  Comments: The patient has erythema of the right ankle  Tenderness to palpation  1+ pitting edema  Skin:     General: Skin is warm and dry  Capillary Refill: Capillary refill takes less than 2 seconds  Neurological:      Mental Status: She is alert

## 2021-08-02 ENCOUNTER — APPOINTMENT (EMERGENCY)
Dept: RADIOLOGY | Facility: HOSPITAL | Age: 61
End: 2021-08-02
Payer: COMMERCIAL

## 2021-08-02 ENCOUNTER — HOSPITAL ENCOUNTER (EMERGENCY)
Facility: HOSPITAL | Age: 61
Discharge: HOME | End: 2021-08-02
Attending: EMERGENCY MEDICINE | Admitting: EMERGENCY MEDICINE
Payer: COMMERCIAL

## 2021-08-02 ENCOUNTER — APPOINTMENT (EMERGENCY)
Dept: ULTRASOUND IMAGING | Facility: HOSPITAL | Age: 61
End: 2021-08-02
Payer: COMMERCIAL

## 2021-08-02 VITALS
SYSTOLIC BLOOD PRESSURE: 141 MMHG | HEART RATE: 68 BPM | OXYGEN SATURATION: 99 % | RESPIRATION RATE: 16 BRPM | TEMPERATURE: 98.1 F | DIASTOLIC BLOOD PRESSURE: 65 MMHG

## 2021-08-02 DIAGNOSIS — S50.01XA CONTUSION OF RIGHT ELBOW, INITIAL ENCOUNTER: ICD-10-CM

## 2021-08-02 DIAGNOSIS — L03.115 CELLULITIS OF RIGHT LOWER EXTREMITY: Primary | ICD-10-CM

## 2021-08-02 LAB
ALBUMIN SERPL BCP-MCNC: 3.3 G/DL (ref 3.5–5)
ALP SERPL-CCNC: 101 U/L (ref 46–116)
ALT SERPL W P-5'-P-CCNC: 48 U/L (ref 12–78)
ANION GAP SERPL CALCULATED.3IONS-SCNC: 5 MMOL/L (ref 4–13)
AST SERPL W P-5'-P-CCNC: 17 U/L (ref 5–45)
BASOPHILS # BLD AUTO: 0.06 THOUSANDS/ΜL (ref 0–0.1)
BASOPHILS NFR BLD AUTO: 1 % (ref 0–1)
BILIRUB SERPL-MCNC: 0.2 MG/DL (ref 0.2–1)
BUN SERPL-MCNC: 24 MG/DL (ref 5–25)
CALCIUM ALBUM COR SERPL-MCNC: 9.2 MG/DL (ref 8.3–10.1)
CALCIUM SERPL-MCNC: 8.6 MG/DL (ref 8.3–10.1)
CHLORIDE SERPL-SCNC: 104 MMOL/L (ref 100–108)
CO2 SERPL-SCNC: 31 MMOL/L (ref 21–32)
CREAT SERPL-MCNC: 0.72 MG/DL (ref 0.6–1.3)
EOSINOPHIL # BLD AUTO: 0.13 THOUSAND/ΜL (ref 0–0.61)
EOSINOPHIL NFR BLD AUTO: 2 % (ref 0–6)
ERYTHROCYTE [DISTWIDTH] IN BLOOD BY AUTOMATED COUNT: 12 % (ref 11.6–15.1)
GFR SERPL CREATININE-BSD FRML MDRD: 91 ML/MIN/1.73SQ M
GLUCOSE SERPL-MCNC: 79 MG/DL (ref 65–140)
HCT VFR BLD AUTO: 38.7 % (ref 34.8–46.1)
HGB BLD-MCNC: 12.2 G/DL (ref 11.5–15.4)
IMM GRANULOCYTES # BLD AUTO: 0.02 THOUSAND/UL (ref 0–0.2)
IMM GRANULOCYTES NFR BLD AUTO: 0 % (ref 0–2)
LACTATE SERPL-SCNC: 0.3 MMOL/L (ref 0.5–2)
LYMPHOCYTES # BLD AUTO: 2.42 THOUSANDS/ΜL (ref 0.6–4.47)
LYMPHOCYTES NFR BLD AUTO: 34 % (ref 14–44)
MCH RBC QN AUTO: 29.3 PG (ref 26.8–34.3)
MCHC RBC AUTO-ENTMCNC: 31.5 G/DL (ref 31.4–37.4)
MCV RBC AUTO: 93 FL (ref 82–98)
MONOCYTES # BLD AUTO: 0.57 THOUSAND/ΜL (ref 0.17–1.22)
MONOCYTES NFR BLD AUTO: 8 % (ref 4–12)
NEUTROPHILS # BLD AUTO: 3.88 THOUSANDS/ΜL (ref 1.85–7.62)
NEUTS SEG NFR BLD AUTO: 55 % (ref 43–75)
NRBC BLD AUTO-RTO: 0 /100 WBCS
PLATELET # BLD AUTO: 246 THOUSANDS/UL (ref 149–390)
PMV BLD AUTO: 9.8 FL (ref 8.9–12.7)
POTASSIUM SERPL-SCNC: 3.6 MMOL/L (ref 3.5–5.3)
PROT SERPL-MCNC: 7.8 G/DL (ref 6.4–8.2)
RBC # BLD AUTO: 4.17 MILLION/UL (ref 3.81–5.12)
SODIUM SERPL-SCNC: 140 MMOL/L (ref 136–145)
WBC # BLD AUTO: 7.08 THOUSAND/UL (ref 4.31–10.16)

## 2021-08-02 PROCEDURE — 36415 COLL VENOUS BLD VENIPUNCTURE: CPT | Performed by: EMERGENCY MEDICINE

## 2021-08-02 PROCEDURE — 73080 X-RAY EXAM OF ELBOW: CPT

## 2021-08-02 PROCEDURE — 99284 EMERGENCY DEPT VISIT MOD MDM: CPT

## 2021-08-02 PROCEDURE — 83605 ASSAY OF LACTIC ACID: CPT | Performed by: EMERGENCY MEDICINE

## 2021-08-02 PROCEDURE — 87040 BLOOD CULTURE FOR BACTERIA: CPT | Performed by: EMERGENCY MEDICINE

## 2021-08-02 PROCEDURE — 85025 COMPLETE CBC W/AUTO DIFF WBC: CPT | Performed by: EMERGENCY MEDICINE

## 2021-08-02 PROCEDURE — 99284 EMERGENCY DEPT VISIT MOD MDM: CPT | Performed by: EMERGENCY MEDICINE

## 2021-08-02 PROCEDURE — 93971 EXTREMITY STUDY: CPT | Performed by: SURGERY

## 2021-08-02 PROCEDURE — 93971 EXTREMITY STUDY: CPT

## 2021-08-02 PROCEDURE — 80053 COMPREHEN METABOLIC PANEL: CPT | Performed by: EMERGENCY MEDICINE

## 2021-08-02 RX ORDER — PREDNISONE 20 MG/1
60 TABLET ORAL ONCE
Status: DISCONTINUED | OUTPATIENT
Start: 2021-08-02 | End: 2021-08-03 | Stop reason: HOSPADM

## 2021-08-02 RX ORDER — PREDNISONE 20 MG/1
60 TABLET ORAL DAILY
Qty: 12 TABLET | Refills: 0 | Status: SHIPPED | OUTPATIENT
Start: 2021-08-03 | End: 2021-08-07

## 2021-08-02 RX ORDER — SULFAMETHOXAZOLE AND TRIMETHOPRIM 800; 160 MG/1; MG/1
1 TABLET ORAL 2 TIMES DAILY
Qty: 14 TABLET | Refills: 0 | Status: SHIPPED | OUTPATIENT
Start: 2021-08-02 | End: 2021-08-09

## 2021-08-02 RX ORDER — SULFAMETHOXAZOLE AND TRIMETHOPRIM 800; 160 MG/1; MG/1
1 TABLET ORAL ONCE
Status: DISCONTINUED | OUTPATIENT
Start: 2021-08-02 | End: 2021-08-03 | Stop reason: HOSPADM

## 2021-08-02 NOTE — Clinical Note
Roxanna Degroots was seen and treated in our emergency department on 8/2/2021  Diagnosis:     Josiane Else  may return to work on return date  She may return on this date: 08/05/2021         If you have any questions or concerns, please don't hesitate to call        Reid Milian MD    ______________________________           _______________          _______________  Hospital Representative                              Date                                Time

## 2021-08-02 NOTE — ED PROVIDER NOTES
History  Chief Complaint   Patient presents with    Cellulitis     RLE cellulitis, taking keflex since thursday  worsening redness and swelling  Patient is a 72-year-old female  She has had right lower leg redness and swelling for about a week now  She has had chills but no fever  She was seen as an outpatient and started on Keflex  It seems to be getting worse  There is some itching  She denies any stings  Today she noticed some red bumps to her foot  In addition she is complaining of an inability to extend her left elbow  She reports banging it several days ago  No associated motor sensory complaints  Prior to Admission Medications   Prescriptions Last Dose Informant Patient Reported? Taking?    Multiple Vitamins-Minerals (multivitamin with minerals) tablet  Self Yes No   Sig: Take 1 tablet by mouth daily   Secukinumab, 300 MG Dose, (Cosentyx, 300 MG Dose,) 150 MG/ML SOSY  Self Yes No   SiX a week on   X 5 weeks (19 started) then Monthly   calcium carbonate (OS-YONG) 600 MG tablet  Self Yes No   Sig: Take 600 mg by mouth 2 (two) times a day with meals   cephalexin (KEFLEX) 500 mg capsule   No No   Sig: Take 1 capsule (500 mg total) by mouth every 6 (six) hours for 7 days   pravastatin (PRAVACHOL) 10 mg tablet   No No   Sig: take 1 tablet by mouth once daily   sertraline (ZOLOFT) 50 mg tablet   No No   Sig: Take 1 tablet (50 mg total) by mouth daily      Facility-Administered Medications: None       Past Medical History:   Diagnosis Date    Anxiety     Arthritis     Bariatric surgery status     Closed head injury 2018    Colon polyp     Concussion with loss of consciousness 2018    2018    Depression     Diabetes mellitus (HCC)     NIDDM    Diarrhea     chronic    Fatty liver     Fecal incontinence     GERD (gastroesophageal reflux disease)     Head injury     6/10/11    Hiatal hernia     Hyperlipidemia     Hypertension     Lifelong obesity     Postsurgical malabsorption     Psoriasis     legs    [facility]H [facility]subarachnoid hemorrhage) ([facility] [address] ) 06/11/2018 2018    Tear of right supraspinatus tendon 12/16/2019    Wears glasses        Past Surgical History:   Procedure Laterality Date    CHOLECYSTECTOMY      COLONOSCOPY N/A 10/24/2017    Procedure: COLONOSCOPY;  Surgeon: Magalie Cameron MD;  [facility]: [facility]; Service: Colorectal    COLONOSCOPY W/ ENDOSCOPIC US N/A 10/24/2017    Procedure: ANAL ENDOSCOPIC U/S;  Surgeon: Magalie Cameron MD;  [facility];   Service: Colorectal    DILATION AND CURETTAGE OF UTERUS      EGD      ENDOMETRIAL BIOPSY      WITHOUT CERVICAL DILATION    HYSTERECTOMY      NASAL SEPTUM SURGERY      OTHER SURGICAL HISTORY      Episiotomy repair    MI LAP, LYDIA RESTRICT PROC, LONGITUDINAL GASTRECTOMY N/A 6/16/2020    Procedure: GASTRECTOMY SLEEVE LAPAROSCOPIC AND INTRAOPERATIVE EGD ;  Surgeon: Santi Pardo MD;  [facility];  Service: Bariatrics    MI SHLDR ARTHROSCOP,SURG,W/ROTAT CUFF REPR Right 1/15/2020    Procedure: SHOULDER ARTHROSCOPIC DEBRIDEMENT OF PARTIAL THICKNESS ROTATOR CUFF TEAR, SAD;  Surgeon: Bartolo Mcgraw MD;  [facility];  Service: Orthopedics    ROTATOR CUFF REPAIR Left        Family History   Problem Relation Age of Onset    Lung cancer Father     Heart disease Father     Diabetes Father     Other Family         ADENOCARCINOMA IN SIOBHAN IN VILLOUS ADENOMA OF THE BREAST, MIGRAINES, SKIN DISORDER    Depression Family     Anxiety disorder Family     Diabetes Family         MELLITUS    Fibrocystic breast disease Family     Heart disease Family     Hiatal hernia Family     Hypertension Family     Irritable bowel syndrome Family     Kidney disease Family     Urinary tract infection Family     Atrial fibrillation Mother     Hypertension Mother     Obesity Brother     No Known Problems Brother     Breast cancer Maternal Grandmother 48    Cancer Paternal Grandfather     Breast cancer Family     No Known Problems Son     No Known Problems Son     Lung cancer Maternal Aunt     No Known Problems Maternal Aunt     No Known Problems Maternal Aunt     No Known Problems Maternal Aunt      I have reviewed and agree with the history as documented  E-Cigarette/Vaping    E-Cigarette Use Never User      E-Cigarette/Vaping Substances    Nicotine No     THC No     CBD No     Flavoring No     Other No     Unknown No      Social History     Tobacco Use    Smoking status: Former Smoker     Quit date:      Years since quittin 6    Smokeless tobacco: Never Used    Tobacco comment: Smoked for [employer]  Vaping Use    Vaping Use: Never used   Substance Use Topics    Alcohol use: Not Currently     Comment: wine    Drug use: No       Review of Systems   Constitutional: Positive for chills  Negative for fever  HENT: Negative for rhinorrhea and sore throat  Eyes: Negative for pain, redness and visual disturbance  Respiratory: Negative for cough and shortness of breath  Cardiovascular: Positive for leg swelling  Negative for chest pain  Gastrointestinal: Negative for abdominal pain, diarrhea and vomiting  Endocrine: Negative for polydipsia and polyuria  Genitourinary: Negative for dysuria, frequency, hematuria, vaginal bleeding and vaginal discharge  Musculoskeletal: Negative for back pain and neck pain  Skin: Positive for rash  Negative for wound  Allergic/Immunologic: Negative for immunocompromised state  Neurological: Negative for weakness, numbness and headaches  Hematological: Does not bruise/bleed easily  Psychiatric/Behavioral: Negative for hallucinations and suicidal ideas  All other systems reviewed and are negative  Physical Exam  Physical Exam  Vitals reviewed  Constitutional:       General: She is not in acute distress  HENT:      Head: Normocephalic and atraumatic        Nose: Nose normal       Mouth/Throat: Mouth: Mucous membranes are moist    Eyes:      General:         Right eye: No discharge  Left eye: No discharge  Conjunctiva/sclera: Conjunctivae normal    Cardiovascular:      Rate and Rhythm: Normal rate and regular rhythm  Pulses: Normal pulses  Heart sounds: Normal heart sounds  No murmur heard  No friction rub  No gallop  Pulmonary:      Effort: Pulmonary effort is normal  No respiratory distress  Breath sounds: Normal breath sounds  No stridor  No wheezing, rhonchi or rales  Abdominal:      General: Bowel sounds are normal  There is no distension  Palpations: Abdomen is soft  Tenderness: There is no abdominal tenderness  There is no right CVA tenderness, left CVA tenderness, guarding or rebound  Musculoskeletal:         General: Swelling, tenderness and signs of injury present  No deformity  Cervical back: Normal range of motion and neck supple  No rigidity  Right lower leg: Edema present  Left lower leg: No edema  Comments: There is swelling to the right lower leg  There is erythema to the right lower leg  There is some erythema to the dorsum of the foot and some red papules  There is tenderness to the left elbow at the medial condyle of the humerus  Neurovascular exam is normal    Skin:     General: Skin is warm and dry  Coloration: Skin is not jaundiced  Findings: No rash  Neurological:      General: No focal deficit present  Mental Status: She is alert and oriented to person, place, and time  Sensory: No sensory deficit  Motor: Motor function is intact     Psychiatric:         Mood and Affect: Mood normal          Behavior: Behavior normal          Vital Signs  ED Triage Vitals [08/02/21 1802]   Temperature Pulse Respirations Blood Pressure SpO2   98 1 °F (36 7 °C) 68 16 141/65 99 %      Temp Source Heart Rate Source Patient Position - Orthostatic VS BP Location FiO2 (%)   Oral Monitor -- Left arm --      Pain Score       --           Vitals:    08/02/21 1802   BP: 141/65   Pulse: 68         Visual Acuity      ED Medications  Medications   predniSONE tablet 60 mg (has no administration in time range)   sulfamethoxazole-trimethoprim (BACTRIM DS) 800-160 mg per tablet 1 tablet (has no administration in time range)       Diagnostic Studies  Results Reviewed     Procedure Component Value Units Date/Time    Lactic acid [[id number]]  (Abnormal) Collected: 08/02/21 1939    Lab Status: Final result Specimen: Blood from Arm, Right Updated: 08/02/21 2004     LACTIC ACID 0 3 mmol/L     Narrative:      Result may be elevated if tourniquet was used during collection      Comprehensive metabolic panel [[id number]]  (Abnormal) Collected: 08/02/21 1939    Lab Status: Final result Specimen: Blood from Arm, Right Updated: 08/02/21 2000     Sodium 140 mmol/L      Potassium 3 6 mmol/L      Chloride 104 mmol/L      CO2 31 mmol/L      ANION GAP 5 mmol/L      BUN 24 mg/dL      Creatinine 0 72 mg/dL      Glucose 79 mg/dL      Calcium 8 6 mg/dL      Corrected Calcium 9 2 mg/dL      AST 17 U/L      ALT 48 U/L      Alkaline Phosphatase 101 U/L      Total Protein 7 8 g/dL      Albumin 3 3 g/dL      Total Bilirubin 0 20 mg/dL      eGFR 91 ml/min/1 73sq m     Narrative:      [place] guidelines for Chronic Kidney Disease (CKD):     Stage 1 with normal or high GFR (GFR > 90 mL/min/1 73 square meters)    Stage 2 Mild CKD (GFR = 60-89 mL/min/1 73 square meters)    Stage 3A Moderate CKD (GFR = 45-59 mL/min/1 73 square meters)    Stage 3B Moderate CKD (GFR = 30-44 mL/min/1 73 square meters)    Stage 4 Severe CKD (GFR = 15-29 mL/min/1 73 square meters)    Stage 5 End Stage CKD (GFR <15 mL/min/1 73 square meters)  Note: GFR calculation is accurate only with a steady state creatinine    CBC and differential [[id number]] Collected: 08/02/21 1939    Lab Status: Final result Specimen: Blood from Arm, Right Updated: 08/02/21 1948 WBC 7 08 Thousand/uL      RBC 4 17 Million/uL      Hemoglobin 12 2 g/dL      Hematocrit 38 7 %      MCV 93 fL      MCH 29 3 pg      MCHC 31 5 g/dL      RDW 12 0 %      MPV 9 8 fL      Platelets 870 Thousands/uL      nRBC 0 /100 WBCs      Neutrophils Relative 55 %      Immat GRANS % 0 %      Lymphocytes Relative 34 %      Monocytes Relative 8 %      Eosinophils Relative 2 %      Basophils Relative 1 %      Neutrophils Absolute 3 88 Thousands/µL      Immature Grans Absolute 0 02 Thousand/uL      Lymphocytes Absolute 2 42 Thousands/µL      Monocytes Absolute 0 57 Thousand/µL      Eosinophils Absolute 0 13 Thousand/µL      Basophils Absolute 0 06 Thousands/µL     Blood culture #1 [[id number]] Collected: 08/02/21 1939    Lab Status: In process Specimen: Blood from Arm, Left Updated: 08/02/21 1944    Blood culture #2 [[id number]] Collected: 08/02/21 1939    Lab Status: In process Specimen: Blood from Arm, Right Updated: 08/02/21 1944                 XR elbow 3+ views LEFT   ED Interpretation by Randy Mcgill MD (08/02 2043)   No fracture or dislocation      VAS lower limb venous duplex study, unilateral/limited    (Results Pending)              Procedures  Procedures         ED Course                             SBIRT 20yo+      Most Recent Value   SBIRT (25 yo +)   In order to provide better care to our patients, we are screening all of our patients for alcohol and drug use  Would it be okay to ask you these screening questions? Yes Filed at: 08/02/2021 2019   Initial Alcohol Screen: US AUDIT-C    1  How often do you have a drink containing alcohol?  0 Filed at: 08/02/2021 2019   2  How many drinks containing alcohol do you have on a typical day you are drinking? 0 Filed at: 08/02/2021 2019   3b  FEMALE Any Age, or MALE 65+: How often do you have 4 or more drinks on one occassion? 0 Filed at: 08/02/2021 2019   Audit-C Score  0 Filed at: 08/02/2021 2019   SUJATA: How many times in the past year have you       Used an illegal drug or used a prescription medication for non-medical reasons? Never Filed at: 08/02/2021 2019                    [facility]  Number of Diagnoses or Management Options  Diagnosis management comments: There is no DVT  Either this is a cellulitis or possibly a dermatitis  There is no elevation white blood cell count  No fever  If it is cellulitis, patient is appropriate for continued outpatient management  She is not septic  Will add Bactrim to cover MRSA  Will also treat with Benadryl/steroids in case this is a dermatitis  Elevation  Follow-up primary MD   As far as the elbow goes, no definite fracture or dislocation  Tylenol and or Motrin for pain and follow-up with Orthopedics if any problems  Amount and/or Complexity of Data Reviewed  Clinical lab tests: ordered and reviewed  Tests in the radiology section of CPT®: ordered and reviewed  Independent visualization of images, tracings, or specimens: yes        Disposition  Final diagnoses:   Cellulitis of right lower extremity   Contusion of right elbow, initial encounter     Time reflects when diagnosis was documented in both MDM as applicable and the Disposition within this note     Time User Action Codes Description Comment    8/2/2021  9:07 PM Everrett Acron Add [L03 115] Cellulitis of right lower extremity     8/2/2021  9:07 PM Everrett Acron Add [S50 01XA] Contusion of right elbow, initial encounter       ED Disposition     ED Disposition Condition Date/Time Comment    Discharge Stable Mon Aug 2, 2021  9:07 PM Amy Hawley discharge to home/self care              Follow-up Information     Follow up With Specialties Details Why Contact Info Additional Information    Will DO [facility] In 2 days  [address]   [address]  [facility] [address]  [phone]       [address] [place] [address] Orthopedic Surgery In 1 week  [address] [place] [address] 42 [id number] [phone] TD [facility], [address], [place], [place], [address] [facility]          Patient's Medications   Discharge Prescriptions    PREDNISONE 20 MG TABLET    Take 3 tablets (60 mg total) by mouth daily for 4 days       Start Date: 8/3/2021  End Date: 8/7/2021       Order Dose: 60 mg       Quantity: 12 tablet    Refills: 0    SULFAMETHOXAZOLE-TRIMETHOPRIM (BACTRIM DS) 800-160 MG PER TABLET    Take 1 tablet by mouth 2 (two) times a day for 7 days smx-tmp DS (BACTRIM) 800-160 mg tabs (1tab q12 D10)       Start Date: 8/2/2021  End Date: 8/9/2021       Order Dose: 1 tablet       Quantity: 14 tablet    Refills: 0     No discharge procedures on file      PDMP Review     None          ED Provider  Electronically Signed by           Kel Rawls MD  08/02/21 3017

## 2021-08-08 LAB
BACTERIA BLD CULT: NORMAL
BACTERIA BLD CULT: NORMAL

## 2021-08-20 DIAGNOSIS — F41.9 ANXIETY: ICD-10-CM

## 2021-08-24 ENCOUNTER — OFFICE VISIT (OUTPATIENT)
Dept: OBGYN CLINIC | Facility: HOSPITAL | Age: 61
End: 2021-08-24
Payer: COMMERCIAL

## 2021-08-24 VITALS
BODY MASS INDEX: 23.27 KG/M2 | SYSTOLIC BLOOD PRESSURE: 152 MMHG | HEART RATE: 60 BPM | WEIGHT: 144.8 LBS | HEIGHT: 66 IN | DIASTOLIC BLOOD PRESSURE: 89 MMHG

## 2021-08-24 DIAGNOSIS — M75.01 ADHESIVE CAPSULITIS OF RIGHT SHOULDER: Primary | ICD-10-CM

## 2021-08-24 PROCEDURE — 99213 OFFICE O/P EST LOW 20 MIN: CPT | Performed by: ORTHOPAEDIC SURGERY

## 2021-08-24 NOTE — PROGRESS NOTES
Assessment  Diagnoses and all orders for this visit:    Adhesive capsulitis of right shoulder      Discussion and Plan:    · Patient's range of motion has  improved significantly since the last visit  · Patient is going for her 3rd COVID injection  I recommend she request it be given in the lateral they as that is an improved injection site  · Follow up as needed  Subjective:   Patient ID: Omid Gant is a 61 y o  female      HPI  Patient present today for follow up of right shoulder adhesive capsulitis  She has been attending PT as instructed and progressing nicely  She is very pleased with her outcome  The following portions of the patient's history were reviewed and updated as appropriate: allergies, current medications, past family history, past medical history, past social history, past surgical history and problem list     Review of Systems   Constitutional: Negative for chills and fever  HENT: Negative for drooling and sneezing  Eyes: Negative for redness  Respiratory: Negative for cough and wheezing  Gastrointestinal: Negative for nausea and vomiting  Musculoskeletal:        Please see ortho exam   Psychiatric/Behavioral: Negative for behavioral problems  The patient is not nervous/anxious  Objective:  /89   Pulse 60   Ht 5' 6" (1 676 m)   Wt 65 7 kg (144 lb 12 8 oz)   BMI 23 37 kg/m²       Right Shoulder Exam     Tenderness   The patient is experiencing no tenderness  Range of Motion   The patient has normal right shoulder ROM  Muscle Strength   The patient has normal right shoulder strength  Other   Erythema: absent  Sensation: normal  Pulse: present            Physical Exam  Vitals reviewed  Constitutional:       Appearance: She is well-developed  Eyes:      Pupils: Pupils are equal, round, and reactive to light  Pulmonary:      Effort: Pulmonary effort is normal       Breath sounds: Normal breath sounds     Skin:     General: Skin is warm and dry  Neurological:      Mental Status: She is alert and oriented to person, place, and time  Psychiatric:         Behavior: Behavior normal          Thought Content:  Thought content normal          Judgment: Judgment normal            Scribe Attestation    I,:  Araceli Walters am acting as a scribe while in the presence of the attending physician :       I,:  Frederick Priest MD personally performed the services described in this documentation    as scribed in my presence :

## 2021-08-25 ENCOUNTER — OFFICE VISIT (OUTPATIENT)
Dept: BARIATRICS | Facility: CLINIC | Age: 61
End: 2021-08-25
Payer: COMMERCIAL

## 2021-08-25 VITALS
BODY MASS INDEX: 23.3 KG/M2 | HEART RATE: 58 BPM | TEMPERATURE: 98.6 F | HEIGHT: 66 IN | DIASTOLIC BLOOD PRESSURE: 72 MMHG | WEIGHT: 145 LBS | SYSTOLIC BLOOD PRESSURE: 118 MMHG

## 2021-08-25 DIAGNOSIS — E78.2 MIXED HYPERLIPIDEMIA: ICD-10-CM

## 2021-08-25 DIAGNOSIS — Z98.84 BARIATRIC SURGERY STATUS: ICD-10-CM

## 2021-08-25 DIAGNOSIS — K91.2 POSTSURGICAL MALABSORPTION: ICD-10-CM

## 2021-08-25 DIAGNOSIS — Z48.815 ENCOUNTER FOR SURGICAL AFTERCARE FOLLOWING SURGERY OF DIGESTIVE SYSTEM: Primary | ICD-10-CM

## 2021-08-25 DIAGNOSIS — L40.9 PSORIASIS: ICD-10-CM

## 2021-08-25 PROCEDURE — 99214 OFFICE O/P EST MOD 30 MIN: CPT | Performed by: PHYSICIAN ASSISTANT

## 2021-08-25 PROCEDURE — 1036F TOBACCO NON-USER: CPT | Performed by: PHYSICIAN ASSISTANT

## 2021-08-25 PROCEDURE — 3074F SYST BP LT 130 MM HG: CPT | Performed by: PHYSICIAN ASSISTANT

## 2021-08-25 PROCEDURE — 3008F BODY MASS INDEX DOCD: CPT | Performed by: PHYSICIAN ASSISTANT

## 2021-08-25 PROCEDURE — 3078F DIAST BP <80 MM HG: CPT | Performed by: PHYSICIAN ASSISTANT

## 2021-08-25 NOTE — PATIENT INSTRUCTIONS
· Follow-up in 6 months for 18 month postop  We kindly ask that your arrive 15 minutes before your scheduled appointment time with your provider to allow our staff to room you, get your vital signs and update your chart  · Call our office if you have any problems with abdominal pain especially associated with fever, chills, nausea, vomiting or any other concerns  · All  Post-bariatric surgery patients should be aware that very small quantities of any alcohol can cause impairment and it is very possible not to feel the effect  The effect can be in the system for several hours  It is also a stomach irritant  · It is advised to AVOID alcohol, Nonsteroidal antiinflammatory drugs (NSAIDS) and nicotine of all forms   Any of these can cause stomach irritation/pain  · Discussed the effects of alcohol on a bariatric patient and the increased impairment risk  · Keep up the good work!

## 2021-08-25 NOTE — PROGRESS NOTES
Assessment/Plan:     Patient ID: Radha Wall is a 61 y o  female  Bariatric Surgery Status    -s/p Vertical Sleeve Gastrectomy with Dr Enzo Kennedy on 6/15/2020  Presents to the office today for annual     · Continued/Maintain healthy weight loss with good nutrition intakes  · Adequate hydration with at least 64oz  fluid intake  · Follow diet as discussed  · Follow vitamin and mineral recommendations as reviewed with you  · Exercise as tolerated  Follow-up in 6 months for 18 month postop  We kindly ask that your arrive 15 minutes before your scheduled appointment time with your provider to allow our staff to room you, get your vital signs and update your chart  · Call our office if you have any problems with abdominal pain especially associated with fever, chills, nausea, vomiting or any other concerns  · All  Post-bariatric surgery patients should be aware that very small quantities of any alcohol can cause impairment and it is very possible not to feel the effect  The effect can be in the system for several hours  It is also a stomach irritant  · It is advised to AVOID alcohol, Nonsteroidal antiinflammatory drugs (NSAIDS) and nicotine of all forms   Any of these can cause stomach irritation/pain  · Discussed the effects of alcohol on a bariatric patient and the increased impairment risk  · Keep up the good work!    ·     · Colonoscopy referral made: utd      Postsurgical Malabsorption   -At risk for malabsorption of vitamins/minerals secondary to malabsorption and restriction of intake from bariatric surgery  -Currently taking adequate postop bariatric surgery vitamin supplementation  -Last set of bariatric labs completed 6/2021 and WNL  -Patient received education about the importance of adhering to a lifelong supplementation regimen to avoid vitamin/mineral deficiencies      Diagnoses and all orders for this visit:    Encounter for surgical aftercare following surgery of digestive system    Bariatric surgery status    Postsurgical malabsorption    Psoriasis    Mixed hyperlipidemia         Subjective:      Patient ID: Ally Polo is a 61 y o  female  -s/p Vertical Sleeve Gastrectomy with Dr Rosalva Lopze on 6/15/2020  Presents to the office today for annual     Initial:225  Current: 145  EWL: (Weight loss is ahead of schedule at this post surgical period )  [place]; 138  Current BMI is Body mass index is 23 76 kg/m²  · Tolerating a regular diet-yes  · Eating at least 60 grams of protein per day-yes  · Following 30/60 minute rule with liquids-yes  · Drinking at least 64 ounces of fluid per day-at least 48 oz  · Drinking carbonated beverages-no  · Sufficient exercise-yes  · Using NSAIDs regularly-no  · Using nicotine-no  · Using alcohol-rare  · Supplements: kaycee advantage mvi + 3 calcium with vit d    · EWL is 110%, which places the patient ahead of schedule for expected post surgical weight loss at this time  The following portions of the patient's history were reviewed and updated as appropriate: allergies, current medications, past family history, past medical history, past social history, past surgical history and problem list     Review of Systems   Constitutional: Negative  Respiratory: Negative  Cardiovascular: Negative  Gastrointestinal: Negative  Musculoskeletal: Negative  Skin: Negative  Neurological: Negative  Psychiatric/Behavioral: Negative            Objective:    /72 (BP Location: Left arm, Patient Position: Sitting, Cuff Size: Standard)   Pulse 58   Temp 98 6 °F (37 °C) (Tympanic)   Ht 5' 5 5" (1 664 m)   Wt 65 8 kg (145 lb)   BMI 23 76 kg/m²      Physical Exam

## 2021-08-31 ENCOUNTER — IMMUNIZATIONS (OUTPATIENT)
Dept: FAMILY MEDICINE CLINIC | Facility: HOSPITAL | Age: 61
End: 2021-08-31

## 2021-08-31 DIAGNOSIS — Z23 ENCOUNTER FOR IMMUNIZATION: Primary | ICD-10-CM

## 2021-08-31 PROCEDURE — 91300 SARS-COV-2 / COVID-19 MRNA VACCINE (PFIZER-BIONTECH) 30 MCG: CPT

## 2021-08-31 PROCEDURE — 0001A SARS-COV-2 / COVID-19 MRNA VACCINE (PFIZER-BIONTECH) 30 MCG: CPT

## 2021-09-02 DIAGNOSIS — E11.9 TYPE 2 DIABETES MELLITUS WITHOUT COMPLICATION, WITHOUT LONG-TERM CURRENT USE OF INSULIN (HCC): ICD-10-CM

## 2021-09-02 RX ORDER — PRAVASTATIN SODIUM 10 MG
TABLET ORAL
Qty: 90 TABLET | Refills: 1 | Status: SHIPPED | OUTPATIENT
Start: 2021-09-02 | End: 2021-09-16

## 2021-09-07 ENCOUNTER — RA CDI HCC (OUTPATIENT)
Dept: OTHER | Facility: HOSPITAL | Age: 61
End: 2021-09-07

## 2021-09-07 NOTE — PROGRESS NOTES
[facility]  coding opportunities       Chart reviewed, no opportunity found: CHART REVIEWED, NO OPPORTUNITY FOUND                        Patients insurance company: [employer] (Medicare Advantage and Commercial)

## 2021-09-16 ENCOUNTER — OFFICE VISIT (OUTPATIENT)
Dept: FAMILY MEDICINE CLINIC | Facility: MEDICAL CENTER | Age: 61
End: 2021-09-16
Payer: COMMERCIAL

## 2021-09-16 VITALS
TEMPERATURE: 97.6 F | HEART RATE: 62 BPM | SYSTOLIC BLOOD PRESSURE: 134 MMHG | DIASTOLIC BLOOD PRESSURE: 82 MMHG | WEIGHT: 146 LBS | HEIGHT: 66 IN | RESPIRATION RATE: 16 BRPM | BODY MASS INDEX: 23.46 KG/M2

## 2021-09-16 DIAGNOSIS — E78.2 MIXED HYPERLIPIDEMIA: ICD-10-CM

## 2021-09-16 DIAGNOSIS — R73.9 HYPERGLYCEMIA: ICD-10-CM

## 2021-09-16 DIAGNOSIS — F41.9 ANXIETY: Primary | ICD-10-CM

## 2021-09-16 PROBLEM — Z01.419 ROUTINE GYNECOLOGICAL EXAMINATION: Status: RESOLVED | Noted: 2019-10-15 | Resolved: 2021-09-16

## 2021-09-16 PROBLEM — E11.29 TYPE 2 DIABETES MELLITUS WITH MICROALBUMINURIA, WITHOUT LONG-TERM CURRENT USE OF INSULIN (HCC): Status: RESOLVED | Noted: 2018-07-05 | Resolved: 2021-09-16

## 2021-09-16 PROBLEM — R31.9 HEMATURIA: Status: RESOLVED | Noted: 2018-07-05 | Resolved: 2021-09-16

## 2021-09-16 PROBLEM — I10 HYPERTENSION: Status: RESOLVED | Noted: 2017-08-25 | Resolved: 2021-09-16

## 2021-09-16 PROBLEM — R80.9 TYPE 2 DIABETES MELLITUS WITH MICROALBUMINURIA, WITHOUT LONG-TERM CURRENT USE OF INSULIN (HCC): Status: RESOLVED | Noted: 2018-07-05 | Resolved: 2021-09-16

## 2021-09-16 PROBLEM — S06.9X9A MILD TBI (HCC): Status: RESOLVED | Noted: 2018-06-11 | Resolved: 2021-09-16

## 2021-09-16 PROBLEM — S06.9XAA MILD TBI: Status: RESOLVED | Noted: 2018-06-11 | Resolved: 2021-09-16

## 2021-09-16 LAB — SL AMB POCT HEMOGLOBIN AIC: 5.4 (ref ?–6.5)

## 2021-09-16 PROCEDURE — 3725F SCREEN DEPRESSION PERFORMED: CPT | Performed by: FAMILY MEDICINE

## 2021-09-16 PROCEDURE — 3079F DIAST BP 80-89 MM HG: CPT | Performed by: FAMILY MEDICINE

## 2021-09-16 PROCEDURE — 99213 OFFICE O/P EST LOW 20 MIN: CPT | Performed by: FAMILY MEDICINE

## 2021-09-16 PROCEDURE — 3075F SYST BP GE 130 - 139MM HG: CPT | Performed by: FAMILY MEDICINE

## 2021-09-16 PROCEDURE — 1036F TOBACCO NON-USER: CPT | Performed by: FAMILY MEDICINE

## 2021-09-16 PROCEDURE — 3008F BODY MASS INDEX DOCD: CPT | Performed by: FAMILY MEDICINE

## 2021-09-16 PROCEDURE — 83036 HEMOGLOBIN GLYCOSYLATED A1C: CPT | Performed by: FAMILY MEDICINE

## 2021-09-16 PROCEDURE — 3044F HG A1C LEVEL LT 7.0%: CPT | Performed by: FAMILY MEDICINE

## 2021-09-16 NOTE — PROGRESS NOTES
Assessment/Plan:       Diagnoses and all orders for this visit:    Anxiety  Stable  Continue sertraline 50 mg daily  I nathan discussed with patient lowering the medication dose or stopping the medication completely but she declined at this time  She feels good on the medication  If she decides that she would like to pursue this she is free to send me a message and I can help her lower the dose or taper the medication and stop  Hyperglycemia  -     POCT hemoglobin A1c  In office A1c within normal limits  Diabetes diagnosis resolved  Mixed hyperlipidemia  -     Lipid panel; Future  -     LDL cholesterol, direct; Future  Due to resolution of diabetes I have gone ahead and stopped patient's statin therapy  Repeat lipid levels in about six weeks  May need to restart statin based on ASCVD risk and/or lipid levels  Flu Vaccine encouraged at the end of September or early October  Follow up in six months or sooner if needed  Subjective:      Patient ID: Shonna Spence is a 61 y o  female  The patient presents for follow-up  She has anxiety  Currently on sertraline 50 mg daily  Anxiety is controlled  She wishes to stay on the medication  She is also taking statin therapy for hyperlipidemia  She is also a previous diabetic but has not taken any medications since having bariatric surgery          The following portions of the patient's history were reviewed and updated as appropriate: She  has a past medical history of Anxiety, Arthritis, Bariatric surgery status, Closed head injury (06/11/2018), Colon polyp, Concussion with loss of consciousness (06/22/2018), Depression, Diabetes mellitus ([address] ), Diarrhea, Fatty liver, Fecal incontinence, GERD (gastroesophageal reflux disease), Head injury, Hiatal hernia, Hyperlipidemia, Hypertension, Lifelong obesity, Mild TBI ([address] ) (6/11/2018), Postsurgical malabsorption, Psoriasis, SAH (subarachnoid hemorrhage) ([address] ) (06/11/2018), Tear of right supraspinatus tendon (12/16/2019), Type 2 diabetes mellitus with microalbuminuria, without long-term current use of insulin ([facility] [address] ) (7/5/2018), and Wears glasses  She   Patient Active Problem List    Diagnosis Date Noted    Adhesive capsulitis of right shoulder 05/11/2021    Encounter for surgical aftercare following surgery of digestive system 09/29/2020    Postsurgical malabsorption 09/29/2020    Bariatric surgery status 09/29/2020    Psoriasis 08/19/2020    Status post laparoscopic sleeve gastrectomy 06/17/2020    Hyperlipidemia 07/05/2018    Anxiety 08/25/2017     She  has a past surgical history that includes Nasal septum surgery; Cholecystectomy; Hysterectomy; Colonoscopy (N/A, 10/24/2017); Colonoscopy w/ endoscopic US (N/A, 10/24/2017); Endometrial biopsy; Dilation and curettage of uterus; EGD; Rotator cuff repair (Left); Other surgical history; pr shldr arthroscop,surg,w/rotat cuff repr (Right, 1/15/2020); and pr lap, francisca restrict proc, longitudinal gastrectomy (N/A, 6/16/2020)  Her family history includes Anxiety disorder in her family; Atrial fibrillation in her mother; Breast cancer in her family; Breast cancer (age of onset: 48) in her maternal grandmother; Cancer in her paternal grandfather; Depression in her family; Diabetes in her family and father; Fibrocystic breast disease in her family; Heart disease in her family and father; Hiatal hernia in her family; Hypertension in her family and mother; Irritable bowel syndrome in her family; Kidney disease in her family; Lung cancer in her father and maternal aunt; No Known Problems in her brother, maternal aunt, maternal aunt, maternal aunt, son, and son; Obesity in her brother; Other in her family; Urinary tract infection in her family  She  reports that she quit smoking about 29 years ago  She has never used smokeless tobacco  She reports previous alcohol use  She reports that she does not use drugs    Current Outpatient Medications   Medication Sig Dispense Refill    calcium carbonate (OS-YONG) 600 MG tablet Take 600 mg by mouth 2 (two) times a day with meals      Multiple Vitamins-Minerals (multivitamin with minerals) tablet Take 1 tablet by mouth daily      Secukinumab, 300 MG Dose, (Cosentyx, 300 MG Dose,) 150 MG/ML SOSY 1X a week on Saturday's  X 5 weeks (12/14/19 started) then Monthly      sertraline (ZOLOFT) 50 mg tablet take 1 tablet by mouth once daily 90 tablet 1     No current facility-administered medications for this visit  Current Outpatient Medications on File Prior to Visit   Medication Sig    calcium carbonate (OS-YONG) 600 MG tablet Take 600 mg by mouth 2 (two) times a day with meals    Multiple Vitamins-Minerals (multivitamin with minerals) tablet Take 1 tablet by mouth daily    Secukinumab, 300 MG Dose, (Cosentyx, 300 MG Dose,) 150 MG/ML SOSY 1X a week on Saturday's  X 5 weeks (12/14/19 started) then Monthly    sertraline (ZOLOFT) 50 mg tablet take 1 tablet by mouth once daily    [DISCONTINUED] pravastatin (PRAVACHOL) 10 mg tablet take 1 tablet by mouth once daily     No current facility-administered medications on file prior to visit  She is allergic to vicodin [hydrocodone-acetaminophen]       Review of Systems   Constitutional: Negative for fever  Respiratory: Negative for shortness of breath  Cardiovascular: Negative for chest pain  Objective:      /82 (BP Location: Left arm, Patient Position: Sitting, Cuff Size: Standard)   Pulse 62   Temp 97 6 °F (36 4 °C)   Resp 16   Ht 5' 5 5" (1 664 m)   Wt 66 2 kg (146 lb)   BMI 23 93 kg/m²          Physical Exam  Constitutional:       General: She is not in acute distress  Appearance: She is not ill-appearing  Cardiovascular:      Rate and Rhythm: Normal rate and regular rhythm  Heart sounds: Normal heart sounds  Pulmonary:      Effort: Pulmonary effort is normal       Breath sounds: Normal breath sounds

## 2021-10-26 ENCOUNTER — ANNUAL EXAM (OUTPATIENT)
Dept: OBGYN CLINIC | Facility: MEDICAL CENTER | Age: 61
End: 2021-10-26
Payer: COMMERCIAL

## 2021-10-26 VITALS
BODY MASS INDEX: 24.11 KG/M2 | DIASTOLIC BLOOD PRESSURE: 86 MMHG | SYSTOLIC BLOOD PRESSURE: 120 MMHG | HEIGHT: 66 IN | WEIGHT: 150 LBS

## 2021-10-26 DIAGNOSIS — Z13.820 OSTEOPOROSIS SCREENING: ICD-10-CM

## 2021-10-26 DIAGNOSIS — R39.15 URINARY URGENCY: ICD-10-CM

## 2021-10-26 DIAGNOSIS — Z01.419 ENCOUNTER FOR ANNUAL ROUTINE GYNECOLOGICAL EXAMINATION: Primary | ICD-10-CM

## 2021-10-26 DIAGNOSIS — Z12.31 ENCOUNTER FOR SCREENING MAMMOGRAM FOR MALIGNANT NEOPLASM OF BREAST: ICD-10-CM

## 2021-10-26 PROCEDURE — 3008F BODY MASS INDEX DOCD: CPT | Performed by: FAMILY MEDICINE

## 2021-10-26 PROCEDURE — S0612 ANNUAL GYNECOLOGICAL EXAMINA: HCPCS | Performed by: STUDENT IN AN ORGANIZED HEALTH CARE EDUCATION/TRAINING PROGRAM

## 2022-01-20 ENCOUNTER — TELEPHONE (OUTPATIENT)
Dept: FAMILY MEDICINE CLINIC | Facility: MEDICAL CENTER | Age: 62
End: 2022-01-20

## 2022-01-20 NOTE — TELEPHONE ENCOUNTER
Pt LMOM that she and  were exposed to a positive covid on Sunday 1/9/22  They are wondering what to do  Spoke with pt  Both she and  are vaccinated and boostered, and neither she nor  are having any symptoms  Advised that [facility] is currently not testing asymptomatic pts  If either of them would start with symptoms, they should call to have a covid test order put in the system  Reviewed current covid protocol and full vaccinated and boostered pts

## 2022-02-01 ENCOUNTER — APPOINTMENT (OUTPATIENT)
Dept: LAB | Facility: MEDICAL CENTER | Age: 62
End: 2022-02-01
Payer: COMMERCIAL

## 2022-02-01 DIAGNOSIS — E78.2 MIXED HYPERLIPIDEMIA: ICD-10-CM

## 2022-02-01 LAB
CHOLEST SERPL-MCNC: 208 MG/DL
HDLC SERPL-MCNC: 79 MG/DL
LDLC SERPL CALC-MCNC: 121 MG/DL (ref 0–100)
LDLC SERPL DIRECT ASSAY-MCNC: 117 MG/DL (ref 0–100)
NONHDLC SERPL-MCNC: 129 MG/DL
TRIGL SERPL-MCNC: 42 MG/DL

## 2022-02-01 PROCEDURE — 36415 COLL VENOUS BLD VENIPUNCTURE: CPT

## 2022-02-01 PROCEDURE — 83721 ASSAY OF BLOOD LIPOPROTEIN: CPT

## 2022-02-01 PROCEDURE — 80061 LIPID PANEL: CPT

## 2022-02-21 ENCOUNTER — OFFICE VISIT (OUTPATIENT)
Dept: BARIATRICS | Facility: CLINIC | Age: 62
End: 2022-02-21
Payer: COMMERCIAL

## 2022-02-21 VITALS
BODY MASS INDEX: 24.27 KG/M2 | HEIGHT: 66 IN | HEART RATE: 60 BPM | SYSTOLIC BLOOD PRESSURE: 110 MMHG | WEIGHT: 151 LBS | TEMPERATURE: 96.5 F | DIASTOLIC BLOOD PRESSURE: 60 MMHG

## 2022-02-21 DIAGNOSIS — K91.2 POSTSURGICAL MALABSORPTION: ICD-10-CM

## 2022-02-21 DIAGNOSIS — L40.9 PSORIASIS: ICD-10-CM

## 2022-02-21 DIAGNOSIS — F41.9 ANXIETY: ICD-10-CM

## 2022-02-21 DIAGNOSIS — Z98.84 BARIATRIC SURGERY STATUS: ICD-10-CM

## 2022-02-21 DIAGNOSIS — Z48.815 ENCOUNTER FOR SURGICAL AFTERCARE FOLLOWING SURGERY OF DIGESTIVE SYSTEM: Primary | ICD-10-CM

## 2022-02-21 PROCEDURE — 1036F TOBACCO NON-USER: CPT | Performed by: PHYSICIAN ASSISTANT

## 2022-02-21 PROCEDURE — 3008F BODY MASS INDEX DOCD: CPT | Performed by: PHYSICIAN ASSISTANT

## 2022-02-21 PROCEDURE — 99213 OFFICE O/P EST LOW 20 MIN: CPT | Performed by: PHYSICIAN ASSISTANT

## 2022-02-21 NOTE — PROGRESS NOTES
Assessment/Plan:     Patient ID: Yahir Chang is a 64 y o  female  Bariatric Surgery Status    -s/p Vertical Sleeve Gastrectomy with Dr Kirstin Rutledge on 6/15/2020  Presents to the office today for 18 month postop  Psoriasis   - on cosentyx     · Continued/Maintain healthy weight loss with good nutrition intakes  · Adequate hydration with at least 64oz  fluid intake  · Follow diet as discussed  · Follow vitamin and mineral recommendations as reviewed with you  · Exercise as tolerated  · Colonoscopy referral made: utd   · Mammogram: utd   · Bone density test scheduled in this summer     · Follow-up in 6 months for annual   We kindly ask that your arrive 15 minutes before your scheduled appointment time with your provider to allow our staff to room you, get your vital signs and update your chart  · Call our office if you have any problems with abdominal pain especially associated with fever, chills, nausea, vomiting or any other concerns  · All  Post-bariatric surgery patients should be aware that very small quantities of any alcohol can cause impairment and it is very possible not to feel the effect  The effect can be in the system for several hours  It is also a stomach irritant  · It is advised to AVOID alcohol, Nonsteroidal antiinflammatory drugs (NSAIDS) and nicotine of all forms   Any of these can cause stomach irritation/pain  · Discussed the effects of alcohol on a bariatric patient and the increased impairment risk  · Keep up the good work!      Postsurgical Malabsorption   -At risk for malabsorption of vitamins/minerals secondary to malabsorption and restriction of intake from bariatric surgery  -Currently taking adequate postop bariatric surgery vitamin supplementation  -Last set of bariatric labs 6/2021 and wnl   -Next set of bariatric labs ordered for approximately 6 months  -Patient received education about the importance of adhering to a lifelong supplementation regimen to avoid vitamin/mineral deficiencies      Diagnoses and all orders for this visit:    Encounter for surgical aftercare following surgery of digestive system    Bariatric surgery status    Postsurgical malabsorption    Anxiety    Psoriasis         Subjective:      Patient ID: Madelaine Edge is a 64 y o  female  -s/p Vertical Sleeve Gastrectomy with Dr Rod Galarza on 6/15/2020  Presents to the office today for 18 month postop  Initial:225  Current:151  EWL: (Weight loss is ahead of schedule at this post surgical period )  [place]: 145  Current BMI is Body mass index is 24 75 kg/m²  · Tolerating a regular diet-yes  · Eating at least 60 grams of protein per day-yes  · Following 30/60 minute rule with liquids-no, encouraged 30/30   · Drinking at least 64 ounces of fluid per day-around 48 oz a day , encouraged increasing and focusing on mostly water   · Drinking carbonated beverages-no  · Sufficient exercise-limited now due to cold weather but plans on increasing once warmer weather starts   · Using NSAIDs regularly-no  · Using nicotine-no  · Using alcohol-rare, wine   · Supplements: kaycee advantage mvi with iron + calcium with vit d     · EWL is 101%, which places the patient ahead of schedule for expected post surgical weight loss at this time  The following portions of the patient's history were reviewed and updated as appropriate: allergies, current medications, past family history, past medical history, past social history, past surgical history and problem list     Review of Systems   Constitutional: Negative  Respiratory: Negative  Cardiovascular: Negative  Gastrointestinal: Negative  Musculoskeletal: Negative  Skin: Negative  Neurological: Negative  Psychiatric/Behavioral: Negative            Objective:    /60 (BP Location: Left arm, Patient Position: Sitting)   Pulse 60   Temp (!) 96 5 °F (35 8 °C) (Tympanic)   Ht 5' 5 5" (1 664 m)   Wt 68 5 kg (151 lb)   BMI 24 75 kg/m² Physical Exam  Vitals and nursing note reviewed  Constitutional:       Appearance: Normal appearance  HENT:      Head: Normocephalic and atraumatic  Eyes:      Extraocular Movements: Extraocular movements intact  Pupils: Pupils are equal, round, and reactive to light  Cardiovascular:      Rate and Rhythm: Normal rate and regular rhythm  Pulmonary:      Effort: Pulmonary effort is normal       Breath sounds: Normal breath sounds  Abdominal:      General: Bowel sounds are normal       Tenderness: There is no abdominal tenderness  Musculoskeletal:         General: Normal range of motion  Cervical back: Normal range of motion  Skin:     General: Skin is warm and dry  Neurological:      General: No focal deficit present  Mental Status: She is alert and oriented to person, place, and time     Psychiatric:         Mood and Affect: Mood normal

## 2022-02-21 NOTE — PATIENT INSTRUCTIONS
· Follow-up in 6 months for annual   We kindly ask that your arrive 15 minutes before your scheduled appointment time with your provider to allow our staff to room you, get your vital signs and update your chart  · Call our office if you have any problems with abdominal pain especially associated with fever, chills, nausea, vomiting or any other concerns  · All  Post-bariatric surgery patients should be aware that very small quantities of any alcohol can cause impairment and it is very possible not to feel the effect  The effect can be in the system for several hours  It is also a stomach irritant  · It is advised to AVOID alcohol, Nonsteroidal antiinflammatory drugs (NSAIDS) and nicotine of all forms   Any of these can cause stomach irritation/pain  · Discussed the effects of alcohol on a bariatric patient and the increased impairment risk  · Keep up the good work!

## 2022-03-17 ENCOUNTER — OFFICE VISIT (OUTPATIENT)
Dept: FAMILY MEDICINE CLINIC | Facility: MEDICAL CENTER | Age: 62
End: 2022-03-17
Payer: COMMERCIAL

## 2022-03-17 VITALS
BODY MASS INDEX: 24.91 KG/M2 | WEIGHT: 155 LBS | HEIGHT: 66 IN | RESPIRATION RATE: 16 BRPM | HEART RATE: 68 BPM | TEMPERATURE: 97.8 F | DIASTOLIC BLOOD PRESSURE: 76 MMHG | SYSTOLIC BLOOD PRESSURE: 133 MMHG

## 2022-03-17 DIAGNOSIS — R00.2 FLUTTERING SENSATION OF HEART: ICD-10-CM

## 2022-03-17 DIAGNOSIS — F41.9 ANXIETY: Primary | ICD-10-CM

## 2022-03-17 DIAGNOSIS — Z13.29 THYROID DISORDER SCREEN: ICD-10-CM

## 2022-03-17 DIAGNOSIS — E78.2 MIXED HYPERLIPIDEMIA: ICD-10-CM

## 2022-03-17 DIAGNOSIS — R73.9 HYPERGLYCEMIA: ICD-10-CM

## 2022-03-17 PROCEDURE — 93000 ELECTROCARDIOGRAM COMPLETE: CPT

## 2022-03-17 PROCEDURE — 99214 OFFICE O/P EST MOD 30 MIN: CPT

## 2022-03-17 PROCEDURE — 1036F TOBACCO NON-USER: CPT

## 2022-03-17 PROCEDURE — 3008F BODY MASS INDEX DOCD: CPT

## 2022-03-17 NOTE — PROGRESS NOTES
Assessment/Plan:    No problem-specific Assessment & Plan notes found for this encounter  Vaccinations up-to-date  DEXA scan scheduled for 06/27/2022  Mammogram scheduled for 06/27/2022  A1c, lipid panel, TSH ordered to be done with other blood work ordered by Ciara  Patient instructed to call office or proceed to emergency department if she develops worsening symptoms such as chest pain, upper back pain, sob or if sensation continues  Follow-up in 6 months or sooner if needed  1  Anxiety  Stable  Continue sertraline 50 mg daily  She does not wish to decrease or discontinue medication at this time  Reports increased stress at work due to decreased staff recently  Denies adverse side effects  - sertraline (ZOLOFT) 50 mg tablet; Take 1 tablet (50 mg total) by mouth daily  Dispense: 90 tablet; Refill: 1    2  Hyperglycemia  Diagnosis resolved  Will recheck A1c with next blood work  - Hemoglobin A1C; Future    3  Mixed hyperlipidemia  Slight elevation in total cholesterol and LDL as of last blood work, however statin therapy was stopped due to resolution of diabetes and weight loss  Will continue to monitor   - Lipid panel; Future    4  Fluttering sensation of heart  - POCT ECG  Normal sinus rhythm, heart rate 57 BPM   Patient currently asymptomatic  Discussed symptoms to monitor with patient  5  Thyroid disorder screen  - TSH, 3rd generation with Free T4 reflex; Future      Subjective:      Patient ID: Geronimo Ortiz is a 64 y o  female  80-year-old female presents for 6 month follow-up  She has anxiety, currently taking sertraline 50 mg daily  Anxiety is well controlled, she wishes to stay on medication  She has a history of diabetes and hyperlipidemia, was previously taking medications however medications were discontinued after bariatric surgery and weight loss, improvement in blood work  Will continue to monitor  She follows with Geriatrics every 6 months with blood work    She reports intermittent fluttering sensation in her chest for the past 2-3 weeks which she contributes to anxiety, recently had a significant decrease in the amount of staff at work  Denies chest pain, shortness a breath, diaphoresis, headache, dizziness  She does not currently have the sensation  She had normal echocardiogram in 2018 and a normal stress test in 2020  She offers no other complaints or concerns today  The following portions of the patient's history were reviewed and updated as appropriate: allergies, current medications, past family history, past medical history, past surgical history and problem list     Review of Systems   Constitutional: Negative for activity change, appetite change, chills, fatigue and fever  HENT: Negative for ear pain, sinus pressure, sinus pain and sore throat  Eyes: Negative for pain and visual disturbance  Respiratory: Negative for cough, chest tightness, shortness of breath and wheezing  Cardiovascular: Negative for chest pain, palpitations and leg swelling  Fluttering sensation   Gastrointestinal: Negative for abdominal pain, constipation, diarrhea, nausea and vomiting  Genitourinary: Negative for dysuria, frequency, hematuria and urgency  Musculoskeletal: Negative for arthralgias, back pain and myalgias  Skin: Negative for color change and rash  Neurological: Negative for dizziness, seizures, syncope and headaches  Psychiatric/Behavioral: Negative for agitation, behavioral problems, confusion and suicidal ideas  All other systems reviewed and are negative  Objective:      /76 (BP Location: Left arm, Patient Position: Sitting, Cuff Size: Adult)   Pulse 68   Temp 97 8 °F (36 6 °C)   Resp 16   Ht 5' 5 5" (1 664 m)   Wt 70 3 kg (155 lb)   BMI 25 40 kg/m²          Physical Exam  Vitals reviewed  Constitutional:       General: She is not in acute distress  Appearance: Normal appearance  She is not ill-appearing  HENT:      Head: Normocephalic and atraumatic  Nose: Nose normal       Mouth/Throat:      Mouth: Mucous membranes are moist       Pharynx: No oropharyngeal exudate or posterior oropharyngeal erythema  Eyes:      General:         Right eye: No discharge  Left eye: No discharge  Extraocular Movements: Extraocular movements intact  Conjunctiva/sclera: Conjunctivae normal       Pupils: Pupils are equal, round, and reactive to light  Cardiovascular:      Rate and Rhythm: Normal rate and regular rhythm  Pulses: Normal pulses  Heart sounds: Normal heart sounds  No murmur heard  Pulmonary:      Effort: Pulmonary effort is normal  No respiratory distress  Breath sounds: Normal breath sounds  No stridor  No wheezing or rhonchi  Abdominal:      General: Abdomen is flat  Bowel sounds are normal  There is no distension  Palpations: Abdomen is soft  Tenderness: There is no abdominal tenderness  There is no guarding  Musculoskeletal:         General: Normal range of motion  Cervical back: Normal range of motion and neck supple  Skin:     General: Skin is warm and dry  Capillary Refill: Capillary refill takes less than 2 seconds  Coloration: Skin is not jaundiced or pale  Neurological:      General: No focal deficit present  Mental Status: She is alert and oriented to person, place, and time  Mental status is at baseline  Motor: No weakness  Gait: Gait normal    Psychiatric:         Mood and Affect: Mood normal          Behavior: Behavior normal          Thought Content:  Thought content normal                     Avelino Barajas

## 2022-03-17 NOTE — PATIENT INSTRUCTIONS

## 2022-04-06 NOTE — PROGRESS NOTES
Patient: Kylah Britt    Procedure: Procedure(s):  COLONOSCOPY, WITH POLYPECTOMY       Diagnosis: Screen for colon cancer [Z12.11]  Diagnosis Additional Information: No value filed.    Anesthesia Type:   MAC     Note:    Oropharynx: oropharynx clear of all foreign objects and spontaneously breathing  Level of Consciousness: drowsy  Oxygen Supplementation: face mask    Independent Airway: airway patency satisfactory and stable  Dentition: dentition unchanged  Vital Signs Stable: post-procedure vital signs reviewed and stable  Report to RN Given: handoff report given  Patient transferred to: Phase II    Handoff Report: Identifed the Patient, Identified the Reponsible Provider, Reviewed the pertinent medical history, Discussed the surgical course, Reviewed Intra-OP anesthesia mangement and issues during anesthesia, Set expectations for post-procedure period and Allowed opportunity for questions and acknowledgement of understanding      Vitals:  Vitals Value Taken Time   BP     Temp     Pulse     Resp     SpO2         Electronically Signed By: ESPERANZA East CRNA  April 6, 2022  10:09 AM   Daily Note     Today's date: 2018  Patient name: Yanna Soliz  : 1960  MRN: [id number]  Referring provider: Aaron Bassett PA-C  Dx: No diagnosis found  Subjective: "I triggered when I was in speech and PT made me a little dizzy"  Objective: See treatment description below      Assessment: Tolerated treatment fair  Patient would benefit from continued OT     HA 3/10 upon arrival after Speech and PT  Multi-matrix beginning functional task with sustained attention to one card and then advancing functional task to alternating attention between two cards with visual closure and figure ground-- Pt required visual anchor to improve working memory and for holding spot on card(s)  Pt with self-report of increased HA 5/10, nausea 4/10, and visual blurriness  Pt required x3 rest breaks for symptom reduction  Pt demo with G abilities to identify shapes with both visual closure and figure ground  Luetta Shadow embedded word search with focus on near far saccadic movements from table to elevated wedge-- activity printed on blue paper for improved HA management  Pt with self-report of increased HA 5/10, nausea 4/10, and visual blurriness  Pt with increased time required to identify words with visual clutter with hypersensitivity to near/far saccadic movements  Pt required lights to be dimmed and sunglasses applied within 11 minutes into tx session  OTR educated Pt on the importance of slowly progressing the abilities to tolerate a normal lighted environment for improved tolerance  Pt able to remove sunglasses and tolerated treatment without sunglasses in dimmed light x 35 minutes  Plan: Continue per plan of care         INTERVENTION COMMENTS:  Diagnosis: SAH (subarachnoid hemorrhage) (HCC) [I60 9]  Precautions: R rib fx  FOTO:  3 of 8 visits, PN due

## 2022-05-03 ENCOUNTER — TELEPHONE (OUTPATIENT)
Dept: ADMINISTRATIVE | Facility: OTHER | Age: 62
End: 2022-05-03

## 2022-05-03 NOTE — TELEPHONE ENCOUNTER
Please submit an IT ticket to review this Lumen's issue further  Your request will now be closed  Contact the [email or web address] with any further questions  Thank you  Any additional questions or concerns should be emailed to the Practice Liaisons via [email or web address] email, please do not reply via In Basket      Thank you  Ana Maria Pace MA

## 2022-05-03 NOTE — TELEPHONE ENCOUNTER
----- Message from Lulu Fernandes sent at 5/2/2022 11:45 AM EDT -----  Regarding: care gap request  05/02/22 11:45 AM    Hello, our patient attached above has had CRC: Colonoscopy completed/performed  Please assist in updating the patient chart by pulling the document from the Media Tab  The date of service is 2017       Thank you,  Magalys Herman MA  [facility]

## 2022-06-27 ENCOUNTER — HOSPITAL ENCOUNTER (OUTPATIENT)
Dept: RADIOLOGY | Facility: MEDICAL CENTER | Age: 62
Discharge: HOME | End: 2022-06-27
Payer: COMMERCIAL

## 2022-06-27 VITALS — WEIGHT: 153 LBS | BODY MASS INDEX: 24.59 KG/M2 | HEIGHT: 66 IN

## 2022-06-27 DIAGNOSIS — Z13.820 OSTEOPOROSIS SCREENING: ICD-10-CM

## 2022-06-27 DIAGNOSIS — Z12.31 ENCOUNTER FOR SCREENING MAMMOGRAM FOR MALIGNANT NEOPLASM OF BREAST: ICD-10-CM

## 2022-06-27 PROCEDURE — 77067 SCR MAMMO BI INCL CAD: CPT

## 2022-06-27 PROCEDURE — 77063 BREAST TOMOSYNTHESIS BI: CPT

## 2022-08-23 ENCOUNTER — OFFICE VISIT (OUTPATIENT)
Dept: BARIATRICS | Facility: CLINIC | Age: 62
End: 2022-08-23
Payer: COMMERCIAL

## 2022-08-23 VITALS
HEIGHT: 66 IN | WEIGHT: 160 LBS | TEMPERATURE: 96.6 F | HEART RATE: 64 BPM | BODY MASS INDEX: 25.71 KG/M2 | OXYGEN SATURATION: 98 % | DIASTOLIC BLOOD PRESSURE: 90 MMHG | SYSTOLIC BLOOD PRESSURE: 140 MMHG

## 2022-08-23 DIAGNOSIS — Z98.84 BARIATRIC SURGERY STATUS: ICD-10-CM

## 2022-08-23 DIAGNOSIS — Z48.815 ENCOUNTER FOR SURGICAL AFTERCARE FOLLOWING SURGERY OF DIGESTIVE SYSTEM: Primary | ICD-10-CM

## 2022-08-23 DIAGNOSIS — K91.2 POSTSURGICAL MALABSORPTION: ICD-10-CM

## 2022-08-23 DIAGNOSIS — E66.3 OVERWEIGHT: ICD-10-CM

## 2022-08-23 DIAGNOSIS — L40.9 PSORIASIS: ICD-10-CM

## 2022-08-23 PROCEDURE — 99213 OFFICE O/P EST LOW 20 MIN: CPT | Performed by: PHYSICIAN ASSISTANT

## 2022-08-23 RX ORDER — SECUKINUMAB 150 MG/ML
INJECTION SUBCUTANEOUS
COMMUNITY
Start: 2022-08-20

## 2022-08-23 NOTE — PROGRESS NOTES
Assessment/Plan:     Patient ID: Ivan Jackson is a 64 y o  female  Bariatric Surgery Status    -s/p Vertical Russella Ashli Sanchez 6/15/2020  Presents to the office today for annual     Due for screening EGD next year     Psoriasis   - on cosentyx        · Continued/Maintain healthy weight loss with good nutrition intakes  · Adequate hydration with at least 64oz  fluid intake  · Follow diet as discussed  · Follow vitamin and mineral recommendations as reviewed with you  · Exercise as tolerated  · Colonoscopy referral made: has appt with pcp in 2 weeks will address   · Mammogram: utd    · Follow-up in 1 year  We kindly ask that your arrive 15 minutes before your scheduled appointment time with your provider to allow our staff to room you, get your vital signs and update your chart  · Get lab work done  Please call the office if you need a script  It is recommended to check with your insurance BEFORE getting labs done to make sure they are covered by your policy  · Call our office if you have any problems with abdominal pain especially associated with fever, chills, nausea, vomiting or any other concerns  · All  Post-bariatric surgery patients should be aware that very small quantities of any alcohol can cause impairment and it is very possible not to feel the effect  The effect can be in the system for several hours  It is also a stomach irritant  · It is advised to AVOID alcohol, Nonsteroidal antiinflammatory drugs (NSAIDS) and nicotine of all forms   Any of these can cause stomach irritation/pain  · Discussed the effects of alcohol on a bariatric patient and the increased impairment risk  · Keep up the good work!      Postsurgical Malabsorption   -At risk for malabsorption of vitamins/minerals secondary to malabsorption and restriction of intake from bariatric surgery  -Currently taking adequate postop bariatric surgery vitamin supplementation  -Last set of bariatric labs completed 8/2021  -Next set of bariatric labs ordered for approximately 2 weeks  -Patient received education about the importance of adhering to a lifelong supplementation regimen to avoid vitamin/mineral deficiencies      Diagnoses and all orders for this visit:    Encounter for surgical aftercare following surgery of digestive system  -     Zinc; Future  -     Vitamin D 25 hydroxy; Future  -     Vitamin B12; Future  -     Vitamin B1, whole blood; Future  -     Vitamin A; Future  -     PTH, intact; Future  -     CBC and Platelet; Future  -     Comprehensive metabolic panel; Future  -     Ferritin; Future  -     Folate; Future  -     Iron Saturation %; Future    Bariatric surgery status  -     Zinc; Future  -     Vitamin D 25 hydroxy; Future  -     Vitamin B12; Future  -     Vitamin B1, whole blood; Future  -     Vitamin A; Future  -     PTH, intact; Future  -     CBC and Platelet; Future  -     Comprehensive metabolic panel; Future  -     Ferritin; Future  -     Folate; Future  -     Iron Saturation %; Future    Psoriasis  -     Zinc; Future  -     Vitamin D 25 hydroxy; Future  -     Vitamin B12; Future  -     Vitamin B1, whole blood; Future  -     Vitamin A; Future  -     PTH, intact; Future  -     CBC and Platelet; Future  -     Comprehensive metabolic panel; Future  -     Ferritin; Future  -     Folate; Future  -     Iron Saturation %; Future    Postsurgical malabsorption  -     Zinc; Future  -     Vitamin D 25 hydroxy; Future  -     Vitamin B12; Future  -     Vitamin B1, whole blood; Future  -     Vitamin A; Future  -     PTH, intact; Future  -     CBC and Platelet; Future  -     Comprehensive metabolic panel; Future  -     Ferritin; Future  -     Folate; Future  -     Iron Saturation %; Future    Overweight  -     Zinc; Future  -     Vitamin D 25 hydroxy; Future  -     Vitamin B12; Future  -     Vitamin B1, whole blood; Future  -     Vitamin A; Future  -     PTH, intact;  Future  -     CBC and Platelet; Future  -     Comprehensive metabolic panel; Future  -     Ferritin; Future  -     Folate; Future  -     Iron Saturation %; Future    Other orders  -     Cosentyx Sensoready, 300 MG, 150 MG/ML SOAJ injection         Subjective:      Patient ID: Neeta Manning is a 64 y o  female     -s/p Vertical Chestine Saliva Dr Kang Canales 6/15/2020  Presents to the office today for annual     Initial:225  Current:160  EWL: (Weight loss is ahead of schedule at this post surgical period )  [place]: 145  Current BMI is Body mass index is 26 22 kg/m²  · Tolerating a regular diet-yes  · Eating at least 60 grams of protein per day-yes  · Following 30/60 minute rule with liquids-yes  · Drinking at least 64 ounces of fluid per day-yes  · Drinking carbonated beverages-no  · Sufficient exercise-light; stays active   · Using NSAIDs regularly-no  · Using nicotine-no  · Using alcohol-occasional   · Supplements: kaycee advantage mvi with iron + calcium with vit d     · EWL is 88%, which places the patient ahead of schedule for expected post surgical weight loss at this time  The following portions of the patient's history were reviewed and updated as appropriate: allergies, current medications, past family history, past medical history, past social history, past surgical history and problem list     Review of Systems   Constitutional: Negative  Respiratory: Negative  Cardiovascular: Negative  Gastrointestinal: Negative for abdominal pain, blood in stool, constipation, diarrhea, nausea and vomiting  Musculoskeletal: Negative  Skin: Negative  Neurological: Negative  Psychiatric/Behavioral: Negative  Objective:    /90 (BP Location: Left arm, Patient Position: Sitting, Cuff Size: Large)   Pulse 64   Temp (!) 96 6 °F (35 9 °C) (Tympanic)   Ht 5' 5 5" (1 664 m)   Wt 72 6 kg (160 lb)   SpO2 98%   BMI 26 22 kg/m²      Physical Exam  Vitals and nursing note reviewed     Constitutional: Appearance: Normal appearance  HENT:      Head: Normocephalic and atraumatic  Eyes:      Extraocular Movements: Extraocular movements intact  Pupils: Pupils are equal, round, and reactive to light  Cardiovascular:      Rate and Rhythm: Normal rate and regular rhythm  Pulmonary:      Effort: Pulmonary effort is normal       Breath sounds: Normal breath sounds  Abdominal:      General: Bowel sounds are normal       Tenderness: There is no abdominal tenderness  Musculoskeletal:         General: Normal range of motion  Cervical back: Normal range of motion  Skin:     General: Skin is warm and dry  Neurological:      General: No focal deficit present  Mental Status: She is alert and oriented to person, place, and time     Psychiatric:         Mood and Affect: Mood normal

## 2022-08-31 ENCOUNTER — TELEPHONE (OUTPATIENT)
Dept: FAMILY MEDICINE CLINIC | Facility: MEDICAL CENTER | Age: 62
End: 2022-08-31

## 2022-08-31 DIAGNOSIS — U07.1 COVID-19: Primary | ICD-10-CM

## 2022-08-31 RX ORDER — NIRMATRELVIR AND RITONAVIR 300-100 MG
3 KIT ORAL 2 TIMES DAILY
Qty: 30 TABLET | Refills: 0 | Status: SHIPPED | OUTPATIENT
Start: 2022-08-31 | End: 2022-09-05

## 2022-08-31 NOTE — TELEPHONE ENCOUNTER
Triaged- has a fever 101  On Cosentyx  Asking  For anti -viral medication  Today is day 4  Please advise  VV?

## 2022-08-31 NOTE — TELEPHONE ENCOUNTER
Please call patient and inform her I sent in Paxlovid to her pharmacy  Take as directed  Please inform her about vitamin-D, vitamin-C, zinc supplements  Advised to rest, stay well hydrated     Take Tylenol and/or ibuprofen as needed for pain and fever control  Also may take otc medications for symptom control

## 2022-08-31 NOTE — TELEPHONE ENCOUNTER
Pt did a Covid test last night and it was positive She started with symptoms on Sat- h/a, dizziness, cough, fever  Please, advise pt

## 2022-09-09 ENCOUNTER — HOSPITAL ENCOUNTER (OUTPATIENT)
Dept: BONE DENSITY | Facility: CLINIC | Age: 62
Discharge: HOME | End: 2022-09-09
Payer: COMMERCIAL

## 2022-09-09 DIAGNOSIS — F41.9 ANXIETY: ICD-10-CM

## 2022-09-09 PROCEDURE — 77080 DXA BONE DENSITY AXIAL: CPT

## 2022-09-20 ENCOUNTER — OFFICE VISIT (OUTPATIENT)
Dept: FAMILY MEDICINE CLINIC | Facility: MEDICAL CENTER | Age: 62
End: 2022-09-20
Payer: COMMERCIAL

## 2022-09-20 VITALS
HEART RATE: 70 BPM | HEIGHT: 66 IN | WEIGHT: 160 LBS | BODY MASS INDEX: 25.71 KG/M2 | DIASTOLIC BLOOD PRESSURE: 82 MMHG | SYSTOLIC BLOOD PRESSURE: 140 MMHG | TEMPERATURE: 98.2 F

## 2022-09-20 DIAGNOSIS — E78.2 MIXED HYPERLIPIDEMIA: ICD-10-CM

## 2022-09-20 DIAGNOSIS — F41.9 ANXIETY: Primary | ICD-10-CM

## 2022-09-20 DIAGNOSIS — Z23 ENCOUNTER FOR IMMUNIZATION: ICD-10-CM

## 2022-09-20 PROCEDURE — 90682 RIV4 VACC RECOMBINANT DNA IM: CPT

## 2022-09-20 PROCEDURE — 99214 OFFICE O/P EST MOD 30 MIN: CPT

## 2022-09-20 PROCEDURE — 90677 PCV20 VACCINE IM: CPT

## 2022-09-20 PROCEDURE — 90472 IMMUNIZATION ADMIN EACH ADD: CPT

## 2022-09-20 PROCEDURE — 90471 IMMUNIZATION ADMIN: CPT

## 2022-09-20 NOTE — PROGRESS NOTES
Assessment/Plan:    Advised patient to monitor home blood pressure readings due to elevated reading today in office which she attributes to stress with work and rushing to her appointment today  She will send readings in over the next 2 weeks  Advised patient to have fasting labs done per previous orders  Follow-up in 6 months or sooner if needed  Continue healthy diet and exercise  Pneumococcal and influenza vaccinations updated today  1  Anxiety  Stable  Continue sertraline  - sertraline (ZOLOFT) 50 mg tablet; Take 1 tablet (50 mg total) by mouth daily  Dispense: 90 tablet; Refill: 1    2  Mixed hyperlipidemia  Previous statin therapy discontinued due to resolution of diabetes and weight loss  Advised patient to have lipid panel drawn per previous orders  3  Encounter for immunization  Pneumococcal and influenza vaccinations updated today, tolerated well  - Pneumococcal Conjugate Vaccine 20-valent (PCV20)  - influenza vaccine, quadrivalent, recombinant, PF, 0 5 mL, for patients 18 yr+ (FLUBLOK)      Subjective:      Patient ID: Alayna Freire is a 64 y o  female  66-year-old female presents for 6 month follow-up  She is doing well overall  She has anxiety  Currently on sertraline  Tolerating medication well  She would like to remain on current dose  Reports stress and anxiety is unchanged due to work and staffing issues that are unchanged however she is managing it well  She has hyperlipidemia  She was previously on statin therapy however this was discontinued due to weight loss and resolution of diabetes mellitus  Labs ordered at last office visit have not been done as of yet however patient tells me she plans on having labs done in addition to labs ordered by bariatric  Patient continues to follow regularly with bariatrics  She recently had covid and has recovered well         The following portions of the patient's history were reviewed and updated as appropriate: allergies, current medications, past family history, past medical history, past surgical history and problem list     Review of Systems   Constitutional: Negative  HENT: Negative  Eyes: Negative  Respiratory: Negative  Cardiovascular: Negative  Gastrointestinal: Negative  Endocrine: Negative  Genitourinary: Negative  Musculoskeletal: Negative  Skin: Negative  Allergic/Immunologic: Negative  Neurological: Negative  Hematological: Negative  Psychiatric/Behavioral: Negative  Objective:      /82 (BP Location: Left arm, Patient Position: Sitting, Cuff Size: Adult)   Pulse 70   Temp 98 2 °F (36 8 °C)   Ht 5' 5 5" (1 664 m)   Wt 72 6 kg (160 lb)   BMI 26 22 kg/m²          Physical Exam  Vitals and nursing note reviewed  Constitutional:       General: She is not in acute distress  Appearance: Normal appearance  She is not ill-appearing  HENT:      Head: Normocephalic and atraumatic  Eyes:      Conjunctiva/sclera: Conjunctivae normal       Pupils: Pupils are equal, round, and reactive to light  Cardiovascular:      Rate and Rhythm: Normal rate and regular rhythm  Pulses: Normal pulses  Heart sounds: Normal heart sounds  No murmur heard  Pulmonary:      Effort: Pulmonary effort is normal  No respiratory distress  Breath sounds: Normal breath sounds  No stridor  No wheezing, rhonchi or rales  Musculoskeletal:         General: Normal range of motion  Cervical back: Normal range of motion and neck supple  Skin:     General: Skin is warm and dry  Neurological:      General: No focal deficit present  Mental Status: She is alert and oriented to person, place, and time  Mental status is at baseline  Psychiatric:         Mood and Affect: Mood normal          Behavior: Behavior normal          Thought Content: Thought content normal              Depression Screening and Follow-up Plan: Patient was screened for depression during today's encounter  They screened negative with a PHQ-2 score of 0              JEFERSON Villarreal

## 2022-09-24 ENCOUNTER — APPOINTMENT (EMERGENCY)
Dept: CT IMAGING | Facility: HOSPITAL | Age: 62
End: 2022-09-24
Payer: COMMERCIAL

## 2022-09-24 ENCOUNTER — APPOINTMENT (OUTPATIENT)
Dept: RADIOLOGY | Facility: HOSPITAL | Age: 62
End: 2022-09-24
Payer: COMMERCIAL

## 2022-09-24 ENCOUNTER — HOSPITAL ENCOUNTER (EMERGENCY)
Facility: HOSPITAL | Age: 62
Discharge: HOME | End: 2022-09-24
Attending: EMERGENCY MEDICINE
Payer: COMMERCIAL

## 2022-09-24 VITALS
TEMPERATURE: 98.4 F | RESPIRATION RATE: 13 BRPM | HEART RATE: 61 BPM | OXYGEN SATURATION: 98 % | SYSTOLIC BLOOD PRESSURE: 153 MMHG | DIASTOLIC BLOOD PRESSURE: 74 MMHG

## 2022-09-24 DIAGNOSIS — J01.90 ACUTE SINUSITIS: ICD-10-CM

## 2022-09-24 DIAGNOSIS — I16.0 HYPERTENSIVE URGENCY: Primary | ICD-10-CM

## 2022-09-24 LAB
2HR DELTA HS TROPONIN: 1 NG/L
ALBUMIN SERPL BCP-MCNC: 3.4 G/DL (ref 3.5–5)
ALP SERPL-CCNC: 98 U/L (ref 46–116)
ALT SERPL W P-5'-P-CCNC: 25 U/L (ref 12–78)
ANION GAP SERPL CALCULATED.3IONS-SCNC: 7 MMOL/L (ref 4–13)
AST SERPL W P-5'-P-CCNC: 12 U/L (ref 5–45)
ATRIAL RATE: 62 BPM
BASOPHILS # BLD AUTO: 0.05 THOUSANDS/ΜL (ref 0–0.1)
BASOPHILS NFR BLD AUTO: 1 % (ref 0–1)
BILIRUB SERPL-MCNC: 0.45 MG/DL (ref 0.2–1)
BUN SERPL-MCNC: 12 MG/DL (ref 5–25)
CALCIUM ALBUM COR SERPL-MCNC: 9.8 MG/DL (ref 8.3–10.1)
CALCIUM SERPL-MCNC: 9.3 MG/DL (ref 8.3–10.1)
CARDIAC TROPONIN I PNL SERPL HS: 2 NG/L
CARDIAC TROPONIN I PNL SERPL HS: 3 NG/L
CHLORIDE SERPL-SCNC: 103 MMOL/L (ref 96–108)
CO2 SERPL-SCNC: 30 MMOL/L (ref 21–32)
CREAT SERPL-MCNC: 0.67 MG/DL (ref 0.6–1.3)
EOSINOPHIL # BLD AUTO: 0.2 THOUSAND/ΜL (ref 0–0.61)
EOSINOPHIL NFR BLD AUTO: 4 % (ref 0–6)
ERYTHROCYTE [DISTWIDTH] IN BLOOD BY AUTOMATED COUNT: 12.5 % (ref 11.6–15.1)
GFR SERPL CREATININE-BSD FRML MDRD: 95 ML/MIN/1.73SQ M
GLUCOSE SERPL-MCNC: 105 MG/DL (ref 65–140)
HCT VFR BLD AUTO: 40.9 % (ref 34.8–46.1)
HGB BLD-MCNC: 13.1 G/DL (ref 11.5–15.4)
IMM GRANULOCYTES # BLD AUTO: 0.01 THOUSAND/UL (ref 0–0.2)
IMM GRANULOCYTES NFR BLD AUTO: 0 % (ref 0–2)
LYMPHOCYTES # BLD AUTO: 1.83 THOUSANDS/ΜL (ref 0.6–4.47)
LYMPHOCYTES NFR BLD AUTO: 37 % (ref 14–44)
MCH RBC QN AUTO: 29.4 PG (ref 26.8–34.3)
MCHC RBC AUTO-ENTMCNC: 32 G/DL (ref 31.4–37.4)
MCV RBC AUTO: 92 FL (ref 82–98)
MONOCYTES # BLD AUTO: 0.37 THOUSAND/ΜL (ref 0.17–1.22)
MONOCYTES NFR BLD AUTO: 7 % (ref 4–12)
NEUTROPHILS # BLD AUTO: 2.51 THOUSANDS/ΜL (ref 1.85–7.62)
NEUTS SEG NFR BLD AUTO: 51 % (ref 43–75)
NRBC BLD AUTO-RTO: 0 /100 WBCS
NT-PROBNP SERPL-MCNC: 308 PG/ML
P AXIS: 54 DEGREES
PLATELET # BLD AUTO: 255 THOUSANDS/UL (ref 149–390)
PMV BLD AUTO: 9.3 FL (ref 8.9–12.7)
POTASSIUM SERPL-SCNC: 4.1 MMOL/L (ref 3.5–5.3)
PR INTERVAL: 162 MS
PROT SERPL-MCNC: 8 G/DL (ref 6.4–8.4)
QRS AXIS: 77 DEGREES
QRSD INTERVAL: 70 MS
QT INTERVAL: 404 MS
QTC INTERVAL: 410 MS
RBC # BLD AUTO: 4.45 MILLION/UL (ref 3.81–5.12)
SODIUM SERPL-SCNC: 140 MMOL/L (ref 135–147)
T WAVE AXIS: 18 DEGREES
VENTRICULAR RATE: 62 BPM
WBC # BLD AUTO: 4.97 THOUSAND/UL (ref 4.31–10.16)

## 2022-09-24 PROCEDURE — 80053 COMPREHEN METABOLIC PANEL: CPT | Performed by: EMERGENCY MEDICINE

## 2022-09-24 PROCEDURE — 99284 EMERGENCY DEPT VISIT MOD MDM: CPT

## 2022-09-24 PROCEDURE — 96361 HYDRATE IV INFUSION ADD-ON: CPT

## 2022-09-24 PROCEDURE — 85025 COMPLETE CBC W/AUTO DIFF WBC: CPT | Performed by: EMERGENCY MEDICINE

## 2022-09-24 PROCEDURE — 93010 ELECTROCARDIOGRAM REPORT: CPT | Performed by: INTERNAL MEDICINE

## 2022-09-24 PROCEDURE — 71045 X-RAY EXAM CHEST 1 VIEW: CPT

## 2022-09-24 PROCEDURE — 70450 CT HEAD/BRAIN W/O DYE: CPT

## 2022-09-24 PROCEDURE — 36415 COLL VENOUS BLD VENIPUNCTURE: CPT | Performed by: EMERGENCY MEDICINE

## 2022-09-24 PROCEDURE — 99285 EMERGENCY DEPT VISIT HI MDM: CPT | Performed by: EMERGENCY MEDICINE

## 2022-09-24 PROCEDURE — 84484 ASSAY OF TROPONIN QUANT: CPT | Performed by: EMERGENCY MEDICINE

## 2022-09-24 PROCEDURE — 93005 ELECTROCARDIOGRAM TRACING: CPT

## 2022-09-24 PROCEDURE — 96374 THER/PROPH/DIAG INJ IV PUSH: CPT

## 2022-09-24 PROCEDURE — 96375 TX/PRO/DX INJ NEW DRUG ADDON: CPT

## 2022-09-24 PROCEDURE — 83880 ASSAY OF NATRIURETIC PEPTIDE: CPT | Performed by: EMERGENCY MEDICINE

## 2022-09-24 RX ORDER — HYDRALAZINE HYDROCHLORIDE 20 MG/ML
10 INJECTION INTRAMUSCULAR; INTRAVENOUS ONCE
Status: COMPLETED | OUTPATIENT
Start: 2022-09-24 | End: 2022-09-24

## 2022-09-24 RX ORDER — METOCLOPRAMIDE HYDROCHLORIDE 5 MG/ML
10 INJECTION INTRAMUSCULAR; INTRAVENOUS ONCE
Status: COMPLETED | OUTPATIENT
Start: 2022-09-24 | End: 2022-09-24

## 2022-09-24 RX ORDER — AMOXICILLIN AND CLAVULANATE POTASSIUM 875; 125 MG/1; MG/1
1 TABLET, FILM COATED ORAL ONCE
Status: COMPLETED | OUTPATIENT
Start: 2022-09-24 | End: 2022-09-24

## 2022-09-24 RX ORDER — AMOXICILLIN AND CLAVULANATE POTASSIUM 875; 125 MG/1; MG/1
1 TABLET, FILM COATED ORAL EVERY 12 HOURS
Qty: 14 TABLET | Refills: 0 | Status: SHIPPED | OUTPATIENT
Start: 2022-09-24 | End: 2022-10-01

## 2022-09-24 RX ORDER — BUTALBITAL, ACETAMINOPHEN AND CAFFEINE 50; 325; 40 MG/1; MG/1; MG/1
1 TABLET ORAL EVERY 4 HOURS PRN
Qty: 20 TABLET | Refills: 0 | Status: SHIPPED | OUTPATIENT
Start: 2022-09-24 | End: 2022-10-04

## 2022-09-24 RX ORDER — KETOROLAC TROMETHAMINE 30 MG/ML
15 INJECTION, SOLUTION INTRAMUSCULAR; INTRAVENOUS ONCE
Status: COMPLETED | OUTPATIENT
Start: 2022-09-24 | End: 2022-09-24

## 2022-09-24 RX ORDER — DIPHENHYDRAMINE HYDROCHLORIDE 50 MG/ML
12.5 INJECTION INTRAMUSCULAR; INTRAVENOUS ONCE
Status: COMPLETED | OUTPATIENT
Start: 2022-09-24 | End: 2022-09-24

## 2022-09-24 RX ADMIN — DIPHENHYDRAMINE HYDROCHLORIDE 12.5 MG: 50 INJECTION, SOLUTION INTRAMUSCULAR; INTRAVENOUS at 11:44

## 2022-09-24 RX ADMIN — SODIUM CHLORIDE 1000 ML: 0.9 INJECTION, SOLUTION INTRAVENOUS at 08:46

## 2022-09-24 RX ADMIN — HYDRALAZINE HYDROCHLORIDE 10 MG: 20 INJECTION INTRAMUSCULAR; INTRAVENOUS at 09:55

## 2022-09-24 RX ADMIN — KETOROLAC TROMETHAMINE 15 MG: 30 INJECTION, SOLUTION INTRAMUSCULAR at 11:43

## 2022-09-24 RX ADMIN — METOCLOPRAMIDE 10 MG: 5 INJECTION, SOLUTION INTRAMUSCULAR; INTRAVENOUS at 11:44

## 2022-09-24 RX ADMIN — AMOXICILLIN AND CLAVULANATE POTASSIUM 1 TABLET: 875; 125 TABLET, FILM COATED ORAL at 11:42

## 2022-09-24 NOTE — ED NOTES
Discharged reviewed with pt  Pt verbalized understanding and has no further questions at this time  Pt ambulatory off unit with steady gait       Berta Davenport RN  09/24/22 [id number]

## 2022-09-26 ENCOUNTER — TELEPHONE (OUTPATIENT)
Dept: FAMILY MEDICINE CLINIC | Facility: MEDICAL CENTER | Age: 62
End: 2022-09-26

## 2022-09-26 NOTE — TELEPHONE ENCOUNTER
Pt was in the ER this weekend  She said she had spoken to Cameron Henry previously about possibly seeing a cardiologist  Does she need to make an ER fup here or just get referral to cardio  Pt said to leave a message and let her know

## 2022-09-26 NOTE — TELEPHONE ENCOUNTER
She will need an appt here first, she doesn't have any diagnosis of hypertension on her chart and is not on medication for it  Should be seen here first to determine if cardiology is needed or if management can be done here

## 2022-09-29 NOTE — ED PROVIDER NOTES
History  Chief Complaint   Patient presents with    Hypertension     Systolic in the 182'M and 190's throughout the night and has a headache and pain behind the right eye      HPI    Prior to Admission Medications   Prescriptions Last Dose Informant Patient Reported? Taking? Cosentyx Sensoready, 300 MG, 150 MG/ML SOAJ injection   Yes No   Multiple Vitamins-Minerals (multivitamin with minerals) tablet  Self Yes No   Sig: Take 1 tablet by mouth daily   calcium carbonate (OS-YONG) 600 MG tablet   Yes No   Sig: Take 600 mg by mouth 2 (two) times a day with meals   sertraline (ZOLOFT) 50 mg tablet   No No   Sig: Take 1 tablet (50 mg total) by mouth daily      Facility-Administered Medications: None       Past Medical History:   Diagnosis Date    Anxiety     Arthritis     Bariatric surgery status     Closed head injury 06/11/2018 2018    Colon polyp     Concussion with loss of consciousness 06/22/2018 2018    Depression     Diabetes mellitus ([facility] )     NIDDM    Diarrhea     chronic    Fatty liver     Fecal incontinence     GERD (gastroesophageal reflux disease)     Head injury     6/10/11    Hiatal hernia     Hyperlipidemia     Hypertension     Lifelong obesity     Mild TBI ([facility] ) 6/11/2018    Postsurgical malabsorption     Psoriasis     legs    SAH (subarachnoid hemorrhage) ([facility] ) 06/11/2018    2018    Tear of right supraspinatus tendon 12/16/2019    Type 2 diabetes mellitus with microalbuminuria, without long-term current use of insulin ([facility] ) 7/5/2018    Wears glasses        Past Surgical History:   Procedure Laterality Date    CHOLECYSTECTOMY      COLONOSCOPY N/A 10/24/2017    Procedure: COLONOSCOPY;  Surgeon: Stuart Bautista MD;  [facility]: [facility]; Service: Colorectal    COLONOSCOPY W/ ENDOSCOPIC US N/A 10/24/2017    Procedure: ANAL ENDOSCOPIC U/S;  Surgeon: Stuart Bautista MD;  [facility];   Service: Colorectal    DILATION AND CURETTAGE OF UTERUS      EGD      ENDOMETRIAL BIOPSY      WITHOUT CERVICAL DILATION    HYSTERECTOMY      NASAL SEPTUM SURGERY      OTHER SURGICAL HISTORY      Episiotomy repair    KY LAP, LYDIA RESTRICT PROC, LONGITUDINAL GASTRECTOMY N/A 2020    Procedure: GASTRECTOMY SLEEVE LAPAROSCOPIC AND INTRAOPERATIVE EGD ;  Surgeon: Esthela Greer MD;  Location: [facility];  Service: Bariatrics    KY SHLDR ARTHROSCOP,SURG,W/ROTAT CUFF REPR Right 1/15/2020    Procedure: SHOULDER ARTHROSCOPIC DEBRIDEMENT OF PARTIAL THICKNESS ROTATOR CUFF TEAR, SAD;  Surgeon: Harlene Kanner, MD;  Location: [facility];  Service: Orthopedics    ROTATOR CUFF REPAIR Left        Family History   Problem Relation Age of Onset    Lung cancer Father     Heart disease Father     Diabetes Father     Other Family         ADENOCARCINOMA IN SIOBHAN IN VILLOUS ADENOMA OF THE BREAST, MIGRAINES, SKIN DISORDER    Depression Family     Anxiety disorder Family     Diabetes Family         MELLITUS    Fibrocystic breast disease Family     Heart disease Family     Hiatal hernia Family     Hypertension Family     Irritable bowel syndrome Family     Kidney disease Family     Urinary tract infection Family     Atrial fibrillation Mother     Hypertension Mother     Obesity Brother     No Known Problems Brother     Breast cancer Maternal Grandmother 48    Cancer Paternal Grandfather     Breast cancer Family     No Known Problems Son     No Known Problems Son     Lung cancer Maternal Aunt     No Known Problems Maternal Aunt     No Known Problems Maternal Aunt     No Known Problems Maternal Aunt      I have reviewed and agree with the history as documented      E-Cigarette/Vaping    E-Cigarette Use Never User      E-Cigarette/Vaping Substances    Nicotine No     THC No     CBD No     Flavoring No     Other No     Unknown No      Social History     Tobacco Use    Smoking status: Former Smoker     Quit date:      Years since quittin 7    Smokeless tobacco: Never Used  Tobacco comment: Smoked for 10yrs     Vaping Use    Vaping Use: Never used   Substance Use Topics    Alcohol use: Yes     Comment: occasional (wine)    Drug use: No       Review of Systems    Physical Exam  Physical Exam    Vital Signs  ED Triage Vitals   Temperature Pulse Respirations Blood Pressure SpO2   09/24/22 0808 09/24/22 0808 09/24/22 0808 09/24/22 0808 09/24/22 0808   98 4 °F (36 9 °C) 62 18 (!) 188/91 98 %      Temp Source Heart Rate Source Patient Position - Orthostatic VS BP Location FiO2 (%)   09/24/22 0808 09/24/22 0830 09/24/22 0808 09/24/22 0808 --   Oral Monitor Sitting Left arm       Pain Score       09/24/22 1130       7           Vitals:    09/24/22 1112 09/24/22 1130 09/24/22 1233 09/24/22 1330   BP: 157/73 (!) 177/81 138/70 153/74   Pulse: 70 70 65 61   Patient Position - Orthostatic VS: Lying            Visual Acuity  Visual Acuity    Flowsheet Row Most Recent Value   L Pupil Size (mm) 4   R Pupil Size (mm) 4          ED Medications  Medications   sodium chloride 0 9 % bolus 1,000 mL (0 mL Intravenous Stopped 9/24/22 0954)   hydrALAZINE (APRESOLINE) injection 10 mg (10 mg Intravenous Given 9/24/22 0955)   amoxicillin-clavulanate (AUGMENTIN) 875-125 mg per tablet 1 tablet (1 tablet Oral Given 9/24/22 1142)   ketorolac (TORADOL) injection 15 mg (15 mg Intravenous Given 9/24/22 1143)   metoclopramide (REGLAN) injection 10 mg (10 mg Intravenous Given 9/24/22 1144)   diphenhydrAMINE (BENADRYL) injection 12 5 mg (12 5 mg Intravenous Given 9/24/22 1144)       Diagnostic Studies  Results Reviewed     Procedure Component Value Units Date/Time    HS Troponin I 2hr [[id number]]  (Normal) Collected: 09/24/22 1105    Lab Status: Final result Specimen: Blood from Arm, Left Updated: 09/24/22 1142     hs TnI 2hr 3 ng/L      Delta 2hr hsTnI 1 ng/L     HS Troponin 0hr (reflex protocol) [[id number]]  (Normal) Collected: 09/24/22 0846    Lab Status: Final result Specimen: Blood from Arm, Right Updated: 09/24/22 0920     hs TnI 0hr 2 ng/L     NT-BNP PRO [[id number]]  (Abnormal) Collected: 09/24/22 0846    Lab Status: Final result Specimen: Blood from Arm, Right Updated: 09/24/22 0919     NT-proBNP 308 pg/mL     Comprehensive metabolic panel [[id number]]  (Abnormal) Collected: 09/24/22 0846    Lab Status: Final result Specimen: Blood from Arm, Right Updated: 09/24/22 0915     Sodium 140 mmol/L      Potassium 4 1 mmol/L      Chloride 103 mmol/L      CO2 30 mmol/L      ANION GAP 7 mmol/L      BUN 12 mg/dL      Creatinine 0 67 mg/dL      Glucose 105 mg/dL      Calcium 9 3 mg/dL      Corrected Calcium 9 8 mg/dL      AST 12 U/L      ALT 25 U/L      Alkaline Phosphatase 98 U/L      Total Protein 8 0 g/dL      Albumin 3 4 g/dL      Total Bilirubin 0 45 mg/dL      eGFR 95 ml/min/1 73sq m     Narrative:      National Kidney Disease Foundation guidelines for Chronic Kidney Disease (CKD):     Stage 1 with normal or high GFR (GFR > 90 mL/min/1 73 square meters)    Stage 2 Mild CKD (GFR = 60-89 mL/min/1 73 square meters)    Stage 3A Moderate CKD (GFR = 45-59 mL/min/1 73 square meters)    Stage 3B Moderate CKD (GFR = 30-44 mL/min/1 73 square meters)    Stage 4 Severe CKD (GFR = 15-29 mL/min/1 73 square meters)    Stage 5 End Stage CKD (GFR <15 mL/min/1 73 square meters)  Note: GFR calculation is accurate only with a steady state creatinine    CBC and differential [[id number]] Collected: 09/24/22 0846    Lab Status: Final result Specimen: Blood from Arm, Right Updated: 09/24/22 0856     WBC 4 97 Thousand/uL      RBC 4 45 Million/uL      Hemoglobin 13 1 g/dL      Hematocrit 40 9 %      MCV 92 fL      MCH 29 4 pg      MCHC 32 0 g/dL      RDW 12 5 %      MPV 9 3 fL      Platelets 050 Thousands/uL      nRBC 0 /100 WBCs      Neutrophils Relative 51 %      Immat GRANS % 0 %      Lymphocytes Relative 37 %      Monocytes Relative 7 %      Eosinophils Relative 4 %      Basophils Relative 1 %      Neutrophils Absolute 2 51 Thousands/µL Immature Grans Absolute 0 01 Thousand/uL      Lymphocytes Absolute 1 83 Thousands/µL      Monocytes Absolute 0 37 Thousand/µL      Eosinophils Absolute 0 20 Thousand/µL      Basophils Absolute 0 05 Thousands/µL                  CT head without contrast   ED Interpretation by Tyson Nielsen DO (09/24 1053)   No acute intracranial abnormality        Intracranial atherosclerotic disease        Small fluid level in the left sphenoid sinus, correlate for signs and symptoms of acute sinusitis  Final Result by Aristides Perez MD (09/24 0698)      No acute intracranial abnormality  Intracranial atherosclerotic disease  Small fluid level in the left sphenoid sinus, correlate for signs and symptoms of acute sinusitis  Workstation performed: [id number]         XR chest 1 view portable   ED Interpretation by Tyson Nielsen DO (09/24 1053)   Normal cxr      Final Result by Pastor Hernandez MD (09/24 8725)      No acute cardiopulmonary disease  Workstation performed: [id number]                    Procedures  Procedures         ED Course  ED Course as of 09/28/22 2126   Sat Sep 24, 2022   1054 Blood Pressure: 166/81   1119 Blood Pressure: 157/73                               SBIRT 20yo+    Flowsheet Row Most Recent Value   SBIRT (25 yo +)    In order to provide better care to our patients, we are screening all of our patients for alcohol and drug use  Would it be okay to ask you these screening questions?  No Filed at: 09/24/2022 4123                    MDM    Disposition  Final diagnoses:   Hypertensive urgency   Acute sinusitis     Time reflects when diagnosis was documented in both MDM as applicable and the Disposition within this note     Time User Action Codes Description Comment    9/24/2022  1:00 PM Amado Huitron Add [I16 0] Hypertensive urgency     9/24/2022  1:00 PM Rajani Huitron Add [J01 90] Acute sinusitis       ED Disposition ED Disposition   Discharge    Condition   Stable    Date/Time   Sat Sep 24, 2022 Emerald Thompson 85 discharge to home/self care  Follow-up Information     Follow up With Specialties Details Why Contact Info Additional [address] Usman Galindo Nurse Practitioner Schedule an appointment as soon as possible for a visit  For follow up to ensure improvement, and for further testing and treatment as needed [address]  [phone]  [facility] [address]  [facility]  If symptoms worsen [address] Emergency Department, [address] [place] [address], [address]          Discharge Medication List as of 9/24/2022  1:23 PM      START taking these medications    Details   amoxicillin-clavulanate (AUGMENTIN) 875-125 mg per tablet Take 1 tablet by mouth every 12 (twelve) hours for 7 days, Starting Sat 9/24/2022, Until Sat 10/1/2022, Normal      butalbital-acetaminophen-caffeine (FIORICET,ESGIC) -40 mg per tablet Take 1 tablet by mouth every 4 (four) hours as needed for headaches for up to 10 days, Starting Sat 9/24/2022, Until Tue 10/4/2022 at 2359, Normal         CONTINUE these medications which have NOT CHANGED    Details   calcium carbonate (OS-YONG) 600 MG tablet Take 600 mg by mouth 2 (two) times a day with meals, Historical Med      Cosentyx Sensoready, 300 MG, 150 MG/ML SOAJ injection Starting Sat 8/20/2022, Historical Med      Multiple Vitamins-Minerals (multivitamin with minerals) tablet Take 1 tablet by mouth daily, Historical Med      sertraline (ZOLOFT) 50 mg tablet Take 1 tablet (50 mg total) by mouth daily, Starting Tue 9/20/2022, Normal             No discharge procedures on file      PDMP Review     None          ED Provider  Electronically Signed by fluid level in the left sphenoid sinus, correlate for signs and symptoms of acute sinusitis  Final Result by Hali Brito MD ( [id number])      No acute intracranial abnormality  Intracranial atherosclerotic disease  Small fluid level in the left sphenoid sinus, correlate for signs and symptoms of acute sinusitis  Workstation performed: [id number]         XR chest 1 view portable   ED Interpretation by Vernon Del Cid DO ( 1053)   Normal cxr      Final Result by Arcadio Boast, MD ( 1437)      No acute cardiopulmonary disease  Workstation performed: [id number]                    Procedures  Procedures   EK/24, 11:18  Normal sinus rhythm with PVC  No ischemia, no STEMI  ED Course  ED Course as of 10/13/22 1556   Sat Sep 24, 2022   1054 Blood Pressure: 166/81   1119 Blood Pressure: 157/73                               SBIRT 20yo+    Flowsheet Row Most Recent Value   SBIRT (23 yo +)    In order to provide better care to our patients, we are screening all of our patients for alcohol and drug use  Would it be okay to ask you these screening questions? No Filed at: 2022 1888                    [facility]  Number of Diagnoses or Management Options  Acute sinusitis  Hypertensive urgency  Diagnosis management comments: CT Head is normal with regards no intracranial hemorrhage or signs intracranial abnormality  Concern for sinusitis likely causing her headache  Will treat for acute sinusitis with antibiotics  Cardiac work up is normal    Hypertension is improved here with medication  Will treat with Fioricet for headaches  Advised follow-up with primary care provider for follow-up from this visit, repeat evaluation regarding hypertension, and hypertensive medications as needed         Amount and/or Complexity of Data Reviewed  Clinical lab tests: ordered and reviewed  Tests in the radiology section of CPT®: ordered and reviewed        Disposition  Final diagnoses:   Hypertensive urgency   Acute sinusitis     Time reflects when diagnosis was documented in both MDM as applicable and the Disposition within this note     Time User Action Codes Description Comment    9/24/2022  1:00 PM Dudley Hiutron Add [I16 0] Hypertensive urgency     9/24/2022  1:00 PM Dudley Huitron Add [J01 90] Acute sinusitis       ED Disposition     ED Disposition   Discharge    Condition   Stable    Date/Time   Sat Sep 24, 2022  1:00 PM    Comment   Jyoti Hawley discharge to home/self care                 Follow-up Information     Follow up With Specialties Details Why Contact Info Additional [address] [place]  Nurse Practitioner Schedule an appointment as soon as possible for a visit  For follow up to ensure improvement, and for further testing and treatment as needed [phone]  [facility] [address]  [facility]  If symptoms worsen [address] Emergency Department, [address] [place] [address], [facility]          Discharge Medication List as of 9/24/2022  1:23 PM      START taking these medications    Details   amoxicillin-clavulanate (AUGMENTIN) 875-125 mg per tablet Take 1 tablet by mouth every 12 (twelve) hours for 7 days, Starting Sat 9/24/2022, Until Sat 10/1/2022, Normal      butalbital-acetaminophen-caffeine (FIORICET,ESGIC) -40 mg per tablet Take 1 tablet by mouth every 4 (four) hours as needed for headaches for up to 10 days, Starting Sat 9/24/2022, Until Tue 10/4/2022 at 2359, Normal         CONTINUE these medications which have NOT CHANGED    Details   calcium carbonate (OS-YONG) 600 MG tablet Take 600 mg by mouth 2 (two) times a day with meals, Historical Med      Cosentyx Sensoready, 300 MG, 150 MG/ML SOAJ injection Starting Sat 8/20/2022, Historical Med      Multiple Vitamins-Minerals (multivitamin with minerals) tablet Take 1 tablet by mouth daily, Historical Med      sertraline (ZOLOFT) 50 mg tablet Take 1 tablet (50 mg total) by mouth daily, Starting Tue 9/20/2022, Normal             No discharge procedures on file      PDMP Review     None          ED Provider  Electronically Signed by           Feng Zuniga,   10/13/22 2871

## 2022-10-04 ENCOUNTER — OFFICE VISIT (OUTPATIENT)
Dept: FAMILY MEDICINE CLINIC | Facility: MEDICAL CENTER | Age: 62
End: 2022-10-04
Payer: COMMERCIAL

## 2022-10-04 VITALS
BODY MASS INDEX: 26.03 KG/M2 | OXYGEN SATURATION: 97 % | WEIGHT: 162 LBS | HEART RATE: 90 BPM | HEIGHT: 66 IN | DIASTOLIC BLOOD PRESSURE: 104 MMHG | SYSTOLIC BLOOD PRESSURE: 190 MMHG

## 2022-10-04 DIAGNOSIS — Z09 HOSPITAL DISCHARGE FOLLOW-UP: Primary | ICD-10-CM

## 2022-10-04 DIAGNOSIS — I10 ESSENTIAL HYPERTENSION: ICD-10-CM

## 2022-10-04 DIAGNOSIS — F41.9 ANXIETY: ICD-10-CM

## 2022-10-04 PROCEDURE — 99214 OFFICE O/P EST MOD 30 MIN: CPT

## 2022-10-04 RX ORDER — SERTRALINE HYDROCHLORIDE 100 MG/1
100 TABLET, FILM COATED ORAL DAILY
Qty: 90 TABLET | Refills: 1 | Status: SHIPPED | OUTPATIENT
Start: 2022-10-04

## 2022-10-04 RX ORDER — MELATONIN
1000 DAILY
COMMUNITY

## 2022-10-04 RX ORDER — VITAMIN B COMPLEX
1 CAPSULE ORAL DAILY
COMMUNITY
End: 2022-10-04

## 2022-10-04 RX ORDER — TRIAMTERENE AND HYDROCHLOROTHIAZIDE 37.5; 25 MG/1; MG/1
1 TABLET ORAL DAILY
Qty: 30 TABLET | Refills: 1 | Status: SHIPPED | OUTPATIENT
Start: 2022-10-04

## 2022-10-04 NOTE — PROGRESS NOTES
Assessment/Plan:    Start triamterene-hydrochlorothiazide  Check BMP in 4 weeks  Monitor home BP readings and keep log  Follow-up in 4 weeks or sooner if needed  1  Hospital discharge follow-up  77-year-old female presents for Emergency Department follow-up  She was seen at [facility] on 9/24  She was started on Augmentin for sinusitis which she has since finished  She was also prescribed Fioricet for her headaches which she has taken 2-3 times since discharge, no noticeable difference  2  Essential hypertension  Start triamterene-hydrochlorothiazide, take once daily in the morning  Please monitor/decrease your sodium intake  Please continue to monitor your home blood pressure readings twice daily 3-4 times per week and keep a log  Advised patient to call the office with blood pressure readings consistently 140/90 or above  Follow-up in 4 weeks or sooner if needed  Check blood work to monitor renal function and electrolytes in 4 weeks  - triamterene-hydrochlorothiazide (MAXZIDE-25) 37 5-25 mg per tablet; Take 1 tablet by mouth daily  Dispense: 30 tablet; Refill: 1  - Basic metabolic panel; Future    3  Anxiety  May continue current dose of sertraline 100 mg daily  Symptoms appear to be stable  - sertraline (ZOLOFT) 100 mg tablet; Take 1 tablet (100 mg total) by mouth daily  Dispense: 90 tablet; Refill: 1      Subjective:      Patient ID: Katherine Hastings is a 64 y o  female  77-year-old female presents for Emergency Department follow-up  She was seen in the emergency department on 09/24/2022 for hypertensive urgency and headache  At that time she was treated with IV antihypertensive medication as well as IV medications for her headache  She was also treated for sinus infection which was found on head ct  Finished Augmentin 3 days ago  Head CT unremarkable  Since discharge she reports feeling okay, reports her head feels "foggy" which is how she felt previously when seen in ED  Does not describe it as headache  She has been monitoring her home blood pressures and readings have been consistently above 725 systolic  She denies chest pain, shortness a breath, palpitations, dizziness  She does have a previous diagnosis of hypertension however this was resolved with weight loss and lifestyle changes and she stopped Lisinopril  She does have a family history of HTN, her mother and both of her sons  She reports increasing her Zoloft this past weekend to 100 mg daily due to having increased anxiety and a lot of stressors  She has been tolerating new dose well and feeling better at current dose  The following portions of the patient's history were reviewed and updated as appropriate: allergies, current medications, past medical history, past social history, past surgical history and problem list     Review of Systems   Constitutional: Negative  HENT: Negative  Eyes: Negative  Respiratory: Negative  Cardiovascular: Negative  Gastrointestinal: Negative  Endocrine: Negative  Genitourinary: Negative  Musculoskeletal: Negative  Skin: Negative  Allergic/Immunologic: Negative  Neurological: Positive for headaches (feeling "Foggy")  Hematological: Negative  Psychiatric/Behavioral: Negative  Objective:      BP (!) 190/104   Pulse 90   Ht 5' 5 5" (1 664 m)   Wt 73 5 kg (162 lb)   SpO2 97%   BMI 26 55 kg/m²          Physical Exam  Vitals and nursing note reviewed  Constitutional:       General: She is not in acute distress  Appearance: Normal appearance  She is not ill-appearing  HENT:      Head: Normocephalic and atraumatic  Eyes:      General:         Right eye: No discharge  Left eye: No discharge  Extraocular Movements: Extraocular movements intact  Conjunctiva/sclera: Conjunctivae normal       Pupils: Pupils are equal, round, and reactive to light     Cardiovascular:      Rate and Rhythm: Normal rate and regular rhythm  Pulses: Normal pulses  Heart sounds: Normal heart sounds  No murmur heard  Pulmonary:      Effort: Pulmonary effort is normal  No respiratory distress  Breath sounds: Normal breath sounds  Musculoskeletal:         General: Normal range of motion  Cervical back: Normal range of motion and neck supple  Skin:     General: Skin is warm and dry  Neurological:      General: No focal deficit present  Mental Status: She is alert and oriented to person, place, and time  Mental status is at baseline  Psychiatric:         Mood and Affect: Mood normal          Behavior: Behavior normal          Thought Content:  Thought content normal                     Shawnee Carrington

## 2022-10-04 NOTE — PATIENT INSTRUCTIONS
Take your new blood pressure medication once a day in the morning  Please watch your sodium intake  Follow up in 4 weeks or sooner if needed  Call or send my chart message with readings or concerns sooner  Monitor your home BP readings twice per day 3-4 times per week and keep a log  If your BP readings are 140/90 or above consistently until your next visit please let me know  Have your blood work done prior to next visit in 4 weeks to monitor kidney and electrolytes

## 2022-10-27 ENCOUNTER — APPOINTMENT (OUTPATIENT)
Dept: LAB | Facility: MEDICAL CENTER | Age: 62
End: 2022-10-27
Payer: COMMERCIAL

## 2022-10-27 DIAGNOSIS — E78.2 MIXED HYPERLIPIDEMIA: ICD-10-CM

## 2022-10-27 DIAGNOSIS — Z48.815 ENCOUNTER FOR SURGICAL AFTERCARE FOLLOWING SURGERY OF DIGESTIVE SYSTEM: ICD-10-CM

## 2022-10-27 DIAGNOSIS — L40.9 PSORIASIS: ICD-10-CM

## 2022-10-27 DIAGNOSIS — R73.9 HYPERGLYCEMIA: ICD-10-CM

## 2022-10-27 DIAGNOSIS — Z13.29 THYROID DISORDER SCREEN: ICD-10-CM

## 2022-10-27 DIAGNOSIS — K91.2 POSTSURGICAL MALABSORPTION: ICD-10-CM

## 2022-10-27 DIAGNOSIS — E66.3 OVERWEIGHT: ICD-10-CM

## 2022-10-27 DIAGNOSIS — I10 ESSENTIAL HYPERTENSION: ICD-10-CM

## 2022-10-27 DIAGNOSIS — Z98.84 BARIATRIC SURGERY STATUS: ICD-10-CM

## 2022-10-27 LAB
25(OH)D3 SERPL-MCNC: 45 NG/ML (ref 30–100)
ALBUMIN SERPL BCP-MCNC: 3.4 G/DL (ref 3.5–5)
ALP SERPL-CCNC: 90 U/L (ref 46–116)
ALT SERPL W P-5'-P-CCNC: 32 U/L (ref 12–78)
ANION GAP SERPL CALCULATED.3IONS-SCNC: 5 MMOL/L (ref 4–13)
AST SERPL W P-5'-P-CCNC: 6 U/L (ref 5–45)
BILIRUB SERPL-MCNC: 0.38 MG/DL (ref 0.2–1)
BUN SERPL-MCNC: 23 MG/DL (ref 5–25)
CALCIUM ALBUM COR SERPL-MCNC: 10 MG/DL (ref 8.3–10.1)
CALCIUM SERPL-MCNC: 9.5 MG/DL (ref 8.3–10.1)
CHLORIDE SERPL-SCNC: 108 MMOL/L (ref 96–108)
CHOLEST SERPL-MCNC: 226 MG/DL
CO2 SERPL-SCNC: 26 MMOL/L (ref 21–32)
CREAT SERPL-MCNC: 0.82 MG/DL (ref 0.6–1.3)
ERYTHROCYTE [DISTWIDTH] IN BLOOD BY AUTOMATED COUNT: 12.5 % (ref 11.6–15.1)
EST. AVERAGE GLUCOSE BLD GHB EST-MCNC: 120 MG/DL
FERRITIN SERPL-MCNC: 121 NG/ML (ref 8–388)
FOLATE SERPL-MCNC: >20 NG/ML (ref 3.1–17.5)
GFR SERPL CREATININE-BSD FRML MDRD: 77 ML/MIN/1.73SQ M
GLUCOSE P FAST SERPL-MCNC: 106 MG/DL (ref 65–99)
HBA1C MFR BLD: 5.8 %
HCT VFR BLD AUTO: 43.7 % (ref 34.8–46.1)
HDLC SERPL-MCNC: 69 MG/DL
HGB BLD-MCNC: 13.6 G/DL (ref 11.5–15.4)
IRON SATN MFR SERPL: 34 % (ref 15–50)
IRON SERPL-MCNC: 96 UG/DL (ref 50–170)
LDLC SERPL CALC-MCNC: 139 MG/DL (ref 0–100)
MCH RBC QN AUTO: 29.2 PG (ref 26.8–34.3)
MCHC RBC AUTO-ENTMCNC: 31.1 G/DL (ref 31.4–37.4)
MCV RBC AUTO: 94 FL (ref 82–98)
NONHDLC SERPL-MCNC: 157 MG/DL
PLATELET # BLD AUTO: 259 THOUSANDS/UL (ref 149–390)
PMV BLD AUTO: 10.4 FL (ref 8.9–12.7)
POTASSIUM SERPL-SCNC: 4.5 MMOL/L (ref 3.5–5.3)
PROT SERPL-MCNC: 8.1 G/DL (ref 6.4–8.4)
PTH-INTACT SERPL-MCNC: 30.4 PG/ML (ref 18.4–80.1)
RBC # BLD AUTO: 4.65 MILLION/UL (ref 3.81–5.12)
SODIUM SERPL-SCNC: 139 MMOL/L (ref 135–147)
TIBC SERPL-MCNC: 283 UG/DL (ref 250–450)
TRIGL SERPL-MCNC: 92 MG/DL
TSH SERPL DL<=0.05 MIU/L-ACNC: 1.66 UIU/ML (ref 0.45–4.5)
VIT B12 SERPL-MCNC: 607 PG/ML (ref 100–900)
WBC # BLD AUTO: 4.89 THOUSAND/UL (ref 4.31–10.16)

## 2022-10-27 PROCEDURE — 36415 COLL VENOUS BLD VENIPUNCTURE: CPT

## 2022-10-27 PROCEDURE — 80061 LIPID PANEL: CPT

## 2022-10-27 PROCEDURE — 83036 HEMOGLOBIN GLYCOSYLATED A1C: CPT

## 2022-10-27 PROCEDURE — 83540 ASSAY OF IRON: CPT

## 2022-10-27 PROCEDURE — 83970 ASSAY OF PARATHORMONE: CPT

## 2022-10-27 PROCEDURE — 82728 ASSAY OF FERRITIN: CPT

## 2022-10-27 PROCEDURE — 84630 ASSAY OF ZINC: CPT

## 2022-10-27 PROCEDURE — 83550 IRON BINDING TEST: CPT

## 2022-10-27 PROCEDURE — 84425 ASSAY OF VITAMIN B-1: CPT

## 2022-10-27 PROCEDURE — 82607 VITAMIN B-12: CPT

## 2022-10-27 PROCEDURE — 82306 VITAMIN D 25 HYDROXY: CPT

## 2022-10-27 PROCEDURE — 84443 ASSAY THYROID STIM HORMONE: CPT

## 2022-10-27 PROCEDURE — 80053 COMPREHEN METABOLIC PANEL: CPT

## 2022-10-27 PROCEDURE — 85027 COMPLETE CBC AUTOMATED: CPT

## 2022-10-27 PROCEDURE — 82746 ASSAY OF FOLIC ACID SERUM: CPT

## 2022-10-27 PROCEDURE — 84590 ASSAY OF VITAMIN A: CPT

## 2022-10-28 LAB — ZINC SERPL-MCNC: 95 UG/DL (ref 44–115)

## 2022-11-01 ENCOUNTER — OFFICE VISIT (OUTPATIENT)
Dept: FAMILY MEDICINE CLINIC | Facility: MEDICAL CENTER | Age: 62
End: 2022-11-01

## 2022-11-01 VITALS
TEMPERATURE: 98.5 F | HEIGHT: 66 IN | RESPIRATION RATE: 16 BRPM | DIASTOLIC BLOOD PRESSURE: 84 MMHG | SYSTOLIC BLOOD PRESSURE: 150 MMHG | WEIGHT: 162.4 LBS | HEART RATE: 74 BPM | BODY MASS INDEX: 26.1 KG/M2

## 2022-11-01 DIAGNOSIS — I10 ESSENTIAL HYPERTENSION: Primary | ICD-10-CM

## 2022-11-01 LAB — VIT B1 BLD-SCNC: 159.5 NMOL/L (ref 66.5–200)

## 2022-11-01 RX ORDER — TRIAMTERENE AND HYDROCHLOROTHIAZIDE 37.5; 25 MG/1; MG/1
1 TABLET ORAL DAILY
Qty: 90 TABLET | Refills: 0 | Status: SHIPPED | OUTPATIENT
Start: 2022-11-01

## 2022-11-01 NOTE — PROGRESS NOTES
Assessment/Plan:    Follow-up in March 2023 as scheduled for annual physical   Continue current dose of triamterene-hydrochlorothiazide  Monitor home blood pressure readings and send in log in 2 weeks  Will make adjustments to medication at that time if needed  Recent lab results reviewed with patient today  1  Essential hypertension  Blood pressure elevated in office today 150/84  Continue current dose of triamterene-hydrochlorothiazide  Advised patient to monitor home blood pressure readings over the next 2 weeks and send log in  Will make adjustments to medication at that time if needed  Also advised patient to continue to monitor sodium intake  - triamterene-hydrochlorothiazide (MAXZIDE-25) 37 5-25 mg per tablet; Take 1 tablet by mouth daily  Dispense: 90 tablet; Refill: 0      Subjective:      Patient ID: Mulu Blue is a 64 y o  female  49-year-old female presents for 4 week follow-up hypertension  She was started on triamterene-hydrochlorothiazide at her last office visit  Tolerating medication well  She is feeling well overall  She is excited to be going on a trip to [facility] in a few weeks  She states she "knows when her blood pressure is up " She gets symptoms of fatigue and fluttering in her chest when it is high  She did bring her BP log in with her today  Her readings have been ranging from 130's/70's to 150s/80s  This morning her reading was 115/71  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 there  She offers no concerns or complaints at this time  The following portions of the patient's history were reviewed and updated as appropriate: allergies, current medications, past family history, past social history, past surgical history and problem list     Review of Systems   Constitutional: Negative  HENT: Negative  Eyes: Negative  Respiratory: Negative  Cardiovascular: Negative  Gastrointestinal: Negative  Endocrine: Negative  Genitourinary: Negative  Musculoskeletal: Negative  Skin: Negative  Allergic/Immunologic: Negative  Neurological: Negative  Hematological: Negative  Psychiatric/Behavioral: Negative  Objective:      /84   Pulse 74   Temp 98 5 °F (36 9 °C)   Resp 16   Ht 5' 5 5" (1 664 m)   Wt 73 7 kg (162 lb 6 4 oz)   BMI 26 61 kg/m²          Physical Exam  Vitals and nursing note reviewed  Constitutional:       General: She is not in acute distress  Appearance: Normal appearance  She is not ill-appearing  HENT:      Head: Normocephalic and atraumatic  Eyes:      Conjunctiva/sclera: Conjunctivae normal       Pupils: Pupils are equal, round, and reactive to light  Cardiovascular:      Rate and Rhythm: Normal rate and regular rhythm  Pulses: Normal pulses  Heart sounds: Normal heart sounds  No murmur heard  Pulmonary:      Effort: Pulmonary effort is normal  No respiratory distress  Breath sounds: Normal breath sounds  No stridor  No wheezing, rhonchi or rales  Musculoskeletal:         General: Normal range of motion  Cervical back: Normal range of motion and neck supple  Right lower leg: No edema  Left lower leg: No edema  Skin:     General: Skin is warm and dry  Neurological:      General: No focal deficit present  Mental Status: She is alert and oriented to person, place, and time  Mental status is at baseline  Gait: Gait normal    Psychiatric:         Mood and Affect: Mood normal          Behavior: Behavior normal          Thought Content:  Thought content normal                     Araseli Carrera

## 2022-11-04 LAB — VIT A SERPL-MCNC: 42.1 UG/DL (ref 22–69.5)

## 2022-11-29 ENCOUNTER — ANNUAL EXAM (OUTPATIENT)
Dept: OBGYN CLINIC | Facility: MEDICAL CENTER | Age: 62
End: 2022-11-29

## 2022-11-29 VITALS
BODY MASS INDEX: 26.42 KG/M2 | DIASTOLIC BLOOD PRESSURE: 82 MMHG | WEIGHT: 164.4 LBS | HEIGHT: 66 IN | SYSTOLIC BLOOD PRESSURE: 132 MMHG

## 2022-11-29 DIAGNOSIS — Z12.31 ENCOUNTER FOR SCREENING MAMMOGRAM FOR BREAST CANCER: ICD-10-CM

## 2022-11-29 DIAGNOSIS — N81.89 PELVIC FLOOR WEAKNESS IN FEMALE: ICD-10-CM

## 2022-11-29 DIAGNOSIS — Z01.411 ENCNTR FOR GYN EXAM (GENERAL) (ROUTINE) W ABNORMAL FINDINGS: Primary | ICD-10-CM

## 2022-11-29 NOTE — PATIENT INSTRUCTIONS
Calcium 1200-1500mg + 600-1000 IU Vit D daily  Exercise 150 minutes per week minimum including weight bearing exercises  No further paps  Annual mammogram ordered and monthly breast self exam recommended  Referred to pelvic floor PT for pelvic florentin weakness  Colonoscopy-  Due 9086        Silicone based lubricant with sex  (Use water based lubricant with condoms or sexual toys )    Vaginal moisturizers twice weekly as needed  Return to office in one year or sooner, if needed

## 2022-11-29 NOTE — PROGRESS NOTES
Assessment/Plan:  Calcium 1200-1500mg + 600-1000 IU Vit D daily  Exercise 150 minutes per week minimum including weight bearing exercises  No further paps  Annual mammogram ordered and monthly breast self exam recommended  Referred to pelvic floor PT for pelvic florentin weakness  Colonoscopy-  Due         Silicone based lubricant with sex  (Use water based lubricant with condoms or sexual toys )    Vaginal moisturizers twice weekly as needed  Return to office in one year or sooner, if needed  1  Encntr for gyn exam (general) (routine) w abnormal findings    2  Encounter for screening mammogram for breast cancer  -     Mammo screening bilateral w 3d & cad; Future    3  Pelvic floor weakness in female  -     Ambulatory referral to Physical Therapy; Future               Subjective:      Patient ID: Allie Rodriguez is a 64 y o  female  HPI    Allie Rodriguez is a 64 y o   ( 40 yo boy, 44 yo girl, 30 yo boy)  female who is here today for her annual visit  No health concerns  No vaginal bleeding since hysterectomy in  (fibroids, AUB)  Exercise- 2-3 times per week  Allie Rodriguez is sexually active with male partner/  of 37 years  Monogamous and feels safe in this relationship  Denies vaginal pain,bleeding or dryness  She is not interested in STD screening today  She denies vaginal discharge, itching or pelvic pain  She has no urinary concerns, does have mixed urinary incontinence  No bowel concerns  No breast concerns  Last pap: hysterectomy   DEXA scan: 22 low BMD  Mammogram: 22 normal  Colonoscopy:2018 with 5 year recall    The following portions of the patient's history were reviewed and updated as appropriate: allergies, current medications, past family history, past medical history, past social history, past surgical history and problem list     Review of Systems   Constitutional: Negative    Negative for activity change, appetite change, chills, diaphoresis, fatigue, fever and unexpected weight change  HENT: Negative for congestion, dental problem, sneezing, sore throat and trouble swallowing  Eyes: Negative for visual disturbance  Respiratory: Negative for chest tightness and shortness of breath  Cardiovascular: Negative for chest pain and leg swelling  Gastrointestinal: Negative for abdominal pain, constipation, diarrhea, nausea and vomiting  Genitourinary: Negative for difficulty urinating, dyspareunia, dysuria, frequency, hematuria, pelvic pain, urgency, vaginal bleeding, vaginal discharge and vaginal pain  Musculoskeletal: Negative for back pain and neck pain  Skin: Negative  Allergic/Immunologic: Negative  Neurological: Negative for weakness and headaches  Hematological: Negative for adenopathy  Psychiatric/Behavioral: Negative  Objective:      /82 (BP Location: Left arm, Patient Position: Sitting, Cuff Size: Standard)   Ht 5' 5 5" (1 664 m)   Wt 74 6 kg (164 lb 6 4 oz)   BMI 26 94 kg/m²          Physical Exam  Vitals and nursing note reviewed  Constitutional:       Appearance: Normal appearance  She is well-developed  HENT:      Head: Normocephalic  Neck:      Thyroid: No thyromegaly  Cardiovascular:      Rate and Rhythm: Normal rate and regular rhythm  Heart sounds: Normal heart sounds  Pulmonary:      Effort: Pulmonary effort is normal       Breath sounds: Normal breath sounds  Chest:   Breasts:     Breasts are symmetrical       Right: Normal  No inverted nipple, mass, nipple discharge, skin change or tenderness  Left: Normal  No inverted nipple, mass, nipple discharge, skin change or tenderness  Abdominal:      Palpations: Abdomen is soft  Genitourinary:     General: Normal vulva  Exam position: Lithotomy position  Labia:         Right: No rash, tenderness, lesion or injury  Left: No rash, tenderness, lesion or injury         Urethra: No prolapse, urethral pain, urethral swelling or urethral lesion  Vagina: No signs of injury and foreign body  No vaginal discharge, erythema, tenderness, bleeding, lesions or prolapsed vaginal walls  Adnexa: Right adnexa normal and left adnexa normal         Right: No mass, tenderness or fullness  Left: No mass, tenderness or fullness  Rectum: No external hemorrhoid  Comments: Vulvovaginal atrophy  Vaginal tone 2/5  Cervix and uterus are surgically absent  Musculoskeletal:         General: Normal range of motion  Cervical back: Normal range of motion  Lymphadenopathy:      Head:      Right side of head: No submental, submandibular, tonsillar or occipital adenopathy  Left side of head: No submental, submandibular, tonsillar or occipital adenopathy  Upper Body:      Right upper body: No supraclavicular or axillary adenopathy  Left upper body: No supraclavicular or axillary adenopathy  Lower Body: No right inguinal adenopathy  No left inguinal adenopathy  Skin:     General: Skin is warm and dry  Neurological:      Mental Status: She is alert and oriented to person, place, and time  Psychiatric:         Mood and Affect: Mood normal          Behavior: Behavior normal  Behavior is cooperative

## 2022-12-29 ENCOUNTER — VBI (OUTPATIENT)
Dept: ADMINISTRATIVE | Facility: OTHER | Age: 62
End: 2022-12-29

## 2023-01-18 DIAGNOSIS — I10 ESSENTIAL HYPERTENSION: ICD-10-CM

## 2023-01-18 RX ORDER — TRIAMTERENE AND HYDROCHLOROTHIAZIDE 37.5; 25 MG/1; MG/1
1 TABLET ORAL DAILY
Qty: 90 TABLET | Refills: 0 | Status: SHIPPED | OUTPATIENT
Start: 2023-01-18 | End: 2023-01-25

## 2023-01-25 DIAGNOSIS — I10 ESSENTIAL HYPERTENSION: ICD-10-CM

## 2023-01-25 RX ORDER — TRIAMTERENE AND HYDROCHLOROTHIAZIDE 37.5; 25 MG/1; MG/1
TABLET ORAL
Qty: 90 TABLET | Refills: 0 | Status: SHIPPED | OUTPATIENT
Start: 2023-01-25

## 2023-03-11 ENCOUNTER — HOSPITAL ENCOUNTER (EMERGENCY)
Facility: HOSPITAL | Age: 63
Discharge: HOME | End: 2023-03-11

## 2023-03-11 ENCOUNTER — APPOINTMENT (EMERGENCY)
Dept: CT IMAGING | Facility: HOSPITAL | Age: 63
End: 2023-03-11

## 2023-03-11 ENCOUNTER — APPOINTMENT (EMERGENCY)
Dept: RADIOLOGY | Facility: HOSPITAL | Age: 63
End: 2023-03-11

## 2023-03-11 VITALS
RESPIRATION RATE: 18 BRPM | DIASTOLIC BLOOD PRESSURE: 80 MMHG | SYSTOLIC BLOOD PRESSURE: 155 MMHG | TEMPERATURE: 97.6 F | HEART RATE: 61 BPM | OXYGEN SATURATION: 99 %

## 2023-03-11 DIAGNOSIS — S09.90XA INJURY OF HEAD, INITIAL ENCOUNTER: ICD-10-CM

## 2023-03-11 DIAGNOSIS — S16.1XXA STRAIN OF NECK MUSCLE, INITIAL ENCOUNTER: ICD-10-CM

## 2023-03-11 DIAGNOSIS — M54.50 LOW BACK PAIN: ICD-10-CM

## 2023-03-11 DIAGNOSIS — M25.552 LEFT HIP PAIN: ICD-10-CM
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
## 2023-03-11 RX ORDER — MORPHINE SULFATE 4 MG/ML
4 INJECTION, SOLUTION INTRAMUSCULAR; INTRAVENOUS ONCE
Status: COMPLETED | OUTPATIENT
Start: 2023-03-11 | End: 2023-03-11

## 2023-03-11 RX ORDER — OXYCODONE HYDROCHLORIDE AND ACETAMINOPHEN 5; 325 MG/1; MG/1
1 TABLET ORAL EVERY 6 HOURS PRN
Qty: 15 TABLET | Refills: 0 | Status: SHIPPED | OUTPATIENT
Start: 2023-03-11 | End: 2023-03-22

## 2023-03-11 RX ORDER — ONDANSETRON 2 MG/ML
4 INJECTION INTRAMUSCULAR; INTRAVENOUS ONCE
Status: COMPLETED | OUTPATIENT
Start: 2023-03-11 | End: 2023-03-11

## 2023-03-11 RX ORDER — METHOCARBAMOL 750 MG/1
750 TABLET, FILM COATED ORAL 4 TIMES DAILY
Qty: 20 TABLET | Refills: 0 | Status: SHIPPED | OUTPATIENT
Start: 2023-03-11 | End: 2023-03-22

## 2023-03-11 RX ADMIN — ONDANSETRON 4 MG: 2 INJECTION INTRAMUSCULAR; INTRAVENOUS at 10:59

## 2023-03-11 RX ADMIN — MORPHINE SULFATE 4 MG: 4 INJECTION INTRAVENOUS at 10:59

## 2023-03-11 NOTE — ED PROVIDER NOTES
History  Chief Complaint   Patient presents with   • Fall     Patient reports slipping down the stairs, injury to lower back   Denies ASA, denies thinners  58 y o  female with past medical history significant for hypertension, subarachnoid hemorrhage (2018), hyperlipidemia, NIDDM, and GERD presents to ED with chief complaint of mechanical fall resulting in left lower back, hip and head pain  Onset of symptoms reported as sudden, this morning  Location of symptoms reported as left lower back, left hip, back of head  Quality is reported as sharp low back and hip pain,  Dull head pain  Severity is reported as moderate  Associated symptoms: denies upper or lower extremity paralysis, paraesthesia or weakness, denies abdominal pain, denies palpitations, chest pain or SOB  Positive low back pain, positive left hip pain, denies left knee or ankle pain  Denies vomiting, blurred vision or loss of vision  Modifying factors: movement exacerbates pain    Context: patient reports she slipped and fell this morning  She describes her feet came out from under her and she landed in upright position  This fall occurred outside on stone steps  She is unsure if she hit or head or if she had loss of consciousness  She denies symptoms including chest pain, sob or palpitations preceding her fall   reports he heard her fall and found her outside on her back and right side  He states she was pale and felt like she was going to pass out but those symptoms resolved shortly after fall  No seizure activity  Denies blood thinner use  History provided by:  Patient and significant other   used: No        Prior to Admission Medications   Prescriptions Last Dose Informant Patient Reported? Taking?    Cosentyx Sensoready, 300 MG, 150 MG/ML SOAJ injection   Yes No   Multiple Vitamins-Minerals (multivitamin with minerals) tablet   Yes No   Sig: Take 1 tablet by mouth daily   calcium carbonate (OS-YONG) 600 MG tablet   Yes No   Sig: Take 600 mg by mouth 2 (two) times a day with meals   cholecalciferol (VITAMIN D3) 1,000 units tablet   Yes No   Sig: Take 1,000 Units by mouth daily   sertraline (ZOLOFT) 100 mg tablet   No No   Sig: Take 1 tablet (100 mg total) by mouth daily   triamterene-hydrochlorothiazide (MAXZIDE-25) 37 5-25 mg per tablet   No No   Sig: take 1 tablet by mouth once daily      Facility-Administered Medications: None       Past Medical History:   Diagnosis Date   • Allergic     Vicodin   • Anemia    • Anxiety    • Arthritis    • Bariatric surgery status    • Closed head injury 06/11/2018 2018   • Colon polyp    • Concussion with loss of consciousness 06/22/2018 2018   • Depression    • Diabetes mellitus ([facility] [address] )     NIDDM   • Diarrhea     chronic   • Fatty liver    • Fecal incontinence    • GERD (gastroesophageal reflux disease)    • Head injury     6/10/11   • Hiatal hernia    • Hyperlipidemia    • Hypertension    • Lifelong obesity    • Mild TBI 06/11/2018   • Postsurgical malabsorption    • Psoriasis     legs   • SAH (subarachnoid hemorrhage) ([facility] [address] ) 06/11/2018 2018   • Tear of right supraspinatus tendon 12/16/2019   • Type 2 diabetes mellitus with microalbuminuria, without long-term current use of insulin ([facility] ) 07/05/2018   • Wears glasses        Past Surgical History:   Procedure Laterality Date   • CHOLECYSTECTOMY     • COLONOSCOPY N/A 10/24/2017    Procedure: COLONOSCOPY;  Surgeon: Praveena Will MD;  Location: [facility]; Service: Colorectal   • COLONOSCOPY W/ ENDOSCOPIC US N/A 10/24/2017    Procedure: ANAL ENDOSCOPIC U/S;  Surgeon: Praveena Will MD;  Location: [facility];   Service: Colorectal   • DILATION AND CURETTAGE OF UTERUS     • EGD     • ENDOMETRIAL BIOPSY      WITHOUT CERVICAL DILATION   • HYSTERECTOMY     • NASAL SEPTUM SURGERY     • OTHER SURGICAL HISTORY      Episiotomy repair   • [place] LAPS [address] [place] [address] LONGITUDINAL GASTRECTOMY N/A 6/16/2020    Procedure: GASTRECTOMY SLEEVE LAPAROSCOPIC AND INTRAOPERATIVE EGD ;  Surgeon: Rohan Guillermo MD;  Location: [facility];  Service: Bariatrics   • CA SURGICAL ARTHROSCOPY SHOULDER W/ROTATOR CUFF RPR Right 1/15/2020    Procedure: SHOULDER ARTHROSCOPIC DEBRIDEMENT OF PARTIAL THICKNESS ROTATOR CUFF TEAR, SAD;  Surgeon: Rozina Aguila MD;  Location: [facility];  Service: Orthopedics   • ROTATOR CUFF REPAIR Left        Family History   Problem Relation Age of Onset   • Atrial fibrillation Mother    • Hypertension Mother    • Depression Mother    • Suicide Attempts Mother    • Lung cancer Father    • Heart disease Father    • Diabetes Father    • Cancer Father    • Obesity Brother    • No Known Problems Brother    • Breast cancer Maternal Grandmother 48   • Cancer Paternal Grandfather    • No Known Problems Son    • No Known Problems Son    • Lung cancer Maternal Aunt    • No Known Problems Maternal Aunt    • No Known Problems Maternal Aunt    • No Known Problems Maternal Aunt    • Other Family         ADENOCARCINOMA IN SIOBHAN IN VILLOUS ADENOMA OF THE BREAST, MIGRAINES, SKIN DISORDER   • Depression Family    • Anxiety disorder Family    • Diabetes Family         MELLITUS   • Fibrocystic breast disease Family    • Heart disease Family    • Hiatal hernia Family    • Hypertension Family    • Irritable bowel syndrome Family    • Kidney disease Family    • Urinary tract infection Family    • Breast cancer Family    • Ovarian cancer Neg Hx    • Uterine cancer Neg Hx    • Cervical cancer Neg Hx      I have reviewed and agree with the history as documented      E-Cigarette/Vaping   • E-Cigarette Use Never User      E-Cigarette/Vaping Substances   • Nicotine No    • THC No    • CBD No    • Flavoring No    • Other No    • Unknown No      Social History     Tobacco Use   • Smoking status: Former     Packs/day: 0 50     Years: 5 00     Pack years: 2 50     Types: Cigarettes     Quit date: 6/10/1992     Years since quittin 7   • Smokeless tobacco: Never   • Tobacco comments:     Smoked for 10yrs  Vaping Use   • Vaping Use: Never used   Substance Use Topics   • Alcohol use: Yes     Comment: Wine maybe once or twice a month   • Drug use: No       Review of Systems   Constitutional: Negative for fatigue and fever  Respiratory: Negative for cough, chest tightness, shortness of breath and stridor  Cardiovascular: Negative for chest pain  Gastrointestinal: Negative for abdominal pain, constipation, diarrhea, nausea and vomiting  Musculoskeletal: Positive for arthralgias, back pain and neck pain  Skin: Negative for rash  Neurological: Positive for headaches  Negative for tremors, seizures, syncope, facial asymmetry, weakness and numbness  All other systems reviewed and are negative  Physical Exam  Physical Exam  Vitals and nursing note reviewed  Constitutional:       General: She is not in acute distress  Appearance: Normal appearance  Comments: /80   Pulse 61   Temp 97 6 °F (36 4 °C)   Resp 18   SpO2 99%      HENT:      Head: Normocephalic and atraumatic  Right Ear: Tympanic membrane and external ear normal       Left Ear: Tympanic membrane and external ear normal       Nose: Nose normal    Eyes:      General: No scleral icterus  Right eye: No discharge  Left eye: No discharge  Extraocular Movements: Extraocular movements intact  Conjunctiva/sclera: Conjunctivae normal    Neck:      Comments: Tenderness to palpation to posterior occipital area head and across upper midline cervical spine c2-3 level  Cardiovascular:      Rate and Rhythm: Normal rate  Pulses: Normal pulses  Pulmonary:      Effort: Pulmonary effort is normal       Breath sounds: Normal breath sounds  Abdominal:      Tenderness: There is no abdominal tenderness  There is no guarding or rebound  Musculoskeletal:         General: Tenderness present  No swelling or deformity        Cervical back: Normal range of motion and neck supple  Tenderness present  Lumbar back: Tenderness present  No deformity or lacerations  Back:       Right knee: Normal       Left knee: Normal       Right ankle: Normal       Left ankle: Normal         Legs:    Skin:     General: Skin is dry  Capillary Refill: Capillary refill takes less than 2 seconds  Coloration: Skin is not jaundiced  Findings: No erythema or rash  Neurological:      General: No focal deficit present  Mental Status: She is alert and oriented to person, place, and time  Mental status is at baseline  Sensory: No sensory deficit  Motor: No weakness  Psychiatric:         Mood and Affect: Mood normal          Behavior: Behavior normal          Thought Content: Thought content normal          Vital Signs  ED Triage Vitals   Temperature Pulse Respirations Blood Pressure SpO2   03/11/23 1045 03/11/23 1042 03/11/23 1042 03/11/23 1042 03/11/23 1042   97 6 °F (36 4 °C) 61 18 155/80 99 %      Temp src Heart Rate Source Patient Position - Orthostatic VS BP Location FiO2 (%)   -- -- -- -- --             Pain Score       03/11/23 1059       9           Vitals:    03/11/23 1042   BP: 155/80   Pulse: 61         Visual Acuity  Visual Acuity    Flowsheet Row Most Recent Value   L Pupil Size (mm) 2   R Pupil Size (mm) 2          ED Medications  Medications   morphine injection 4 mg (4 mg Intravenous Given 3/11/23 1059)   ondansetron (ZOFRAN) injection 4 mg (4 mg Intravenous Given 3/11/23 1059)       Diagnostic Studies  Results Reviewed     None                 XR lumbar spine 2 or 3 views   ED Interpretation by Jolie Busby PA-C (03/11 1146)   No acute fracture or dislocation      XR hip/pelv 2-3 vws left   ED Interpretation by Jolie Busby PA-C (03/11 1146)   No acute fracture or dislocation       CT head without contrast   Final Result by Hanane 6, DO (03/11 1141)      No acute intracranial abnormality  Workstation performed: [id number]         CT cervical spine without contrast   Final Result by Chloé Lopez DO (03/11 1143)      No cervical spine fracture or traumatic malalignment  Workstation performed: [id number]         XR chest 1 view portable    (Results Pending)              Procedures  Procedures         ED Course  ED Course as of 03/11/23 1156   Sat Mar 11, 2023   1145 CT cervical spine without contrast  Dt cervical spine interpretation No cervical spine fracture or traumatic malalignment  1146 CT head without contrast  Ct head interpretation No acute intracranial abnormality       1146 CXR interpretation  no infiltrate or PTX  Medical Decision Making  MDM  ddx includes but is not limited to intracranial injury/bleed/fracture, cervical spine fracture, lumbar spine fracture, muscle spasm, hip fracture, contusion, sprain  Strain  ED plan:  Check ct head and neck to evaluate for fracture  Add L spine xray and left hip xray to evaluate for fracture  IV morphine for pain  ED results:   I have reviewed the patient's vital signs, nursing notes, and other relevant ancillary testing/information  I have had a detailed discussion with the patient regarding the historical points, examination findings, and any diagnostic results    Xray images left hip, lumbar spine, interpretation no acute fracture or dislocation  CXR interpretation no infiltrate or PTX  Ct head interpretation no acute intracranial abnormality  Ct cervical spine interpretation No acute vertebral fracture or traumatic malalignment  ED summary: 43-year-old female with a mechanical slip and fall suddenly this morning landing on her back and right side  Unsure of head injury  No seizures   No blood thinner use  Alert and oriented x3 on exam   CT scans reviewed no acute fractures or traumatic injuries    Given IV morphine in the emergency department with improvement in pain  Able to ambulate with stable gait  Discussed symptoms most consistent with mechanical fall, neck pain, low back pain and left hip pain  Discussed treatment including rest, ice, avoidance of aggravating activities, use of muscle relaxers  Use of oxycodone for pain  Standard narcotic precautions were given  I discussed diagnosis and treatment plan with patient at bedside  Extended discussion with patient regarding the diagnosis, pathophysiology, expectant coarse and treatment plan  Instructed to follow up with pcp and recommended specialist in 3-5 days  Reviewed reasons to return to ed  Patient verbalized understanding of diagnosis and agreement with discharge plan of care as well as understanding of reasons to return to ed  Amount and/or Complexity of Data Reviewed  Radiology: ordered  Risk  Prescription drug management  Disposition  Final diagnoses:   Fall, initial encounter   Low back pain   Left hip pain   Strain of neck muscle, initial encounter   Injury of head, initial encounter     Time reflects when diagnosis was documented in both MDM as applicable and the Disposition within this note     Time User Action Codes Description Comment    3/11/2023 11:48 AM Jess Schooling Add Xen Ser  GPFR] Fall, initial encounter     3/11/2023 11:48 AM Jess Schooling Add [M54 50] Low back pain     3/11/2023 11:48 AM Jess Schooling Add [M25 552] Left hip pain     3/11/2023 11:48 AM Jess Schooling Add [S16  1XXA] Strain of neck muscle, initial encounter     3/11/2023 11:48 AM Jess Schooling Add [S09 90XA] Injury of head, initial encounter       ED Disposition     ED Disposition   Discharge    Condition   Stable    Date/Time   Sat Mar 11, 2023 11:48 AM    Comment   Idris Hawley discharge to home/self care                 Follow-up Information     Follow up With Specialties Details Why Contact Info Additional [address] Usman Galindo Nurse Practitioner Call in 3 days for further evaluation of symptoms [address] [facility] [address] [place] Emergency Department Emergency Medicine Go to  If symptoms worsen 34 [facility] [address] [facility] Emergency Department, [address] [place], [place], [address] [facility],  Sports Medicine Call in 1 week for further evaluation of symptoms, follow up as needed [address]  [facility] [address] [phone]             Patient's Medications   Discharge Prescriptions    METHOCARBAMOL (ROBAXIN) 750 MG TABLET    Take 1 tablet (750 mg total) by mouth 4 (four) times a day for 5 days       Start Date: 3/11/2023 End Date: 3/16/2023       Order Dose: 750 mg       Quantity: 20 tablet    Refills: 0    OXYCODONE-ACETAMINOPHEN (PERCOCET) 5-325 MG PER TABLET    Take 1 tablet by mouth every 6 (six) hours as needed for severe pain (back/neck pain/initial rx ) Label no driving no etoh  Initial rx  Dx: Max Daily Amount: 4 tablets       Start Date: 3/11/2023 End Date: --       Order Dose: 1 tablet       Quantity: 15 tablet    Refills: 0       No discharge procedures on file      PDMP Review     None          ED Provider  Electronically Signed by           Marlon Leroy PA-C  03/11/23 5780

## 2023-03-15 DIAGNOSIS — F41.9 ANXIETY: ICD-10-CM

## 2023-03-15 RX ORDER — SERTRALINE HYDROCHLORIDE 100 MG/1
100 TABLET, FILM COATED ORAL DAILY
Qty: 90 TABLET | Refills: 1 | Status: SHIPPED | OUTPATIENT
Start: 2023-03-15

## 2023-03-22 ENCOUNTER — OFFICE VISIT (OUTPATIENT)
Dept: FAMILY MEDICINE CLINIC | Facility: MEDICAL CENTER | Age: 63
End: 2023-03-22

## 2023-03-22 VITALS
HEART RATE: 74 BPM | HEIGHT: 66 IN | DIASTOLIC BLOOD PRESSURE: 90 MMHG | BODY MASS INDEX: 26.52 KG/M2 | WEIGHT: 165 LBS | SYSTOLIC BLOOD PRESSURE: 140 MMHG | OXYGEN SATURATION: 99 %

## 2023-03-22 DIAGNOSIS — Z00.00 ANNUAL PHYSICAL EXAM: Primary | ICD-10-CM

## 2023-03-22 DIAGNOSIS — I10 ESSENTIAL HYPERTENSION: ICD-10-CM

## 2023-03-22 DIAGNOSIS — E78.2 MIXED HYPERLIPIDEMIA: ICD-10-CM

## 2023-03-22 RX ORDER — TRIAMTERENE AND HYDROCHLOROTHIAZIDE 37.5; 25 MG/1; MG/1
1 TABLET ORAL DAILY
Qty: 90 TABLET | Refills: 1 | Status: SHIPPED | OUTPATIENT
Start: 2023-03-22

## 2023-08-23 ENCOUNTER — TELEPHONE (OUTPATIENT)
Dept: BARIATRICS | Facility: CLINIC | Age: 63
End: 2023-08-23

## 2023-09-15 DIAGNOSIS — I10 ESSENTIAL HYPERTENSION: ICD-10-CM

## 2023-09-15 DIAGNOSIS — F41.9 ANXIETY: ICD-10-CM

## 2023-09-15 RX ORDER — TRIAMTERENE AND HYDROCHLOROTHIAZIDE 37.5; 25 MG/1; MG/1
1 TABLET ORAL DAILY
Qty: 90 TABLET | Refills: 1 | Status: SHIPPED | OUTPATIENT
Start: 2023-09-15

## 2023-09-15 RX ORDER — SERTRALINE HYDROCHLORIDE 100 MG/1
100 TABLET, FILM COATED ORAL DAILY
Qty: 90 TABLET | Refills: 1 | Status: SHIPPED | OUTPATIENT
Start: 2023-09-15

## 2023-09-20 ENCOUNTER — APPOINTMENT (OUTPATIENT)
Dept: LAB | Facility: MEDICAL CENTER | Age: 63
End: 2023-09-20
Payer: COMMERCIAL

## 2023-09-20 DIAGNOSIS — L40.0 PSORIASIS VULGARIS: ICD-10-CM

## 2023-09-20 DIAGNOSIS — I10 ESSENTIAL HYPERTENSION: ICD-10-CM

## 2023-09-20 DIAGNOSIS — E78.2 MIXED HYPERLIPIDEMIA: ICD-10-CM

## 2023-09-20 LAB
CHOLEST SERPL-MCNC: 242 MG/DL
HDLC SERPL-MCNC: 68 MG/DL
LDLC SERPL CALC-MCNC: 154 MG/DL (ref 0–100)
NONHDLC SERPL-MCNC: 174 MG/DL
TRIGL SERPL-MCNC: 102 MG/DL

## 2023-09-20 PROCEDURE — 80061 LIPID PANEL: CPT

## 2023-09-22 ENCOUNTER — OFFICE VISIT (OUTPATIENT)
Dept: FAMILY MEDICINE CLINIC | Facility: MEDICAL CENTER | Age: 63
End: 2023-09-22

## 2023-09-22 VITALS
RESPIRATION RATE: 16 BRPM | OXYGEN SATURATION: 98 % | HEART RATE: 76 BPM | DIASTOLIC BLOOD PRESSURE: 98 MMHG | TEMPERATURE: 99.1 F | HEIGHT: 66 IN | BODY MASS INDEX: 27.64 KG/M2 | WEIGHT: 172 LBS | SYSTOLIC BLOOD PRESSURE: 166 MMHG

## 2023-09-22 DIAGNOSIS — Z13.1 DIABETES MELLITUS SCREENING: ICD-10-CM

## 2023-09-22 DIAGNOSIS — E78.2 MIXED HYPERLIPIDEMIA: ICD-10-CM

## 2023-09-22 DIAGNOSIS — F41.9 ANXIETY: ICD-10-CM

## 2023-09-22 DIAGNOSIS — I10 PRIMARY HYPERTENSION: Primary | ICD-10-CM

## 2023-09-22 NOTE — PROGRESS NOTES
Assessment/Plan:    Monitor home blood pressure readings over the next 2 weeks and call with log. Limit saturated fat and sodium intake as well as sugar and carbohydrate intake. Check labs in 3 months prior to next office visit. 1. Primary hypertension  Blood pressure elevated in office today at 166/98. Patient with increased stress and anxiety at this time. We will continue current dose of triamterene-hydrochlorothiazide. Advised patient to limit sodium and saturated fat intake. Monitor home blood pressure readings over the next 2 weeks and call with log. We will make adjustments to medications at that time if necessary. Check labs in 3 months. - Albumin / creatinine urine ratio  - Comprehensive metabolic panel; Future    2. Anxiety  Symptoms appear to be stable with current dose of sertraline, continue. 3. Mixed hyperlipidemia  Lipids elevated on most recent labs. Advised patient to decrease saturated fat intake, regular exercise recommended. Recheck labs in 3 months. Advised patient about possibly needing to add statin in the future. - Lipid panel; Future    4. Diabetes mellitus screening  A1c elevated on previous labs at 5.8%. No current medication regimen. Advised to decrease sugar and carbohydrate intake. - Hemoglobin A1C; Future      Subjective:      Patient ID: Meg Alves is a 58 y.o. female. 70-year-old female presents for 6-month follow-up. She has hypertension, currently on triamterene-hydrochlorothiazide. She has anxiety, currently on sertraline. She has hyperlipidemia, no current medication regimen, controlled with diet. She tells me she has been under a lot of stress lately as both of her children moved out of [facility], her son recently moved to [place], had to sell his home and is having relationship troubles. She also tells me her diet has not been the best, has been eating a lot of sweets and cheeses.   She would like to make dietary changes and get her weight down.  She offers no concerns or complaints otherwise at this time and does not need any refills on her medications. The following portions of the patient's history were reviewed and updated as appropriate: allergies, current medications, past family history, past social history, past surgical history and problem list.    Review of Systems   Constitutional: Negative. HENT: Negative. Eyes: Negative. Respiratory: Negative. Cardiovascular: Negative. Gastrointestinal: Negative. Endocrine: Negative. Genitourinary: Negative. Musculoskeletal: Negative. Skin: Negative. Allergic/Immunologic: Negative. Neurological: Negative. Hematological: Negative. Psychiatric/Behavioral: Negative. Objective:      /98 (BP Location: Left arm, Patient Position: Sitting, Cuff Size: Adult)   Pulse 76   Temp 99.1 °F (37.3 °C)   Resp 16   Ht 5' 5.5" (1.664 m)   Wt 78 kg (172 lb)   SpO2 98%   BMI 28.19 kg/m²          Physical Exam  Vitals and nursing note reviewed. Constitutional:       General: She is not in acute distress. Appearance: Normal appearance. She is not ill-appearing. HENT:      Head: Normocephalic and atraumatic. Eyes:      Conjunctiva/sclera: Conjunctivae normal.      Pupils: Pupils are equal, round, and reactive to light. Cardiovascular:      Rate and Rhythm: Normal rate and regular rhythm. Pulses: Normal pulses. Heart sounds: Normal heart sounds. Pulmonary:      Effort: Pulmonary effort is normal.      Breath sounds: Normal breath sounds. Abdominal:      General: Abdomen is flat. Bowel sounds are normal.      Palpations: Abdomen is soft. Tenderness: There is no abdominal tenderness. Musculoskeletal:         General: Normal range of motion. Cervical back: Normal range of motion and neck supple. Right lower leg: No edema. Left lower leg: No edema. Skin:     General: Skin is warm and dry.    Neurological:      General: No focal deficit present. Mental Status: She is alert and oriented to person, place, and time. Mental status is at baseline. Psychiatric:         Mood and Affect: Mood normal.         Behavior: Behavior normal.         Thought Content: Thought content normal.             Depression Screening and Follow-up Plan: Patient was screened for depression during today's encounter. They screened negative with a PHQ-2 score of 1.             JEFERSON Wilcox

## 2023-09-22 NOTE — PATIENT INSTRUCTIONS
Monitor your home blood pressure readings 2x daily over the next 2 weeks, call or send my chart message with log. Follow healthy diet, limit carb, sugar and saturated fat intake. Return in 3 months, sooner if needed. Blood work due prior to visit, fasting.

## 2023-10-19 ENCOUNTER — APPOINTMENT (OUTPATIENT)
Dept: LAB | Facility: MEDICAL CENTER | Age: 63
End: 2023-10-19
Payer: COMMERCIAL

## 2023-10-19 DIAGNOSIS — Z13.1 DIABETES MELLITUS SCREENING: ICD-10-CM

## 2023-10-19 DIAGNOSIS — L40.0 PSORIASIS VULGARIS: ICD-10-CM

## 2023-10-19 DIAGNOSIS — E78.2 MIXED HYPERLIPIDEMIA: ICD-10-CM

## 2023-10-19 DIAGNOSIS — I10 PRIMARY HYPERTENSION: ICD-10-CM

## 2023-10-19 LAB
ALBUMIN SERPL BCP-MCNC: 4.1 G/DL (ref 3.5–5)
ALP SERPL-CCNC: 79 U/L (ref 34–104)
ALT SERPL W P-5'-P-CCNC: 17 U/L (ref 7–52)
ANION GAP SERPL CALCULATED.3IONS-SCNC: 10 MMOL/L
AST SERPL W P-5'-P-CCNC: 11 U/L (ref 13–39)
BILIRUB SERPL-MCNC: 0.49 MG/DL (ref 0.2–1)
BUN SERPL-MCNC: 18 MG/DL (ref 5–25)
CALCIUM SERPL-MCNC: 9.5 MG/DL (ref 8.4–10.2)
CHLORIDE SERPL-SCNC: 102 MMOL/L (ref 96–108)
CHOLEST SERPL-MCNC: 221 MG/DL
CO2 SERPL-SCNC: 29 MMOL/L (ref 21–32)
CREAT SERPL-MCNC: 0.69 MG/DL (ref 0.6–1.3)
CREAT UR-MCNC: 129.8 MG/DL
EST. AVERAGE GLUCOSE BLD GHB EST-MCNC: 126 MG/DL
GFR SERPL CREATININE-BSD FRML MDRD: 93 ML/MIN/1.73SQ M
GLUCOSE P FAST SERPL-MCNC: 97 MG/DL (ref 65–99)
HBA1C MFR BLD: 6 %
HDLC SERPL-MCNC: 66 MG/DL
LDLC SERPL CALC-MCNC: 131 MG/DL (ref 0–100)
MICROALBUMIN UR-MCNC: 7 MG/L
MICROALBUMIN/CREAT 24H UR: 5 MG/G CREATININE (ref 0–30)
NONHDLC SERPL-MCNC: 155 MG/DL
POTASSIUM SERPL-SCNC: 4.1 MMOL/L (ref 3.5–5.3)
PROT SERPL-MCNC: 7.1 G/DL (ref 6.4–8.4)
SODIUM SERPL-SCNC: 141 MMOL/L (ref 135–147)
TRIGL SERPL-MCNC: 122 MG/DL

## 2023-10-19 PROCEDURE — 80053 COMPREHEN METABOLIC PANEL: CPT

## 2023-10-19 PROCEDURE — 86480 TB TEST CELL IMMUN MEASURE: CPT

## 2023-10-19 PROCEDURE — 83036 HEMOGLOBIN GLYCOSYLATED A1C: CPT

## 2023-10-19 PROCEDURE — 36415 COLL VENOUS BLD VENIPUNCTURE: CPT

## 2023-10-19 PROCEDURE — 80061 LIPID PANEL: CPT

## 2023-10-20 LAB
GAMMA INTERFERON BACKGROUND BLD IA-ACNC: 0.06 IU/ML
M TB IFN-G BLD-IMP: NEGATIVE
M TB IFN-G CD4+ BCKGRND COR BLD-ACNC: 0.03 IU/ML
M TB IFN-G CD4+ BCKGRND COR BLD-ACNC: 0.03 IU/ML
MITOGEN IGNF BCKGRD COR BLD-ACNC: 1.27 IU/ML

## 2023-10-23 ENCOUNTER — TELEPHONE (OUTPATIENT)
Dept: FAMILY MEDICINE CLINIC | Facility: MEDICAL CENTER | Age: 63
End: 2023-10-23

## 2023-10-23 NOTE — TELEPHONE ENCOUNTER
----- Message from [address] [place] [address] sent at 10/22/2023 11:17 PM EDT -----  Total cholesterol and LDL remain elevated, however ldl is trending down. Labs were due in 3 months, not yet due but we can continue to further decrease saturated fats, exercise regularly and recheck in 3-6 months will further discuss in office at next visit. Liver, kidney function and electrolytes returned normal.  A1c returned in pre-diabetic range, slightly higher than prior. Medication not yet indicated however I strongly advise decreasing sugar and carb intake, we can repeat in the future.

## 2023-10-24 ENCOUNTER — TELEPHONE (OUTPATIENT)
Age: 63
End: 2023-10-24

## 2023-10-24 NOTE — TELEPHONE ENCOUNTER
Patient returned call. Gave results as indicated by BEENA Fontenot's note. Patient understood and will try and reduce sugar and carbs. F/U as indicated.

## 2023-12-27 ENCOUNTER — OFFICE VISIT (OUTPATIENT)
Dept: FAMILY MEDICINE CLINIC | Facility: MEDICAL CENTER | Age: 63
End: 2023-12-27
Payer: COMMERCIAL

## 2023-12-27 VITALS
HEART RATE: 64 BPM | WEIGHT: 174 LBS | HEIGHT: 66 IN | TEMPERATURE: 98.8 F | DIASTOLIC BLOOD PRESSURE: 82 MMHG | RESPIRATION RATE: 16 BRPM | OXYGEN SATURATION: 98 % | SYSTOLIC BLOOD PRESSURE: 136 MMHG | BODY MASS INDEX: 27.97 KG/M2

## 2023-12-27 DIAGNOSIS — E78.2 MIXED HYPERLIPIDEMIA: ICD-10-CM

## 2023-12-27 DIAGNOSIS — I10 PRIMARY HYPERTENSION: Primary | ICD-10-CM

## 2023-12-27 DIAGNOSIS — F41.9 ANXIETY: ICD-10-CM

## 2023-12-27 PROCEDURE — 99214 OFFICE O/P EST MOD 30 MIN: CPT

## 2023-12-27 NOTE — PROGRESS NOTES
Assessment/Plan:    Vaccinations up-to-date.  Mammogram due, patient will have OB/GYN order this at her next visit.  Encouraged to continue with healthy diet, regular exercise.  Return in 4 months for annual physical, sooner if needed.     1. Primary hypertension  Stable with current management.  Continue triamterene-hydrochlorothiazide.  Kidney function and electrolytes stable on most recent labs.    2. Anxiety  Stable with current management.  Continue sertraline.      3. Mixed hyperlipidemia  Lipids improved.  Encouraged to continue with healthy diet, regular exercise and limiting saturated fat intake.      Subjective:      Patient ID: Alexa Hawley is a 63 y.o. female.    63-year-old pleasant female presents for 3-month follow-up.  She is doing well overall.  She has hypertension, currently on triamterene-hydrochlorothiazide.  She has anxiety, currently on sertraline.  Tolerating medications well, does not need refills at this time.  She tells me when she does need refills she will let me know as she now needs to go through [facility].  She has hyperlipidemia, currently controlled with diet ,lifestyle.   She is up-to-date on all vaccinations   She is due for mammogram, reports she will have this ordered by OB/GYN when she schedules her next visit..    She offers no concerns or complaints at this time        The following portions of the patient's history were reviewed and updated as appropriate: allergies, current medications, past medical history, past social history, past surgical history, and problem list.    Review of Systems   Constitutional: Negative.    HENT: Negative.     Eyes: Negative.    Respiratory: Negative.     Cardiovascular: Negative.    Gastrointestinal: Negative.    Endocrine: Negative.    Genitourinary: Negative.    Musculoskeletal: Negative.    Skin: Negative.    Allergic/Immunologic: Negative.    Neurological: Negative.    Hematological: Negative.    Psychiatric/Behavioral: Negative.    "        Objective:      /82   Pulse 64   Temp 98.8 °F (37.1 °C)   Resp 16   Ht 5' 5.5\" (1.664 m)   Wt 78.9 kg (174 lb)   SpO2 98%   BMI 28.51 kg/m²          Physical Exam  Vitals and nursing note reviewed.   Constitutional:       General: She is not in acute distress.     Appearance: Normal appearance. She is not ill-appearing, toxic-appearing or diaphoretic.   HENT:      Head: Normocephalic and atraumatic.   Eyes:      Conjunctiva/sclera: Conjunctivae normal.      Pupils: Pupils are equal, round, and reactive to light.   Cardiovascular:      Rate and Rhythm: Normal rate and regular rhythm.      Pulses: Normal pulses.      Heart sounds: Normal heart sounds.   Pulmonary:      Effort: Pulmonary effort is normal.      Breath sounds: Normal breath sounds.   Abdominal:      General: Abdomen is flat. Bowel sounds are normal.      Palpations: Abdomen is soft.      Tenderness: There is no abdominal tenderness.   Musculoskeletal:         General: Normal range of motion.      Cervical back: Normal range of motion and neck supple.      Right lower leg: No edema.      Left lower leg: No edema.   Skin:     General: Skin is warm and dry.   Neurological:      General: No focal deficit present.      Mental Status: She is alert and oriented to person, place, and time. Mental status is at baseline.   Psychiatric:         Mood and Affect: Mood normal.         Behavior: Behavior normal.         Thought Content: Thought content normal.                    JEFERSON Rodriguez  "

## 2024-01-01 ENCOUNTER — APPOINTMENT (EMERGENCY)
Dept: RADIOLOGY | Facility: HOSPITAL | Age: 64
End: 2024-01-01
Payer: COMMERCIAL

## 2024-01-01 ENCOUNTER — HOSPITAL ENCOUNTER (EMERGENCY)
Facility: HOSPITAL | Age: 64
Discharge: HOME | End: 2024-01-01
Attending: EMERGENCY MEDICINE
Payer: COMMERCIAL

## 2024-01-01 VITALS
SYSTOLIC BLOOD PRESSURE: 183 MMHG | OXYGEN SATURATION: 99 % | RESPIRATION RATE: 18 BRPM | TEMPERATURE: 97.7 F | DIASTOLIC BLOOD PRESSURE: 83 MMHG | HEART RATE: 67 BPM

## 2024-01-01 DIAGNOSIS — S62.609A FINGER FRACTURE, RIGHT: Primary | ICD-10-CM

## 2024-01-01 PROCEDURE — 29125 APPL SHORT ARM SPLINT STATIC: CPT | Performed by: PHYSICIAN ASSISTANT

## 2024-01-01 PROCEDURE — 73130 X-RAY EXAM OF HAND: CPT

## 2024-01-01 PROCEDURE — 99283 EMERGENCY DEPT VISIT LOW MDM: CPT

## 2024-01-01 PROCEDURE — 99284 EMERGENCY DEPT VISIT MOD MDM: CPT | Performed by: PHYSICIAN ASSISTANT

## 2024-01-01 NOTE — ED PROVIDER NOTES
History  Chief Complaint   Patient presents with    Hand Injury     R HAND, DIGIITS 3,4,5 INJURY      64 yo with right hand injury. States she was was riding on horseback and her third, fourth, and fifth digits were crushed in the reins and saddle. Pain in those digits since then. Normal ROM albeit pain when doing so. No numbness, tingling, weakness. Also notes her nails of those digits were partially avulsed.       History provided by:  Patient   used: No    Hand Injury  Associated symptoms: no back pain and no fever        Prior to Admission Medications   Prescriptions Last Dose Informant Patient Reported? Taking?   Cosentyx Sensoready, 300 MG, 150 MG/ML SOAJ injection   Yes No   Multiple Vitamins-Minerals (multivitamin with minerals) tablet  Self Yes No   Sig: Take 1 tablet by mouth daily   calcium carbonate (OS-YONG) 600 MG tablet   Yes No   Sig: Take 600 mg by mouth 2 (two) times a day with meals   cholecalciferol (VITAMIN D3) 1,000 units tablet   Yes No   Sig: Take 1,000 Units by mouth daily   sertraline (ZOLOFT) 100 mg tablet   No No   Sig: Take 1 tablet (100 mg total) by mouth daily   triamterene-hydrochlorothiazide (MAXZIDE-25) 37.5-25 mg per tablet   No No   Sig: take 1 tablet by mouth once daily      Facility-Administered Medications: None       Past Medical History:   Diagnosis Date    Allergic     Vicodin    Anemia     Anxiety     Arthritis     Bariatric surgery status     Closed head injury 06/11/2018    2018    Colon polyp     Concussion with loss of consciousness 06/22/2018    2018    Depression     Diabetes mellitus (HCC)     NIDDM    Diarrhea     chronic    Fatty liver     Fecal incontinence     GERD (gastroesophageal reflux disease)     Head injury     6/10/11    Hiatal hernia     Hyperlipidemia     Hypertension     Lifelong obesity     Mild TBI (HCC) 06/11/2018    Postsurgical malabsorption     Psoriasis     legs    SAH (subarachnoid hemorrhage) (HCC) 06/11/2018    2018    Tear  of right supraspinatus tendon 12/16/2019    Type 2 diabetes mellitus with microalbuminuria, without long-term current use of insulin  07/05/2018    Wears glasses        Past Surgical History:   Procedure Laterality Date    CHOLECYSTECTOMY      COLONOSCOPY N/A 10/24/2017    Procedure: COLONOSCOPY;  Surgeon: Wai Baez MD;  Location: [facility];  Service: Colorectal    COLONOSCOPY W/ ENDOSCOPIC US N/A 10/24/2017    Procedure: ANAL ENDOSCOPIC U/S;  Surgeon: Wai Baez MD;  Location: [facility];  Service: Colorectal    DILATION AND CURETTAGE OF UTERUS      EGD      ENDOMETRIAL BIOPSY      WITHOUT CERVICAL DILATION    HYSTERECTOMY      NASAL SEPTUM SURGERY      OTHER SURGICAL HISTORY      Episiotomy repair    WV LAPS GSTRC RSTRICTIV PX LONGITUDINAL GASTRECTOMY N/A 6/16/2020    Procedure: GASTRECTOMY SLEEVE LAPAROSCOPIC AND INTRAOPERATIVE EGD.;  Surgeon: Robin Sommer MD;  Location: [facility];  Service: Bariatrics    WV SURGICAL ARTHROSCOPY SHOULDER W/ROTATOR CUFF RPR Right 1/15/2020    Procedure: SHOULDER ARTHROSCOPIC DEBRIDEMENT OF PARTIAL THICKNESS ROTATOR CUFF TEAR, SAD;  Surgeon: Stan Angeles MD;  Location: [facility];  Service: Orthopedics    ROTATOR CUFF REPAIR Left        Family History   Problem Relation Age of Onset    Atrial fibrillation Mother     Hypertension Mother     Depression Mother     Suicide Attempts Mother     Lung cancer Father     Heart disease Father     Diabetes Father     Cancer Father     Obesity Brother     No Known Problems Brother     Breast cancer Maternal Grandmother 50    Cancer Paternal Grandfather     No Known Problems Son     No Known Problems Son     Lung cancer Maternal Aunt     No Known Problems Maternal Aunt     No Known Problems Maternal Aunt     No Known Problems Maternal Aunt     Other Family         ADENOCARCINOMA IN SIOBHAN IN VILLOUS ADENOMA OF THE BREAST, MIGRAINES, SKIN DISORDER    Depression Family     Anxiety disorder Family     Diabetes Family          MELLITUS    Fibrocystic breast disease Family     Heart disease Family     Hiatal hernia Family     Hypertension Family     Irritable bowel syndrome Family     Kidney disease Family     Urinary tract infection Family     Breast cancer Family     Ovarian cancer Neg Hx     Uterine cancer Neg Hx     Cervical cancer Neg Hx      I have reviewed and agree with the history as documented.    E-Cigarette/Vaping    E-Cigarette Use Never User      E-Cigarette/Vaping Substances    Nicotine No     THC No     CBD No     Flavoring No     Other No     Unknown No      Social History     Tobacco Use    Smoking status: Former     Current packs/day: 0.00     Average packs/day: 0.5 packs/day for 5.0 years (2.5 ttl pk-yrs)     Types: Cigarettes     Start date: 6/10/1987     Quit date: 6/10/1992     Years since quittin.5    Smokeless tobacco: Never    Tobacco comments:     Smoked for 10yrs.   Vaping Use    Vaping status: Never Used   Substance Use Topics    Alcohol use: Yes     Comment: Wine maybe once or twice a month    Drug use: No       Review of Systems   Constitutional:  Negative for chills and fever.   HENT:  Negative for ear pain and sore throat.    Eyes:  Negative for pain and visual disturbance.   Respiratory:  Negative for cough and shortness of breath.    Cardiovascular:  Negative for chest pain and palpitations.   Gastrointestinal:  Negative for abdominal pain and vomiting.   Genitourinary:  Negative for dysuria and hematuria.   Musculoskeletal:  Negative for arthralgias and back pain.        Right hand injury   Skin:  Negative for color change and rash.   Neurological:  Negative for seizures and syncope.   All other systems reviewed and are negative.      Physical Exam  Physical Exam  Vitals and nursing note reviewed.   Constitutional:       General: She is not in acute distress.     Appearance: She is well-developed.   HENT:      Head: Normocephalic and atraumatic.   Eyes:      Conjunctiva/sclera: Conjunctivae normal.    Cardiovascular:      Rate and Rhythm: Normal rate and regular rhythm.      Heart sounds: No murmur heard.  Pulmonary:      Effort: Pulmonary effort is normal. No respiratory distress.      Breath sounds: Normal breath sounds.   Abdominal:      Palpations: Abdomen is soft.      Tenderness: There is no abdominal tenderness.   Musculoskeletal:         General: No swelling.      Cervical back: Neck supple.      Comments: Normal radial, ulnar, median nerve function right hand. Tenderness and swelling along middle and distal phalanges of 3rd-5th digits. Trace swelling in this area.    Skin:     General: Skin is warm and dry.      Capillary Refill: Capillary refill takes less than 2 seconds.   Neurological:      Mental Status: She is alert.   Psychiatric:         Mood and Affect: Mood normal.         Vital Signs  ED Triage Vitals [01/01/24 1710]   Temperature Pulse Respirations Blood Pressure SpO2   97.7 °F (36.5 °C) 67 18 (!) 183/83 99 %      Temp src Heart Rate Source Patient Position - Orthostatic VS BP Location FiO2 (%)   -- -- -- -- --      Pain Score       --           Vitals:    01/01/24 1710   BP: (!) 183/83   Pulse: 67         Visual Acuity      ED Medications  Medications - No data to display    Diagnostic Studies  Results Reviewed       None                   XR hand 3+ views RIGHT   ED Interpretation by Boyd Dunlap PA-C (01/01 7358)   Fracture fifth distal phalanx and third middle phalanx                 Procedures  Splint application    Date/Time: 1/1/2024 6:13 PM    Performed by: Boyd Dunlap PA-C  Authorized by: Boyd Dunlap PA-C  [place] Protocol:  Consent: Verbal consent obtained.  Risks and benefits: risks, benefits and alternatives were discussed  Consent given by: patient  Patient understanding: patient states understanding of the procedure being performed  Patient identity confirmed: verbally with patient, arm band and provided demographic data    Procedure details:      Laterality:  Right    Location:  Hand    Hand:  R hand    Splint type:  Ulnar gutter    Supplies:  Ortho-Glass  Post-procedure details:     Pain:  Improved    Sensation:  Normal    Patient tolerance of procedure:  Tolerated well, no immediate complications           ED Course                               SBIRT 22yo+      Flowsheet Row Most Recent Value   Initial Alcohol Screen: US AUDIT-C     1. How often do you have a drink containing alcohol? 0 Filed at: 01/01/2024 1739   2. How many drinks containing alcohol do you have on a typical day you are drinking?  0 Filed at: 01/01/2024 1739   3b. FEMALE Any Age, or MALE 65+: How often do you have 4 or more drinks on one occassion? 0 Filed at: 01/01/2024 1739   Audit-C Score 0 Filed at: 01/01/2024 1739   SUJATA: How many times in the past year have you...    Used an illegal drug or used a prescription medication for non-medical reasons? Never Filed at: 01/01/2024 1739                      Medical Decision Making  Differential diagnosis including but not limited to: sprain, strain, fracture, dislocation, contusion; doubt compartment syndrome.   Plan: XR.     MDM: 64 yo with hand injury. Fractures of third and fifth digits. Splinted. Ortho f/u. Return parameters provided. Pt understands and agrees with plan.      Amount and/or Complexity of Data Reviewed  Radiology: ordered and independent interpretation performed.             Disposition  Final diagnoses:   Finger fracture, right - Right third middle phalanx and right fifth distal phalanx.     Time reflects when diagnosis was documented in both MDM as applicable and the Disposition within this note       Time User Action Codes Description Comment    1/1/2024  6:25 PM Boyd Dunlap Add [O35.783L] Finger fracture, right     1/1/2024  6:25 PM Boyd Dunlap Modify [O39.369J] Finger fracture, right Right third middle phalanx and right fifth distal phalanx.          ED Disposition       ED Disposition   Discharge     Condition   Stable    Date/Time   Mon Jan 1, 2024 1825    Comment   Alexa Hawley discharge to home/self care.                   Follow-up Information       Follow up With Specialties Details Why Contact Info Additional Information    [facility]   [address]  [facility] [id number] [phone] [facility], [address], [place], [place], [address] [phone]            Discharge Medication List as of 1/1/2024  6:25 PM        CONTINUE these medications which have NOT CHANGED    Details   calcium carbonate (OS-YONG) 600 MG tablet Take 600 mg by mouth 2 (two) times a day with meals, Historical Med      cholecalciferol (VITAMIN D3) 1,000 units tablet Take 1,000 Units by mouth daily, Historical Med      Cosentyx Sensoready, 300 MG, 150 MG/ML SOAJ injection Starting Sat 8/20/2022, Historical Med      Multiple Vitamins-Minerals (multivitamin with minerals) tablet Take 1 tablet by mouth daily, Historical Med      sertraline (ZOLOFT) 100 mg tablet Take 1 tablet (100 mg total) by mouth daily, Starting Fri 9/15/2023, Normal      triamterene-hydrochlorothiazide (MAXZIDE-25) 37.5-25 mg per tablet take 1 tablet by mouth once daily, Starting Fri 9/15/2023, Normal                 PDMP Review       None            ED Provider  Electronically Signed by             Boyd Dunlap PA-C  01/01/24 6636

## 2024-01-02 ENCOUNTER — TELEPHONE (OUTPATIENT)
Dept: EMERGENCY DEPT | Facility: HOSPITAL | Age: 64
End: 2024-01-02

## 2024-01-02 NOTE — LETTER
[facility] EMERGENCY DEPARTMENT  [address]  [place] [address]    Date: 01/04/24  Alexa Hawley  [address] [address]    Dear Alexa:                                                                                                                              Thank you for choosing [facility] emergency department for care.  Your primary care provider wants to make sure that your ongoing medical care is being addressed. If you require follow up care as a result of your emergency department visit, there are a few things the practice would like you to know.                As part of the network's continuing commitment to caring for our patients, we have added more same day appointments and have extended office hours to meet your medical needs. After hours, on-call physicians are available via your primary care provider's main office line.               We encourage you to contact our office prior to seeking treatment to discuss your symptoms with the medical staff.  Together, we can determine the correct course of action.  A majority of non-emergent conditions such as: common cold, flu-like symptoms, fevers, strains/sprains, dislocations, minor burns, cuts and animal bites can be treated at [facility] facilities. Diagnostic testing is available at some sites.               Of course, if you are experiencing a life threatening medical emergency call 911 or proceed directly to the nearest emergency room.    Your nearest [facility] facility is conveniently located at:    [facility]  [address]  [place] [address]  [phone]  SKIP THE WAIT  Conveniently offered at most [facility] locations  [place] your spot online at [email or web address] or on the [facility] [place]    Sincerely,    [facility] EMERGENCY DEPARTMENT  Dept: [phone]

## 2024-01-03 NOTE — TELEPHONE ENCOUNTER
01/03/24 12:04 PM    Patient contacted post ED visit, second outreach attempt made. Message was left for patient to return a call to the VBI Department at [facility]: Phone [phone].    Thank you.  Andi Whitley  [facility]

## 2024-01-04 NOTE — TELEPHONE ENCOUNTER
01/04/24 11:26 AM    Patient contacted post ED visit, VBI phone outreaches documented. Patient called practice and scheduled a follow-up ED visit.     Thank you.  Andi Whitley [facility]

## 2024-01-08 ENCOUNTER — OFFICE VISIT (OUTPATIENT)
Dept: OBGYN CLINIC | Facility: CLINIC | Age: 64
End: 2024-01-08
Payer: COMMERCIAL

## 2024-01-08 VITALS — WEIGHT: 174 LBS | BODY MASS INDEX: 27.97 KG/M2 | HEIGHT: 66 IN

## 2024-01-08 DIAGNOSIS — S62.609A FINGER FRACTURE, RIGHT: ICD-10-CM

## 2024-01-08 PROCEDURE — 99213 OFFICE O/P EST LOW 20 MIN: CPT | Performed by: SURGERY

## 2024-01-08 NOTE — PROGRESS NOTES
Assessment:    Right long finger middle phalanx fracture  Right small finger distal phalanx fracture   DOI:  1/1/2024      Plan:    Nonsurgical treatment.  Stack splints provided, size 5.5 for the long finger, size 3 for the small finger.  They remove these for hygiene.  Over-the-counter pain medications as needed.  Follow-up in 2-3 weeks for splint discontinuation and beginning range of motion.            Subjective:     HPI    Patient ID:  Alexa Hawley is a right hand dominant 63 y.o. female   Presenting for evaluation of the right hand.  About a week ago she fell off the horse and landed on her right hand.  She went to the ER where she was found to have fractures of the fingers.  She was placed in a splint and referred here.  Today she states her pain is fairly well-controlled but she has throbbing in the middle and small fingers.  She has intermittent numbness and tingling of the digits as well as the whole hand.  She denies any open wounds.  No history of surgery to the these digits.    The following portions of the patient's history were reviewed and updated as appropriate: allergies, current medications, past family history, past medical history, past social history, past surgical history, and problem list.    Review of Systems     Objective:    Imaging:  Right hand x-rays 1/1/2024  FINDINGS:     Nondisplaced oblique intra-articular fracture distal aspect third middle phalanx.     Avulsion fracture dorsal base fifth distal phalanx.     Mild degenerative changes DIP joints.     No lytic or blastic osseous lesion.     Mild soft tissue swelling overlying the third and fifth digits.        IMPRESSION:     Nondisplaced, oblique intra-articular fracture distal aspect third middle phalanx.     Avulsion fracture, dorsal base fifth distal phalanx.    Physical Exam     General appearance:  NAD   Cardiac:  Regular rate  Lungs:  Unlabored breathing  Abdomen:  Non-distended    Orthopedic Examination:  Right hand      Inspection: No open wounds.  Swelling of the middle and small fingers.  Ecchymosis of the middle finger is present.  No discrete mallet deformity of the small finger is visible.    Palpation: tender to palpation at the fracture sites    Range-of-motion: Limited due to fractures, no obvious scissoring of the digits.    Strength: Deferred    Sensation: Intact to light touch    Special Tests: Cap refill at the fingertips  Palpable radial pulse

## 2024-01-29 ENCOUNTER — OFFICE VISIT (OUTPATIENT)
Dept: OBGYN CLINIC | Facility: CLINIC | Age: 64
End: 2024-01-29
Payer: COMMERCIAL

## 2024-01-29 VITALS — WEIGHT: 174 LBS | HEIGHT: 66 IN | BODY MASS INDEX: 27.97 KG/M2

## 2024-01-29 DIAGNOSIS — S62.652D CLOSED NONDISPLACED FRACTURE OF MIDDLE PHALANX OF RIGHT MIDDLE FINGER WITH ROUTINE HEALING, SUBSEQUENT ENCOUNTER: Primary | ICD-10-CM

## 2024-01-29 DIAGNOSIS — S62.666D CLOSED NONDISPLACED FRACTURE OF DISTAL PHALANX OF RIGHT LITTLE FINGER WITH ROUTINE HEALING, SUBSEQUENT ENCOUNTER: ICD-10-CM

## 2024-01-29 PROCEDURE — 99213 OFFICE O/P EST LOW 20 MIN: CPT | Performed by: SURGERY

## 2024-01-29 NOTE — PROGRESS NOTES
Assessment:    Right long finger middle phalanx fracture  Right small finger distal phalanx fracture   DOI:  1/1/2024      Plan:    No further splinting necessary  Begin more aggressive active and passive ROM exercises and increase as able.  Normal daily activities to tolerance, no restrictions.  OTC pain medications as needed.  May follow-up PRN          Subjective:     HPI    Patient ID:  Alexa Hawley is a right hand dominant 63 y.o. female   Presenting for follow-up  evaluation of the right hand.    Today, she states she self-discontinued the splints this past weekend.  Pain is present but improving.  Has some tingling in the fingertips.  No new issues.      Initial Hand H&P:  About a week ago she fell off the horse and landed on her right hand.  She went to the ER where she was found to have fractures of the fingers.  She was placed in a splint and referred here.  Today she states her pain is fairly well-controlled but she has throbbing in the middle and small fingers.  She has intermittent numbness and tingling of the digits as well as the whole hand.  She denies any open wounds.  No history of surgery to the these digits.    The following portions of the patient's history were reviewed and updated as appropriate: allergies, current medications, past family history, past medical history, past social history, past surgical history, and problem list.    Review of Systems     Objective:    Imaging:  Right hand x-rays 1/1/2024    FINDINGS:     Nondisplaced oblique intra-articular fracture distal aspect third middle phalanx.     Avulsion fracture dorsal base fifth distal phalanx.     Mild degenerative changes DIP joints.     No lytic or blastic osseous lesion.     Mild soft tissue swelling overlying the third and fifth digits.        IMPRESSION:     Nondisplaced, oblique intra-articular fracture distal aspect third middle phalanx.     Avulsion fracture, dorsal base fifth distal phalanx.    Physical Exam      General appearance:  NAD   Cardiac:  Regular rate  Lungs:  Unlabored breathing  Abdomen:  Non-distended    Orthopedic Examination:  Right hand     Inspection: No open wounds.  Swelling of the middle and small fingers that is improved.  No ecchymosis.    Palpation:  Minimal tender to palpation at the fracture sites    Range-of-motion: Able to make full composite fist, no rotational deformities    Strength: Deferred    Sensation: Intact to light touch    Special Tests: Good cap refill at the fingertips  Palpable radial pulse

## 2024-03-05 DIAGNOSIS — F41.9 ANXIETY: ICD-10-CM

## 2024-03-05 RX ORDER — SERTRALINE HYDROCHLORIDE 100 MG/1
100 TABLET, FILM COATED ORAL DAILY
Qty: 90 TABLET | Refills: 1 | Status: SHIPPED | OUTPATIENT
Start: 2024-03-05

## 2024-03-06 DIAGNOSIS — I10 ESSENTIAL HYPERTENSION: ICD-10-CM

## 2024-03-06 RX ORDER — TRIAMTERENE AND HYDROCHLOROTHIAZIDE 37.5; 25 MG/1; MG/1
1 TABLET ORAL DAILY
Qty: 90 TABLET | Refills: 1 | Status: SHIPPED | OUTPATIENT
Start: 2024-03-06

## 2024-03-26 NOTE — PROGRESS NOTES
ADULT ANNUAL PHYSICAL  [address]  [address] [facility] [place]    NAME: Brooke Mcdonald  AGE: 58 y o  SEX: female  : 1960     DATE: 3/22/2023     Assessment and Plan:   Fasting blood work ordered, to be done in 3 months  Vaccinations up-to-date  Mammogram up-to-date  Colonoscopy up-to-date  Encouraged to follow healthy diet and exercise  Follow-up in 6 months or sooner if needed     Problem List Items Addressed This Visit        Other    Hyperlipidemia    Relevant Orders    Lipid panel   Other Visit Diagnoses     Annual physical exam    -  Primary    Essential hypertension        Relevant Medications    triamterene-hydrochlorothiazide (MAXZIDE-25) 37 5-25 mg per tablet    Other Relevant Orders    Comprehensive metabolic panel          Immunizations and preventive care screenings were discussed with patient today  Appropriate education was printed on patient's after visit summary  Counseling:  Alcohol/drug use: discussed moderation in alcohol intake, the recommendations for healthy alcohol use, and avoidance of illicit drug use  Dental Health: discussed importance of regular tooth brushing, flossing, and dental visits  Injury prevention: discussed safety/seat belts, safety helmets, smoke detectors, carbon dioxide detectors, and smoking near bedding or upholstery  Sexual health: discussed sexually transmitted diseases, partner selection, use of condoms, avoidance of unintended pregnancy, and contraceptive alternatives  Exercise: the importance of regular exercise/physical activity was discussed  Recommend exercise 3-5 times per week for at least 30 minutes  Denies smoking, former smoker, etoh use rare, denies drug use  BMI Counseling: Body mass index is 27 04 kg/m²   The BMI is above normal  Nutrition recommendations include encouraging healthy choices of fruits and vegetables, moderation in carbohydrate intake, increasing intake of lean protein and reducing LMP 4 years ago.  Patient reports one episode of bright red vaginal bleeding on 3/13 but she was experiencing diarrhea at the time.  February 2023 pap smear was negative for malignancy and HPV.  Jan 2023 TSH and T4 WNL.   intake of saturated and trans fat  Exercise recommendations include moderate physical activity 150 minutes/week and exercising 3-5 times per week  No pharmacotherapy was ordered  Rationale for BMI follow-up plan is due to patient being overweight or obese  Depression Screening and Follow-up Plan: Patient was screened for depression during today's encounter  They screened negative with a PHQ-2 score of 0  Return in 6 months (on 9/22/2023)  Chief Complaint:     Chief Complaint   Patient presents with   • Annual Exam      History of Present Illness:     Adult Annual Physical   Patient here for a comprehensive physical exam    She is doing well overall  She just returned from a trip to [facility]  She tells me that she is a little stressed out recently as both of her sons are moving away to different states  However, she seems to be doing well with this and lives at home with her   She is agreeable to fasting blood work in 3 months   She reports making changes to her diet and eating less saturated fats and cheeses   She offers no concerns or complaints today  Diet and Physical Activity  Diet/Nutrition: well balanced diet  Exercise: [place] work and riding horses  Depression Screening  PHQ-2/9 Depression Screening    Little interest or pleasure in doing things: 0 - not at all  Feeling down, depressed, or hopeless: 0 - not at all  PHQ-2 Score: 0  PHQ-2 Interpretation: Negative depression screen       General Health  Sleep: sleeps well  Hearing: normal - bilateral   Vision: goes for regular eye exams and wears glasses  Dental: regular dental visits  /GYN Health  Patient is: postmenopausal     Review of Systems:     Review of Systems   Constitutional: Negative  HENT: Negative  Eyes: Negative  Respiratory: Negative  Cardiovascular: Negative  Gastrointestinal: Negative  Endocrine: Negative  Genitourinary: Negative  Musculoskeletal: Negative      Skin: Negative  Allergic/Immunologic: Negative  Neurological: Negative  Hematological: Negative  Psychiatric/Behavioral: Negative  Past Medical History:     Past Medical History:   Diagnosis Date   • Allergic     Vicodin   • Anemia    • Anxiety    • Arthritis    • Bariatric surgery status    • Closed head injury 06/11/2018 2018   • Colon polyp    • Concussion with loss of consciousness 06/22/2018 2018   • Depression    • Diabetes mellitus ([facility] [address] )     NIDDM   • Diarrhea     chronic   • Fatty liver    • Fecal incontinence    • GERD (gastroesophageal reflux disease)    • Head injury     6/10/11   • Hiatal hernia    • Hyperlipidemia    • Hypertension    • Lifelong obesity    • Mild TBI 06/11/2018   • Postsurgical malabsorption    • Psoriasis     legs   • SAH (subarachnoid hemorrhage) ([facility] [address] ) 06/11/2018 2018   • Tear of right supraspinatus tendon 12/16/2019   • Type 2 diabetes mellitus with microalbuminuria, without long-term current use of insulin ([facility] ) 07/05/2018   • Wears glasses       Past Surgical History:     Past Surgical History:   Procedure Laterality Date   • CHOLECYSTECTOMY     • COLONOSCOPY N/A 10/24/2017    Procedure: COLONOSCOPY;  Surgeon: Stefani Schultz MD;  Location: [facility]; Service: Colorectal   • COLONOSCOPY W/ ENDOSCOPIC US N/A 10/24/2017    Procedure: ANAL ENDOSCOPIC U/S;  Surgeon: Stefani Schultz MD;  Location: [facility];   Service: Colorectal   • DILATION AND CURETTAGE OF UTERUS     • EGD     • ENDOMETRIAL BIOPSY      WITHOUT CERVICAL DILATION   • HYSTERECTOMY     • NASAL SEPTUM SURGERY     • OTHER SURGICAL HISTORY      Episiot[facility] repair   [facility] [address] [place] [address] LONGITUDINAL GASTRECTOMY N/A 6/16/2020    Procedure: GASTRECTOMY SLEEVE LAPAROSCOPIC AND INTRAOPERATIVE EGD ;  Surgeon: Homer Chiu MD;  Location: [facility];  Service: Bariatrics   • CT SURGICAL ARTHROSCOPY SHOULDER W/ROTATOR CUFF RPR Right 1/15/2020    Procedure: SHOULDER ARTHROSCOPIC DEBRIDEMENT OF PARTIAL THICKNESS ROTATOR CUFF TEAR, SAD;  Surgeon: Hao Dumont MD;  Location: [facility];  Service: Orthopedics   • ROTATOR CUFF REPAIR Left       Social History:     Social History     Socioeconomic History   • Marital status: /Civil Union     Spouse name: None   • Number of children: None   • Years of education: None   • Highest education level: None   Occupational History   • None   Tobacco Use   • Smoking status: Former     Packs/day: 0 50     Years: 5 00     Pack years: 2 50     Types: Cigarettes     Quit date: 6/10/1992     Years since quittin 8   • Smokeless tobacco: Never   • Tobacco comments:     Smoked for 10yrs     Vaping Use   • Vaping Use: Never used   Substance and Sexual Activity   • Alcohol use: Yes     Comment: Wine maybe once or twice a month   • Drug use: No   • Sexual activity: Yes     Partners: Male     Birth control/protection: Surgical, Male Sterilization     Comment: with    Other Topics Concern   • None   Social History Narrative    CAFFEINE USE    DAILY COFFEE CONSUMPTION    EXERCISES FREQUENTLY     Social Determinants of Health     Financial Resource Strain: Not on file   Food Insecurity: Not on file   Transportation Needs: Not on file   Physical Activity: Not on file   Stress: Not on file   Social Connections: Not on file   Intimate Partner Violence: Not on file   Housing Stability: Not on file      Family History:     Family History   Problem Relation Age of Onset   • Atrial fibrillation Mother    • Hypertension Mother    • Depression Mother    • Suicide Attempts Mother    • Lung cancer Father    • Heart disease Father    • Diabetes Father    • Cancer Father    • Obesity Brother    • No Known Problems Brother    • Breast cancer Maternal Grandmother 48   • Cancer Paternal Grandfather    • No Known Problems Son    • No Known Problems Son    • Lung cancer Maternal Aunt    • No Known Problems Maternal Aunt    • No Known Problems Maternal Aunt    • No Known Problems Maternal Aunt    • Other Family         ADENOCARCINOMA IN SIOBHAN IN VILLOUS ADENOMA OF THE BREAST, MIGRAINES, SKIN DISORDER   • Depression Family    • Anxiety disorder Family    • Diabetes Family         MELLITUS   • Fibrocystic breast disease Family    • Heart disease Family    • Hiatal hernia Family    • Hypertension Family    • Irritable bowel syndrome Family    • Kidney disease Family    • Urinary tract infection Family    • Breast cancer Family    • Ovarian cancer Neg Hx    • Uterine cancer Neg Hx    • Cervical cancer Neg Hx       Current Medications:     Current Outpatient Medications   Medication Sig Dispense Refill   • calcium carbonate (OS-YONG) 600 MG tablet Take 600 mg by mouth 2 (two) times a day with meals     • cholecalciferol (VITAMIN D3) 1,000 units tablet Take 1,000 Units by mouth daily     • Cosentyx Sensoready, 300 MG, 150 MG/ML SOAJ injection      • Multiple Vitamins-Minerals (multivitamin with minerals) tablet Take 1 tablet by mouth daily     • sertraline (ZOLOFT) 100 mg tablet Take 1 tablet (100 mg total) by mouth daily 90 tablet 1   • triamterene-hydrochlorothiazide (MAXZIDE-25) 37 5-25 mg per tablet Take 1 tablet by mouth daily 90 tablet 1     No current facility-administered medications for this visit  Allergies: Allergies   Allergen Reactions   • Vicodin [Hydrocodone-Acetaminophen] Anaphylaxis      Physical Exam:     /90   Pulse 74   Ht 5' 5 5" (1 664 m)   Wt 74 8 kg (165 lb)   SpO2 99%   BMI 27 04 kg/m²     Physical Exam  Vitals and nursing note reviewed  Constitutional:       General: She is not in acute distress  Appearance: Normal appearance  She is well-developed  She is not ill-appearing  HENT:      Head: Normocephalic and atraumatic        Right Ear: Tympanic membrane, ear canal and external ear normal       Left Ear: Tympanic membrane, ear canal and external ear normal       Nose: Nose normal       Mouth/Throat:      Mouth: Mucous membranes are moist  Pharynx: Oropharynx is clear  Eyes:      General:         Right eye: No discharge  Left eye: No discharge  Conjunctiva/sclera: Conjunctivae normal       Pupils: Pupils are equal, round, and reactive to light  Cardiovascular:      Rate and Rhythm: Normal rate and regular rhythm  Pulses: Normal pulses  Heart sounds: Normal heart sounds  No murmur heard  Pulmonary:      Effort: Pulmonary effort is normal  No respiratory distress  Breath sounds: Normal breath sounds  Abdominal:      General: Abdomen is flat  Bowel sounds are normal       Palpations: Abdomen is soft  Tenderness: There is no abdominal tenderness  Musculoskeletal:         General: Normal range of motion  Cervical back: Normal range of motion and neck supple  Skin:     General: Skin is warm and dry  Capillary Refill: Capillary refill takes less than 2 seconds  Neurological:      General: No focal deficit present  Mental Status: She is alert and oriented to person, place, and time  Mental status is at baseline  Psychiatric:         Mood and Affect: Mood normal          Behavior: Behavior normal          Thought Content:  Thought content normal           JEFERSON Justin  [facility]

## 2024-04-20 ENCOUNTER — HOSPITAL ENCOUNTER (EMERGENCY)
Facility: HOSPITAL | Age: 64
Discharge: HOME | End: 2024-04-20
Attending: EMERGENCY MEDICINE | Admitting: EMERGENCY MEDICINE
Payer: COMMERCIAL

## 2024-04-20 ENCOUNTER — APPOINTMENT (EMERGENCY)
Dept: RADIOLOGY | Facility: HOSPITAL | Age: 64
End: 2024-04-20
Payer: COMMERCIAL

## 2024-04-20 ENCOUNTER — APPOINTMENT (EMERGENCY)
Dept: CT IMAGING | Facility: HOSPITAL | Age: 64
End: 2024-04-20
Payer: COMMERCIAL

## 2024-04-20 VITALS
OXYGEN SATURATION: 100 % | RESPIRATION RATE: 18 BRPM | HEART RATE: 66 BPM | SYSTOLIC BLOOD PRESSURE: 144 MMHG | DIASTOLIC BLOOD PRESSURE: 76 MMHG | TEMPERATURE: 97.9 F

## 2024-04-20 DIAGNOSIS — R56.9 SEIZURE-LIKE ACTIVITY (HCC): ICD-10-CM

## 2024-04-20 DIAGNOSIS — S89.90XA KNEE INJURY: Primary | ICD-10-CM

## 2024-04-20 LAB
ANION GAP SERPL CALCULATED.3IONS-SCNC: 10 MMOL/L (ref 4–13)
BASOPHILS # BLD AUTO: 0.05 THOUSANDS/ÂΜL (ref 0–0.1)
BASOPHILS NFR BLD AUTO: 1 % (ref 0–1)
BUN SERPL-MCNC: 20 MG/DL (ref 5–25)
CALCIUM SERPL-MCNC: 9.2 MG/DL (ref 8.4–10.2)
CHLORIDE SERPL-SCNC: 103 MMOL/L (ref 96–108)
CK SERPL-CCNC: 103 U/L (ref 26–192)
CO2 SERPL-SCNC: 25 MMOL/L (ref 21–32)
CREAT SERPL-MCNC: 0.94 MG/DL (ref 0.6–1.3)
EOSINOPHIL # BLD AUTO: 0.09 THOUSAND/ÂΜL (ref 0–0.61)
EOSINOPHIL NFR BLD AUTO: 2 % (ref 0–6)
ERYTHROCYTE [DISTWIDTH] IN BLOOD BY AUTOMATED COUNT: 12.4 % (ref 11.6–15.1)
GFR SERPL CREATININE-BSD FRML MDRD: 64 ML/MIN/1.73SQ M
GLUCOSE SERPL-MCNC: 88 MG/DL (ref 65–140)
GLUCOSE SERPL-MCNC: 98 MG/DL (ref 65–140)
HCT VFR BLD AUTO: 39.6 % (ref 34.8–46.1)
HGB BLD-MCNC: 12.8 G/DL (ref 11.5–15.4)
IMM GRANULOCYTES # BLD AUTO: 0.02 THOUSAND/UL (ref 0–0.2)
IMM GRANULOCYTES NFR BLD AUTO: 0 % (ref 0–2)
LYMPHOCYTES # BLD AUTO: 2.09 THOUSANDS/ÂΜL (ref 0.6–4.47)
LYMPHOCYTES NFR BLD AUTO: 35 % (ref 14–44)
MCH RBC QN AUTO: 29.6 PG (ref 26.8–34.3)
MCHC RBC AUTO-ENTMCNC: 32.3 G/DL (ref 31.4–37.4)
MCV RBC AUTO: 92 FL (ref 82–98)
MONOCYTES # BLD AUTO: 0.38 THOUSAND/ÂΜL (ref 0.17–1.22)
MONOCYTES NFR BLD AUTO: 6 % (ref 4–12)
NEUTROPHILS # BLD AUTO: 3.36 THOUSANDS/ÂΜL (ref 1.85–7.62)
NEUTS SEG NFR BLD AUTO: 56 % (ref 43–75)
NRBC BLD AUTO-RTO: 0 /100 WBCS
PLATELET # BLD AUTO: 245 THOUSANDS/UL (ref 149–390)
PMV BLD AUTO: 10.7 FL (ref 8.9–12.7)
POTASSIUM SERPL-SCNC: 3.6 MMOL/L (ref 3.5–5.3)
RBC # BLD AUTO: 4.33 MILLION/UL (ref 3.81–5.12)
SODIUM SERPL-SCNC: 138 MMOL/L (ref 135–147)
WBC # BLD AUTO: 5.99 THOUSAND/UL (ref 4.31–10.16)

## 2024-04-20 PROCEDURE — 85025 COMPLETE CBC W/AUTO DIFF WBC: CPT

## 2024-04-20 PROCEDURE — 36415 COLL VENOUS BLD VENIPUNCTURE: CPT

## 2024-04-20 PROCEDURE — 96372 THER/PROPH/DIAG INJ SC/IM: CPT

## 2024-04-20 PROCEDURE — 70450 CT HEAD/BRAIN W/O DYE: CPT

## 2024-04-20 PROCEDURE — 93005 ELECTROCARDIOGRAM TRACING: CPT

## 2024-04-20 PROCEDURE — 73564 X-RAY EXAM KNEE 4 OR MORE: CPT

## 2024-04-20 PROCEDURE — 99285 EMERGENCY DEPT VISIT HI MDM: CPT

## 2024-04-20 PROCEDURE — 99285 EMERGENCY DEPT VISIT HI MDM: CPT | Performed by: EMERGENCY MEDICINE

## 2024-04-20 PROCEDURE — 82550 ASSAY OF CK (CPK): CPT

## 2024-04-20 PROCEDURE — 80048 BASIC METABOLIC PNL TOTAL CA: CPT

## 2024-04-20 PROCEDURE — 82948 REAGENT STRIP/BLOOD GLUCOSE: CPT

## 2024-04-20 RX ORDER — KETOROLAC TROMETHAMINE 30 MG/ML
15 INJECTION, SOLUTION INTRAMUSCULAR; INTRAVENOUS ONCE
Status: COMPLETED | OUTPATIENT
Start: 2024-04-20 | End: 2024-04-20

## 2024-04-20 RX ADMIN — KETOROLAC TROMETHAMINE 15 MG: 30 INJECTION, SOLUTION INTRAMUSCULAR; INTRAVENOUS at 13:22

## 2024-04-20 NOTE — ED PROVIDER NOTES
History  Chief Complaint   Patient presents with    Syncope     Syncope episode after hurting right knee     Patient is a 63-year-old female with a past medical history significant for DM, DL, HTN, prior head injury with resultant concussion and subarachnoid hemorrhage presenting to the emergency department for evaluation of knee pain, syncope.  Patient was working with a horse yesterday and noted some right lower extremity discomfort at the end of the day she states is similar to muscle overuse.  She states today she was walking alongside of a horse when she heard a pop and had significant knee pain.  She sat down and subsequently lost consciousness.  She thereafter had an episode of bilateral upper and lower extremity jerking witnessed by her daughter.  Her daughter states she became semiconscious and then became unconscious again, eventually regaining conscious.  She states that this happens when she has significant pain.  Patient currently feels well apart from some knee pain and right lower extremity discomfort.  She otherwise denies dizziness, chest pain, chest pressure, shortness of breath, abdominal pain.  She does report an episode of fecal incontinence during her period of unconsciousness.        Prior to Admission Medications   Prescriptions Last Dose Informant Patient Reported? Taking?   Cosentyx Sensoready, 300 MG, 150 MG/ML SOAJ injection   Yes No   Multiple Vitamins-Minerals (multivitamin with minerals) tablet  Self Yes No   Sig: Take 1 tablet by mouth daily   calcium carbonate (OS-YONG) 600 MG tablet   Yes No   Sig: Take 600 mg by mouth 2 (two) times a day with meals   cholecalciferol (VITAMIN D3) 1,000 units tablet   Yes No   Sig: Take 1,000 Units by mouth daily   sertraline (ZOLOFT) 100 mg tablet   No No   Sig: Take 1 tablet (100 mg total) by mouth daily   triamterene-hydrochlorothiazide (MAXZIDE-25) 37.5-25 mg per tablet   No No   Sig: take 1 tablet by mouth once daily      Facility-Administered  Medications: None       Past Medical History:   Diagnosis Date    Allergic     Vicodin    Anemia     Anxiety     Arthritis     Bariatric surgery status     Closed head injury 06/11/2018 2018    Colon polyp     Concussion with loss of consciousness 06/22/2018 2018    Depression     Diabetes mellitus (HCC)     NIDDM    Diarrhea     chronic    Fatty liver     Fecal incontinence     GERD (gastroesophageal reflux disease)     Head injury     6/10/11    Hiatal hernia     Hyperlipidemia     Hypertension     Lifelong obesity     Mild TBI (HCC) 06/11/2018    Postsurgical malabsorption     Psoriasis     legs    SAH (subarachnoid hemorrhage) ([facility]) 06/11/2018    2018    Tear of right supraspinatus tendon 12/16/2019    Type 2 diabetes mellitus with microalbuminuria, without long-term current use of insulin (HCC) 07/05/2018    Wears glasses        Past Surgical History:   Procedure Laterality Date    CHOLECYSTECTOMY      COLONOSCOPY N/A 10/24/2017    Procedure: COLONOSCOPY;  Surgeon: Wai Baez MD;  Location: [facility];  Service: Colorectal    COLONOSCOPY W/ ENDOSCOPIC US N/A 10/24/2017    Procedure: ANAL ENDOSCOPIC U/S;  Surgeon: Wai Baez MD;  Location: [facility];  Service: Colorectal    DILATION AND CURETTAGE OF UTERUS      EGD      ENDOMETRIAL BIOPSY      WITHOUT CERVICAL DILATION    HYSTERECTOMY      NASAL SEPTUM SURGERY      OTHER SURGICAL HISTORY      Episiotomy repair    TN LAPS GSTRC RSTRICTIV PX LONGITUDINAL GASTRECTOMY N/A 6/16/2020    Procedure: GASTRECTOMY SLEEVE LAPAROSCOPIC AND INTRAOPERATIVE EGD.;  Surgeon: Robin Sommer MD;  Location: [facility];  Service: Bariatrics    TN SURGICAL ARTHROSCOPY SHOULDER W/ROTATOR CUFF RPR Right 1/15/2020    Procedure: SHOULDER ARTHROSCOPIC DEBRIDEMENT OF PARTIAL THICKNESS ROTATOR CUFF TEAR, SAD;  Surgeon: Stan Angeles MD;  Location: [facility];  Service: Orthopedics    ROTATOR CUFF REPAIR Left        Family History   Problem Relation Age of Onset    Atrial  fibrillation Mother     Hypertension Mother     Depression Mother     Suicide Attempts Mother     Lung cancer Father     Heart disease Father     Diabetes Father     Cancer Father     Obesity Brother     No Known Problems Brother     Breast cancer Maternal Grandmother 50    Cancer Paternal Grandfather     No Known Problems Son     No Known Problems Son     Lung cancer Maternal Aunt     No Known Problems Maternal Aunt     No Known Problems Maternal Aunt     No Known Problems Maternal Aunt     Other Family         ADENOCARCINOMA IN SIOBHAN IN VILLOUS ADENOMA OF THE BREAST, MIGRAINES, SKIN DISORDER    Depression Family     Anxiety disorder Family     Diabetes Family         MELLITUS    Fibrocystic breast disease Family     Heart disease Family     Hiatal hernia Family     Hypertension Family     Irritable bowel syndrome Family     Kidney disease Family     Urinary tract infection Family     Breast cancer Family     Ovarian cancer Neg Hx     Uterine cancer Neg Hx     Cervical cancer Neg Hx      I have reviewed and agree with the history as documented.    E-Cigarette/Vaping    E-Cigarette Use Never User      E-Cigarette/Vaping Substances    Nicotine No     THC No     CBD No     Flavoring No     Other No     Unknown No      Social History     Tobacco Use    Smoking status: Former     Current packs/day: 0.00     Average packs/day: 0.5 packs/day for 5.0 years (2.5 ttl pk-yrs)     Types: Cigarettes     Start date: 6/10/1987     Quit date: 6/10/1992     Years since quittin.8    Smokeless tobacco: Never    Tobacco comments:     Smoked for 10yrs.   Vaping Use    Vaping status: Never Used   Substance Use Topics    Alcohol use: Yes     Comment: Wine maybe once or twice a month    Drug use: No        Review of Systems   Constitutional:  Negative for chills and fever.   HENT:  Negative for ear pain and sore throat.    Eyes:  Negative for pain and visual disturbance.   Respiratory:  Negative for cough and shortness of breath.     Cardiovascular:  Negative for chest pain and palpitations.   Gastrointestinal:  Negative for abdominal pain and vomiting.   Genitourinary:  Negative for dysuria and hematuria.   Musculoskeletal:  Negative for arthralgias and back pain.   Skin:  Negative for color change and rash.   Neurological:  Negative for seizures and syncope.   All other systems reviewed and are negative.      Physical Exam  ED Triage Vitals   Temperature Pulse Respirations Blood Pressure SpO2   04/20/24 1216 04/20/24 1216 04/20/24 1216 04/20/24 1216 04/20/24 1216   97.9 °F (36.6 °C) 58 18 158/77 98 %      Temp src Heart Rate Source Patient Position - Orthostatic VS BP Location FiO2 (%)   -- 04/20/24 1620 04/20/24 1216 04/20/24 1216 --    Monitor Sitting Right arm       Pain Score       04/20/24 1322       7             Orthostatic Vital Signs  Vitals:    04/20/24 1216 04/20/24 1404 04/20/24 1620   BP: 158/77 148/75 144/76   Pulse: 58 63 66   Patient Position - Orthostatic VS: Sitting Sitting Lying       Physical Exam  Vitals and nursing note reviewed.   Constitutional:       General: She is not in acute distress.     Appearance: Normal appearance. She is not ill-appearing, toxic-appearing or diaphoretic.   HENT:      Head: Normocephalic and atraumatic.   Eyes:      General: No scleral icterus.        Right eye: No discharge.         Left eye: No discharge.      Extraocular Movements: Extraocular movements intact.      Conjunctiva/sclera: Conjunctivae normal.   Cardiovascular:      Rate and Rhythm: Normal rate.      Pulses: Normal pulses.      Heart sounds: Normal heart sounds. No murmur heard.     No friction rub. No gallop.   Pulmonary:      Effort: Pulmonary effort is normal. No respiratory distress.      Breath sounds: Normal breath sounds. No stridor. No wheezing, rhonchi or rales.   Abdominal:      General: Abdomen is flat. Bowel sounds are normal. There is no distension.      Palpations: Abdomen is soft.      Tenderness: There is no  abdominal tenderness. There is no guarding or rebound.   Musculoskeletal:         General: No swelling. Normal range of motion.      Cervical back: Normal range of motion. No rigidity.      Right lower leg: No edema.      Left lower leg: No edema.      Comments: R knee pain w/ manipulation. Unable to perform drawer test 2/2 patient discomfort.   Skin:     General: Skin is warm and dry.      Capillary Refill: Capillary refill takes less than 2 seconds.      Coloration: Skin is not jaundiced.      Findings: No bruising or lesion.   Neurological:      General: No focal deficit present.      Mental Status: She is alert and oriented to person, place, and time. Mental status is at baseline.   Psychiatric:         Mood and Affect: Mood normal.         Behavior: Behavior normal.         Thought Content: Thought content normal.         Judgment: Judgment normal.         ED Medications  Medications   ketorolac (TORADOL) injection 15 mg (15 mg Intramuscular Given 4/20/24 1322)       Diagnostic Studies  Results Reviewed       Procedure Component Value Units Date/Time    Basic metabolic panel [[id number]] Collected: 04/20/24 1403    Lab Status: Final result Specimen: Blood from Arm, Left Updated: 04/20/24 1428     Sodium 138 mmol/L      Potassium 3.6 mmol/L      Chloride 103 mmol/L      CO2 25 mmol/L      ANION GAP 10 mmol/L      BUN 20 mg/dL      Creatinine 0.94 mg/dL      Glucose 88 mg/dL      Calcium 9.2 mg/dL      eGFR 64 ml/min/1.73sq m     Narrative:      National Kidney Disease Foundation guidelines for Chronic Kidney Disease (CKD):     Stage 1 with normal or high GFR (GFR > 90 mL/min/1.73 square meters)    Stage 2 Mild CKD (GFR = 60-89 mL/min/1.73 square meters)    Stage 3A Moderate CKD (GFR = 45-59 mL/min/1.73 square meters)    Stage 3B Moderate CKD (GFR = 30-44 mL/min/1.73 square meters)    Stage 4 Severe CKD (GFR = 15-29 mL/min/1.73 square meters)    Stage 5 End Stage CKD (GFR <15 mL/min/1.73 square meters)  Note: GFR  calculation is accurate only with a steady state creatinine    CK [[id number]]  (Normal) Collected: 04/20/24 1403    Lab Status: Final result Specimen: Blood from Arm, Left Updated: 04/20/24 1428     Total  U/L     CBC and differential [[id number]] Collected: 04/20/24 1403    Lab Status: Final result Specimen: Blood from Arm, Left Updated: 04/20/24 1412     WBC 5.99 Thousand/uL      RBC 4.33 Million/uL      Hemoglobin 12.8 g/dL      Hematocrit 39.6 %      MCV 92 fL      MCH 29.6 pg      MCHC 32.3 g/dL      RDW 12.4 %      MPV 10.7 fL      Platelets 245 Thousands/uL      nRBC 0 /100 WBCs      Segmented % 56 %      Immature Grans % 0 %      Lymphocytes % 35 %      Monocytes % 6 %      Eosinophils Relative 2 %      Basophils Relative 1 %      Absolute Neutrophils 3.36 Thousands/µL      Absolute Immature Grans 0.02 Thousand/uL      Absolute Lymphocytes 2.09 Thousands/µL      Absolute Monocytes 0.38 Thousand/µL      Eosinophils Absolute 0.09 Thousand/µL      Basophils Absolute 0.05 Thousands/µL     Fingerstick Glucose (POCT) [[id number]]  (Normal) Collected: 04/20/24 1329    Lab Status: Final result Specimen: Blood Updated: 04/20/24 1331     POC Glucose 98 mg/dl                    CT head without contrast   Final Result by Albino Luna MD (04/20 1734)      No acute intracranial abnormality.                  Workstation performed: [id number]         XR knee 4+ vw right injury   ED Interpretation by Reymundo Balbuena DO (04/20 1457)   No acute injury      Final Result by Jorge Miramontes MD (04/20 1628)      No acute osseous abnormality.            Workstation performed: [id number]               Procedures  Procedures      ED Course  ED Course as of 04/21/24 0656   Sat Apr 20, 2024   1457 Total CK: 103   1457 CBC and differential   1457 Basic metabolic panel   1457 Fingerstick Glucose (POCT)                             SBIRT 20yo+      Flowsheet Row Most Recent Value   Initial Alcohol Screen: US AUDIT-C      1. How often do you have a drink containing alcohol? 0 Filed at: 04/20/2024 1254   2. How many drinks containing alcohol do you have on a typical day you are drinking?  0 Filed at: 04/20/2024 1254   3b. FEMALE Any Age, or MALE 65+: How often do you have 4 or more drinks on one occassion? 0 Filed at: 04/20/2024 1254   Audit-C Score 0 Filed at: 04/20/2024 1254   SUJATA: How many times in the past year have you...    Used an illegal drug or used a prescription medication for non-medical reasons? Never Filed at: 04/20/2024 1254                  Medical Decision Making  63y F p/w knee pain, syncope    Ddx includes vagal episode, anemia, electrolyte disturbance, rhabdomyolysis, ACL injury/PCL injury, meniscal tear, gastrocnemius tear, muscle strain    Plan, CBC, BMP CK, knee xr, poct glucose, CTH    Work-up negative, overall pt well-appearing. Will recommend neuro and ortho f/u for new seizure and knee injury.    Amount and/or Complexity of Data Reviewed  Labs: ordered. Decision-making details documented in ED Course.  Radiology: ordered and independent interpretation performed.    Risk  Prescription drug management.          Disposition  Final diagnoses:   Knee injury   Seizure-like activity (HCC)     Time reflects when diagnosis was documented in both MDM as applicable and the Disposition within this note       Time User Action Codes Description Comment    4/20/2024  2:08 PM Reymundo Balbuena Add [S89.90XA] Knee injury     4/20/2024  2:08 PM Reymundo Balbuena Add [R55] Syncope     4/20/2024  5:37 PM Reymundo Balbuena Remove [R55] Syncope     4/20/2024  5:37 PM Reymundo Balbuena Add [R56.9] Seizure-like activity (HCC)           ED Disposition       ED Disposition   Discharge    Condition   Stable    Date/Time   Sat Apr 20, 2024 1408    Comment   Alexa Hawley discharge to home/self care.                   Follow-up Information       Follow up With Specialties Details Why Contact Info Additional Information    St  [facility] [place] [facility] Call in 1 day to arrange for next available appointment [address]  [address]  [facility] [address] [phone] [facility] [place], [address], [address], [place], [address] [phone]    [facility] Emergency Department Emergency Medicine Go to  As needed, for new/worsening symptoms. [address] [id number]  [phone] [facility] Emergency Department, [address] [place], [place], [address]    [facility] Call in 2 days  [address]  [address]  [facility] [address] [phone] [facility], [address], [address], [place], [place], [address] [phone]            Discharge Medication List as of 4/20/2024  5:38 PM        CONTINUE these medications which have NOT CHANGED    Details   calcium carbonate (OS-YONG) 600 MG tablet Take 600 mg by mouth 2 (two) times a day with meals, Historical Med      cholecalciferol (VITAMIN D3) 1,000 units tablet Take 1,000 Units by mouth daily, Historical Med      Cosentyx Sensoready, 300 MG, 150 MG/ML SOAJ injection Starting Sat 8/20/2022, Historical Med      Multiple Vitamins-Minerals (multivitamin with minerals) tablet Take 1 tablet by mouth daily, Historical Med      sertraline (ZOLOFT) 100 mg tablet Take 1 tablet (100 mg total) by mouth daily, Starting Tue 3/5/2024, Normal      triamterene-hydrochlorothiazide (MAXZIDE-25) 37.5-25 mg per tablet take 1 tablet by mouth once daily, Starting Wed 3/6/2024, Normal               PDMP Review       None             ED Provider  Attending physically available and evaluated Alexa Hawley. I managed the patient along with the ED Attending.    Electronically Signed by           Reymundo Balbuena DO  04/21/24 0656     [place] filter in place  Placed 4/2016 removed 5/2017  H/O knee surgery  2016 - repair right  H/O total knee replacement, right  11/2015, 8/2016  History of hip replacement, total, right    History of total left knee replacement  2007

## 2024-04-20 NOTE — ED ATTENDING ATTESTATION
4/20/2024  I, Tiffany Stephens MD, saw and evaluated the patient. I have discussed the patient with the resident/non-physician practitioner and agree with the resident's/non-physician practitioner's findings, Plan of Care, and MDM as documented in the resident's/non-physician practitioner's note, except where noted. All available labs and Radiology studies were reviewed.  I was present for key portions of any procedure(s) performed by the resident/non-physician practitioner and I was immediately available to provide assistance.       At this point I agree with the current assessment done in the Emergency Department.  I have conducted an independent evaluation of this patient a history and physical is as follows:    This is a 63-year-old female with a relevant past medical history of diabetes, hypertension, hyperlipidemia, anxiety, presenting to the ED today for seizure-like activity.  Patient was in the garden gardening, and all of a sudden developed pain in her right knee.  She went to the couch to sit down, due to her pain and all of a sudden lost consciousness.  Per the daughter patient started having a generalized convulsions, rhythmic shaking of her bilateral upper and lower extremities.  She also was banging her head against the wall unintentionally.  She had a period of approximately 3 to 4 minutes of heavy breathing, almost as if she was snoring, I am then after total period of approximately 10 minutes she regained normal consciousness.  Patient does not remember anything aside from her knee pain.  She was slightly confused afterwards.  Upon arrival to the ED she is alert and oriented x 4, does not have any complaints at this time.  Does not have any headache, shortness of breath, focal weakness, numbness or tingling, visual abnormalities, hearing abnormalities or any other significantly associated symptoms.  She does have right lower extremity pain and is difficult to examine her right knee due to her  pain.  Despite pain control patient was very apprehensive about moving her knee.  Her exam otherwise is unremarkable.  She did not have any evidence of trauma throughout her body.  Her differential diagnosis includes: Seizure versus syncope with myoclonus versus electrolyte abnormality versus other.  Patient had a CBC, metabolic panel, CK, EKG all of which were unremarkable.  EKG does not reveal any evidence of acute ischemia, significant bradycardia, advanced AV blocks, WPW, prolonged QTC, Brugada syndrome, hypertrophic cardiomyopathy, arrhythmogenic right ventricular dysplasia.  I think her symptoms are most consistent with seizure.  Since this is first-time seizure I do not think that we need to give her any antiepileptic medications here.  She will follow-up with neurology as an outpatient.    Patient underwent CT scan, but her CT scan results were pending at the time of shift change.  Patient signed out at shift change pending CT scan results.    ED Course         Critical Care Time  Procedures

## 2024-04-20 NOTE — Clinical Note
Alexa Hawley was seen and treated in our emergency department on 4/20/2024.                Diagnosis:     Alexa  .    She may return on this date: 04/29/2024         If you have any questions or concerns, please don't hesitate to call.      Reymundo Balbuena, DO    ______________________________           _______________          _______________  Hospital Representative                              Date                                Time

## 2024-04-21 LAB
ATRIAL RATE: 60 BPM
P AXIS: 49 DEGREES
PR INTERVAL: 170 MS
QRS AXIS: 69 DEGREES
QRSD INTERVAL: 72 MS
QT INTERVAL: 438 MS
QTC INTERVAL: 438 MS
T WAVE AXIS: 53 DEGREES
VENTRICULAR RATE: 60 BPM

## 2024-04-21 PROCEDURE — 93010 ELECTROCARDIOGRAM REPORT: CPT | Performed by: INTERNAL MEDICINE

## 2024-04-23 ENCOUNTER — OFFICE VISIT (OUTPATIENT)
Dept: OBGYN CLINIC | Facility: CLINIC | Age: 64
End: 2024-04-23
Payer: COMMERCIAL

## 2024-04-23 ENCOUNTER — HOSPITAL ENCOUNTER (OUTPATIENT)
Dept: MRI IMAGING | Facility: HOSPITAL | Age: 64
Discharge: HOME | End: 2024-04-23
Attending: ORTHOPAEDIC SURGERY
Payer: COMMERCIAL

## 2024-04-23 VITALS
HEART RATE: 74 BPM | SYSTOLIC BLOOD PRESSURE: 191 MMHG | WEIGHT: 174 LBS | BODY MASS INDEX: 27.97 KG/M2 | HEIGHT: 66 IN | DIASTOLIC BLOOD PRESSURE: 95 MMHG

## 2024-04-23 DIAGNOSIS — S89.91XA INJURY OF RIGHT KNEE, INITIAL ENCOUNTER: ICD-10-CM

## 2024-04-23 DIAGNOSIS — M23.91 INTERNAL DERANGEMENT OF MULTIPLE SITES OF RIGHT KNEE: ICD-10-CM

## 2024-04-23 DIAGNOSIS — M23.91 INTERNAL DERANGEMENT OF MULTIPLE SITES OF RIGHT KNEE: Primary | ICD-10-CM

## 2024-04-23 PROCEDURE — 73721 MRI JNT OF LWR EXTRE W/O DYE: CPT

## 2024-04-23 PROCEDURE — 99214 OFFICE O/P EST MOD 30 MIN: CPT | Performed by: ORTHOPAEDIC SURGERY

## 2024-04-23 NOTE — PROGRESS NOTES
Orthopaedics Office Visit - New to me Patient Visit    ASSESSMENT/PLAN:    Assessment:   Right knee internal derangement suspected MCL and medial meniscus injury    Plan:   The patient's x-rays were reviewed today.  A STAT MRI study was ordered for further evaluation of acute injury  A TROM was provided for stability during ambulation. The patient may continue to use crutches as needed for ambulation.   The patient was referred to physical therapy.  The patient is out of work until Monday with ER note. Patient will call if she is not able to return to work on Monday and a note will be provided at that time.   I will see the patient back upon completion of MRI study.      To Do Next Visit:  MRI review right knee    _____________________________________________________  CHIEF COMPLAINT:  Chief Complaint   Patient presents with    Right Knee - Pain         SUBJECTIVE:  Alexa Hawley is a 63 y.o. female who presents for initial evaluation of right knee pain. On Saturday the patient was walking in an indoor horse arena on sand, turned and felt a pop in her knee. A week prior she had numbness from her hip down to her ankle.  The patient passed out, had a seizure and was brought to [facility]. She was placed in a knee immobilizer and provided crutches. Pain is located at the posteromedial knee and anteromedial knee. The patient has a history of right knee injury approximately 30 years ago. She also has ridden horses since she was 10. The patient is using ice as needed for symptom management. The patient denies any proior surgeries to this knee. The patient is not a diabetic or a smoker. The patient is a dispatcher for [employer].  The patient is accompanied by her  for her exam today.      PAST MEDICAL HISTORY:  Past Medical History:   Diagnosis Date    Allergic     Vicodin    Anemia     Anxiety     Arthritis     Bariatric surgery status     Closed head injury 06/11/2018    2018    Colon polyp      Concussion with loss of consciousness 06/22/2018    2018    Depression     Diabetes mellitus (HCC)     NIDDM    Diarrhea     chronic    Fatty liver     Fecal incontinence     GERD (gastroesophageal reflux disease)     Head injury     6/10/11    Hiatal hernia     Hyperlipidemia     Hypertension     Lifelong obesity     Mild TBI (HCC) 06/11/2018    Postsurgical malabsorption     Psoriasis     legs    SAH (subarachnoid hemorrhage) ([facility]) 06/11/2018    2018    Tear of right supraspinatus tendon 12/16/2019    Type 2 diabetes mellitus with microalbuminuria, without long-term current use of insulin ([facility]) 07/05/2018    Wears glasses        PAST SURGICAL HISTORY:  Past Surgical History:   Procedure Laterality Date    CHOLECYSTECTOMY      COLONOSCOPY N/A 10/24/2017    Procedure: COLONOSCOPY;  Surgeon: Wai Baez MD;  [facility];  Service: Colorectal    COLONOSCOPY W/ ENDOSCOPIC US N/A 10/24/2017    Procedure: ANAL ENDOSCOPIC U/S;  Surgeon: Wai Baez MD;  [facility];  Service: Colorectal    DILATION AND CURETTAGE OF UTERUS      EGD      ENDOMETRIAL BIOPSY      WITHOUT CERVICAL DILATION    HYSTERECTOMY      NASAL SEPTUM SURGERY      OTHER SURGICAL HISTORY      Episiotomy repair    NC LAPS GSTRC RSTRICTIV PX LONGITUDINAL GASTRECTOMY N/A 6/16/2020    Procedure: GASTRECTOMY SLEEVE LAPAROSCOPIC AND INTRAOPERATIVE EGD.;  Surgeon: Robin Sommer MD;  [facility];  Service: Bariatrics    NC SURGICAL ARTHROSCOPY SHOULDER W/ROTATOR CUFF RPR Right 1/15/2020    Procedure: SHOULDER ARTHROSCOPIC DEBRIDEMENT OF PARTIAL THICKNESS ROTATOR CUFF TEAR, SAD;  Surgeon: Stan Angeles MD;  [facility];  Service: Orthopedics    ROTATOR CUFF REPAIR Left        FAMILY HISTORY:  Family History   Problem Relation Age of Onset    Atrial fibrillation Mother     Hypertension Mother     Depression Mother     Suicide Attempts Mother     Lung cancer Father     Heart disease Father     Diabetes Father      Cancer Father     Obesity Brother     No Known Problems Brother     Breast cancer Maternal Grandmother 50    Cancer Paternal Grandfather     No Known Problems Son     No Known Problems Son     Lung cancer Maternal Aunt     No Known Problems Maternal Aunt     No Known Problems Maternal Aunt     No Known Problems Maternal Aunt     Other Family         ADENOCARCINOMA IN SIOBHAN IN VILLOUS ADENOMA OF THE BREAST, MIGRAINES, SKIN DISORDER    Depression Family     Anxiety disorder Family     Diabetes Family         MELLITUS    Fibrocystic breast disease Family     Heart disease Family     Hiatal hernia Family     Hypertension Family     Irritable bowel syndrome Family     Kidney disease Family     Urinary tract infection Family     Breast cancer Family     Ovarian cancer Neg Hx     Uterine cancer Neg Hx     Cervical cancer Neg Hx        SOCIAL HISTORY:  Social History     Tobacco Use    Smoking status: Former     Current packs/day: 0.00     Average packs/day: 0.5 packs/day for 5.0 years (2.5 ttl pk-yrs)     Types: Cigarettes     Start date: 6/10/1987     Quit date: 6/10/1992     Years since quittin.8    Smokeless tobacco: Never    Tobacco comments:     Smoked for 10yrs.   Vaping Use    Vaping status: Never Used   Substance Use Topics    Alcohol use: Yes     Comment: Wine maybe once or twice a month    Drug use: No       MEDICATIONS:    Current Outpatient Medications:     calcium carbonate (OS-YONG) 600 MG tablet, Take 600 mg by mouth 2 (two) times a day with meals, Disp: , Rfl:     cholecalciferol (VITAMIN D3) 1,000 units tablet, Take 1,000 Units by mouth daily, Disp: , Rfl:     Cosentyx Sensoready, 300 MG, 150 MG/ML SOAJ injection, , Disp: , Rfl:     Multiple Vitamins-Minerals (multivitamin with minerals) tablet, Take 1 tablet by mouth daily, Disp: , Rfl:     sertraline (ZOLOFT) 100 mg tablet, Take 1 tablet (100 mg total) by mouth daily, Disp: 90 tablet, Rfl: 1    triamterene-hydrochlorothiazide (MAXZIDE-25) 37.5-25 mg  "per tablet, take 1 tablet by mouth once daily, Disp: 90 tablet, Rfl: 1    ALLERGIES:  Allergies   Allergen Reactions    Vicodin [Hydrocodone-Acetaminophen] Anaphylaxis       REVIEW OF SYSTEMS:  MSK: right knee  Neuro: WNL  Pertinent items are otherwise noted in HPI.  A comprehensive review of systems was otherwise negative.    LABS:  HgA1c:   Lab Results   Component Value Date    HGBA1C 6.0 (H) 10/19/2023     BMP:   Lab Results   Component Value Date    CALCIUM 9.2 04/20/2024    K 3.6 04/20/2024    CO2 25 04/20/2024     04/20/2024    BUN 20 04/20/2024    CREATININE 0.94 04/20/2024     CBC: No components found for: \"CBC\"    _____________________________________________________  PHYSICAL EXAMINATION:  Vital signs: BP (!) 191/95   Pulse 74   Ht 5' 5.5\" (1.664 m)   Wt 78.9 kg (174 lb)   BMI 28.51 kg/m²   General: No acute distress, awake and alert  Psychiatric: Mood and affect appear appropriate  HEENT: Trachea Midline, No torticollis, no apparent facial trauma  Cardiovascular: No audible murmurs; Extremities appear perfused  Pulmonary: No audible wheezing or stridor  Skin: No open lesions; see further details (if any) below    MUSCULOSKELETAL EXAMINATION:  Extremities:    Right Knee  Alignment neutral  There is moderate effusion.    There is tenderness over the medial joint line, MCL distribution, pes anserine.    Range of motion from 0 to 40.    The patient is able to perform a straight leg raise.    The patient is unable to perform a straight leg raise with toes pointed outward  Toes are pink and mobile  Compartments are soft  Brisk capillary refill  Sensation intact  The patient is neurovascular intact distally.      _____________________________________________________  STUDIES REVIEWED:  I personally reviewed the images and interpretation is as follows:    X-rays taken 4/20/24 of right knee, non-weightbearing demonstrate mild degenerative changes with early osteophyte formation. No acute " fracture.    PROCEDURES PERFORMED:  Procedures  None performed.        Scribe Attestation      I,:  Macarena Quinones am acting as a scribe while in the presence of the attending physician.:       I,:  Camden Goetz MD personally performed the services described in this documentation    as scribed in my presence.:

## 2024-04-23 NOTE — LETTER
April 23, 2024     Patient: Alexa Hawley  YOB: 1960  Date of Visit: 4/23/2024      To Whom it May Concern:    Alexa Hawley is under my professional care. Alexa was seen in my office on 4/23/2024. 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 The patient is not able to drive at this time.     If you have any questions or concerns, please don't hesitate to call.         Sincerely,          Camden Goetz MD        CC:   No Recipients

## 2024-04-24 ENCOUNTER — OFFICE VISIT (OUTPATIENT)
Dept: FAMILY MEDICINE CLINIC | Facility: MEDICAL CENTER | Age: 64
End: 2024-04-24
Payer: COMMERCIAL

## 2024-04-24 VITALS
WEIGHT: 175.4 LBS | DIASTOLIC BLOOD PRESSURE: 88 MMHG | RESPIRATION RATE: 15 BRPM | BODY MASS INDEX: 28.74 KG/M2 | HEART RATE: 65 BPM | TEMPERATURE: 98.1 F | OXYGEN SATURATION: 96 % | SYSTOLIC BLOOD PRESSURE: 140 MMHG

## 2024-04-24 DIAGNOSIS — R73.09 ELEVATED HEMOGLOBIN A1C: ICD-10-CM

## 2024-04-24 DIAGNOSIS — R15.9 INCONTINENCE OF FECES, UNSPECIFIED FECAL INCONTINENCE TYPE: ICD-10-CM

## 2024-04-24 DIAGNOSIS — E78.2 MIXED HYPERLIPIDEMIA: ICD-10-CM

## 2024-04-24 DIAGNOSIS — Z00.00 ANNUAL PHYSICAL EXAM: Primary | ICD-10-CM

## 2024-04-24 DIAGNOSIS — Z12.31 ENCOUNTER FOR SCREENING MAMMOGRAM FOR BREAST CANCER: ICD-10-CM

## 2024-04-24 PROCEDURE — 99396 PREV VISIT EST AGE 40-64: CPT

## 2024-04-24 PROCEDURE — 99214 OFFICE O/P EST MOD 30 MIN: CPT

## 2024-04-24 NOTE — PROGRESS NOTES
ADULT ANNUAL PHYSICAL  [facility] [place]    NAME: Alexa Hawley  AGE: 63 y.o. SEX: female  : 1960     DATE: 2024     Assessment and Plan:   Referral placed to colorectal surgery.  Mammogram due, order placed.  Fasting lab orders due prior to next visit.  Message sent to neurology regarding recent seizure-like activity.  Follow-up scheduled for .  Return in 3 months for follow-up, sooner if needed.  Problem List Items Addressed This Visit     Hyperlipidemia    Relevant Orders    Lipid panel   Other Visit Diagnoses     Annual physical exam    -  Primary    Encounter for screening mammogram for breast cancer        Relevant Orders    Mammo screening bilateral w 3d & cad    Elevated hemoglobin A1c        Relevant Orders    Hemoglobin A1C    Incontinence of feces, unspecified fecal incontinence type        Relevant Orders    Ambulatory Referral to Colorectal Surgery          Immunizations and preventive care screenings were discussed with patient today. Appropriate education was printed on patient's after visit summary.    Counseling:  Alcohol/drug use: discussed moderation in alcohol intake, the recommendations for healthy alcohol use, and avoidance of illicit drug use.  Dental Health: discussed importance of regular tooth brushing, flossing, and dental visits.  Exercise: the importance of regular exercise/physical activity was discussed. Recommend exercise 3-5 times per week for at least 30 minutes.          No follow-ups on file.     Chief Complaint:     Chief Complaint   Patient presents with   • Physical Exam      History of Present Illness:     Adult Annual Physical   Patient here for a comprehensive physical exam. She is here today with her .   The patient reports problems - fecal incontinence x 1 year, denies blood in stool or any other related complaints.  She also reports having seizure like activity after recent fall that occurred  on 4/20.  She tells me her daughter witnessed this seizure-like activity and lasted approximately 1 minute with associated incontinence.  Denies history of seizures.  Denies any further episodes or seizure-like activity since.  She is scheduled to see neurology in June. I will reach out to Neurologist to see if she would like me to order an EEG now as she did not have one in the hospital setting. CT head negative at the time of ER visit.   She sustained a right knee injury during this fall and is currently following with orthopedics for this. She had an MRI of her right knee yesterday and follow up tomorrow with ortho.    Diet and Physical Activity  Diet/Nutrition: well balanced diet.   Exercise:  rides horses .      Depression Screening  PHQ-2/9 Depression Screening    Little interest or pleasure in doing things: 0 - not at all  Feeling down, depressed, or hopeless: 0 - not at all  PHQ-2 Score: 0  PHQ-2 Interpretation: Negative depression screen       General Health  Sleep: sleeps well.   Hearing: normal - bilateral.  Vision: wears glasses.   Dental: regular dental visits.       /GYN Health  Follows with gynecology? Yes, due for follow up and plans to call and schedule.       Review of Systems:     Review of Systems   Constitutional: Negative.    HENT: Negative.     Eyes: Negative.    Respiratory: Negative.     Cardiovascular: Negative.    Gastrointestinal: Negative.         Fecal incontinence x 1 year   Endocrine: Negative.    Genitourinary: Negative.    Musculoskeletal: Negative.         Right knee pain since fall, following with orthopedics   Skin: Negative.    Allergic/Immunologic: Negative.    Neurological:  Positive for seizures (seizure like activity after recent fall, none since, no prior hx of).   Hematological: Negative.    Psychiatric/Behavioral: Negative.        Past Medical History:     Past Medical History:   Diagnosis Date   • Allergic     Vicodin   • Anemia    • Anxiety    • Arthritis    •  Bariatric surgery status    • Closed head injury 06/11/2018 2018   • Colon polyp    • Concussion with loss of consciousness 06/22/2018 2018   • Depression    • Diabetes mellitus (HCC)     NIDDM   • Diarrhea     chronic   • Fatty liver    • Fecal incontinence    • GERD (gastroesophageal reflux disease)    • Head injury     6/10/11   • Hiatal hernia    • Hyperlipidemia    • Hypertension    • Lifelong obesity    • Mild TBI (HCC) 06/11/2018   • Postsurgical malabsorption    • Psoriasis     legs   • SAH (subarachnoid hemorrhage) (HCC) 06/11/2018 2018   • Tear of right supraspinatus tendon 12/16/2019   • Type 2 diabetes mellitus with microalbuminuria, without long-term current use of insulin (HCC) 07/05/2018   • Wears glasses       Past Surgical History:     Past Surgical History:   Procedure Laterality Date   • CHOLECYSTECTOMY     • COLONOSCOPY N/A 10/24/2017    Procedure: COLONOSCOPY;  Surgeon: Wai Baez MD;  [facility];  Service: Colorectal   • COLONOSCOPY W/ ENDOSCOPIC US N/A 10/24/2017    Procedure: ANAL ENDOSCOPIC U/S;  Surgeon: Wai Baez MD;  [facility];  Service: Colorectal   • DILATION AND CURETTAGE OF UTERUS     • EGD     • ENDOMETRIAL BIOPSY      WITHOUT CERVICAL DILATION   • HYSTERECTOMY     • NASAL SEPTUM SURGERY     • OTHER SURGICAL HISTORY      Episiotomy repair   • [place] LAPS GSTRC RSTRICTIV PX LONGITUDINAL GASTRECTOMY N/A 6/16/2020    Procedure: GASTRECTOMY SLEEVE LAPAROSCOPIC AND INTRAOPERATIVE EGD.;  Surgeon: Robin Sommer MD;  [facility];  Service: Bariatrics   • [place] SURGICAL ARTHROSCOPY SHOULDER W/ROTATOR CUFF RPR Right 1/15/2020    Procedure: SHOULDER ARTHROSCOPIC DEBRIDEMENT OF PARTIAL THICKNESS ROTATOR CUFF TEAR, SAD;  Surgeon: Stan Angeles MD;  [facility];  Service: Orthopedics   • ROTATOR CUFF REPAIR Left       Social History:     Social History     Socioeconomic History   • Marital status: /Civil Union     Spouse name: None    • Number of children: None   • Years of education: None   • Highest education level: None   Occupational History   • None   Tobacco Use   • Smoking status: Former     Current packs/day: 0.00     Average packs/day: 0.5 packs/day for 5.0 years (2.5 ttl pk-yrs)     Types: Cigarettes     Start date: 6/10/1987     Quit date: 6/10/1992     Years since quittin.8   • Smokeless tobacco: Never   • Tobacco comments:     Smoked for 10yrs.   Vaping Use   • Vaping status: Never Used   Substance and Sexual Activity   • Alcohol use: Yes     Comment: Wine maybe once or twice a month   • Drug use: No   • Sexual activity: Yes     Partners: Male     Birth control/protection: Surgical, Male Sterilization     Comment: with    Other Topics Concern   • None   Social History Narrative    CAFFEINE USE    DAILY COFFEE CONSUMPTION    EXERCISES FREQUENTLY     Social Determinants of Health     Financial Resource Strain: Not on file   Food Insecurity: Not on file   Transportation Needs: Not on file   Physical Activity: Not on file   Stress: Not on file   Social Connections: Not on file   Intimate Partner Violence: Not on file   Housing Stability: Not on file      Family History:     Family History   Problem Relation Age of Onset   • Atrial fibrillation Mother    • Hypertension Mother    • Depression Mother    • Suicide Attempts Mother    • Lung cancer Father    • Heart disease Father    • Diabetes Father    • Cancer Father    • Obesity Brother    • No Known Problems Brother    • Breast cancer Maternal Grandmother 50   • Cancer Paternal Grandfather    • No Known Problems Son    • No Known Problems Son    • Lung cancer Maternal Aunt    • No Known Problems Maternal Aunt    • No Known Problems Maternal Aunt    • No Known Problems Maternal Aunt    • Other Family         ADENOCARCINOMA IN S[place]BH[place] IN VILLOUS ADENOMA OF THE BREAST, MIGRAINES, SKIN DISORDER   • Depression Family    • Anxiety disorder Family    • Diabetes Family         MELLITUS    • Fibrocystic breast disease Family    • Heart disease Family    • Hiatal hernia Family    • Hypertension Family    • Irritable bowel syndrome Family    • Kidney disease Family    • Urinary tract infection Family    • Breast cancer Family    • Ovarian cancer Neg Hx    • Uterine cancer Neg Hx    • Cervical cancer Neg Hx       Current Medications:     Current Outpatient Medications   Medication Sig Dispense Refill   • calcium carbonate (OS-YONG) 600 MG tablet Take 600 mg by mouth 2 (two) times a day with meals     • cholecalciferol (VITAMIN D3) 1,000 units tablet Take 1,000 Units by mouth daily     • Cosentyx Sensoready, 300 MG, 150 MG/ML SOAJ injection      • Multiple Vitamins-Minerals (multivitamin with minerals) tablet Take 1 tablet by mouth daily     • sertraline (ZOLOFT) 100 mg tablet Take 1 tablet (100 mg total) by mouth daily 90 tablet 1   • triamterene-hydrochlorothiazide (MAXZIDE-25) 37.5-25 mg per tablet take 1 tablet by mouth once daily 90 tablet 1     No current facility-administered medications for this visit.      Allergies:     Allergies   Allergen Reactions   • Vicodin [Hydrocodone-Acetaminophen] Anaphylaxis      Physical Exam:     /88 (BP Location: Left arm, Patient Position: Sitting, Cuff Size: Standard)   Pulse 65   Temp 98.1 °F (36.7 °C) (Temporal)   Resp 15   Wt 79.6 kg (175 lb 6.4 oz)   SpO2 96%   BMI 28.74 kg/m²     Physical Exam  Vitals and nursing note reviewed.   Constitutional:       General: She is not in acute distress.     Appearance: Normal appearance. She is not ill-appearing.   HENT:      Head: Normocephalic and atraumatic.      Right Ear: Tympanic membrane and ear canal normal.      Left Ear: Tympanic membrane and ear canal normal.      Nose: Nose normal.      Mouth/Throat:      Mouth: Mucous membranes are moist.      Pharynx: Oropharynx is clear.   Eyes:      General:         Right eye: No discharge.         Left eye: No discharge.      Extraocular Movements: Extraocular  movements intact.      Conjunctiva/sclera: Conjunctivae normal.      Pupils: Pupils are equal, round, and reactive to light.   Cardiovascular:      Rate and Rhythm: Normal rate and regular rhythm.      Pulses: Normal pulses.      Heart sounds: Normal heart sounds.   Pulmonary:      Effort: Pulmonary effort is normal.      Breath sounds: Normal breath sounds.   Abdominal:      Palpations: Abdomen is soft.      Tenderness: There is no abdominal tenderness.   Musculoskeletal:      Cervical back: Normal range of motion and neck supple.      Comments: Right knee brace in place   Skin:     General: Skin is warm and dry.   Neurological:      General: No focal deficit present.      Mental Status: She is alert and oriented to person, place, and time. Mental status is at baseline.      GCS: GCS eye subscore is 4. GCS verbal subscore is 5. GCS motor subscore is 6.      Cranial Nerves: Cranial nerves 2-12 are intact.      Sensory: Sensation is intact.      Motor: Motor function is intact. No seizure activity.      Coordination: Coordination is intact.      Gait: Gait is intact.   Psychiatric:         Mood and Affect: Mood normal.         Behavior: Behavior normal.         Thought Content: Thought content normal.          JEFERSON Rodriguez  [facility]

## 2024-04-25 ENCOUNTER — OFFICE VISIT (OUTPATIENT)
Dept: OBGYN CLINIC | Facility: CLINIC | Age: 64
End: 2024-04-25
Payer: COMMERCIAL

## 2024-04-25 VITALS
WEIGHT: 174 LBS | BODY MASS INDEX: 27.97 KG/M2 | HEART RATE: 72 BPM | HEIGHT: 66 IN | SYSTOLIC BLOOD PRESSURE: 158 MMHG | DIASTOLIC BLOOD PRESSURE: 85 MMHG

## 2024-04-25 DIAGNOSIS — S80.01XA CONTUSION OF RIGHT KNEE, INITIAL ENCOUNTER: Primary | ICD-10-CM

## 2024-04-25 PROCEDURE — 99213 OFFICE O/P EST LOW 20 MIN: CPT | Performed by: ORTHOPAEDIC SURGERY

## 2024-04-25 NOTE — PROGRESS NOTES
Orthopaedics Office Visit - Follow up Patient Visit    ASSESSMENT/PLAN:    Assessment:   Right knee mild osteoarthritis and bone contusions     Plan:   The MRI was reviewed in the office today which demonstrate no mensicus tears.  The patient was instructed begin therapy as  scheduled.  She can begin to wean out of the TROM brace as she feels comfortable.   She will follow up in 4 weeks for repeat evaluation.    To Do Next Visit:  Repeat evaluation    _____________________________________________________  CHIEF COMPLAINT:  Chief Complaint   Patient presents with    Right Knee - Follow-up     Review MRI. Patient states knee has improved, but feels tight, hot, and swollen at times.         SUBJECTIVE:  Alexa Hawley is a 63 y.o. female who presents to the office today for follow up evaluation of right knee pain. At her last visit, a STAT MRI was ordered. The patient presents to the office today to review the MRI. The patient notes appx 90% improvement thus far. She notes intermittent pain to the posterior and anterior aspect of her knee. She notes increased pain with certain movements and at night. She has been using the TROM brace. She did not yet start therapy. She has been taking Tylenol OTC for pain and using ice.    PAST MEDICAL HISTORY:  Past Medical History:   Diagnosis Date    Allergic     Vicodin    Anemia     Anxiety     Arthritis     Bariatric surgery status     Closed head injury 06/11/2018    2018    Colon polyp     Concussion with loss of consciousness 06/22/2018    2018    Depression     Diabetes mellitus (HCC)     NIDDM    Diarrhea     chronic    Fatty liver     Fecal incontinence     Fractures 6/11/2018    GERD (gastroesophageal reflux disease)     Head injury     6/10/11    Hiatal hernia     Hyperlipidemia     Hypertension     Lifelong obesity     Mild TBI (HCC) 06/11/2018    Postsurgical malabsorption     Psoriasis     legs    SAH (subarachnoid hemorrhage) ([facility]) 06/11/2018    2018    Skin disorder  6/2014    Tear of right supraspinatus tendon 12/16/2019    Type 2 diabetes mellitus with microalbuminuria, without long-term current use of insulin (HCC) 07/05/2018    Wears glasses        PAST SURGICAL HISTORY:  Past Surgical History:   Procedure Laterality Date    CHOLECYSTECTOMY      COLONOSCOPY N/A 10/24/2017    Procedure: COLONOSCOPY;  Surgeon: Wai Baez MD;  [facility]: [facility];  Service: Colorectal    COLONOSCOPY W/ ENDOSCOPIC US N/A 10/24/2017    Procedure: ANAL ENDOSCOPIC U/S;  Surgeon: Wai Baez MD;  Location: [facility];  Service: Colorectal    DILATION AND CURETTAGE OF UTERUS      EGD      ENDOMETRIAL BIOPSY      WITHOUT CERVICAL DILATION    HYSTERECTOMY      NASAL SEPTUM SURGERY      OTHER SURGICAL HISTORY      Episiotomy repair    NH LAPS GSTRC RSTRICTIV PX LONGITUDINAL GASTRECTOMY N/A 06/16/2020    Procedure: GASTRECTOMY SLEEVE LAPAROSCOPIC AND INTRAOPERATIVE EGD.;  Surgeon: Robin Sommer MD;  [facility];  Service: Bariatrics    NH SURGICAL ARTHROSCOPY SHOULDER W/ROTATOR CUFF RPR Right 01/15/2020    Procedure: SHOULDER ARTHROSCOPIC DEBRIDEMENT OF PARTIAL THICKNESS ROTATOR CUFF TEAR, SAD;  Surgeon: Stan Angeles MD;  [facility];  Service: Orthopedics    ROTATOR CUFF REPAIR Left     SHOULDER SURGERY  01/2017       FAMILY HISTORY:  Family History   Problem Relation Age of Onset    Atrial fibrillation Mother     Hypertension Mother     Depression Mother     Suicide Attempts Mother     Lung cancer Father     Heart disease Father     Diabetes Father     Cancer Father     Obesity Brother     No Known Problems Brother     Breast cancer Maternal Grandmother 50    Cancer Paternal Grandfather     No Known Problems Son     No Known Problems Son     Lung cancer Maternal Aunt     No Known Problems Maternal Aunt     No Known Problems Maternal Aunt     No Known Problems Maternal Aunt     Other Family         ADENOCARCINOMA IN SIOBHAN IN VILLOUS ADENOMA OF THE BREAST, MIGRAINES, SKIN  DISORDER    Depression Family     Anxiety disorder Family     Diabetes Family         MELLITUS    Fibrocystic breast disease Family     Heart disease Family     Hiatal hernia Family     Hypertension Family     Irritable bowel syndrome Family     Kidney disease Family     Urinary tract infection Family     Breast cancer Family     Ovarian cancer Neg Hx     Uterine cancer Neg Hx     Cervical cancer Neg Hx        SOCIAL HISTORY:  Social History     Tobacco Use    Smoking status: Former     Current packs/day: 0.00     Average packs/day: 0.5 packs/day for 5.0 years (2.5 ttl pk-yrs)     Types: Cigarettes     Start date: 6/10/1987     Quit date: 6/10/1992     Years since quittin.8    Smokeless tobacco: Never    Tobacco comments:     Smoked for 10yrs.   Vaping Use    Vaping status: Never Used   Substance Use Topics    Alcohol use: Yes     Comment: Wine maybe once or twice a month    Drug use: No       MEDICATIONS:    Current Outpatient Medications:     calcium carbonate (OS-YONG) 600 MG tablet, Take 600 mg by mouth 2 (two) times a day with meals, Disp: , Rfl:     cholecalciferol (VITAMIN D3) 1,000 units tablet, Take 1,000 Units by mouth daily, Disp: , Rfl:     Cosentyx Sensoready, 300 MG, 150 MG/ML SOAJ injection, , Disp: , Rfl:     Multiple Vitamins-Minerals (multivitamin with minerals) tablet, Take 1 tablet by mouth daily, Disp: , Rfl:     sertraline (ZOLOFT) 100 mg tablet, Take 1 tablet (100 mg total) by mouth daily, Disp: 90 tablet, Rfl: 1    triamterene-hydrochlorothiazide (MAXZIDE-25) 37.5-25 mg per tablet, take 1 tablet by mouth once daily, Disp: 90 tablet, Rfl: 1    ALLERGIES:  Allergies   Allergen Reactions    Vicodin [Hydrocodone-Acetaminophen] Anaphylaxis       REVIEW OF SYSTEMS:  MSK: as noted in HPI  Neuro: WNL  Pertinent items are otherwise noted in HPI.  A comprehensive review of systems was otherwise negative.    LABS:  HgA1c:   Lab Results   Component Value Date    HGBA1C 6.0 (H) 10/19/2023     BMP:  "  Lab Results   Component Value Date    CALCIUM 9.2 04/20/2024    K 3.6 04/20/2024    CO2 25 04/20/2024     04/20/2024    BUN 20 04/20/2024    CREATININE 0.94 04/20/2024     CBC: No components found for: \"CBC\"    _____________________________________________________  PHYSICAL EXAMINATION:  Vital signs: /85   Pulse 72   Ht 5' 5.5\" (1.664 m)   Wt 78.9 kg (174 lb)   BMI 28.51 kg/m²   General: No acute distress, awake and alert  Psychiatric: Mood and affect appear appropriate  HEENT: Trachea Midline, No torticollis, no apparent facial trauma  Cardiovascular: No audible murmurs; Extremities appear perfused  Pulmonary: No audible wheezing or stridor  Skin: No open lesions; see further details (if any) below    MUSCULOSKELETAL EXAMINATION:  Extremities:  right knee  ROM WNL  Skin intact  No erythema  NVI distally     _____________________________________________________  STUDIES REVIEWED:  I personally reviewed the images and interpretation is as follows: MRI right knee performed on 4/23/24 demonstrates mild osteoarthritis and bone contusions.       PROCEDURES PERFORMED:  None performed today    Scribe Attestation      I,:  Kristina Kramer MA am acting as a scribe while in the presence of the attending physician.:       I,:  Camden Goetz MD personally performed the services described in this documentation    as scribed in my presence.:               "

## 2024-04-30 NOTE — PROGRESS NOTES
Diagnoses and all orders for this visit:    Incontinence of feces, unspecified fecal incontinence type  -     Ambulatory Referral to Colorectal Surgery  -     Ambulatory referral to Physical Therapy; Future       Incontinence of feces  Patient presents for evaluation of fecal incontinence.  Patient has been seen for this in the past 2017.  She notes that symptoms had initially improved since her symptoms.  Since then she has also undergone gastric sleeve and has 3-4 loose to soft bowel movements daily.  With this she has almost daily fecal incontinence as well.  She also does note some degree of urinary incontinence 2.  No recent change in bowel habits or rectal bleeding.    We again reviewed therapies.  Given her mixed urinary and fecal incontinence, I recommended pelvic floor physical therapy as a starting point.  Also recommended fiber bulking as she has multiple loose bowel movements.  Hopefully this will gain her some significant benefit.  Otherwise we discussed about surgical approaches.  We specifically discussed colostomy as well as sacral nerve stimulation with InterStim.  Discussed that there are otherwise limits on surgical care for fecal incontinence.  I will plan on seeing him back in 6 months.      MAURA Hawley is here for evaluation of fecal incontinence.    The patient notes daily episodes of fecal incontinence of loose stool with decreased sense of urgency; having 3-4 formed to loose bowel movements a day.     Last colonoscopy performed on 10/24/2017 showed:  Decreased sphincter tone was noted during the rectal exam.  Normal terminal ileum. Biopsy taken. Mild diverticulosis found in the sigmoid colon.    Past Medical History:   Diagnosis Date    Allergic     Vicodin    Anemia     Anxiety     Arthritis     Bariatric surgery status     Closed head injury 06/11/2018 2018    Colon polyp     Concussion with loss of consciousness 06/22/2018    2018    Depression     Diabetes mellitus (HCC)      NIDDM    Diarrhea     chronic    Fatty liver     Fecal incontinence     Fractures 6/11/2018    GERD (gastroesophageal reflux disease)     Head injury     6/10/11    Hiatal hernia     Hyperlipidemia     Hypertension     Lifelong obesity     Mild TBI (HCC) 06/11/2018    Postsurgical malabsorption     Psoriasis     legs    SAH (subarachnoid hemorrhage) (HCC) 06/11/2018    2018    Skin disorder 6/2014    Tear of right supraspinatus tendon 12/16/2019    Type 2 diabetes mellitus with microalbuminuria, without long-term current use of insulin ([facility]) 07/05/2018    Wears glasses      Past Surgical History:   Procedure Laterality Date    CHOLECYSTECTOMY      COLONOSCOPY N/A 10/24/2017    Procedure: COLONOSCOPY;  Surgeon: Wai Baez MD;  [facility];  Service: Colorectal    COLONOSCOPY W/ ENDOSCOPIC US N/A 10/24/2017    Procedure: ANAL ENDOSCOPIC U/S;  Surgeon: Wai Baez MD;  [facility];  Service: Colorectal    DILATION AND CURETTAGE OF UTERUS      EGD      ENDOMETRIAL BIOPSY      WITHOUT CERVICAL DILATION    HYSTERECTOMY      NASAL SEPTUM SURGERY      OTHER SURGICAL HISTORY      Episiotomy repair    WA LAPS GSTRC RSTRICTIV PX LONGITUDINAL GASTRECTOMY N/A 06/16/2020    Procedure: GASTRECTOMY SLEEVE LAPAROSCOPIC AND INTRAOPERATIVE EGD.;  Surgeon: Robin Sommer MD;  [facility];  Service: Bariatrics    WA SURGICAL ARTHROSCOPY SHOULDER W/ROTATOR CUFF RPR Right 01/15/2020    Procedure: SHOULDER ARTHROSCOPIC DEBRIDEMENT OF PARTIAL THICKNESS ROTATOR CUFF TEAR, SAD;  Surgeon: Stan Angeles MD;  [facility];  Service: Orthopedics    ROTATOR CUFF REPAIR Left     SHOULDER SURGERY  01/2017       Current Outpatient Medications:     calcium carbonate (OS-YONG) 600 MG tablet, Take 600 mg by mouth 2 (two) times a day with meals, Disp: , Rfl:     cholecalciferol (VITAMIN D3) 1,000 units tablet, Take 1,000 Units by mouth daily, Disp: , Rfl:     Cosentyx Sensoready, 300 MG, 150 MG/ML SOAJ  injection, , Disp: , Rfl:     Multiple Vitamins-Minerals (multivitamin with minerals) tablet, Take 1 tablet by mouth daily, Disp: , Rfl:     sertraline (ZOLOFT) 100 mg tablet, Take 1 tablet (100 mg total) by mouth daily, Disp: 90 tablet, Rfl: 1    triamterene-hydrochlorothiazide (MAXZIDE-25) 37.5-25 mg per tablet, take 1 tablet by mouth once daily, Disp: 90 tablet, Rfl: 1  Allergies as of 05/03/2024 - Reviewed 05/03/2024   Allergen Reaction Noted    Vicodin [hydrocodone-acetaminophen] Anaphylaxis 04/21/2012     Review of Systems   Musculoskeletal:  Positive for joint swelling.   All other systems reviewed and are negative.    There were no vitals filed for this visit.  Physical Exam  Constitutional:       Appearance: Normal appearance.   HENT:      Head: Normocephalic and atraumatic.   Eyes:      Extraocular Movements: Extraocular movements intact.      Pupils: Pupils are equal, round, and reactive to light.   Pulmonary:      Effort: Pulmonary effort is normal.   Musculoskeletal:      Comments: Right knee brace   Skin:     General: Skin is warm.   Neurological:      General: No focal deficit present.      Mental Status: She is alert and oriented to person, place, and time.   Psychiatric:         Mood and Affect: Mood normal.         Thought Content: Thought content normal.         Judgment: Judgment normal.

## 2024-05-03 ENCOUNTER — OFFICE VISIT (OUTPATIENT)
Age: 64
End: 2024-05-03
Payer: COMMERCIAL

## 2024-05-03 VITALS — HEIGHT: 65 IN | WEIGHT: 174 LBS | BODY MASS INDEX: 28.99 KG/M2

## 2024-05-03 DIAGNOSIS — R15.9 INCONTINENCE OF FECES, UNSPECIFIED FECAL INCONTINENCE TYPE: Primary | ICD-10-CM

## 2024-05-03 PROCEDURE — 99203 OFFICE O/P NEW LOW 30 MIN: CPT | Performed by: COLON & RECTAL SURGERY

## 2024-05-03 NOTE — ASSESSMENT & PLAN NOTE
Patient presents for evaluation of fecal incontinence.  Patient has been seen for this in the past 2017.  She notes that symptoms had initially improved since her symptoms.  Since then she has also undergone gastric sleeve and has 3-4 loose to soft bowel movements daily.  With this she has almost daily fecal incontinence as well.  She also does note some degree of urinary incontinence 2.  No recent change in bowel habits or rectal bleeding.    We again reviewed therapies.  Given her mixed urinary and fecal incontinence, I recommended pelvic floor physical therapy as a starting point.  Also recommended fiber bulking as she has multiple loose bowel movements.  Hopefully this will gain her some significant benefit.  Otherwise we discussed about surgical approaches.  We specifically discussed colostomy as well as sacral nerve stimulation with InterStim.  Discussed that there are otherwise limits on surgical care for fecal incontinence.  I will plan on seeing him back in 6 months.

## 2024-05-07 ENCOUNTER — EVALUATION (OUTPATIENT)
Dept: PHYSICAL THERAPY | Age: 64
End: 2024-05-07
Payer: COMMERCIAL

## 2024-05-07 DIAGNOSIS — M23.91 INTERNAL DERANGEMENT OF MULTIPLE SITES OF RIGHT KNEE: ICD-10-CM

## 2024-05-07 DIAGNOSIS — S89.91XA INJURY OF RIGHT KNEE, INITIAL ENCOUNTER: ICD-10-CM

## 2024-05-07 PROCEDURE — 97110 THERAPEUTIC EXERCISES: CPT | Performed by: PHYSICAL THERAPIST

## 2024-05-07 PROCEDURE — 97161 PT EVAL LOW COMPLEX 20 MIN: CPT | Performed by: PHYSICAL THERAPIST

## 2024-05-07 NOTE — PROGRESS NOTES
PT Evaluation     Today's date: 2024  Patient name: Alexa Hawley  : 1960  MRN: [id number]  Referring provider: Camden Goetz MD  Dx:   Encounter Diagnosis     ICD-10-CM    1. Injury of right knee, initial encounter  S89.91XA Ambulatory Referral to Physical Therapy      2. Internal derangement of multiple sites of right knee  M23.91 Ambulatory Referral to Physical Therapy             Stop Time: 1400       Assessment  Assessment details: Aleax Hawley is a 63-year-old female who presents to physical therapy with an acute onset of right knee pain and swelling with persistent stiffness.  She has significant pain and limitation to flexion range of motion with difficulty achieving terminal extension.  There is audible crepitus. She has poor quadricep contraction likely due to pain inhibition.  She does display an antalgic gait with decreased knee excursion throughout the gait cycle.  There is mild swelling present in her right ankle.  At this time due to the impairments noted patient does have functional limitations and would benefit from skilled physical therapy services to achieve her maximum functional potential.  Impairments: abnormal or restricted ROM, impaired physical strength, lacks appropriate home exercise program, pain with function and weight-bearing intolerance  Functional limitations: waling, stair climbing, prolonged sitting, standing, riding her horse, iadls, caring for horsesUnderstanding of Dx/Px/POC: good   Prognosis: good    Goals  ST.  Decrease pain and swelling.  2.  Improve range of motion with flexion greater than 90 degrees and achieving terminal extension.  3.  Patient will be out of perform a good-quality quad set with independent straight leg raise.  Long-term goals:  1.  Patient will demonstrate normalize gait pattern with equal weightbearing and no evidence of antalgia.  2.  Patient will be able to ride her horse.  3.  Patient will be able to complete all IADL  activities including caring for her horses.    Plan  Patient would benefit from: skilled physical therapy  Planned modality interventions: low level laser therapy  Planned therapy interventions: neuromuscular re-education, patient education, therapeutic exercise, therapeutic activities, strengthening, graded exercise, functional ROM exercises, home exercise program and gait training  Frequency: 2x week  Duration in visits: 15  Plan of Care beginning date: 2024  Plan of Care expiration date: 2024  Treatment plan discussed with: patient        Subjective Evaluation    History of Present Illness  Mechanism of injury: On  pt reports she was trying to get her horse up off the ground to walk it until vet came to prevent colic..   The following day she grabbed a horse rein and turned and felt a crack in her knee. She had a sharp pain and passed out and had a seizure.   She went to the hospital via ambulance. She has no prior history of seizure. Ctscan was negative.   Since this episode she has persistent rigth knee pain, stiffness and swelling. She did see an ortho and a stat MRI was ordered. Results in chart show no discrete meniscal or ligamentous tear.     Pt reports stiffness after prolonged sitting and getting OOB in morning. She has increased pain at times with change of direction. She has pain with walking and prolonged WB. She can not complete her home activities and taking care of her horse.     Pt's goals are to be able to ride her horse and take care of the horses daily.   Patient Goals  Patient goals for therapy: decreased pain, decreased edema and increased strength  Patient goal: be able take care of horses and ride horse  Pain  Current pain ratin  At worst pain ratin  Location: medial knee  Quality: dull ache and sharp  Relieving factors: ice  Aggravating factors: sitting, standing, walking and stair climbing    Social Support  Lives with: spouse      Diagnostic Tests  Ultrasound  findings: normalMRI studies: normal        Objective     Observations     Right Knee   Positive for edema.     Additional Observation Details  Swelling also present in right ankle likely due to dependent position    Neurological Testing     Sensation     Knee     Right Knee   Paresthesia: light touch     Comments   Right light touch: L5-S1 dermatome, intermittent.     Active Range of Motion   Left Hip   Normal active range of motion    Right Hip   Normal active range of motion  Left Knee   Flexion: 136 degrees   Extension: 0 degrees     Right Knee   Flexion: 92 degrees with pain  Extension: -5 degrees     Strength/Myotome Testing     Left Hip   Planes of Motion   Flexion: 4  Abduction: 4  Adduction: 4    Right Hip   Planes of Motion   Flexion: 4  Abduction: 4  Adduction: 4    Left Knee   Normal strength    Right Knee   Flexion: 2+  Extension: 2+  Quadriceps contraction: fair    Left Ankle/Foot   Dorsiflexion: 5  Plantar flexion: 5    Right Ankle/Foot   Dorsiflexion: 5  Plantar flexion: 5    Tests     Right Knee   Positive lateral Cassie and medial Cassie.   Negative anterior drawer and posterior drawer.     Additional Tests Details  Pt has moderate to high pain and highly irritable symptoms therefore testing difficult to assess results.     Swelling     Left Knee Girth Measurement (cm)   10 cm above joint line: 40.3 cm    Right Knee Girth Measurement (cm)   10 cm above joint line: 43.2 cm             POC expires Unit limit Auth Expiration date PT/OT + Visit Limit?   8/6/24 BOMN  BOMN                           Visit/Unit Tracking  AUTH Status:  Date 4/7              No Auth  Used 1               Remaining                 FOTO                      Precautions: previous seizure      Manuals 5/7            Laser IASTM  200/20/10                         TherEx             Pt ed/HEP 5'                         Quad set 20x            Heel slide 20x            SLR             SAQ             LAQ             Ball ADD 20x             Tband BAD 20x                         Nustep                                                                                                                                                                          Ther Activity                                       Gait Training                                       Modalities

## 2024-05-08 DIAGNOSIS — Z00.6 ENCOUNTER FOR EXAMINATION FOR NORMAL COMPARISON OR CONTROL IN CLINICAL RESEARCH PROGRAM: ICD-10-CM

## 2024-05-10 ENCOUNTER — OFFICE VISIT (OUTPATIENT)
Dept: PHYSICAL THERAPY | Age: 64
End: 2024-05-10
Payer: COMMERCIAL

## 2024-05-10 DIAGNOSIS — M23.91 INTERNAL DERANGEMENT OF MULTIPLE SITES OF RIGHT KNEE: ICD-10-CM

## 2024-05-10 DIAGNOSIS — S89.91XA INJURY OF RIGHT KNEE, INITIAL ENCOUNTER: Primary | ICD-10-CM

## 2024-05-10 PROCEDURE — 97110 THERAPEUTIC EXERCISES: CPT | Performed by: PHYSICAL THERAPIST

## 2024-05-10 NOTE — PROGRESS NOTES
"Daily Note     Today's date: 5/10/2024  Patient name: Alexa Hawley  : 1960  MRN: [id number]  Referring provider: Camden Goetz MD  Dx:   Encounter Diagnosis     ICD-10-CM    1. Injury of right knee, initial encounter  S89.91XA       2. Internal derangement of multiple sites of right knee  M23.91           Start Time: 0700  Stop Time: 0745  Total time in clinic (min): 45 minutes    Subjective: A little bit better. Pt reports stiffness.       Objective: See treatment diary below      Assessment: Pt noting improvement. Crepitus audible with flexion. ROM improved compared to IE. Gait slightly improved with increased knee excursion.       Plan: Continue per plan of care.      POC expires Unit limit Auth Expiration date PT/OT + Visit Limit?   24 BOMN  BOMN                           Visit/Unit Tracking  AUTH Status:  Date 5/7 5/10             No Auth  Used 1               Remaining                 FOTO                      Precautions: previous seizure      Manuals 5/7 5/10           Laser IASTM  200/20/10 200/20/15                        TherEx             Pt ed/HEP 5'                         Quad set 20x 30x           Heel slide 20x 30x           SLR  2x10           SAQ  30x           LAQ  30x           Ball ADD 20x 30x           Tband BAD 20x 30x                        Nustep   5'           slant  30\"x4           Standing heel raise  30x           Standing abd  30x                                                                                                                                Ther Activity                                       Gait Training                                       Modalities                                            "

## 2024-05-13 ENCOUNTER — OFFICE VISIT (OUTPATIENT)
Dept: PHYSICAL THERAPY | Age: 64
End: 2024-05-13
Payer: COMMERCIAL

## 2024-05-13 DIAGNOSIS — M23.91 INTERNAL DERANGEMENT OF MULTIPLE SITES OF RIGHT KNEE: ICD-10-CM

## 2024-05-13 DIAGNOSIS — S89.91XA INJURY OF RIGHT KNEE, INITIAL ENCOUNTER: Primary | ICD-10-CM

## 2024-05-13 PROCEDURE — 97110 THERAPEUTIC EXERCISES: CPT | Performed by: PHYSICAL THERAPIST

## 2024-05-13 NOTE — PROGRESS NOTES
"Daily Note     Today's date: 2024  Patient name: Alexa Hawley  : 1960  MRN: [id number]  Referring provider: Camden Geotz MD  Dx:   Encounter Diagnosis     ICD-10-CM    1. Injury of right knee, initial encounter  S89.91XA       2. Internal derangement of multiple sites of right knee  M23.91           Start Time: 0720  Stop Time: 0800  Total time in clinic (min): 40 minutes    Subjective: Patient reports she had increased pain after last session in the joint and was achy. She also did report an incident of the knee giving way when stepping out of her car.       Objective: See treatment diary below      Assessment: Pt tolerated session well. Performed laser IASTM to both anterior and posterior knee today. Swelling appears decreased despite pain.  Genu varum, lacks TKE during gait which may have contributed to sensation of buckling.     Plan: Continue per plan of care.      POC expires Unit limit Auth Expiration date PT/OT + Visit Limit?   24 BOMN  BOMN                           Visit/Unit Tracking  AUTH Status:  Date 5/7 5/10 5/13            No Auth  Used 1 2 3             Remaining                 FOTO                      Precautions: previous seizure      Manuals 5/7 5/10 5/13          Laser IASTM  200/20/10 200/20/15 200/25/15                       TherEx             Pt ed/HEP 5'                         Quad set 20x 30x 30x          Heel slide 20x 30x 30x          SLR  2x10 2x10          SAQ  30x 30x          LAQ  30x 30x          Ball ADD 20x 30x 30x          Tband BAD 20x 30x 30x BTB                       Nustep   5' 5' L2          slant  30\"x4           Standing heel raise  30x 30x          Standing abd  30x 30x                                                                                                                               Ther Activity                                       Gait Training                                       Modalities                                     "

## 2024-05-15 ENCOUNTER — OFFICE VISIT (OUTPATIENT)
Dept: PHYSICAL THERAPY | Age: 64
End: 2024-05-15
Payer: COMMERCIAL

## 2024-05-15 DIAGNOSIS — M23.91 INTERNAL DERANGEMENT OF MULTIPLE SITES OF RIGHT KNEE: ICD-10-CM

## 2024-05-15 DIAGNOSIS — S89.91XA INJURY OF RIGHT KNEE, INITIAL ENCOUNTER: Primary | ICD-10-CM

## 2024-05-15 PROCEDURE — 97110 THERAPEUTIC EXERCISES: CPT | Performed by: PHYSICAL THERAPIST

## 2024-05-15 NOTE — PROGRESS NOTES
"Daily Note     Today's date: 5/15/2024  Patient name: Alexa Hawley  : 1960  MRN: [id number]  Referring provider: Camden Goetz MD  Dx:   Encounter Diagnosis     ICD-10-CM    1. Injury of right knee, initial encounter  S89.91XA       2. Internal derangement of multiple sites of right knee  M23.91           Start Time: 0700  Stop Time: 0740  Total time in clinic (min): 40 minutes    Subjective: Pt reports her knee is a little better. She is anxious to get back on her horse.   C/o audible cracking.       Objective: See treatment diary below      Assessment: Pt has decreased swelling; remains apprehensive while walking but is using brace at times. ROM gradually improving.       Plan: Continue per plan of care.      POC expires Unit limit Auth Expiration date PT/OT + Visit Limit?   24 BOMN  BOMN                           Visit/Unit Tracking  AUTH Status:  Date 5/7 5/10 5/13 5/15           No Auth  Used 1 2 3 4            Remaining                 FOTO                      Precautions: previous seizure      Manuals 5/7 5/10 5/13 5/15         Laser IASTM  200/20/10 200/20/15 200/25/15 200/25/10                      TherEx             Pt ed/HEP 5'                         Quad set 20x 30x 30x 30x         Heel slide 20x 30x 30x 30x         SLR  2x10 2x10 2x10         SAQ  30x 30x 30x         LAQ  30x 30x 30x         Ball ADD 20x 30x 30x 30x         Tband BAD 20x 30x 30x BTB 30x                      Nustep   5' 5' L2 5'         slant  30\"x4           Standing heel raise  30x 30x 30x         Standing abd  30x 30x 30x                                                                                                                              Ther Activity                                       Gait Training                                       Modalities                                                "

## 2024-05-22 ENCOUNTER — OFFICE VISIT (OUTPATIENT)
Dept: PHYSICAL THERAPY | Age: 64
End: 2024-05-22
Payer: COMMERCIAL

## 2024-05-22 DIAGNOSIS — M23.91 INTERNAL DERANGEMENT OF MULTIPLE SITES OF RIGHT KNEE: ICD-10-CM

## 2024-05-22 DIAGNOSIS — S89.91XA INJURY OF RIGHT KNEE, INITIAL ENCOUNTER: Primary | ICD-10-CM

## 2024-05-22 PROCEDURE — 97110 THERAPEUTIC EXERCISES: CPT | Performed by: PHYSICAL THERAPIST

## 2024-05-22 NOTE — PROGRESS NOTES
"Daily Note     Today's date: 2024  Patient name: Alexa Hawley  : 1960  MRN: [id number]  Referring provider: Camden Goetz MD  Dx:   Encounter Diagnosis     ICD-10-CM    1. Injury of right knee, initial encounter  S89.91XA       2. Internal derangement of multiple sites of right knee  M23.91           Start Time: 0700  Stop Time: 0740  Total time in clinic (min): 40 minutes    Subjective: Pt reports decreased pain and swelling. She still notes uncertainty on uneven surfaces. She still has some popping and medial posterior knee pain.       Objective: See treatment diary below  Flexion 118    Assessment: Pt has decreased symptom irritability and decreased swelling. Improved ROM, achieved TKE and good overall flexion. Gait quality improving. Pt does use brace for walking to her barn, has not yet tried riding her horse.       Plan: Continue per plan of care.      POC expires Unit limit Auth Expiration date PT/OT + Visit Limit?   24 BOMN  BOMN                           Visit/Unit Tracking  AUTH Status:  Date 5/7 5/10 5/13 5/15 5/22          No Auth  Used 1 2 3 4 5           Remaining                 FOTO                      Precautions: previous seizure      Manuals 5/7 5/10 5/13 5/15 5/22        Laser IASTM  200/20/10 200/20/15 200/25/15 200/25/10 200/25/10                     TherEx             Pt ed/HEP 5'                         Quad set 20x 30x 30x 30x 30x        Heel slide 20x 30x 30x 30x 30x        SLR  2x10 2x10 2x10 3x10        SAQ  30x 30x 30x 2# 30x        LAQ  30x 30x 30x 30x        Ball ADD 20x 30x 30x 30x 30x        Tband BAD 20x 30x 30x BTB 30x 30x                     Nustep   5' 5' L2 5' 5' L3        slant  30\"x4   30\"x4        Standing heel raise  30x 30x 30x 30x        Standing abd  30x 30x 30x 30x                                                                                                                             Ther Activity                                       Gait " Training                                       Modalities

## 2024-05-23 ENCOUNTER — OFFICE VISIT (OUTPATIENT)
Dept: OBGYN CLINIC | Facility: CLINIC | Age: 64
End: 2024-05-23
Payer: COMMERCIAL

## 2024-05-23 VITALS
HEART RATE: 69 BPM | HEIGHT: 65 IN | WEIGHT: 175.4 LBS | SYSTOLIC BLOOD PRESSURE: 155 MMHG | DIASTOLIC BLOOD PRESSURE: 82 MMHG | BODY MASS INDEX: 29.22 KG/M2

## 2024-05-23 DIAGNOSIS — S80.01XA CONTUSION OF RIGHT KNEE, INITIAL ENCOUNTER: Primary | ICD-10-CM

## 2024-05-23 DIAGNOSIS — M17.11 PRIMARY OSTEOARTHRITIS OF RIGHT KNEE: ICD-10-CM

## 2024-05-23 PROCEDURE — 99213 OFFICE O/P EST LOW 20 MIN: CPT | Performed by: ORTHOPAEDIC SURGERY

## 2024-05-23 PROCEDURE — 20610 DRAIN/INJ JOINT/BURSA W/O US: CPT | Performed by: ORTHOPAEDIC SURGERY

## 2024-05-23 RX ORDER — BETAMETHASONE SODIUM PHOSPHATE AND BETAMETHASONE ACETATE 3; 3 MG/ML; MG/ML
12 INJECTION, SUSPENSION INTRA-ARTICULAR; INTRALESIONAL; INTRAMUSCULAR; SOFT TISSUE
Status: COMPLETED | OUTPATIENT
Start: 2024-05-23 | End: 2024-05-23

## 2024-05-23 RX ORDER — BUPIVACAINE HYDROCHLORIDE 2.5 MG/ML
2 INJECTION, SOLUTION INFILTRATION; PERINEURAL
Status: COMPLETED | OUTPATIENT
Start: 2024-05-23 | End: 2024-05-23

## 2024-05-23 RX ADMIN — BETAMETHASONE SODIUM PHOSPHATE AND BETAMETHASONE ACETATE 12 MG: 3; 3 INJECTION, SUSPENSION INTRA-ARTICULAR; INTRALESIONAL; INTRAMUSCULAR; SOFT TISSUE at 07:45

## 2024-05-23 RX ADMIN — BUPIVACAINE HYDROCHLORIDE 2 ML: 2.5 INJECTION, SOLUTION INFILTRATION; PERINEURAL at 07:45

## 2024-05-23 NOTE — PROGRESS NOTES
Orthopaedics Office Visit - Follow up Patient Visit    ASSESSMENT/PLAN:    Assessment:   Right knee mild osteoarthritis and bone contusions     DOI 4/20/24  Improved symptoms, persistent posteromedial pain       Plan:   Weight bearing as tolerated right lower extremity   Offered patient cortisone injection. Patient accepted and tolerated well.   Discussed that cortisone injections can be repeated every 3 months as needed   Continue with physical therapy as directed   Over the counter analgesics as needed / directed   Ice / heat as directed   Follow up 3 months       To Do Next Visit:  Evaluate right knee pain   _____________________________________________________  CHIEF COMPLAINT:  Chief Complaint   Patient presents with    Right Knee - Follow-up         SUBJECTIVE:  Alexa Hawley is a 63 y.o. female who presents to the office for a follow-up evaluation of her right knee.  Patient is 1 month status post injury resulting in right knee pain.  Patient states that her right is substantially improved overall.  Patient states that she has been attending physical therapy as previously directed with good results.  Patient does not take any type of pain medication for the knee.  Patient does admit to having popping and clicking in the knee patient denies any numbness or tingling in her leg.  Patient offers no other complaints at this time.      PAST MEDICAL HISTORY:  Past Medical History:   Diagnosis Date    Allergic     Vicodin    Anemia     Anxiety     Arthritis     Bariatric surgery status     Closed head injury 06/11/2018    2018    Colon polyp     Concussion with loss of consciousness 06/22/2018    2018    Depression     Diabetes mellitus (HCC)     NIDDM    Diarrhea     chronic    Fatty liver     Fecal incontinence     Fractures 6/11/2018    GERD (gastroesophageal reflux disease)     Head injury     6/10/11    Hiatal hernia     Hyperlipidemia     Hypertension     Lifelong obesity     Mild TBI (HCC) 06/11/2018     Postsurgical malabsorption     Psoriasis     legs    SAH (subarachnoid hemorrhage) ([facility]) 06/11/2018    2018    Skin disorder 6/2014    Tear of right supraspinatus tendon 12/16/2019    Type 2 diabetes mellitus with microalbuminuria, without long-term current use of insulin ([facility]) 07/05/2018    Wears glasses        PAST SURGICAL HISTORY:  Past Surgical History:   Procedure Laterality Date    CHOLECYSTECTOMY      COLONOSCOPY N/A 10/24/2017    Procedure: COLONOSCOPY;  Surgeon: Wai Baez MD;  Location: [facility];  Service: Colorectal    COLONOSCOPY W/ ENDOSCOPIC US N/A 10/24/2017    Procedure: ANAL ENDOSCOPIC U/S;  Surgeon: Wai Baez MD;  Location: [facility];  Service: Colorectal    DILATION AND CURETTAGE OF UTERUS      EGD      ENDOMETRIAL BIOPSY      WITHOUT CERVICAL DILATION    HYSTERECTOMY      NASAL SEPTUM SURGERY      OTHER SURGICAL HISTORY      Episiotomy repair    ME LAPS GSTRC RSTRICTIV PX LONGITUDINAL GASTRECTOMY N/A 06/16/2020    Procedure: GASTRECTOMY SLEEVE LAPAROSCOPIC AND INTRAOPERATIVE EGD.;  Surgeon: Robin Sommer MD;  Location: [facility];  Service: Bariatrics    ME SURGICAL ARTHROSCOPY SHOULDER W/ROTATOR CUFF RPR Right 01/15/2020    Procedure: SHOULDER ARTHROSCOPIC DEBRIDEMENT OF PARTIAL THICKNESS ROTATOR CUFF TEAR, SAD;  Surgeon: Stan Angeles MD;  Location: [facility];  Service: Orthopedics    ROTATOR CUFF REPAIR Left     SHOULDER SURGERY  01/2017       FAMILY HISTORY:  Family History   Problem Relation Age of Onset    Atrial fibrillation Mother     Hypertension Mother     Depression Mother     Suicide Attempts Mother     Lung cancer Father     Heart disease Father     Diabetes Father     Cancer Father     Obesity Brother     No Known Problems Brother     Breast cancer Maternal Grandmother 50    Cancer Paternal Grandfather     No Known Problems Son     No Known Problems Son     Lung cancer Maternal Aunt     No Known Problems Maternal Aunt     No Known Problems Maternal Aunt     No  Known Problems Maternal Aunt     Other Family         ADENOCARCINOMA IN SIOBHAN IN [place]ILLOUS ADENOMA OF THE BREAST, MIGRAINES, SKIN DISORDER    Depression Family     Anxiety disorder Family     Diabetes Family         MELLITUS    Fibrocystic breast disease Family     Heart disease Family     Hiatal hernia Family     Hypertension Family     Irritable bowel syndrome Family     Kidney disease Family     Urinary tract infection Family     Breast cancer Family     Ovarian cancer Neg Hx     Uterine cancer Neg Hx     Cervical cancer Neg Hx        SOCIAL HISTORY:  Social History     Tobacco Use    Smoking status: Former     Current packs/day: 0.00     Average packs/day: 0.5 packs/day for 5.0 years (2.5 ttl pk-yrs)     Types: Cigarettes     Start date: 6/10/1987     Quit date: 6/10/1992     Years since quittin.9    Smokeless tobacco: Never    Tobacco comments:     Smoked for 10yrs.   Vaping Use    Vaping status: Never Used   Substance Use Topics    Alcohol use: Yes     Comment: Wine maybe once or twice a month    Drug use: No       MEDICATIONS:    Current Outpatient Medications:     calcium carbonate (OS-YONG) 600 MG tablet, Take 600 mg by mouth 2 (two) times a day with meals, Disp: , Rfl:     cholecalciferol (VITAMIN D3) 1,000 units tablet, Take 1,000 Units by mouth daily, Disp: , Rfl:     Cosentyx Sensoready, 300 MG, 150 MG/ML SOAJ injection, , Disp: , Rfl:     Multiple Vitamins-Minerals (multivitamin with minerals) tablet, Take 1 tablet by mouth daily, Disp: , Rfl:     sertraline (ZOLOFT) 100 mg tablet, Take 1 tablet (100 mg total) by mouth daily, Disp: 90 tablet, Rfl: 1    triamterene-hydrochlorothiazide (MAXZIDE-25) 37.5-25 mg per tablet, take 1 tablet by mouth once daily, Disp: 90 tablet, Rfl: 1    ALLERGIES:  Allergies   Allergen Reactions    Vicodin [Hydrocodone-Acetaminophen] Anaphylaxis       REVIEW OF SYSTEMS:  MSK: right knee pain   Neuro: WNL   Pertinent items are otherwise noted in HPI.  A comprehensive review of  "systems was otherwise negative.    LABS:  HgA1c:   Lab Results   Component Value Date    HGBA1C 6.0 (H) 10/19/2023     BMP:   Lab Results   Component Value Date    CALCIUM 9.2 04/20/2024    K 3.6 04/20/2024    CO2 25 04/20/2024     04/20/2024    BUN 20 04/20/2024    CREATININE 0.94 04/20/2024     CBC: No components found for: \"CBC\"    _____________________________________________________  PHYSICAL EXAMINATION:  Vital signs: /82   Pulse 69   Ht 5' 5\" (1.651 m)   Wt 79.6 kg (175 lb 6.4 oz)   BMI 29.19 kg/m²   General: No acute distress, awake and alert  Psychiatric: Mood and affect appear appropriate  HEENT: Trachea Midline, No torticollis, no apparent facial trauma  Cardiovascular: No audible murmurs; Extremities appear perfused  Pulmonary: No audible wheezing or stridor  Skin: No open lesions; see further details (if any) below      MUSCULOSKELETAL EXAMINATION:  Right knee examination:  Patient sitting comfortably in the office in no acute distress   No acute visible abnormalities present in the right knee.  Extremity appears well-perfused overall  TTP noted over the posteromedial joint line. No other bony or soft tissue tenderness palpation noted at this time  0-90 degrees of ROM of the right knee limited by pain  NV intact     _____________________________________________________  STUDIES REVIEWED:  I personally reviewed the images and interpretation is as follows:  N/A         PROCEDURES PERFORMED:  Large joint arthrocentesis: R knee  Universal Protocol:  Consent: Verbal consent obtained.  Risks and benefits: risks, benefits and alternatives were discussed  Consent given by: patient  Patient understanding: patient states understanding of the procedure being performed  Site marked: the operative site was marked  Patient identity confirmed: verbally with patient  Procedure Details  Location: knee - R knee  Preparation: Patient was prepped and draped in the usual sterile fashion  Needle size: 22 " "G  Ultrasound guidance: no  Approach: anterolateral  Medications administered: 2 mL bupivacaine 0.25 %; 12 mg betamethasone acetate-betamethasone sodium phosphate 6 (3-3) mg/mL    Patient tolerance: patient tolerated the procedure well with no immediate complications  Dressing:  Sterile dressing applied                          Santosh Warner PA-C - assisting  Camden Goetz MD                                      Portions of the record may have been created with voice recognition software.  Occasional wrong word or \"sound a like\" substitutions may have occurred due to the inherent limitations of voice recognition software.  Read the chart carefully and recognize, using context, where substitutions have occurred.    "

## 2024-05-23 NOTE — PATIENT INSTRUCTIONS
Weight bearing as tolerated right lower extremity   Offered patient cortisone injection. Patient accepted and tolerated well.   Discussed that cortisone injections can be repeated every 3 months as needed   Continue with physical therapy as directed   Over the counter analgesics as needed / directed   Ice / heat as directed   Follow up 3 months

## 2024-05-24 ENCOUNTER — OFFICE VISIT (OUTPATIENT)
Dept: PHYSICAL THERAPY | Age: 64
End: 2024-05-24
Payer: COMMERCIAL

## 2024-05-24 DIAGNOSIS — S89.91XA INJURY OF RIGHT KNEE, INITIAL ENCOUNTER: Primary | ICD-10-CM

## 2024-05-24 DIAGNOSIS — M23.91 INTERNAL DERANGEMENT OF MULTIPLE SITES OF RIGHT KNEE: ICD-10-CM

## 2024-05-24 PROCEDURE — 97110 THERAPEUTIC EXERCISES: CPT | Performed by: PHYSICAL THERAPIST

## 2024-05-24 NOTE — PROGRESS NOTES
"Daily Note     Today's date: 2024  Patient name: Alexa Hawley  : 1960  MRN: [id number]  Referring provider: Camden Goetz MD  Dx:   Encounter Diagnosis     ICD-10-CM    1. Injury of right knee, initial encounter  S89.91XA       2. Internal derangement of multiple sites of right knee  M23.91           Start Time: 0700  Stop Time: 0745  Total time in clinic (min): 45 minutes    Subjective: Pt reports she saw Dr Goetz yesterday, had an injection of cortisone. She reports her pain is a 0-1/10. Pt is wearing brace when walking on uneven ground, riding. Pt is going to try riding her horse tomorrow.       Objective: See treatment diary below      Assessment: Pt making good progress. ROM is WFL. Mild infra-patellar pain with TKE today.       Plan: Continue per plan of care.      POC expires Unit limit Auth Expiration date PT/OT + Visit Limit?   24 BOMN  BOMN                           Visit/Unit Tracking  AUTH Status:  Date 5/7 5/10 5/13 5/15 5/22 5/24         No Auth  Used 1 2 3 4 5 6          Remaining                 FOTO                      Precautions: previous seizure      Manuals 5/7 5/10 5/13 5/15 5/22 5/26       Laser IASTM  200/20/10 200/20/15 200/25/15 200/25/10 200/25/10                     TherEx             Pt ed/HEP 5'                         Quad set 20x 30x 30x 30x 30x 30x       Heel slide 20x 30x 30x 30x 30x 30x       SLR  2x10 2x10 2x10 3x10 30x       SAQ  30x 30x 30x 2# 30x 2#/ 30       LAQ  30x 30x 30x 30x 30x       Ball ADD 20x 30x 30x 30x 30x 30x       Tband BAD 20x 30x 30x BTB 30x 30x 30x                    Nustep   5' 5' L2 5' 5' L3 6' L4       slant  30\"x4   30\"x4 30\"x4       Standing heel raise  30x 30x 30x 30x 30x       Standing abd  30x 30x 30x 30x 30x ea       Partial sqaut       20x                                                                                                               Ther Activity                                       Gait Training           "                             Modalities

## 2024-05-29 ENCOUNTER — OFFICE VISIT (OUTPATIENT)
Dept: PHYSICAL THERAPY | Age: 64
End: 2024-05-29
Payer: COMMERCIAL

## 2024-05-29 DIAGNOSIS — S89.91XA INJURY OF RIGHT KNEE, INITIAL ENCOUNTER: Primary | ICD-10-CM

## 2024-05-29 DIAGNOSIS — M23.91 INTERNAL DERANGEMENT OF MULTIPLE SITES OF RIGHT KNEE: ICD-10-CM

## 2024-05-29 PROCEDURE — 97110 THERAPEUTIC EXERCISES: CPT | Performed by: PHYSICAL THERAPIST

## 2024-05-29 NOTE — PROGRESS NOTES
"Daily Note     Today's date: 2024  Patient name: Alexa Hawley  : 1960  MRN: [id number]  Referring provider: Camden Goetz MD  Dx:   Encounter Diagnosis     ICD-10-CM    1. Injury of right knee, initial encounter  S89.91XA       2. Internal derangement of multiple sites of right knee  M23.91                      Subjective: Pt reports she was bale to ride her horse. She did have pain when she went to canter and had to squeeze her knee. She also noted doing a lot of walking without her brace on uneven surface and did \"pay for it\".       Objective: See treatment diary below  1:1 with PT 30    Assessment: Added activity simulation with pt seated on swiss ball to simulate horse back riding. Pt did well with activity, had some mild onset of pain with WB in this position. Pt continues to progress well.       Plan: Continue per plan of care.      POC expires Unit limit Auth Expiration date PT/OT + Visit Limit?   24 BOMN  BOMN                           Visit/Unit Tracking  AUTH Status:  Date 5/7 5/10 5/13 5/15 5/22 5/24 5/29        No Auth  Used 1 2 3 4 5 6 7         Remaining                 FOTO                      Precautions: previous seizure      Manuals 5/7 5/10 5/13 5/15 5/22 5/24 5/29      Laser IASTM  200/20/10 200/20/15 200/25/15 200/25/10 200/25/10                     TherEx             Pt ed/HEP 5'                         Quad set 20x 30x 30x 30x 30x 30x 30x      Heel slide 20x 30x 30x 30x 30x 30x 30x      SLR  2x10 2x10 2x10 3x10 30x 2#/30      SAQ  30x 30x 30x 2# 30x 2#/ 30 3#/ 30      LAQ  30x 30x 30x 30x 30x 3#/30      Ball ADD 20x 30x 30x 30x 30x 30x 30x30x      Tband BAD 20x 30x 30x BTB 30x 30x 30x 30x BTB                   Nustep   5' 5' L2 5' 5' L3 6' L4 6 L5      slant  30\"x4   30\"x4 30\"x4 30\"x4      Standing heel raise  30x 30x 30x 30x 30x 30x      Standing abd  30x 30x 30x 30x 30x ea 30x      Partial sqaut       20x 20x                   Swissball horse sim squeeze/ up& down  "      20x ea                                                                                    Ther Activity                                       Gait Training                                       Modalities

## 2024-05-31 ENCOUNTER — OFFICE VISIT (OUTPATIENT)
Dept: PHYSICAL THERAPY | Age: 64
End: 2024-05-31
Payer: COMMERCIAL

## 2024-05-31 DIAGNOSIS — S89.91XA INJURY OF RIGHT KNEE, INITIAL ENCOUNTER: Primary | ICD-10-CM

## 2024-05-31 DIAGNOSIS — M23.91 INTERNAL DERANGEMENT OF MULTIPLE SITES OF RIGHT KNEE: ICD-10-CM

## 2024-05-31 PROCEDURE — 97110 THERAPEUTIC EXERCISES: CPT

## 2024-05-31 NOTE — PROGRESS NOTES
"Daily Note     Today's date: 2024  Patient name: Alexa Hawley  : 1960  MRN: [id number]  Referring provider: Camden Goetz MD  Dx:   Encounter Diagnosis     ICD-10-CM    1. Injury of right knee, initial encounter  S89.91XA       2. Internal derangement of multiple sites of right knee  M23.91                      Subjective: Pt reports she is feeling pretty good overall today with minimal pain in her R describing it as more of an ache.       Objective: See treatment diary below      Assessment: Pt continues to do well with all exercises this session denying any increased pain levels. Pt continues to progress well continuing to make good progress towards her long term goals. Laser was performed at the end of session       Plan: Continue per plan of care.      POC expires Unit limit Auth Expiration date PT/OT + Visit Limit?   24 BOMN  BOMN                           Visit/Unit Tracking  AUTH Status:  Date 5/7 5/10 5/13 5/15 5/22 5/24 5/29 5/31       No Auth  Used 1 2 3 4 5 6 7 8        Remaining                 FOTO                      Precautions: previous seizure      Manuals 5/7 5/10 5/13 5/15 5/22 5/24 5/29 5/31     Laser IASTM  200/20/10 200/20/15 200/25/15 200/25/10 200/25/10   200/25/10                  TherEx             Pt ed/HEP 5'                         Quad set 20x 30x 30x 30x 30x 30x 30x 30x     Heel slide 20x 30x 30x 30x 30x 30x 30x 30x     SLR  2x10 2x10 2x10 3x10 30x 2#/30 2#/30     SAQ  30x 30x 30x 2# 30x 2#/ 30 3#/ 30 3#/30     LAQ  30x 30x 30x 30x 30x 3#/30 3#/30     Ball ADD 20x 30x 30x 30x 30x 30x 30x30x 30x     Tband BAD 20x 30x 30x BTB 30x 30x 30x 30x BTB 30x BTB                  Nustep   5' 5' L2 5' 5' L3 6' L4 6 L5 6' L5     slant  30\"x4   30\"x4 30\"x4 30\"x4 30\"x4     Standing heel raise  30x 30x 30x 30x 30x 30x 30x     Standing abd  30x 30x 30x 30x 30x ea 30x 3#/30     Partial sqaut       20x 20x 20x                  Swissball horse sim squeeze/ up& down       20x ea " 20x ea                                                                                   Ther Activity                                       Gait Training                                       Modalities

## 2024-06-05 ENCOUNTER — OFFICE VISIT (OUTPATIENT)
Dept: PHYSICAL THERAPY | Age: 64
End: 2024-06-05
Payer: COMMERCIAL

## 2024-06-05 DIAGNOSIS — M23.91 INTERNAL DERANGEMENT OF MULTIPLE SITES OF RIGHT KNEE: ICD-10-CM

## 2024-06-05 DIAGNOSIS — S89.91XA INJURY OF RIGHT KNEE, INITIAL ENCOUNTER: Primary | ICD-10-CM

## 2024-06-05 PROCEDURE — 97110 THERAPEUTIC EXERCISES: CPT | Performed by: PHYSICAL THERAPIST

## 2024-06-05 NOTE — PROGRESS NOTES
"Daily Note     Today's date: 2024  Patient name: Alexa Hawley  : 1960  MRN: [id number]  Referring provider: Camden Goetz MD  Dx:   Encounter Diagnosis     ICD-10-CM    1. Injury of right knee, initial encounter  S89.91XA       2. Internal derangement of multiple sites of right knee  M23.91           Start Time: 0700  Stop Time: 0755  Total time in clinic (min): 55 minutes    Subjective: \"I feel almost normal\". Pt able to ride her horse, mount using blocks, going up/down stairs normally.       Objective: See treatment diary below      Assessment: Pt has made good functional progress. Decreased pain. ROM WFL.       Plan: Continue per plan of care.       POC expires Unit limit Auth Expiration date PT/OT + Visit Limit?   24 BOMN  BOMN                           Visit/Unit Tracking  AUTH Status:  Date 5/7 5/10 5/13 5/15 5/22 5/24 5/29 5/31 6      No Auth  Used 1 2 3 4 5 6 7 8 9       Remaining                 FOTO                      Precautions: previous seizure      Manuals 5/7 5/10 5/13 5/15 5/22 5/24 5/29 5/31 6    Laser IASTM  200/20/10 200/20/15 200/25/15 200/25/10 200/25/10   200/25/10 200/25/10                 TherEx             Pt ed/HEP 5'                         Quad set 20x 30x 30x 30x 30x 30x 30x 30x 30x    Heel slide 20x 30x 30x 30x 30x 30x 30x 30x 30x    SLR  2x10 2x10 2x10 3x10 30x 2#/30 2#/30 3#/ 30    SAQ  30x 30x 30x 2# 30x 2#/ 30 3#/ 30 3#/30 4#/ 30    LAQ  30x 30x 30x 30x 30x 3#/30 3#/30 4#/ 30    Ball ADD 20x 30x 30x 30x 30x 30x 30x30x 30x 30x    Tband BAD 20x 30x 30x BTB 30x 30x 30x 30x BTB 30x BTB 30x blk                 Nustep   5' 5' L2 5' 5' L3 6' L4 6 L5 6' L5 6'    slant  30\"x4   30\"x4 30\"x4 30\"x4 30\"x4 30\"x4    Standing heel raise  30x 30x 30x 30x 30x 30x 30x     Standing abd  30x 30x 30x 30x 30x ea 30x 3#/30 4#/ 30    Partial sqaut       20x 20x 20x 20x    Step ups         20x    Swissball horse sim squeeze/ up& down       20x ea 20x ea 20x ea                   "                                                                Ther Activity                                       Gait Training                                       Modalities

## 2024-06-07 ENCOUNTER — OFFICE VISIT (OUTPATIENT)
Dept: PHYSICAL THERAPY | Age: 64
End: 2024-06-07
Payer: COMMERCIAL

## 2024-06-07 DIAGNOSIS — S89.91XA INJURY OF RIGHT KNEE, INITIAL ENCOUNTER: Primary | ICD-10-CM

## 2024-06-07 DIAGNOSIS — M23.91 INTERNAL DERANGEMENT OF MULTIPLE SITES OF RIGHT KNEE: ICD-10-CM

## 2024-06-07 PROCEDURE — 97110 THERAPEUTIC EXERCISES: CPT | Performed by: PHYSICAL THERAPIST

## 2024-06-07 NOTE — PROGRESS NOTES
"Daily Note     Today's date: 2024  Patient name: Alexa Hawley  : 1960  MRN: [id number]  Referring provider: Camden Goetz MD  Dx:   Encounter Diagnosis     ICD-10-CM    1. Injury of right knee, initial encounter  S89.91XA       2. Internal derangement of multiple sites of right knee  M23.91           Start Time: 0700  Stop Time: 0755  Total time in clinic (min): 55 minutes    Subjective: No pain.       Objective: See treatment diary below      Assessment: Pt ROM now WNL, good strength, no pain. Pt has returned to all prior activities including care for horse and riding. Signficant increase in FOTO score.       Plan: Hold PT and plan to progress to d/c if symptoms remain abolished.      POC expires Unit limit Auth Expiration date PT/OT + Visit Limit?   24 BOMN  BOMN                           Visit/Unit Tracking  AUTH Status:  Date 5/7 5/10 5/13 5/15 5/22 5/24 5/29 5/31 6/5 6/7     No Auth  Used 1 2 3 4 5 6 7 8 9 10      Remaining                 FOTO                      Precautions: previous seizure      Manuals 5/7 5/10 5/13 5/15 5/22 5/24 5/29 5/31 6/5 6/7   Laser IASTM  200/20/10 200/20/15 200/25/15 200/25/10 200/25/10   200/25/10 200/25/10 200/25/10                  TherEx             Pt ed/HEP 5'                         Quad set 20x 30x 30x 30x 30x 30x 30x 30x 30x 30x   Heel slide 20x 30x 30x 30x 30x 30x 30x 30x 30x 30x   SLR  2x10 2x10 2x10 3x10 30x 2#/30 2#/30 3#/ 30 3#/ 30   SAQ  30x 30x 30x 2# 30x 2#/ 30 3#/ 30 3#/30 4#/ 30 4#/ 30   LAQ  30x 30x 30x 30x 30x 3#/30 3#/30 4#/ 30 4#/ 30   Ball ADD 20x 30x 30x 30x 30x 30x 30x30x 30x 30x 30x   Tband BAD 20x 30x 30x BTB 30x 30x 30x 30x BTB 30x BTB 30x blk 30x                Nustep   5' 5' L2 5' 5' L3 6' L4 6 L5 6' L5 6' 6 L6   slant  30\"x4   30\"x4 30\"x4 30\"x4 30\"x4 30\"x4    Standing heel raise  30x 30x 30x 30x 30x 30x 30x  30x   Standing abd  30x 30x 30x 30x 30x ea 30x 3#/30 4#/ 30 4#/ 30   Partial sqaut       20x 20x 20x 20x 20x   Step " "ups         20x 20x 8\"   Swissball horse sim squeeze/ up& down       20x ea 20x ea 20x ea                                                                                  Ther Activity                                       Gait Training                                       Modalities                                                          "

## 2024-06-13 ENCOUNTER — TELEPHONE (OUTPATIENT)
Dept: NEUROLOGY | Facility: CLINIC | Age: 64
End: 2024-06-13

## 2024-06-14 NOTE — PROGRESS NOTES
PT Evaluation     Today's date: 2024  Patient name: Alexa Hawley  : 1960  MRN: [id number]  Referring provider: Wai Baez, *  Dx:   Encounter Diagnosis     ICD-10-CM    1. Incontinence of feces, unspecified fecal incontinence type  R15.9           Start Time: 0630  Stop Time: 0730  Total time in clinic (min): 60 minutes    Assessment  Impairments: abnormal muscle firing, abnormal muscle tone, impaired physical strength and lacks appropriate home exercise program  Symptom irritability: moderate    Assessment details: Alexa is a 63 year old female who presents to PFPT with a primary concern of fecal and urinary incontinence. Direct examination reveals TTP and some increased one PC and ME musculature with weak resting tone at anal sphincter. Limited global muscle strength and mobility. Time spent in patient education regarding toileting body mechanics, PFM anatomy, bladder health. Patient could benefit from a trial of PFPT to address presenting impairments and functional limitations and improve overall quality of life.   Understanding of Dx/Px/POC: good     Prognosis: good    Goals  STG 2-3 weeks  1. Instruction in an ongoing HEP  2. Improved tone PFM with decreased TTP  3. Decreased use bladder irritants  4. Successful use of urge deferment techniques to increase void interval.     LTG 12 weeks    1. Increase void interval to 2 hours or greater  2. 3/5 PFM/AS with full relaxation post activation  3. Decrease use of bladder/bowel protective pads by 25% or greater  4. Decrease episodes of FI/UI    Plan  Patient would benefit from: skilled physical therapy  Planned modality interventions: biofeedback    Planned therapy interventions: activity modification, behavior modification, manual therapy, motor coordination training, neuromuscular re-education, patient/caregiver education, therapeutic activities, therapeutic exercise and home exercise program    Frequency: 1x week  Duration in weeks:  10  Plan of Care beginning date: 6/17/2024  Plan of Care expiration date: 9/15/2024  Treatment plan discussed with: patient        PT Pelvic Floor Subjective:   History of Present Illness:   Patient presents with primary concern of fecal incontinence.   Was seen by GI in 2017 for similar issue that persisted.  Symptoms returned/worsened after undergoing gastric sleeve surgery has 3-4 loose to soft bowel movements daily with daily FI. Also issues with urinary incontinence.     Notes urinary issues since she was a child.   Started a probiotic 4 weeks ago. Notes some formed stool but varies from day to day. Unsure of food triggers. Generally urgency of bowel starts after first meal of the day.   Urinary urgency near every hour to 90 minutes.   Several pads per day of varying size depending on activity  Quality of life effected by bowel and bladder symptoms.              Recurrent probem      Social Support:     Lives with:  Spouse    Relationship status: /committed    Work status: employed full time    Life stress severity: moderate  Diet and Exercise:    Diet:balanced nutrition    Ride horses and active in their care  OB/ gyn History    Gestational History:     Number of term pregnancies: 3    Delivery Type: vaginal delivery      Delivery Complications:  Last child had to go in for reconstructive surgery 2-3 years later. Notes significant tear with muscle retraction.     Menstrual History:    Hysterectomy 1996  Bladder Function:     Voiding Difficulties positive for: urgency and frequent urination       Voiding Difficulties comments:     Voiding frequency: every 1-2 hours    Urinary leakage: urine leakage    Urinary leakage aggravated by: coughing, sneezing, walking to the bathroom and seeing a toilet    Nocturia (episodes per night): 4    Painful urination: No      Fluid Intake Type:  Water and coffee    Intake (ounces):     Intake (ounces) comment: Coffee  24-ounces  Iced coffee  - 24 ounces  Diuretic in BP  "pill    Water - 36-42 ounces   Incontinence Management:     Pads/Diaper Use:  24 hours  Bowel Function:     Voiding DIfficulties: unfinished feeling after defecating      Bowel frequency: multiple times a day    Uses \"squatty potty\": no Squatty Potty (suggested use today)  Sexual Function:     Sexually Active:  Sexually active    Pain during intercourse: No    Pain:     Current pain ratin    Location:  Back pain history  Patient Goals:     Patient goals for therapy:  Fully empty bladder or bowels, improved bladder or bowel function and improved quality of life    Other patient goals:  Not have to wear pads all the time;      Objective       Abdominal Assessment:      Abdominal Assessment: Notes left LQ discomfort at times  To be assessed NV      General Perineum Exam:   perineum intact.   Positive for gaping introitus.     Visual Inspection of Perineum:   Excursion of perineal body in cephalad direction with contraction of pelvic floor muscles (PFM): fair   Excursion of perineal body in caudal direction with relaxation of pelvic floor muscles (PFM): fair   Involuntary contraction with coughing: no  Involuntary relaxation with bearing down: no  Sphincter Tone Resting: weak  Sphincter Tone Squeeze: weak    Pelvic Organ Prolapse   With bearing down: mild (>1cm from hymenal remnants)  Location: anterior and posterior        Pelvic Floor Muscle Exam:      Breathing pattern with contraction: holding breath   Pelvic floor muscle relaxation is delayed.         PERFECT Score   Power right: 2/5 (no lift)   Power left: 2/5   Endurance (seconds to max): 4   Repetitions (before fatigue): 3   Fast flicks (in 10 seconds): 3   Perfect Score: Some \"soreness\" external at ischio and bulbo, L>R  Mild  \"soreness\" L>R tenderness at PC/AR with increase tone     Rectal Pelvic Floor Muscle Exam  External anal sphincter: 1/5  External anal sphincter: wink reflex impaired    pelvic floor exam consent given by patient    Pelvic exam " completed: vaginally and rectally                  POC expires Unit limit Auth Expiration date PT/OT + Visit Limit?   9/14/24 4 NA BOMN                           Visit/Unit Tracking  AUTH Status:  Date 6/17              No AUTH/ $40 copay Used 1               Remaining                 PFDI done                    Precautions: DM HTN hysterectomy head injury, gastric sleeve      Manuals 6/17                                                                Neuro Re-Ed                                                                                                        Ther Ex             CRK Eval pt ed                                                                                                       Ther Activity             PFM education   PP            Bladder health HO  HEP            Squatty potty PP            Urge suppression  NV                         Gait Training                                       Modalities

## 2024-06-17 ENCOUNTER — CONSULT (OUTPATIENT)
Dept: NEUROLOGY | Facility: CLINIC | Age: 64
End: 2024-06-17
Payer: COMMERCIAL

## 2024-06-17 ENCOUNTER — EVALUATION (OUTPATIENT)
Dept: PHYSICAL THERAPY | Age: 64
End: 2024-06-17
Payer: COMMERCIAL

## 2024-06-17 VITALS
WEIGHT: 175 LBS | DIASTOLIC BLOOD PRESSURE: 92 MMHG | BODY MASS INDEX: 29.12 KG/M2 | HEART RATE: 70 BPM | SYSTOLIC BLOOD PRESSURE: 182 MMHG

## 2024-06-17 DIAGNOSIS — R56.9 SEIZURE-LIKE ACTIVITY (HCC): Primary | ICD-10-CM

## 2024-06-17 DIAGNOSIS — R15.9 INCONTINENCE OF FECES, UNSPECIFIED FECAL INCONTINENCE TYPE: Primary | ICD-10-CM

## 2024-06-17 PROCEDURE — 97530 THERAPEUTIC ACTIVITIES: CPT | Performed by: PHYSICAL THERAPIST

## 2024-06-17 PROCEDURE — 97162 PT EVAL MOD COMPLEX 30 MIN: CPT | Performed by: PHYSICAL THERAPIST

## 2024-06-17 PROCEDURE — 97110 THERAPEUTIC EXERCISES: CPT | Performed by: PHYSICAL THERAPIST

## 2024-06-17 PROCEDURE — 99205 OFFICE O/P NEW HI 60 MIN: CPT | Performed by: STUDENT IN AN ORGANIZED HEALTH CARE EDUCATION/TRAINING PROGRAM

## 2024-06-17 NOTE — PROGRESS NOTES
[facility] Neurology Consult  PATIENT:  Alexa Hawley  MRN:  [id number]  :  1960  DATE OF SERVICE:  2024  REFERRED BY: Tiffany Stephens MD  PMD: JEFERSON Rodriguez    Assessment/Plan:     Alexa Hawley is a very pleasant 63 y.o. female with a past medical history that includes HTN, anxiety, HLD who presents for evaluation of a seizure-like event.     Seizure-like episode  -Per daughter's description of the event, there is considerable concern that this was representative of a first-time seizure, given description of generalized tonic-clonic activity, fecal incontinence, and post-ictal phase. As this is a first-time seizure and possibly provoked, would defer treatment with an AED for now and pursue further workup with routine EEG and MRI brain w/wo. If there evidence of abnormalities on EEG or MRI brain, would consider starting treatment with an AED for further prevention.   -discussed seizure precautions, including not driving, not swimming/bathing without supervision, not using heavy machinery, or engaging in any activities which may cause harm to herself or others if she were to lose consciousness  -will submit penndot form    CC:   Seizure-like episode    History of Present Illness:     63 y.o. female with a past medical history that includes HTN, anxiety, HLD who presents for evaluation of a seizure-like event.     Pt presents today as a follow up after she had an episode of LOC back on 2024. At that time, she had recently sustained an injury to her knee. At the time of the episode, she was working with a horse and suddenly experienced a pop in her knee followed by severe knee pain. The event was partially witnessed by her daughter. Reportedly, she lost consciousness, her eyes rolled up into her head, and she experienced violent shaking of all 4 extremities and her head, striking her head in the process. She had bowel incontinence during this event. In total, it lasted about 1  minute. Afterward, she was confused, and seemed to be making sounds as if she was gasping for air. She was taken to the ED for further evaluation.     CTH was negative for any evidence of acute intracranial abnormalities. Labs were unremarkable, lactate/UA/UDS not obtained at that time. She returned to her neurologic baseline and was discharged with neurology follow up.     Daughter states that pt has had episodes of syncope in the past associated with severe pain. However, these episodes have not previously been accompanied by motor symptoms. 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 She has no history of childhood infections, seizures as a child, or any previous seizure-like events.     Past Medical History:     Past Medical History:   Diagnosis Date    Allergic     Vicodin    Anemia     Anxiety     Arthritis     Bariatric surgery status     Closed head injury 06/11/2018 2018    Colon polyp     Concussion with loss of consciousness 06/22/2018 2018    Depression     Diabetes mellitus (HCC)     NIDDM    Diarrhea     chronic    Fatty liver     Fecal incontinence     Fractures 6/11/2018    GERD (gastroesophageal reflux disease)     Head injury     6/10/11    Hiatal hernia     Hyperlipidemia     Hypertension     Lifelong obesity     Mild TBI ([facility]) 06/11/2018    Postsurgical malabsorption     Psoriasis     legs    SAH (subarachnoid hemorrhage) ([facility]) 06/11/2018    2018    Skin disorder 6/2014    Tear of right supraspinatus tendon 12/16/2019    Type 2 diabetes mellitus with microalbuminuria, without long-term current use of insulin ([facility]) 07/05/2018    Wears glasses        Patient Active Problem List   Diagnosis    Anxiety    Hypertension    Hyperlipidemia    Status post laparoscopic sleeve gastrectomy    Psoriasis    Encounter for surgical aftercare following surgery of digestive system    Postsurgical malabsorption    Bariatric surgery status    Adhesive capsulitis of right shoulder     Contusion of right knee    Internal derangement of multiple sites of right knee    Incontinence of feces    Primary osteoarthritis of right knee       Medications:      Current Outpatient Medications   Medication Sig Dispense Refill    calcium carbonate (OS-YONG) 600 MG tablet Take 600 mg by mouth 2 (two) times a day with meals      cholecalciferol (VITAMIN D3) 1,000 units tablet Take 1,000 Units by mouth daily      Cosentyx Sensoready, 300 MG, 150 MG/ML SOAJ injection       Multiple Vitamins-Minerals (multivitamin with minerals) tablet Take 1 tablet by mouth daily      sertraline (ZOLOFT) 100 mg tablet Take 1 tablet (100 mg total) by mouth daily 90 tablet 1    triamterene-hydrochlorothiazide (MAXZIDE-25) 37.5-25 mg per tablet take 1 tablet by mouth once daily 90 tablet 1     No current facility-administered medications for this visit.        Allergies:      Allergies   Allergen Reactions    Vicodin [Hydrocodone-Acetaminophen] Anaphylaxis       Family History:     Family History   Problem Relation Age of Onset    Atrial fibrillation Mother     Hypertension Mother     Depression Mother     Suicide Attempts Mother     Lung cancer Father     Heart disease Father     Diabetes Father     Cancer Father     Obesity Brother     No Known Problems Brother     Breast cancer Maternal Grandmother 50    Cancer Paternal Grandfather     No Known Problems Son     No Known Problems Son     Lung cancer Maternal Aunt     No Known Problems Maternal Aunt     No Known Problems Maternal Aunt     No Known Problems Maternal Aunt     Other Family         ADENOCARCINOMA IN SIOBHAN IN VILLOUS ADENOMA OF THE BREAST, MIGRAINES, SKIN DISORDER    Depression Family     Anxiety disorder Family     Diabetes Family         MELLITUS    Fibrocystic breast disease Family     Heart disease Family     Hiatal hernia Family     Hypertension Family     Irritable bowel syndrome Family     Kidney disease Family     Urinary tract infection Family     Breast cancer  Family     Ovarian cancer Neg Hx     Uterine cancer Neg Hx     Cervical cancer Neg Hx        Social History:       Social History     Socioeconomic History    Marital status: /Civil Union     Spouse name: Not on file    Number of children: Not on file    Years of education: Not on file    Highest education level: Not on file   Occupational History    Not on file   Tobacco Use    Smoking status: Former     Current packs/day: 0.00     Average packs/day: 0.5 packs/day for 5.0 years (2.5 ttl pk-yrs)     Types: Cigarettes     Start date: 6/10/1987     Quit date: 6/10/1992     Years since quittin.0    Smokeless tobacco: Never    Tobacco comments:     Smoked for 10yrs.   Vaping Use    Vaping status: Never Used   Substance and Sexual Activity    Alcohol use: Yes     Comment: Wine maybe once or twice a month    Drug use: No    Sexual activity: Yes     Partners: Male     Birth control/protection: Surgical, Male Sterilization     Comment: with    Other Topics Concern    Not on file   Social History Narrative    CAFFEINE USE    DAILY COFFEE CONSUMPTION    EXERCISES FREQUENTLY     Social Determinants of Health     Financial Resource Strain: Not on file   Food Insecurity: Not on file   Transportation Needs: Not on file   Physical Activity: Not on file   Stress: Not on file   Social Connections: Not on file   Intimate Partner Violence: Not on file   Housing Stability: Not on file         Objective:   BP (!) 182/92 (BP Location: Right arm, Patient Position: Sitting, Cuff Size: Large)   Pulse 70   Wt 79.4 kg (175 lb)   BMI 29.12 kg/m²     General: Patient is not in any acute/apparent distress, well nourished, well developed and cooperative.   HEENT: normocephalic, atraumatic, moist membranes  Neck: supple  Extremities: no edema noted   Skin: no lesions or rash  Musculosketal: no bony abnormalities    Neurologic Examination:   Mental status: alert, awake, oriented X 3 and following commands.     Speech/Language:  Speech is fluent without any dysarthria, no aphasia noted, can name, repeat, read and write and comprehension intact    Cranial Nerves:   CN I: smell not tested  CN II: Visual fields full to confrontation, fundus - no papilledema noted.  CN III, IV, VI: Extraocular movements intact bilaterally. Pupils equal round and reactive to light bilaterally.  CN V: Facial sensation is normal.  CN VII: Full and symmetric facial movement.  CN VIII: Hearing is normal.  CN IX, X: Palate elevates symmetrically. Normal gag reflex.  CN XI: Shoulder shrug strength is normal.  CN XII: Tongue midline without atrophy or fasciculations.    Motor:   Strength 5/5 in all 4 extremities. Bulk/tone - normal.  Fasiculations - none    Sensory:   Sensation intact to soft touch in all 4 extremities.    Cerebellar:   Finger-to-nose intact, normal heel to shin.    Reflexes: 2+ in all 4 extremities  Pathologic reflexes - babinski reflex negative    Gait:   Normal gait, Romberg sign negative        Review of Systems:     ROS:  Review of Systems   Constitutional:  Negative for appetite change, fatigue and fever.   HENT: Negative.  Negative for hearing loss, tinnitus, trouble swallowing and voice change.    Eyes: Negative.  Negative for photophobia, pain and visual disturbance.   Respiratory: Negative.  Negative for shortness of breath.    Cardiovascular: Negative.  Negative for palpitations.   Gastrointestinal: Negative.  Negative for nausea and vomiting.   Endocrine: Negative.  Negative for cold intolerance.   Genitourinary: Negative.  Negative for dysuria, frequency and urgency.   Musculoskeletal:  Negative for back pain, gait problem, myalgias, neck pain and neck stiffness.   Skin: Negative.  Negative for rash.   Allergic/Immunologic: Negative.    Neurological:  Positive for seizures and syncope. Negative for dizziness, tremors, facial asymmetry, speech difficulty, weakness, light-headedness, numbness and headaches.        Patient states she has a Hx  of syncope when in pain. Per daughter, when episode occurred patient head was banging on floor, difficulty breathing, loose control of bowel. Patient states she had a lot of fatigue. Patient has a HX of head injury from horse riding.   Patient on occasions has tingling on extremities.    Hematological: Negative.  Does not bruise/bleed easily.   Psychiatric/Behavioral: Negative.  Negative for confusion, hallucinations and sleep disturbance.      I have spent a total time of 53 minutes on 06/17/24 in caring for this patient including Risks and benefits of tx options, Instructions for management, Patient and family education, Risk factor reductions, Impressions, Documenting in the medical record, Reviewing / ordering tests, medicine, procedures  , and Obtaining or reviewing history  .

## 2024-06-21 NOTE — PROGRESS NOTES
"Daily Note     Today's date: 2024  Patient name: Alexa Hawley  : 1960  MRN: [id number]  Referring provider: Wai Baez, *  Dx:   Encounter Diagnosis     ICD-10-CM    1. Incontinence of feces, unspecified fecal incontinence type  R15.9           Start Time: 0630  Stop Time: 0725  Total time in clinic (min): 55 minutes    Subjective: Patient notes she was seen by neurology and will have additional testing next month. Unable to drive until cleared by testing and 6 months post seizure. Offers no other complaints. Continues with urinary and fecal urgency.      Objective: See treatment diary below      Assessment: Tolerated treatment well. Patient would benefit from continued PT Good LE mobility. Increased urgency with PFM relaxation. Notes fatigue with CRK at 3 seconds. Improved breath sequencing with practice with exercise. HEP instruction this date. Verbalized and demonstrated understanding. Written handouts provided.       Plan: Continue per plan of care.      POC expires Unit limit Auth Expiration date PT/OT + Visit Limit?   24 4 NA BOMN                           Visit/Unit Tracking  AUTH Status:  Date              No AUTH/ $40 copay Used 1 2              Remaining                 PFDI done                    Precautions: DM HTN hysterectomy, head injury with seizure, gastric sleeve      Manuals            Transverse plane pelvic   10'                                                  Neuro Re-Ed                                                                                                        Ther Ex             CRK Eval pt ed                         LE stretch  10'  good           Diaphragmatic breathing  2'  HEP           Pfm relaxation/drop  3'  HEP                        CRK for coordination  3\"/10\"/2x5           Ball with PFMC and exhale  3\"/10x  HEP           TB with ER and exhale  10x  BTB  HEP           TB with ER and PFMC  10x  BTB  HEP                    "                                            Ther Activity             PFM education   PP            Bladder health HO  HEP            Squatty potty PP            Urge suppression   NV                        Gait Training                                       Modalities

## 2024-06-24 ENCOUNTER — OFFICE VISIT (OUTPATIENT)
Dept: PHYSICAL THERAPY | Age: 64
End: 2024-06-24
Payer: COMMERCIAL

## 2024-06-24 DIAGNOSIS — R15.9 INCONTINENCE OF FECES, UNSPECIFIED FECAL INCONTINENCE TYPE: Primary | ICD-10-CM

## 2024-06-24 PROCEDURE — 97110 THERAPEUTIC EXERCISES: CPT | Performed by: PHYSICAL THERAPIST

## 2024-06-24 PROCEDURE — 97140 MANUAL THERAPY 1/> REGIONS: CPT | Performed by: PHYSICAL THERAPIST

## 2024-06-27 ENCOUNTER — ANNUAL EXAM (OUTPATIENT)
Dept: OBGYN CLINIC | Facility: MEDICAL CENTER | Age: 64
End: 2024-06-27
Payer: COMMERCIAL

## 2024-06-27 VITALS
DIASTOLIC BLOOD PRESSURE: 88 MMHG | BODY MASS INDEX: 29.49 KG/M2 | WEIGHT: 177 LBS | SYSTOLIC BLOOD PRESSURE: 144 MMHG | HEIGHT: 65 IN

## 2024-06-27 DIAGNOSIS — Z12.31 ENCOUNTER FOR SCREENING MAMMOGRAM FOR BREAST CANCER: ICD-10-CM

## 2024-06-27 DIAGNOSIS — M85.9 DISORDER OF BONE DENSITY AND STRUCTURE, UNSPECIFIED: ICD-10-CM

## 2024-06-27 DIAGNOSIS — Z01.419 ENCOUNTER FOR GYNECOLOGICAL EXAMINATION (GENERAL) (ROUTINE) WITHOUT ABNORMAL FINDINGS: Primary | ICD-10-CM

## 2024-06-27 DIAGNOSIS — Z78.0 POST-MENOPAUSE: ICD-10-CM

## 2024-06-27 PROCEDURE — S0612 ANNUAL GYNECOLOGICAL EXAMINA: HCPCS | Performed by: CLINICAL NURSE SPECIALIST

## 2024-06-27 RX ORDER — SACCHAROMYCES BOULARDII 250 MG
CAPSULE ORAL
COMMUNITY
Start: 2024-05-13

## 2024-06-27 NOTE — PROGRESS NOTES
Subjective:      Alexa Hawley is a 63 y.o. female. Here for Gynecologic Exam (Hysterectomy /Mammo 6/17/22 birads 2/ scheduled /Colonoscopy 2018 recall 5 years /Dxa 9/9/22 osteopenia/Maternal grandmother breast cancer )    GYN HPI  Menstrual cycle:  N/A- S/P Hysterectomy    Vaginal c/o: Denies  Urinary c/o: denies  Breast complaints:denies  She does do self breast Exams    Sexually active: yes, /monogamous  Denies problems r/t Sexual active  She reports she feels safe at home.     Dietary calcium/vit D  intake: adequate  Lifestyle: rides horses and barnwork     HEALTH MAINTENANCE SCREENINGS:    Last Papanicolaou test:  Not on file   History of abnormal pap: No, And now is s/p hysterectomy- no longer indicated  Last mammogram:  06/27/2022  Last Colon Cancer Screening: colonoscopy: 2017 Recall  10 yrs     Hereditary Cancer Screening  Cancer-related family history includes Breast cancer in her family; Breast cancer (age of onset: 50) in her maternal grandmother; Cancer in her father and paternal grandfather; Lung cancer in her father and maternal aunt. There is no history of Ovarian cancer, Uterine cancer, or Cervical cancer.      Substance Abuse Screening Completed. See hx and flowsheet.    The following portions of the patient's history were reviewed and updated as appropriate: allergies, current medications, past family history, past medical history, past social history, past surgical history, and problem list.  Review of Systems   Constitutional:  Negative for appetite change, chills, fatigue, fever and unexpected weight change.   HENT: Negative.     Eyes: Negative.    Respiratory:  Negative for chest tightness and shortness of breath.    Cardiovascular:  Negative for chest pain and palpitations.   Gastrointestinal:  Negative for abdominal pain, constipation and vomiting.   Endocrine: Negative for cold intolerance and heat intolerance.   Genitourinary:         As per HPI   Musculoskeletal:  Negative for  "back pain, joint swelling and neck pain.   Skin:  Negative for color change and rash.   Neurological:  Negative for dizziness, weakness and numbness.   Hematological:  Does not bruise/bleed easily.   Psychiatric/Behavioral: Negative.               Objective:  /88 (BP Location: Left arm, Patient Position: Sitting, Cuff Size: Standard)   Ht 5' 5\" (1.651 m)   Wt 80.3 kg (177 lb)   BMI 29.45 kg/m²        Physical Exam  Constitutional:       General: She is not in acute distress.     Appearance: Normal appearance.   Genitourinary:      Vulva and rectum normal.      No lesions in the vagina.      Genitourinary Comments: Cervix/Uterus surgically absent      No vaginal discharge, erythema, tenderness or bleeding.        Right Adnexa: not tender and no mass present.     Left Adnexa: not tender and no mass present.     Uterus is absent.      No urethral prolapse present.      Pelvic exam was performed with patient in the lithotomy position.   Breasts:     Breasts are symmetrical.      Right: No inverted nipple, mass, nipple discharge, skin change or tenderness.      Left: No inverted nipple, mass, nipple discharge, skin change or tenderness.   HENT:      Head: Normocephalic and atraumatic.   Cardiovascular:      Rate and Rhythm: Normal rate.      Heart sounds: No murmur heard.  Pulmonary:      Effort: Pulmonary effort is normal.      Breath sounds: Normal breath sounds.   Abdominal:      General: There is no distension.      Palpations: Abdomen is soft.      Tenderness: There is no abdominal tenderness.   Musculoskeletal:         General: Normal range of motion.      Cervical back: Normal range of motion.   Lymphadenopathy:      Cervical: No cervical adenopathy.   Neurological:      Mental Status: She is alert and oriented to person, place, and time.   Skin:     General: Skin is warm and dry.   Psychiatric:         Mood and Affect: Mood normal.         Behavior: Behavior normal.   Vitals reviewed.         "     Assessment/Plan:           ANNUAL GYN EXAM- Primary  Annual GYN examination completed today.   Health maintenance reviewed/updated as appropriate  Cervical cancer screen: Previous pap smears and ASCCP screening guidelines have been reviewed. Pap s/p hysterectomy- no longer indicated.  Breast Health: Encouraged regular self breast exams. Mammo already scheduled for next month.  Colon cancer screening: Overdue   Dexa: due in September    Risk prevention and anticipatory guidance provided including:  Age related Calcium and vitamin D intake  Dietary and lifestyle recommendations based on her age and weight. body mass index is 29.45 kg/m²..    Tobacco and alcohol use, intervention ordered if applicable.   Condom use for prevention of STI's.      Problem List Items Addressed This Visit    None  Visit Diagnoses       Encounter for gynecological examination (general) (routine) without abnormal findings    -  Primary    Encounter for screening mammogram for breast cancer        Disorder of bone density and structure, unspecified        Relevant Orders    DXA bone density spine hip and pelvis    Post-menopause        Relevant Orders    DXA bone density spine hip and pelvis            Orders Placed This Encounter   Procedures    DXA bone density spine hip and pelvis

## 2024-06-28 NOTE — PROGRESS NOTES
"Daily Note     Today's date: 2024  Patient name: Alexa Hawley  : 1960  MRN: [id number]  Referring provider: Wai Baez, *  Dx:   Encounter Diagnosis     ICD-10-CM    1. Incontinence of feces, unspecified fecal incontinence type  R15.9                      Subjective: ***      Objective: See treatment diary below      Assessment: Tolerated treatment {Tolerated treatment :}. Patient {assessment:}      Plan: {PLAN:[id number]}     POC expires Unit limit Auth Expiration date PT/OT + Visit Limit?   24 4 NA BOMN                           Visit/Unit Tracking  AUTH Status:  Date              No AUTH/ $40 copay Used 1 2              Remaining                 PFDI done                    Precautions: DM HTN hysterectomy, head injury with seizure, gastric sleeve      Manuals            Transverse plane pelvic   10'                                                  Neuro Re-Ed                                                                                                        Ther Ex             CRK Eval pt ed                         LE stretch  10'  good           Diaphragmatic breathing  2'  HEP           Pfm relaxation/drop  3'  HEP                        CRK for coordination  3\"/10\"/2x5           Ball with PFMC and exhale  3\"/10x  HEP           TB with ER and exhale  10x  BTB  HEP           TB with ER and PFMC  10x  BTB  HEP                                                               Ther Activity             PFM education   PP            Bladder health HO  HEP            Squatty potty PP            Urge suppression   NV                        Gait Training                                       Modalities                                              " Discharged

## 2024-07-01 ENCOUNTER — APPOINTMENT (OUTPATIENT)
Dept: PHYSICAL THERAPY | Age: 64
End: 2024-07-01
Payer: COMMERCIAL

## 2024-07-08 ENCOUNTER — OFFICE VISIT (OUTPATIENT)
Dept: PHYSICAL THERAPY | Age: 64
End: 2024-07-08
Payer: COMMERCIAL

## 2024-07-08 DIAGNOSIS — R15.9 INCONTINENCE OF FECES, UNSPECIFIED FECAL INCONTINENCE TYPE: Primary | ICD-10-CM

## 2024-07-08 PROCEDURE — 97110 THERAPEUTIC EXERCISES: CPT | Performed by: PHYSICAL THERAPIST

## 2024-07-08 PROCEDURE — 97140 MANUAL THERAPY 1/> REGIONS: CPT | Performed by: PHYSICAL THERAPIST

## 2024-07-08 NOTE — PROGRESS NOTES
"Daily Note     Today's date: 2024  Patient name: Alexa Hawley  : 1960  MRN: [id number]  Referring provider: Wai Baez, *  Dx:   Encounter Diagnosis     ICD-10-CM    1. Incontinence of feces, unspecified fecal incontinence type  R15.9                      Subjective:  Patient notes having a more formed stool since taking a probiotic with improved fecal control. Notes only one accident this week of UI when grocery shopping where no urge sensation and just started leaking.       Objective: See treatment diary below      Assessment: Tolerated treatment well. Patient exhibited good technique with therapeutic exercises and would benefit from continued PT Reviewed urge deferment techniques for urinary and fecal urgency.      Plan: Continue per plan of care.      POC expires Unit limit Auth Expiration date PT/OT + Visit Limit?   24 4 NA BOMN                           Visit/Unit Tracking  AUTH Status:  Date             No AUTH/ $40 copay Used 1 2 3             Remaining                 PFDI done                    Precautions: DM HTN hysterectomy, head injury with seizure, gastric sleeve      Manuals           Transverse plane pelvic   10' 10'                                    Ther Ex             CRK Eval pt ed                         LE stretch  10'  good nt          Diaphragmatic breathing  2'  HEP 2'          Pfm relaxation/drop  3'  HEP 2'                       CRK for coordination  3\"/10\"/2x5 10x          Ball with PFMC and exhale  3\"/10x  HEP 3\"/  10x          TB with ER and exhale  10x  BTB  HEP 10x          TB with ER and PFMC  10x  BTB  HEP 10x          Segmental bridge   10x   HEP                       AS slow contraction   3-5\"/5\"  5x  HEP          AS quick contraction   10x  HEP                       Sit to stand with PFMC   nv                       Ther Activity             PFM education   PP            Bladder health HO  HEP            Squatty potty PP  "           Urge suppression   NV                        Gait Training                                       Modalities

## 2024-07-11 NOTE — PROGRESS NOTES
"Daily Note     Today's date: 7/15/2024  Patient name: Alexa Hawley  : 1960  MRN: [id number]  Referring provider: Wai Baez, *  Dx:   Encounter Diagnosis     ICD-10-CM    1. Incontinence of feces, unspecified fecal incontinence type  R15.9                      Subjective: Patient notes she had one incident of urge related urinary leakage with returning from home yesterday. Notes bowel symptoms have been better overall.       Objective: See treatment diary below      Assessment: Tolerated treatment well. Patient would benefit from continued PT Reviewed urge deferment techniques. Suggested practicing in car before standing to get into the house to address return home urgency. Improved awareness of PFM contraction. Challenge with activation and ER simultaneously.      Plan: Continue per plan of care.      POC expires Unit limit Auth Expiration date PT/OT + Visit Limit?   24 4 NA BOMN                           Visit/Unit Tracking  AUTH Status:  Date 6/17 6/24 7/8 7/15           No AUTH/ $40 copay Used 1 2 3 4            Remaining                 PFDI done                    Precautions: DM HTN hysterectomy, head injury with seizure, gastric sleeve      Manuals 6/17 6/24 7/8 7/15         Transverse plane pelvic   10' 10' 10'                                   Ther Ex             CRK Eval pt ed                         LE stretch  10'  good nt nt         Diaphragmatic breathing  2'  HEP 2' 2'         Pfm relaxation/drop  3'  HEP 2'                       CRK for coordination  3\"/10\"/2x5 10x 10x         Ball with PFMC and exhale  3\"/10x  HEP 3\"/  10x 3-5\"/10x         TB with ER and exhale  10x  BTB  HEP 10x 10x         TB with ER and PFMC  10x  BTB  HEP 10x 10x         Segmental bridge   10x   HEP 10x         clamshell    10x  HEP         AS slow contraction   3-5\"/5\"  5x  HEP 5\"/10x         AS quick contraction   10x  HEP                       Sit to stand with PFMC   nv 10x  HEP                    "   Ther Activity             PFM education   PP            Bladder health HO  HEP            Squatty potty PP            Urge suppression   NV  PP  review                      Gait Training                                       Modalities

## 2024-07-15 ENCOUNTER — HOSPITAL ENCOUNTER (OUTPATIENT)
Dept: RADIOLOGY | Facility: MEDICAL CENTER | Age: 64
Discharge: HOME | End: 2024-07-15
Payer: COMMERCIAL

## 2024-07-15 ENCOUNTER — OFFICE VISIT (OUTPATIENT)
Dept: PHYSICAL THERAPY | Age: 64
End: 2024-07-15
Payer: COMMERCIAL

## 2024-07-15 VITALS — HEIGHT: 65 IN | WEIGHT: 177 LBS | BODY MASS INDEX: 29.49 KG/M2

## 2024-07-15 DIAGNOSIS — R15.9 INCONTINENCE OF FECES, UNSPECIFIED FECAL INCONTINENCE TYPE: Primary | ICD-10-CM

## 2024-07-15 DIAGNOSIS — Z12.31 ENCOUNTER FOR SCREENING MAMMOGRAM FOR BREAST CANCER: ICD-10-CM

## 2024-07-15 PROCEDURE — 77063 BREAST TOMOSYNTHESIS BI: CPT

## 2024-07-15 PROCEDURE — 97140 MANUAL THERAPY 1/> REGIONS: CPT | Performed by: PHYSICAL THERAPIST

## 2024-07-15 PROCEDURE — 97110 THERAPEUTIC EXERCISES: CPT | Performed by: PHYSICAL THERAPIST

## 2024-07-15 PROCEDURE — 77067 SCR MAMMO BI INCL CAD: CPT

## 2024-07-18 NOTE — PROGRESS NOTES
"Daily Note     Today's date: 2024  Patient name: Alexa Hawley  : 1960  MRN: [id number]  Referring provider: Wai Baez, *  Dx:   Encounter Diagnosis     ICD-10-CM    1. Incontinence of feces, unspecified fecal incontinence type  R15.9                      Subjective: Patient notes she is feeling stronger. Doing Kegels throughout the day and feels she can hold longer without fatigue. Notes one incident of FI with loose stool. Has been practicing urge deferment with success. Also practicing going without bladder protection pads while at home with success as well.       Objective: See treatment diary below      Assessment: Tolerated treatment well. Patient would benefit from continued PT. Positive gains with increased strength and reduced incontinence episodes. Independent in HEP.      Plan: Continue per plan of care.      POC expires Unit limit Auth Expiration date PT/OT + Visit Limit?   24 4 NA BOMN                           Visit/Unit Tracking  AUTH Status:  Date 6/17 6/24 7/8 7/15 7/22          No AUTH/ $40 copay Used 1 2 3 4 5           Remaining                 PFDI done                    Precautions: DM HTN hysterectomy, head injury with seizure, gastric sleeve      Manuals 6/17 6/24 7/8 7/15 7/22        Transverse plane pelvic   10' 10' 10' 10'                                  Ther Ex             CRK Eval pt ed                         LE stretch  10'  good nt nt nt        Diaphragmatic breathing  2'  HEP 2' 2' 2'        Pfm relaxation/drop  3'  HEP 2'                       CRK for coordination  3\"/10\"/2x5 10x 10x 5\"/10\"  10x        Ball with PFMC and exhale  3\"/10x  HEP 3\"/  10x 3-5\"/10x 3-5\"/  10x        TB with ER and exhale  10x  BTB  HEP 10x 10x 10x  BTB        TB with ER and PFMC  10x  BTB  HEP 10x 10x 10x        Segmental bridge   10x   HEP 10x 2x10        clamshell    10x  HEP 10x        AS slow contraction   3-5\"/5\"  5x  HEP 5\"/10x 10x        AS quick contraction   " 10x  HEP                       Sit to stand with PF   nv 10x  HEP                      Ther Activity             PFM education   PP            Bladder health HO  HEP            Squatty potty PP            Urge suppression   NV  PP  review                      Gait Training                                       Modalities

## 2024-07-22 ENCOUNTER — OFFICE VISIT (OUTPATIENT)
Dept: PHYSICAL THERAPY | Age: 64
End: 2024-07-22
Payer: COMMERCIAL

## 2024-07-22 DIAGNOSIS — R15.9 INCONTINENCE OF FECES, UNSPECIFIED FECAL INCONTINENCE TYPE: Primary | ICD-10-CM

## 2024-07-22 PROCEDURE — 97110 THERAPEUTIC EXERCISES: CPT | Performed by: PHYSICAL THERAPIST

## 2024-07-22 PROCEDURE — 97140 MANUAL THERAPY 1/> REGIONS: CPT | Performed by: PHYSICAL THERAPIST

## 2024-07-26 ENCOUNTER — APPOINTMENT (OUTPATIENT)
Dept: LAB | Facility: MEDICAL CENTER | Age: 64
End: 2024-07-26
Payer: COMMERCIAL

## 2024-07-26 DIAGNOSIS — E78.2 MIXED HYPERLIPIDEMIA: ICD-10-CM

## 2024-07-26 DIAGNOSIS — R56.9 SEIZURE-LIKE ACTIVITY (HCC): ICD-10-CM

## 2024-07-26 DIAGNOSIS — Z00.6 ENCOUNTER FOR EXAMINATION FOR NORMAL COMPARISON OR CONTROL IN CLINICAL RESEARCH PROGRAM: ICD-10-CM

## 2024-07-26 DIAGNOSIS — R73.09 ELEVATED HEMOGLOBIN A1C: ICD-10-CM

## 2024-07-26 LAB
ANION GAP SERPL CALCULATED.3IONS-SCNC: 12 MMOL/L (ref 4–13)
BUN SERPL-MCNC: 16 MG/DL (ref 5–25)
CALCIUM SERPL-MCNC: 9.4 MG/DL (ref 8.4–10.2)
CHLORIDE SERPL-SCNC: 102 MMOL/L (ref 96–108)
CHOLEST SERPL-MCNC: 222 MG/DL
CO2 SERPL-SCNC: 25 MMOL/L (ref 21–32)
CREAT SERPL-MCNC: 0.71 MG/DL (ref 0.6–1.3)
EST. AVERAGE GLUCOSE BLD GHB EST-MCNC: 131 MG/DL
GFR SERPL CREATININE-BSD FRML MDRD: 90 ML/MIN/1.73SQ M
GLUCOSE P FAST SERPL-MCNC: 93 MG/DL (ref 65–99)
HBA1C MFR BLD: 6.2 %
HDLC SERPL-MCNC: 55 MG/DL
LDLC SERPL CALC-MCNC: 139 MG/DL (ref 0–100)
NONHDLC SERPL-MCNC: 167 MG/DL
POTASSIUM SERPL-SCNC: 4.1 MMOL/L (ref 3.5–5.3)
SODIUM SERPL-SCNC: 139 MMOL/L (ref 135–147)
TRIGL SERPL-MCNC: 140 MG/DL

## 2024-07-26 PROCEDURE — 36415 COLL VENOUS BLD VENIPUNCTURE: CPT

## 2024-07-26 PROCEDURE — 80048 BASIC METABOLIC PNL TOTAL CA: CPT

## 2024-07-26 PROCEDURE — 83036 HEMOGLOBIN GLYCOSYLATED A1C: CPT

## 2024-07-26 PROCEDURE — 80061 LIPID PANEL: CPT

## 2024-07-26 NOTE — PROGRESS NOTES
"Daily Note     Today's date: 2024  Patient name: Alexa Hawley  : 1960  MRN: [id number]  Referring provider: Wai Baez, *  Dx:   Encounter Diagnosis     ICD-10-CM    1. Incontinence of feces, unspecified fecal incontinence type  R15.9           Start Time: 0630  Stop Time: 0725  Total time in clinic (min): 55 minutes    Subjective: No issues with fecal incontinence. Notes one incident of urinary incontinence yesterday while shopping. Was completely surprised and no related trigger. Notes using lighter bladder protective pads/liners. Trying to go without a liner at home.       Objective: See treatment diary below      Assessment: Tolerated treatment well. Patient exhibited good technique with therapeutic exercises and would benefit from continued PT Improved endurance with activating PFM.      Plan: Continue per plan of care.      POC expires Unit limit Auth Expiration date PT/OT + Visit Limit?   24 4 NA BOMN                           Visit/Unit Tracking  AUTH Status:  Date 6/17 6/24 7/8 7/15 7/22 7/29         No AUTH/ $40 copay Used 1 2 3 4 5 6          Remaining                 PFDI done                    Precautions: DM HTN hysterectomy, head injury with seizure, gastric sleeve      Manuals 6/17 6/24 7/8 7/15 7/22 7/29       Transverse plane pelvic   10' 10' 10' 10' 10'                                 Ther Ex             CRK Eval pt ed                         LE stretch  10'  good nt nt nt        Diaphragmatic breathing  2'  HEP 2' 2' 2'        Pfm relaxation/drop  3'  HEP 2'                       CRK for coordination  3\"/10\"/2x5 10x 10x 5\"/10\"  10x 5\"/10\"/10x       Ball with PFMC and exhale  3\"/10x  HEP 3\"/  10x 3-5\"/10x 3-5\"/  10x 10x       TB with ER and exhale  10x  BTB  HEP 10x 10x 10x  BTB 10x       TB with ER and PFMC  10x  BTB  HEP 10x 10x 10x 10x       Segmental bridge   10x   HEP 10x 2x10 10x       clamshell    10x  HEP 10x 10x       AS slow contraction   3-5\"/5\"  5x  HEP " "5\"/10x 10x 10x       AS quick contraction   10x  HEP   10x                    Sit to stand with PFMC   nv 10x  HEP  10x                    Ther Activity             PFM education   PP            Bladder health HO  HEP            Squatty potty PP            Urge suppression   NV  PP  review                      Gait Training                                       Modalities                                                      "

## 2024-07-29 ENCOUNTER — OFFICE VISIT (OUTPATIENT)
Dept: PHYSICAL THERAPY | Age: 64
End: 2024-07-29
Payer: COMMERCIAL

## 2024-07-29 DIAGNOSIS — R15.9 INCONTINENCE OF FECES, UNSPECIFIED FECAL INCONTINENCE TYPE: Primary | ICD-10-CM

## 2024-07-29 PROCEDURE — 97110 THERAPEUTIC EXERCISES: CPT | Performed by: PHYSICAL THERAPIST

## 2024-07-29 PROCEDURE — 97140 MANUAL THERAPY 1/> REGIONS: CPT | Performed by: PHYSICAL THERAPIST

## 2024-07-30 ENCOUNTER — OFFICE VISIT (OUTPATIENT)
Dept: FAMILY MEDICINE CLINIC | Facility: MEDICAL CENTER | Age: 64
End: 2024-07-30
Payer: COMMERCIAL

## 2024-07-30 VITALS
DIASTOLIC BLOOD PRESSURE: 86 MMHG | RESPIRATION RATE: 15 BRPM | SYSTOLIC BLOOD PRESSURE: 130 MMHG | BODY MASS INDEX: 29.09 KG/M2 | OXYGEN SATURATION: 97 % | HEIGHT: 65 IN | TEMPERATURE: 98.1 F | WEIGHT: 174.6 LBS | HEART RATE: 75 BPM

## 2024-07-30 DIAGNOSIS — F41.9 ANXIETY: ICD-10-CM

## 2024-07-30 DIAGNOSIS — E78.2 MIXED HYPERLIPIDEMIA: ICD-10-CM

## 2024-07-30 DIAGNOSIS — I10 ESSENTIAL HYPERTENSION: ICD-10-CM

## 2024-07-30 DIAGNOSIS — I10 PRIMARY HYPERTENSION: Primary | ICD-10-CM

## 2024-07-30 PROCEDURE — 99214 OFFICE O/P EST MOD 30 MIN: CPT

## 2024-07-30 PROCEDURE — 3075F SYST BP GE 130 - 139MM HG: CPT

## 2024-07-30 PROCEDURE — 3079F DIAST BP 80-89 MM HG: CPT

## 2024-07-30 PROCEDURE — 3725F SCREEN DEPRESSION PERFORMED: CPT

## 2024-07-30 RX ORDER — SERTRALINE HYDROCHLORIDE 100 MG/1
100 TABLET, FILM COATED ORAL DAILY
Qty: 90 TABLET | Refills: 1 | Status: SHIPPED | OUTPATIENT
Start: 2024-07-30

## 2024-07-30 RX ORDER — TRIAMTERENE AND HYDROCHLOROTHIAZIDE 37.5; 25 MG/1; MG/1
1 TABLET ORAL DAILY
Qty: 90 TABLET | Refills: 1 | Status: SHIPPED | OUTPATIENT
Start: 2024-07-30

## 2024-07-30 NOTE — PROGRESS NOTES
"Ambulatory Visit  Name: Alexa Hawley      : 1960      MRN: [id number]  Encounter Provider: JEFERSON Rodriguez  Encounter Date: 2024   Encounter department: [facility] [place]    Assessment & Plan   1. Primary hypertension  2. Anxiety  -     sertraline (ZOLOFT) 100 mg tablet; Take 1 tablet (100 mg total) by mouth daily  3. Mixed hyperlipidemia  4. Essential hypertension  -     triamterene-hydrochlorothiazide (MAXZIDE-25) 37.5-25 mg per tablet; Take 1 tablet by mouth daily      Depression Screening and Follow-up Plan: Patient was screened for depression during today's encounter. They screened negative with a PHQ-2 score of 0.      History of Present Illness     63-year-old female presents for 3-month follow-up hypertensio anxiety and hyperlipidemia.  She is currently on sertraline and triamterene-hydrochlorothiazide.  She is doing very well overall and is tolerating all of her medications well.  She is looking forward to her upcoming trip to [place] to visit her sons and grandchild. She offers no concerns at this time.           Review of Systems   Constitutional: Negative.    HENT: Negative.     Eyes: Negative.    Respiratory: Negative.     Cardiovascular: Negative.    Gastrointestinal: Negative.    Endocrine: Negative.    Genitourinary: Negative.    Musculoskeletal: Negative.    Skin: Negative.    Allergic/Immunologic: Negative.    Neurological: Negative.    Hematological: Negative.    Psychiatric/Behavioral: Negative.       Objective     /86 (BP Location: Left arm, Patient Position: Sitting, Cuff Size: Standard)   Pulse 75   Temp 98.1 °F (36.7 °C) (Temporal)   Resp 15   Ht 5' 5\" (1.651 m)   Wt 79.2 kg (174 lb 9.6 oz)   SpO2 97%   BMI 29.05 kg/m²     Physical Exam  Vitals and nursing note reviewed.   Constitutional:       General: She is not in acute distress.     Appearance: Normal appearance. She is not ill-appearing.   HENT:      Head: Normocephalic and " atraumatic.   Cardiovascular:      Rate and Rhythm: Normal rate and regular rhythm.      Pulses: Normal pulses.      Heart sounds: Normal heart sounds.   Pulmonary:      Effort: Pulmonary effort is normal.      Breath sounds: Normal breath sounds.   Skin:     General: Skin is warm and dry.   Neurological:      General: No focal deficit present.      Mental Status: She is alert and oriented to person, place, and time. Mental status is at baseline.   Psychiatric:         Mood and Affect: Mood normal.         Behavior: Behavior normal.         Thought Content: Thought content normal.       Administrative Statements

## 2024-08-02 ENCOUNTER — VBI (OUTPATIENT)
Dept: ADMINISTRATIVE | Facility: OTHER | Age: 64
End: 2024-08-02

## 2024-08-02 NOTE — TELEPHONE ENCOUNTER
08/02/24 12:48 PM     Chart reviewed for Diabetic Eye Exam and Mammogram ; nothing is submitted to the patient's insurance at this time.     Devante Billingsley MA   [facility]

## 2024-08-05 ENCOUNTER — APPOINTMENT (OUTPATIENT)
Dept: PHYSICAL THERAPY | Age: 64
End: 2024-08-05
Payer: COMMERCIAL

## 2024-08-06 LAB
APOB+LDLR+PCSK9 GENE MUT ANL BLD/T: NOT DETECTED
BRCA1+BRCA2 DEL+DUP + FULL MUT ANL BLD/T: NOT DETECTED
MLH1+MSH2+MSH6+PMS2 GN DEL+DUP+FUL M: NOT DETECTED

## 2024-08-12 ENCOUNTER — HOSPITAL ENCOUNTER (OUTPATIENT)
Dept: NEUROLOGY | Facility: HOSPITAL | Age: 64
Discharge: HOME | End: 2024-08-12
Attending: STUDENT IN AN ORGANIZED HEALTH CARE EDUCATION/TRAINING PROGRAM
Payer: COMMERCIAL

## 2024-08-12 ENCOUNTER — HOSPITAL ENCOUNTER (OUTPATIENT)
Dept: MRI IMAGING | Facility: HOSPITAL | Age: 64
Discharge: HOME | End: 2024-08-12
Attending: STUDENT IN AN ORGANIZED HEALTH CARE EDUCATION/TRAINING PROGRAM
Payer: COMMERCIAL

## 2024-08-12 ENCOUNTER — APPOINTMENT (OUTPATIENT)
Dept: PHYSICAL THERAPY | Age: 64
End: 2024-08-12
Payer: COMMERCIAL

## 2024-08-12 DIAGNOSIS — R56.9 SEIZURE-LIKE ACTIVITY (HCC): ICD-10-CM

## 2024-08-12 PROCEDURE — A9585 GADOBUTROL INJECTION: HCPCS | Performed by: STUDENT IN AN ORGANIZED HEALTH CARE EDUCATION/TRAINING PROGRAM

## 2024-08-12 PROCEDURE — 70553 MRI BRAIN STEM W/O & W/DYE: CPT

## 2024-08-12 PROCEDURE — 95816 EEG AWAKE AND DROWSY: CPT | Performed by: STUDENT IN AN ORGANIZED HEALTH CARE EDUCATION/TRAINING PROGRAM

## 2024-08-12 PROCEDURE — 95816 EEG AWAKE AND DROWSY: CPT

## 2024-08-12 RX ORDER — GADOBUTROL 604.72 MG/ML
7 INJECTION INTRAVENOUS
Status: COMPLETED | OUTPATIENT
Start: 2024-08-12 | End: 2024-08-12

## 2024-08-12 RX ADMIN — GADOBUTROL 7 ML: 604.72 INJECTION INTRAVENOUS at 09:57

## 2024-08-13 ENCOUNTER — TELEPHONE (OUTPATIENT)
Age: 64
End: 2024-08-13

## 2024-08-13 DIAGNOSIS — U07.1 COVID-19: Primary | ICD-10-CM

## 2024-08-13 RX ORDER — NIRMATRELVIR AND RITONAVIR 300-100 MG
3 KIT ORAL 2 TIMES DAILY
Qty: 30 TABLET | Refills: 0 | Status: SHIPPED | OUTPATIENT
Start: 2024-08-13 | End: 2024-08-13

## 2024-08-13 RX ORDER — NIRMATRELVIR AND RITONAVIR 300-100 MG
3 KIT ORAL 2 TIMES DAILY
Qty: 30 TABLET | Refills: 0 | Status: SHIPPED | OUTPATIENT
Start: 2024-08-13 | End: 2024-08-18

## 2024-08-13 NOTE — TELEPHONE ENCOUNTER
Patient called in stating she had tested positive for Covid today 8/13 and symptoms have started yesterday 8/12. Patient stated she has body aches, sinus pressure/ pain. Patient asked if she can have Paxlovid sent to the pharmacy. Advised patient she may need to be seen for an appointment first.   Please advise, thank you

## 2024-08-19 ENCOUNTER — OFFICE VISIT (OUTPATIENT)
Dept: PHYSICAL THERAPY | Age: 64
End: 2024-08-19
Payer: COMMERCIAL

## 2024-08-19 DIAGNOSIS — R15.9 INCONTINENCE OF FECES, UNSPECIFIED FECAL INCONTINENCE TYPE: Primary | ICD-10-CM

## 2024-08-19 PROCEDURE — 97140 MANUAL THERAPY 1/> REGIONS: CPT | Performed by: PHYSICAL THERAPIST

## 2024-08-19 PROCEDURE — 97110 THERAPEUTIC EXERCISES: CPT | Performed by: PHYSICAL THERAPIST

## 2024-09-12 ENCOUNTER — HOSPITAL ENCOUNTER (OUTPATIENT)
Age: 64
Discharge: HOME | End: 2024-09-12
Payer: COMMERCIAL

## 2024-09-12 VITALS — WEIGHT: 177 LBS | HEIGHT: 64 IN | BODY MASS INDEX: 30.22 KG/M2

## 2024-09-12 DIAGNOSIS — M85.9 DISORDER OF BONE DENSITY AND STRUCTURE, UNSPECIFIED: ICD-10-CM

## 2024-09-12 DIAGNOSIS — Z78.0 POST-MENOPAUSE: ICD-10-CM

## 2024-09-12 PROCEDURE — 77080 DXA BONE DENSITY AXIAL: CPT

## 2024-09-12 NOTE — RESULT ENCOUNTER NOTE
DEXA scan showing osteopenia in lumbar spine.  Not yet at the point of osteoporosis.  No statistical significant change from previous  Message sent via [facility]

## 2024-09-18 ENCOUNTER — VBI (OUTPATIENT)
Dept: ADMINISTRATIVE | Facility: OTHER | Age: 64
End: 2024-09-18

## 2024-09-18 NOTE — TELEPHONE ENCOUNTER
09/18/24 2:05 PM     Chart reviewed for Mammogram was/were submitted to the patient's insurance.     Devante Billingsley MA   [facility]

## 2024-10-16 ENCOUNTER — TELEPHONE (OUTPATIENT)
Dept: NEUROLOGY | Facility: CLINIC | Age: 64
End: 2024-10-16

## 2024-10-16 NOTE — TELEPHONE ENCOUNTER
Spoke with patient and informed her. That her appt needed to be reschedule. Patient understood and agreed. Patient is now schedule for 12/6 at 3:30 pm

## 2024-11-04 ENCOUNTER — TELEPHONE (OUTPATIENT)
Dept: BARIATRICS | Facility: CLINIC | Age: 64
End: 2024-11-04

## 2024-11-04 NOTE — TELEPHONE ENCOUNTER
----- Message from Ann VIGIL sent at 2024  6:55 AM EDT -----  Regardin year f/u appointment  Please contact patient to schedule a 4 year follow up appointment and document the results of the call in EPIC.  Thank You

## 2024-12-06 ENCOUNTER — OFFICE VISIT (OUTPATIENT)
Dept: NEUROLOGY | Facility: CLINIC | Age: 64
End: 2024-12-06
Payer: COMMERCIAL

## 2024-12-06 VITALS
WEIGHT: 174.5 LBS | HEART RATE: 90 BPM | SYSTOLIC BLOOD PRESSURE: 140 MMHG | BODY MASS INDEX: 29.79 KG/M2 | DIASTOLIC BLOOD PRESSURE: 80 MMHG | HEIGHT: 64 IN

## 2024-12-06 DIAGNOSIS — R40.4 ALTERED AWARENESS, TRANSIENT: Primary | ICD-10-CM

## 2024-12-06 PROCEDURE — 99214 OFFICE O/P EST MOD 30 MIN: CPT | Performed by: STUDENT IN AN ORGANIZED HEALTH CARE EDUCATION/TRAINING PROGRAM

## 2024-12-06 NOTE — PROGRESS NOTES
Name: Alexa Hawley      : 1960      MRN: [id number]  Encounter Provider: Tessa Hernadez MD  Encounter Date: 2024   Encounter department: [facility]  :  Assessment & Plan  Altered awareness, transient  -Per daughter's description of the event, there is concern that previous episode was representative of a first-time seizure vs convulsive syncope. Given that she has not experienced any further episodes in over 6 months, no need for continued seizure precautions. No need for AEDs at this time. Advised her to contact the office immediately if a similar episode occurs in the future         CC:   Seizure-like episode    History of Present Illness:     63 y.o. female with a past medical history that includes HTN, anxiety, HLD who presents for evaluation of a seizure-like event.     Interval Hx  Presents as a f/u today over 6 months since her previous visit. She has not had any further episodes of LOC. She denies any other neurologic complaints at this time.     Previous Hx  Pt presents today as a follow up after she had an episode of LOC back on 2024. At that time, she had recently sustained an injury to her knee. At the time of the episode, she was working with a horse and suddenly experienced a pop in her knee followed by severe knee pain. The event was partially witnessed by her daughter. Reportedly, she lost consciousness, her eyes rolled up into her head, and she experienced violent shaking of all 4 extremities and her head, striking her head in the process. She had bowel incontinence during this event. In total, it lasted about 1 minute. Afterward, she was confused, and seemed to be making sounds as if she was gasping for air. She was taken to the ED for further evaluation.     CTH was negative for any evidence of acute intracranial abnormalities. Labs were unremarkable, lactate/UA/UDS not obtained at that time. She returned to her neurologic baseline and was  discharged with neurology follow up.     Daughter states that pt has had episodes of syncope in the past associated with severe pain. However, these episodes have not previously been accompanied by motor symptoms. 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 She has no history of childhood infections, seizures as a child, or any previous seizure-like events.     Past Medical History:     Past Medical History:   Diagnosis Date    Allergic     Vicodin    Anemia     Anxiety     Arthritis     Bariatric surgery status     Closed head injury 06/11/2018 2018    Colon polyp     Concussion with loss of consciousness 06/22/2018    2018    Depression     Diabetes mellitus ([facility])     NIDDM    Diarrhea     chronic    Fatty liver     Fecal incontinence     Fibroid 2003    Fractures 6/11/2018    GERD (gastroesophageal reflux disease)     Head injury     6/10/11    Hiatal hernia     Hyperlipidemia     Hypertension     Lifelong obesity     Migraine 06/10/2018    Mild TBI ([facility]) 06/11/2018    Postsurgical malabsorption     Psoriasis     legs    SAH (subarachnoid hemorrhage) ([facility]) 06/11/2018    2018    Skin disorder 6/2014    Tear of right supraspinatus tendon 12/16/2019    Type 2 diabetes mellitus with microalbuminuria, without long-term current use of insulin ([facility]) 07/05/2018    Wears glasses        Patient Active Problem List   Diagnosis    Anxiety    Hypertension    Hyperlipidemia    Status post laparoscopic sleeve gastrectomy    Psoriasis    Encounter for surgical aftercare following surgery of digestive system    Postsurgical malabsorption    Bariatric surgery status    Adhesive capsulitis of right shoulder    Contusion of right knee    Internal derangement of multiple sites of right knee    Incontinence of feces    Primary osteoarthritis of right knee    Seizure-like activity ([facility])       Medications:      Current Outpatient Medications   Medication Sig Dispense Refill    calcium carbonate (OS-YONG)  600 MG tablet Take 600 mg by mouth 2 (two) times a day with meals      cholecalciferol (VITAMIN D3) 1,000 units tablet Take 1,000 Units by mouth daily      Cosentyx Sensoready, 300 MG, 150 MG/ML SOAJ injection       Multiple Vitamins-Minerals (multivitamin with minerals) tablet Take 1 tablet by mouth daily      saccharomyces boulardii (Florastor) 250 mg capsule       sertraline (ZOLOFT) 100 mg tablet Take 1 tablet (100 mg total) by mouth daily 90 tablet 1    triamterene-hydrochlorothiazide (MAXZIDE-25) 37.5-25 mg per tablet Take 1 tablet by mouth daily 90 tablet 1     No current facility-administered medications for this visit.        Allergies:      Allergies   Allergen Reactions    Vicodin [Hydrocodone-Acetaminophen] Anaphylaxis       Family History:     Family History   Problem Relation Age of Onset    Atrial fibrillation Mother     Hypertension Mother     Depression Mother     Suicide Attempts Mother     Lung cancer Father     Heart disease Father     Diabetes Father     Cancer Father     No Known Problems Daughter     Breast cancer Maternal Grandmother 50    No Known Problems Maternal Grandfather     No Known Problems Paternal Grandmother     Cancer Paternal Grandfather     Obesity Brother     No Known Problems Brother     No Known Problems Son     No Known Problems Son     Lung cancer Maternal Aunt     No Known Problems Maternal Aunt     No Known Problems Maternal Aunt     No Known Problems Maternal Aunt     Other Family         ADENOCARCINOMA IN SIOBHAN IN VILLOUS ADENOMA OF THE BREAST, MIGRAINES, SKIN DISORDER    Depression Family     Anxiety disorder Family     Diabetes Family         MELLITUS    Fibrocystic breast disease Family     Heart disease Family     Hiatal hernia Family     Hypertension Family     Irritable bowel syndrome Family     Kidney disease Family     Urinary tract infection Family     Breast cancer Family     Ovarian cancer Neg Hx     Uterine cancer Neg Hx     Cervical cancer Neg Hx   "      Social History:       Social History     Socioeconomic History    Marital status: /Civil Union     Spouse name: Not on file    Number of children: Not on file    Years of education: Not on file    Highest education level: Not on file   Occupational History    Not on file   Tobacco Use    Smoking status: Former     Current packs/day: 0.00     Average packs/day: 0.5 packs/day for 5.0 years (2.5 ttl pk-yrs)     Types: Cigarettes     Start date: 6/10/1987     Quit date: 6/10/1992     Years since quittin.5    Smokeless tobacco: Never    Tobacco comments:     Smoked for 10yrs.   Vaping Use    Vaping status: Never Used   Substance and Sexual Activity    Alcohol use: Yes     Comment: Wine maybe once or twice a month    Drug use: No    Sexual activity: Yes     Partners: Male     Birth control/protection: Surgical, Male Sterilization     Comment: with    Other Topics Concern    Not on file   Social History Narrative    CAFFEINE USE    DAILY COFFEE CONSUMPTION    EXERCISES FREQUENTLY     Social Drivers of Health     Financial Resource Strain: Not on file   Food Insecurity: Not on file   Transportation Needs: Not on file   Physical Activity: Not on file   Stress: Not on file   Social Connections: Unknown (2024)    Received from [employer]     How often do you feel lonely or isolated from those around you? (Adult - for ages 18 years and over): Not on file   Intimate Partner Violence: Not on file   Housing Stability: Not on file         Objective:   /80 (BP Location: Left arm, Patient Position: Sitting, Cuff Size: Standard)   Pulse 90   Ht 5' 4.2\" (1.631 m)   Wt 79.2 kg (174 lb 8 oz)   BMI 29.77 kg/m²     General: Patient is not in any acute/apparent distress, well nourished, well developed and cooperative.   HEENT: normocephalic, atraumatic, moist membranes  Neck: supple  Extremities: no edema noted   Skin: no lesions or rash  Musculosketal: no bony " abnormalities    Neurologic Examination:   Mental status: alert, awake, oriented X 3 and following commands.     Speech/Language: Speech is fluent without any dysarthria, no aphasia noted, can name, repeat, read and write and comprehension intact    Cranial Nerves:   CN I: smell not tested  CN II: Visual fields full to confrontation, fundus - no papilledema noted.  CN III, IV, VI: Extraocular movements intact bilaterally. Pupils equal round and reactive to light bilaterally.  CN V: Facial sensation is normal.  CN VII: Full and symmetric facial movement.  CN VIII: Hearing is normal.  CN IX, X: Palate elevates symmetrically. Normal gag reflex.  CN XI: Shoulder shrug strength is normal.  CN XII: Tongue midline without atrophy or fasciculations.    Motor:   Strength 5/5 in all 4 extremities. Bulk/tone - normal.  Fasiculations - none    Sensory:   Sensation intact to soft touch in all 4 extremities.    Cerebellar:   Finger-to-nose intact, normal heel to shin.    Reflexes: 2+ in all 4 extremities  Pathologic reflexes - babinski reflex negative    Gait:   Normal gait, Romberg sign negative        Review of Systems:       Review of Systems   Constitutional:  Negative for appetite change, fatigue and fever.   HENT: Negative.  Negative for hearing loss, tinnitus, trouble swallowing and voice change.    Eyes: Negative.  Negative for photophobia, pain and visual disturbance.   Respiratory: Negative.  Negative for shortness of breath.    Cardiovascular: Negative.  Negative for palpitations.   Gastrointestinal: Negative.  Negative for nausea and vomiting.   Endocrine: Negative.  Negative for cold intolerance.   Genitourinary: Negative.  Negative for dysuria, frequency and urgency.   Musculoskeletal:  Negative for back pain, gait problem, myalgias, neck pain and neck stiffness.   Skin: Negative.  Negative for rash.   Allergic/Immunologic: Negative.    Neurological:  Positive for numbness (Legs and arms every now and then).  Negative for dizziness, tremors, seizures, syncope, facial asymmetry, speech difficulty, weakness, light-headedness and headaches.   Hematological: Negative.  Does not bruise/bleed easily.   Psychiatric/Behavioral: Negative.  Negative for confusion, hallucinations and sleep disturbance.     I have personally reviewed the MA's review of systems and made changes as necessary.    I have spent a total time of 30 minutes in caring for this patient on the day of the visit/encounter including Risks and benefits of tx options, Instructions for management, Patient and family education, Risk factor reductions, Impressions, Documenting in the medical record, Reviewing / ordering tests, medicine, procedures  , and Obtaining or reviewing history  .

## 2024-12-16 ENCOUNTER — VBI (OUTPATIENT)
Dept: ADMINISTRATIVE | Facility: OTHER | Age: 64
End: 2024-12-16

## 2024-12-16 NOTE — TELEPHONE ENCOUNTER
12/16/24 2:12 PM     Chart reviewed for Diabetic Eye Exam ; nothing is submitted to the patient's insurance at this time.     Devante Billingsley MA   [facility]

## 2025-01-10 ENCOUNTER — OFFICE VISIT (OUTPATIENT)
Dept: FAMILY MEDICINE CLINIC | Facility: MEDICAL CENTER | Age: 65
End: 2025-01-10
Payer: COMMERCIAL

## 2025-01-10 VITALS
BODY MASS INDEX: 30.83 KG/M2 | TEMPERATURE: 98.9 F | WEIGHT: 180.6 LBS | HEART RATE: 77 BPM | OXYGEN SATURATION: 99 % | HEIGHT: 64 IN | DIASTOLIC BLOOD PRESSURE: 92 MMHG | RESPIRATION RATE: 16 BRPM | SYSTOLIC BLOOD PRESSURE: 148 MMHG

## 2025-01-10 DIAGNOSIS — E78.2 MIXED HYPERLIPIDEMIA: ICD-10-CM

## 2025-01-10 DIAGNOSIS — F41.9 ANXIETY: ICD-10-CM

## 2025-01-10 DIAGNOSIS — Z12.31 SCREENING MAMMOGRAM FOR BREAST CANCER: ICD-10-CM

## 2025-01-10 DIAGNOSIS — Z13.29 THYROID DISORDER SCREEN: ICD-10-CM

## 2025-01-10 DIAGNOSIS — R56.9 SEIZURE-LIKE ACTIVITY (HCC): ICD-10-CM

## 2025-01-10 DIAGNOSIS — I10 PRIMARY HYPERTENSION: Primary | ICD-10-CM

## 2025-01-10 DIAGNOSIS — I10 ESSENTIAL HYPERTENSION: ICD-10-CM

## 2025-01-10 DIAGNOSIS — R73.09 ELEVATED HEMOGLOBIN A1C: ICD-10-CM

## 2025-01-10 PROCEDURE — 99214 OFFICE O/P EST MOD 30 MIN: CPT

## 2025-01-10 RX ORDER — SERTRALINE HYDROCHLORIDE 100 MG/1
100 TABLET, FILM COATED ORAL DAILY
Qty: 90 TABLET | Refills: 1 | Status: SHIPPED | OUTPATIENT
Start: 2025-01-10

## 2025-01-10 RX ORDER — TRIAMTERENE AND HYDROCHLOROTHIAZIDE 37.5; 25 MG/1; MG/1
1 TABLET ORAL DAILY
Qty: 90 TABLET | Refills: 1 | Status: SHIPPED | OUTPATIENT
Start: 2025-01-10

## 2025-01-10 NOTE — ASSESSMENT & PLAN NOTE
Lipids mildly elevated in the past, no current medication use.  Encouraged to continue with healthy diet, exercise regularly.  Recheck lipid panel prior to next visit.  Orders:    Lipid panel; Future

## 2025-01-10 NOTE — ASSESSMENT & PLAN NOTE
Stable with current management.  Continue sertraline at current dose, refilled.    Orders:    sertraline (ZOLOFT) 100 mg tablet; Take 1 tablet (100 mg total) by mouth daily

## 2025-01-10 NOTE — PROGRESS NOTES
Name: Alexa Hawley      : 1960      MRN: [id number]  Encounter Provider: JEFERSON Rodriguez  Encounter Date: 1/10/2025   Encounter department: [facility]  :  Assessment & Plan  Primary hypertension  Blood pressure slightly elevated in office today at 148/92, patient attributes this to the fact that she  may have to put her dog down today.  We will continue triamterene-hydrochlorothiazide at current dose, refilled.  Due for labs prior to next visit.  Orders:  •  Comprehensive metabolic panel; Future  •  CBC and differential; Future  •  Protein / creatinine ratio, urine; Future    Anxiety  Stable with current management.  Continue sertraline at current dose, refilled.    Orders:  •  sertraline (ZOLOFT) 100 mg tablet; Take 1 tablet (100 mg total) by mouth daily    Mixed hyperlipidemia  Lipids mildly elevated in the past, no current medication use.  Encouraged to continue with healthy diet, exercise regularly.  Recheck lipid panel prior to next visit.  Orders:  •  Lipid panel; Future    Elevated hemoglobin A1c  A1c elevated in prediabetic range on previous labs.  Encouraged to continue healthy diet, stay active.  Recheck A1c prior to next visit.  Orders:  •  Hemoglobin A1C; Future    Thyroid disorder screen    Orders:  •  TSH, 3rd generation with Free T4 reflex; Future    Screening mammogram for breast cancer  Due in July, order provided.  Orders:  •  Mammo screening bilateral w 3d and cad; Future    Essential hypertension    Orders:  •  triamterene-hydrochlorothiazide (MAXZIDE-25) 37.5-25 mg per tablet; Take 1 tablet by mouth daily    Seizure-like activity (HCC)  Followed up with neurology, diagnosed this syncope with convulsions per patient.  No further episodes.              History of Present Illness     64-year-old female presents for 6-month follow-up anxiety, hypertension and hyperlipidemia.  She is on triamterene-hydrochlorothiazide and sertraline.  She is not on any  "medication for hyperlipidemia, has been stable on most recent labs.  Tolerating all medications well.  Patient is doing very well, offers no concerns or complaints at this time.  She will return in July for her annual physical exam with labs prior to visit.      Review of Systems   Constitutional: Negative.    HENT: Negative.     Eyes: Negative.    Respiratory: Negative.     Cardiovascular: Negative.    Gastrointestinal: Negative.    Endocrine: Negative.    Genitourinary: Negative.    Musculoskeletal: Negative.    Skin: Negative.    Allergic/Immunologic: Negative.    Neurological: Negative.    Hematological: Negative.    Psychiatric/Behavioral: Negative.         Objective   /92 (BP Location: Left arm, Patient Position: Sitting, Cuff Size: Large)   Pulse 77   Temp 98.9 °F (37.2 °C) (Temporal)   Resp 16   Ht 5' 4.2\" (1.631 m)   Wt 81.9 kg (180 lb 9.6 oz)   SpO2 99%   BMI 30.81 kg/m²      Physical Exam  Vitals and nursing note reviewed.   Constitutional:       General: She is not in acute distress.     Appearance: Normal appearance. She is obese. She is not ill-appearing.   HENT:      Head: Normocephalic and atraumatic.   Cardiovascular:      Rate and Rhythm: Normal rate and regular rhythm.      Pulses: Normal pulses.      Heart sounds: Normal heart sounds.   Pulmonary:      Effort: Pulmonary effort is normal.      Breath sounds: Normal breath sounds.   Musculoskeletal:      Cervical back: Normal range of motion and neck supple.   Skin:     General: Skin is warm and dry.   Neurological:      General: No focal deficit present.      Mental Status: She is alert and oriented to person, place, and time. Mental status is at baseline.   Psychiatric:         Mood and Affect: Mood normal.         Behavior: Behavior normal.         Thought Content: Thought content normal.     "

## 2025-01-10 NOTE — ASSESSMENT & PLAN NOTE
Blood pressure slightly elevated in office today at 148/92, patient attributes this to the fact that she  may have to put her dog down today.  We will continue triamterene-hydrochlorothiazide at current dose, refilled.  Due for labs prior to next visit.  Orders:    Comprehensive metabolic panel; Future    CBC and differential; Future    Protein / creatinine ratio, urine; Future

## 2025-01-10 NOTE — ASSESSMENT & PLAN NOTE
Followed up with neurology, diagnosed this syncope with convulsions per patient.  No further episodes.

## 2025-03-04 ENCOUNTER — PATIENT MESSAGE (OUTPATIENT)
Dept: FAMILY MEDICINE CLINIC | Facility: MEDICAL CENTER | Age: 65
End: 2025-03-04

## 2025-04-09 ENCOUNTER — OFFICE VISIT (OUTPATIENT)
Dept: CARDIOLOGY CLINIC | Facility: CLINIC | Age: 65
End: 2025-04-09
Payer: COMMERCIAL

## 2025-04-09 VITALS
OXYGEN SATURATION: 97 % | WEIGHT: 183 LBS | RESPIRATION RATE: 17 BRPM | BODY MASS INDEX: 31.24 KG/M2 | SYSTOLIC BLOOD PRESSURE: 146 MMHG | HEIGHT: 64 IN | HEART RATE: 67 BPM | DIASTOLIC BLOOD PRESSURE: 90 MMHG

## 2025-04-09 DIAGNOSIS — I10 PRIMARY HYPERTENSION: Primary | ICD-10-CM

## 2025-04-09 DIAGNOSIS — E78.2 MIXED HYPERLIPIDEMIA: ICD-10-CM

## 2025-04-09 DIAGNOSIS — R07.9 CHEST PAIN IN ADULT: ICD-10-CM

## 2025-04-09 PROCEDURE — 99204 OFFICE O/P NEW MOD 45 MIN: CPT | Performed by: INTERNAL MEDICINE

## 2025-04-09 PROCEDURE — 93000 ELECTROCARDIOGRAM COMPLETE: CPT | Performed by: INTERNAL MEDICINE

## 2025-04-09 RX ORDER — LOSARTAN POTASSIUM 50 MG/1
50 TABLET ORAL DAILY
Qty: 90 TABLET | Refills: 3 | Status: SHIPPED | OUTPATIENT
Start: 2025-04-09

## 2025-04-09 NOTE — PROGRESS NOTES
Cardiology Consultation     Alexa Hawley  [id number]  1960  [facility]  [facility] [place] [id number]    Impression:  Hypertension  Chest pain, tachycardia  Hypercholesterolemia, LDL last year 139.    Plan:  She remains hypertensive at home.   Start losartan 50 mg a day.   Report home bps.   For her chest pain we will check a stress echo to check lvf and for ischemia.   She had a high stress job,  for [employer] transport.   Check bmp, seeing pcp.       Alexa presents for cardiac consultation for hypertension.  She also has hypercholesterolemia.  Her father had triple bypass and paternal uncle had CAD.  She is a former smoker.  Since last office visit she saw neurology for a possible seizure-like event.  CT of the head was negative.  No further neurologic recommendations.    She brings in her bp readings from home which run 152-190 over 70s.     On further questioning she notes that last week when she rode her horse she developed an episode of chest pain and tachycardia. A midsternal sharp chest pain that radiated down her sides then resolved with rest as the day went on. Also gets occasional heart pounding/racing. She shaniqua 4 horses and is up at 5 in the barn daily.       Previous cardiac testing:    Nuclear stress test 5/5/2020: Normal study after maximal exercise.  Cardiovascular risk is low.  Myocardial perfusion was normal at rest and with stress.      Echocardiogram 6/12/2018: Normal LV function, EF 60%.      Patient Active Problem List   Diagnosis    Anxiety    Hypertension    Hyperlipidemia    Status post laparoscopic sleeve gastrectomy    Psoriasis    Encounter for surgical aftercare following surgery of digestive system    Postsurgical malabsorption    Bariatric surgery status    Adhesive capsulitis of right shoulder    Contusion of right knee    Internal derangement of multiple sites  of right knee    Incontinence of feces    Primary osteoarthritis of right knee    Seizure-like activity ([facility])     Past Medical History:   Diagnosis Date    Allergic     Vicodin    Anemia     Anxiety     Arthritis     Bariatric surgery status     Closed head injury 2018    Colon polyp     Concussion with loss of consciousness 2018    Depression     Diabetes mellitus ([facility])     NIDDM    Diarrhea     chronic    Fatty liver     Fecal incontinence     Fibroid 2003    Fractures 2018    GERD (gastroesophageal reflux disease)     Head injury     6/10/11    Hiatal hernia     Hyperlipidemia     Hypertension     Lifelong obesity     Migraine 06/10/2018    Mild TBI ([facility]) 2018    Postsurgical malabsorption     Psoriasis     legs    SAH (subarachnoid hemorrhage) ([facility]) 2018    2018    Skin disorder 2014    Tear of right supraspinatus tendon 2019    Type 2 diabetes mellitus with microalbuminuria, without long-term current use of insulin ([facility]) 2018    Wears glasses      Social History     Socioeconomic History    Marital status: /Civil Union     Spouse name: Not on file    Number of children: Not on file    Years of education: Not on file    Highest education level: Not on file   Occupational History    Not on file   Tobacco Use    Smoking status: Former     Current packs/day: 0.00     Average packs/day: 0.5 packs/day for 5.0 years (2.5 ttl pk-yrs)     Types: Cigarettes     Start date: 6/10/1987     Quit date: 6/10/1992     Years since quittin.8    Smokeless tobacco: Never    Tobacco comments:     Smoked for 10yrs.   Vaping Use    Vaping status: Never Used   Substance and Sexual Activity    Alcohol use: Yes     Comment: Wine maybe once or twice a month    Drug use: No    Sexual activity: Yes     Partners: Male     Birth control/protection: Surgical, Male Sterilization     Comment: with    Other Topics Concern    Not on file   Social History Narrative     CAFFEINE USE    DAILY COFFEE CONSUMPTION    EXERCISES FREQUENTLY     Social Drivers of Health     Financial Resource Strain: Not on file   Food Insecurity: Not on file   Transportation Needs: Not on file   Physical Activity: Not on file   Stress: Not on file   Social Connections: Unknown (6/18/2024)    Received from [facility]     How often do you feel lonely or isolated from those around you? (Adult - for ages 18 years and over): Not on file   Intimate Partner Violence: Not on file   Housing Stability: Not on file      Family History   Problem Relation Age of Onset    Atrial fibrillation Mother     Hypertension Mother     Depression Mother     Suicide Attempts Mother     Lung cancer Father     Heart disease Father     Diabetes Father     Cancer Father     No Known Problems Daughter     Breast cancer Maternal Grandmother 50    No Known Problems Maternal Grandfather     No Known Problems Paternal Grandmother     Cancer Paternal Grandfather     Obesity Brother     No Known Problems Brother     No Known Problems Son     No Known Problems Son     Lung cancer Maternal Aunt     No Known Problems Maternal Aunt     No Known Problems Maternal Aunt     No Known Problems Maternal Aunt     Other Family         ADENOCARCINOMA IN SIOBHAN IN VILLOUS ADENOMA OF THE BREAST, MIGRAINES, SKIN DISORDER    Depression Family     Anxiety disorder Family     Diabetes Family         MELLITUS    Fibrocystic breast disease Family     Heart disease Family     Hiatal hernia Family     Hypertension Family     Irritable bowel syndrome Family     Kidney disease Family     Urinary tract infection Family     Breast cancer Family     Ovarian cancer Neg Hx     Uterine cancer Neg Hx     Cervical cancer Neg Hx      Past Surgical History:   Procedure Laterality Date    CHOLECYSTECTOMY      COLONOSCOPY N/A 10/24/2017    Procedure: COLONOSCOPY;  Surgeon: Wai Baez MD;  Location: [facility];  Service: Colorectal    COLONOSCOPY W/  "ENDOSCOPIC US N/A 10/24/2017    Procedure: ANAL ENDOSCOPIC U/S;  Surgeon: Wai Baez MD;  Location: [facility];  Service: Colorectal    DILATION AND CURETTAGE OF UTERUS      EGD      ENDOMETRIAL ABLATION      ENDOMETRIAL BIOPSY      WITHOUT CERVICAL DILATION    HYSTERECTOMY      NASAL SEPTUM SURGERY      OTHER SURGICAL HISTORY      Episiotomy repair    IL LAPS GSTRC RSTRICTIV PX LONGITUDINAL GASTRECTOMY N/A 06/16/2020    Procedure: GASTRECTOMY SLEEVE LAPAROSCOPIC AND INTRAOPERATIVE EGD.;  Surgeon: Robin Sommer MD;  Location: [facility];  Service: Bariatrics    IL SURGICAL ARTHROSCOPY SHOULDER W/ROTATOR CUFF RPR Right 01/15/2020    Procedure: SHOULDER ARTHROSCOPIC DEBRIDEMENT OF PARTIAL THICKNESS ROTATOR CUFF TEAR, SAD;  Surgeon: Stan Angeles MD;  Location[facility];  Service: Orthopedics    ROTATOR CUFF REPAIR Left     SHOULDER SURGERY  01/2017       Current Outpatient Medications:     calcium carbonate (OS-YONG) 600 MG tablet, Take 600 mg by mouth 2 (two) times a day with meals, Disp: , Rfl:     cholecalciferol (VITAMIN D3) 1,000 units tablet, Take 1,000 Units by mouth daily, Disp: , Rfl:     Cosentyx Sensoready, 300 MG, 150 MG/ML SOAJ injection, , Disp: , Rfl:     Multiple Vitamins-Minerals (multivitamin with minerals) tablet, Take 1 tablet by mouth daily, Disp: , Rfl:     saccharomyces boulardii (Florastor) 250 mg capsule, Take 250 mg by mouth in the morning, Disp: , Rfl:     sertraline (ZOLOFT) 100 mg tablet, Take 1 tablet (100 mg total) by mouth daily, Disp: 90 tablet, Rfl: 1    triamterene-hydrochlorothiazide (MAXZIDE-25) 37.5-25 mg per tablet, Take 1 tablet by mouth daily, Disp: 90 tablet, Rfl: 1  Allergies   Allergen Reactions    Vicodin [Hydrocodone-Acetaminophen] Anaphylaxis     Vitals:    04/09/25 0843   BP: 146/90   BP Location: Right arm   Patient Position: Sitting   Cuff Size: Standard   Pulse: 67   Resp: 17   SpO2: 97%   Weight: 83 kg (183 lb)   Height: 5' 4.2\" (1.631 m)       Labs:  No " visits with results within 6 Month(s) from this visit.   Latest known visit with results is:   Appointment on 07/26/2024   Component Date Value    Cholesterol 07/26/2024 222 (H)     Triglycerides 07/26/2024 140     HDL, Direct 07/26/2024 55     LDL Calculated 07/26/2024 139 (H)     Non-HDL-Chol (CHOL-HDL) 07/26/2024 167     Hemoglobin A1C 07/26/2024 6.2 (H)     EAG 07/26/2024 131     RUTH SYNDROME DNA CHRISTINE* 07/26/2024 Not Detected     HEREDITARY BREAST & OVAR* 07/26/2024 Not Detected     FAMILIAL HYPERCHOLESTERO* 07/26/2024 Not Detected     Sodium 07/26/2024 139     Potassium 07/26/2024 4.1     Chloride 07/26/2024 102     CO2 07/26/2024 25     ANION GAP 07/26/2024 12     BUN 07/26/2024 16     Creatinine 07/26/2024 0.71     Glucose, Fasting 07/26/2024 93     Calcium 07/26/2024 9.4     eGFR 07/26/2024 90      Imaging: No results found.    Review of Systems:  Review of Systems negative except for HPI    Physical Exam:  Physical Exam  GEN: Alert and oriented x 3, in no acute distress.  Well appearing and well nourished.   HEENT: Sclera anicteric, conjunctivae pink, mucous membranes moist. Oropharynx clear.   NECK: Supple, no carotid bruits, no significant JVD. Trachea midline, no thyromegaly.   HEART: Regular rhythm, normal S1 and S2, no murmurs, clicks, gallops or rubs. PMI nondisplaced, no thrills.   LUNGS: Clear to auscultation bilaterally; no wheezes, rales, or rhonchi. No increased work of breathing or signs of respiratory distress.   ABDOMEN: Soft, nontender, nondistended, normoactive bowel sounds.   EXTREMITIES: Skin warm and well perfused, no clubbing, cyanosis, or edema.  NEURO: No focal findings. Normal speech. Mood and affect normal.   SKIN: Normal without suspicious lesions on exposed skin.       1. Primary hypertension        2. Chest pain in adult

## 2025-05-07 ENCOUNTER — HOSPITAL ENCOUNTER (OUTPATIENT)
Dept: NON INVASIVE DIAGNOSTICS | Facility: HOSPITAL | Age: 65
Discharge: HOME | End: 2025-05-07
Attending: INTERNAL MEDICINE
Payer: COMMERCIAL

## 2025-05-07 VITALS — SYSTOLIC BLOOD PRESSURE: 134 MMHG | DIASTOLIC BLOOD PRESSURE: 80 MMHG | OXYGEN SATURATION: 98 % | HEART RATE: 65 BPM

## 2025-05-07 DIAGNOSIS — E78.2 MIXED HYPERLIPIDEMIA: ICD-10-CM

## 2025-05-07 DIAGNOSIS — R07.9 CHEST PAIN IN ADULT: ICD-10-CM

## 2025-05-07 DIAGNOSIS — I10 PRIMARY HYPERTENSION: ICD-10-CM

## 2025-05-07 LAB
CHEST PAIN STATEMENT: NORMAL
MAX DIASTOLIC BP: 82 MMHG
MAX HR PERCENT: 94 %
MAX HR: 148 BPM
MAX PREDICTED HEART RATE: 156 BPM
PROTOCOL NAME: NORMAL
RATE PRESSURE PRODUCT: NORMAL
REASON FOR TERMINATION: NORMAL
SL CV LV EF: 60
SL CV STRESS RECOVERY BP: NORMAL MMHG
SL CV STRESS RECOVERY HR: 93 BPM
SL CV STRESS RECOVERY O2 SAT: 98 %
SL CV STRESS STAGE REACHED: 4
STRESS ANGINA INDEX: 0
STRESS BASELINE BP: NORMAL MMHG
STRESS BASELINE HR: 65 BPM
STRESS O2 SAT REST: 98 %
STRESS PEAK HR: 146 BPM
STRESS POST ESTIMATED WORKLOAD: 13.4 METS
STRESS POST EXERCISE DUR MIN: 10 MIN
STRESS POST EXERCISE DUR MIN: 10 MIN
STRESS POST EXERCISE DUR SEC: 30 SEC
STRESS POST EXERCISE DUR SEC: 30 SEC
STRESS POST O2 SAT PEAK: 97 %
STRESS POST PEAK BP: 180 MMHG
STRESS POST PEAK HR: 148 BPM
STRESS POST PEAK SYSTOLIC BP: 180 MMHG
TARGET HR FORMULA: NORMAL
TEST INDICATION: NORMAL

## 2025-05-07 PROCEDURE — 93350 STRESS TTE ONLY: CPT

## 2025-05-07 PROCEDURE — 93350 STRESS TTE ONLY: CPT | Performed by: INTERNAL MEDICINE

## 2025-05-09 ENCOUNTER — RESULTS FOLLOW-UP (OUTPATIENT)
Dept: CARDIOLOGY CLINIC | Facility: CLINIC | Age: 65
End: 2025-05-09

## 2025-06-27 DIAGNOSIS — I10 PRIMARY HYPERTENSION: ICD-10-CM

## 2025-06-27 DIAGNOSIS — I10 ESSENTIAL HYPERTENSION: ICD-10-CM

## 2025-06-27 DIAGNOSIS — F41.9 ANXIETY: ICD-10-CM

## 2025-06-27 RX ORDER — LOSARTAN POTASSIUM 50 MG/1
50 TABLET ORAL DAILY
Qty: 90 TABLET | Refills: 1 | Status: SHIPPED | OUTPATIENT
Start: 2025-06-27

## 2025-06-27 NOTE — TELEPHONE ENCOUNTER
Reason for call:   [x] Refill   [] Prior Auth  [] Other:     Office:   [] PCP/Provider -   [x] Specialty/Provider -     Medication:     losartan (COZAAR) 50 mg tablet       Dose/Frequency: Take 1 tablet (50 mg total) by mouth daily,     Quantity: 90 tablet     [id number] [address] [place] [address]   Does the patient have enough for 3 days?   [x] Yes   [] No - Send as HP to POD

## 2025-06-27 NOTE — TELEPHONE ENCOUNTER
Reason for call:   [x] Refill   [] Prior Auth  [] Other:     Office:   [x] PCP/Provider -   [] Specialty/Provider -     Medication: sertraline (ZOLOFT) 100 mg tablet     Dose/Frequency:  Take 1 tablet (100 mg total) by mouth daily     Quantity:  90 tablet     Medication: triamterene-hydrochlorothiazide (MAXZIDE-25) 37.5-25 mg per tablet     Dose/Frequency:  Take 1 tablet by mouth daily     Quantity: 90 tablet     Pharmacy: [id number] [address]   Does the patient have enough for 3 days?   [x] Yes   [] No - Send as HP to POD

## 2025-06-30 RX ORDER — SERTRALINE HYDROCHLORIDE 100 MG/1
100 TABLET, FILM COATED ORAL DAILY
Qty: 90 TABLET | Refills: 0 | Status: SHIPPED | OUTPATIENT
Start: 2025-06-30

## 2025-06-30 RX ORDER — TRIAMTERENE AND HYDROCHLOROTHIAZIDE 37.5; 25 MG/1; MG/1
1 TABLET ORAL DAILY
Qty: 90 TABLET | Refills: 0 | Status: SHIPPED | OUTPATIENT
Start: 2025-06-30

## 2025-07-07 ENCOUNTER — ANNUAL EXAM (OUTPATIENT)
Dept: OBGYN CLINIC | Facility: MEDICAL CENTER | Age: 65
End: 2025-07-07
Payer: COMMERCIAL

## 2025-07-07 VITALS
WEIGHT: 179 LBS | SYSTOLIC BLOOD PRESSURE: 118 MMHG | BODY MASS INDEX: 30.56 KG/M2 | DIASTOLIC BLOOD PRESSURE: 70 MMHG | HEIGHT: 64 IN

## 2025-07-07 DIAGNOSIS — Z12.31 ENCOUNTER FOR SCREENING MAMMOGRAM FOR BREAST CANCER: ICD-10-CM

## 2025-07-07 DIAGNOSIS — Z01.419 ENCOUNTER FOR GYNECOLOGICAL EXAMINATION (GENERAL) (ROUTINE) WITHOUT ABNORMAL FINDINGS: Primary | ICD-10-CM

## 2025-07-07 PROCEDURE — S0612 ANNUAL GYNECOLOGICAL EXAMINA: HCPCS | Performed by: CLINICAL NURSE SPECIALIST

## 2025-07-07 NOTE — PROGRESS NOTES
Name: Alexa Hawley      : 1960      MRN: [id number]  Encounter Provider: JEFERSON Morales  Encounter Date: 2025   Encounter department: [facility] [place]    :  Assessment & Plan  Encounter for gynecological examination (general) (routine) without abnormal findings  Annual GYN examination completed today.   Health maintenance reviewed/updated as appropriate  Risk prevention and anticipatory guidance provided including:  Encouraged regular self breast exams and to call with changes   Age related Calcium and vitamin D intake  Dietary and lifestyle recommendations based on her age and weight. body mass index is 30.73 kg/m²..    Tobacco and alcohol use, intervention ordered if applicable.   Condom use for prevention of STI's.       Encounter for screening mammogram for breast cancer    Orders:  •  Mammo screening bilateral w 3d and cad; Future              Subjective:      History of Present Illness     Alexa Hawley is a 64 y.o. female. Here for Gynecologic Exam (Hysterectomy /Mammo 7/15/24 normal / scheduled /Dxa 24 osteopenia /Colonoscopy -2018 /Maternal grandmother breast cancer )      N/A- S/P Hysterectomy no c/o   She denies any C/O abnormal vaginal discharge or irritation.   Denies Urinary complaints-did pelvic PT and improved  Denies breast changes    Sexually active: yes  Monogamous/single partner  Denies C/O related to intimacy/sexual activity      She reports she feels safe at home.   Lifestyle/exercise: active, ride horse  Dietary calcium/vit D  intake: adequate    HEALTH MAINTENANCE SCREENINGS:     Previous pap smears and ASCCP screening guidelines have been reviewed.   Last Pap:  Not on file; Next due N/A due to hyster  History of abnormal pap: No,   Last mammogram:  07/15/2024  Last Colon Cancer Screening: colonoscopy: 10/24/2017 Cologuard:Not on file. Recall 10yr  Last dexa scan 2024- osteopenia- stable.      Hereditary Cancer  "Screening  FH Breast cancer: MG  FH Ovarian cancer: no  FH Uterine cancer: no  FH Colon cancer: no      Substance Abuse Screening Completed. See hx and flowsheet.  Review of Systems   Constitutional:  Negative for appetite change, chills, fatigue, fever and unexpected weight change.   HENT: Negative.     Eyes: Negative.    Respiratory:  Negative for chest tightness and shortness of breath.    Cardiovascular:  Negative for chest pain and palpitations.   Gastrointestinal:  Negative for abdominal pain, constipation and vomiting.   Endocrine: Negative for cold intolerance and heat intolerance.   Genitourinary:         As per HPI   Musculoskeletal:  Negative for back pain, joint swelling and neck pain.   Skin:  Negative for color change and rash.   Neurological:  Negative for dizziness, weakness and numbness.   Hematological:  Does not bruise/bleed easily.   Psychiatric/Behavioral: Negative.       The following portions of the patient's history were reviewed and updated as appropriate: allergies, current medications, past family history, past medical history, past social history, past surgical history, and problem list.         Objective   /70 (BP Location: Left arm, Patient Position: Sitting, Cuff Size: Standard)   Ht 5' 4\" (1.626 m)   Wt 81.2 kg (179 lb)   BMI 30.73 kg/m²     Physical Exam  Constitutional:       General: She is not in acute distress.     Appearance: Normal appearance.   Genitourinary:      Vulva and rectum normal.      No lesions in the vagina.      Genitourinary Comments: Cervix/Uterus surgically absent      Right Labia: No rash, tenderness, lesions or skin changes.     Left Labia: No tenderness, lesions, skin changes or rash.     No vaginal discharge, erythema, tenderness or bleeding.        Right Adnexa: not tender and no mass present.     Left Adnexa: not tender and no mass present.     Uterus is absent.      No urethral prolapse present.      Pelvic exam was performed with patient in the " lithotomy position.   Breasts:     Breasts are symmetrical.      Right: No inverted nipple, mass, nipple discharge, skin change or tenderness.      Left: No inverted nipple, mass, nipple discharge, skin change or tenderness.   HENT:      Head: Normocephalic and atraumatic.     Cardiovascular:      Rate and Rhythm: Normal rate.      Heart sounds: No murmur heard.  Pulmonary:      Effort: Pulmonary effort is normal.      Breath sounds: Normal breath sounds.   Abdominal:      General: There is no distension.      Palpations: Abdomen is soft.      Tenderness: There is no abdominal tenderness.     Musculoskeletal:         General: Normal range of motion.      Cervical back: Normal range of motion.   Lymphadenopathy:      Cervical: No cervical adenopathy.     Neurological:      Mental Status: She is alert and oriented to person, place, and time.     Skin:     General: Skin is warm and dry.     Psychiatric:         Mood and Affect: Mood normal.         Behavior: Behavior normal.   Vitals reviewed.

## 2025-07-17 ENCOUNTER — HOSPITAL ENCOUNTER (OUTPATIENT)
Dept: MAMMOGRAPHY | Facility: CLINIC | Age: 65
End: 2025-07-17
Payer: COMMERCIAL

## 2025-07-17 VITALS — BODY MASS INDEX: 30.56 KG/M2 | WEIGHT: 179 LBS | HEIGHT: 64 IN

## 2025-07-17 DIAGNOSIS — Z12.31 SCREENING MAMMOGRAM FOR BREAST CANCER: ICD-10-CM

## 2025-07-17 PROCEDURE — 77063 BREAST TOMOSYNTHESIS BI: CPT

## 2025-07-17 PROCEDURE — 77067 SCR MAMMO BI INCL CAD: CPT

## (undated) DEVICE — VAPR COOLPULSE 90 ELECTRODE 90 DEGREES SUCTION WITH INTEGRATED HANDPIECE: Brand: VAPR COOLPULSE

## (undated) DEVICE — VIOLET BRAIDED (POLYGLACTIN 910), SYNTHETIC ABSORBABLE SUTURE: Brand: COATED VICRYL

## (undated) DEVICE — TROCAR VISIPORT

## (undated) DEVICE — NEEDLE SPINAL18G X 3.5 IN QUINCKE

## (undated) DEVICE — ALLENTOWN LAP CHOLE APP PACK: Brand: CARDINAL HEALTH

## (undated) DEVICE — GLOVE INDICATOR PI UNDERGLOVE SZ 7.5 BLUE

## (undated) DEVICE — ADHESIVE SKIN CLSR DERMABOND NX

## (undated) DEVICE — THREADED CLEAR CANNULA WITH OBTURATOR 7MM X 75MM

## (undated) DEVICE — SCD SEQUENTIAL COMPRESSION COMFORT SLEEVE MEDIUM KNEE LENGTH: Brand: KENDALL SCD

## (undated) DEVICE — 3M™ IOBAN™ 2 ANTIMICROBIAL INCISE DRAPE 6650EZ: Brand: IOBAN™ 2

## (undated) DEVICE — 3M™ TEGADERM™ TRANSPARENT FILM DRESSING FRAME STYLE, 1626W, 4 IN X 4-3/4 IN (10 CM X 12 CM), 50/CT 4CT/CASE: Brand: 3M™ TEGADERM™

## (undated) DEVICE — PACK PBDS SHOULDER ARTHROSCOPY RF

## (undated) DEVICE — INTENDED FOR TISSUE SEPARATION, AND OTHER PROCEDURES THAT REQUIRE A SHARP SURGICAL BLADE TO PUNCTURE OR CUT.: Brand: BARD-PARKER SAFETY BLADES SIZE 15, STERILE

## (undated) DEVICE — TUBING SUCTION 5MM X 12 FT

## (undated) DEVICE — SYRINGE 30ML LL

## (undated) DEVICE — THREADED CLEAR CANNULA WITH OBTURATOR 8.5MM X 75MM

## (undated) DEVICE — TRAVELKIT CONTAINS FIRST STEP KIT (200ML EP-4 KIT) AND SOILED SCOPE BAG - 1 KIT: Brand: TRAVELKIT CONTAINS FIRST STEP KIT AND SOILED SCOPE BAG

## (undated) DEVICE — WEBRIL 6 IN UNSTERILE

## (undated) DEVICE — COVIDIEN ENDO GIA PURPLE (MED) RELOAD 60MM

## (undated) DEVICE — HARMONIC 1100 SHEARS, 36CM SHAFT LENGTH: Brand: HARMONIC

## (undated) DEVICE — PENCIL ELECTROSURG E-Z CLEAN -0035H

## (undated) DEVICE — COVIDIEN GIA BLACK (XTRA THICK) RELOAD

## (undated) DEVICE — ADHESIVE SKIN HIGH VISCOSITY EXOFIN 1ML

## (undated) DEVICE — VISUALIZATION SYSTEM: Brand: CLEARIFY

## (undated) DEVICE — GLOVE SRG BIOGEL 8

## (undated) DEVICE — GAUZE SPONGES,16 PLY: Brand: CURITY

## (undated) DEVICE — ASTOUND STANDARD SURGICAL GOWN, XL: Brand: CONVERTORS

## (undated) DEVICE — CHLORAPREP HI-LITE 26ML ORANGE

## (undated) DEVICE — URETERAL CATHETER ADAPTOR TIP

## (undated) DEVICE — DRESSING MEPILEX AG BORDER 4 X 4 IN

## (undated) DEVICE — 3000CC GUARDIAN II: Brand: GUARDIAN

## (undated) DEVICE — IRRIG ENDO FLO TUBING

## (undated) DEVICE — PMI DISPOSABLE PUNCTURE CLOSURE DEVICE / SUTURE GRASPER: Brand: PMI

## (undated) DEVICE — ENDOPATH 5MM CURVED SCISSORS WITH MONOPOLAR CAUTERY: Brand: ENDOPATH

## (undated) DEVICE — SUT MONOCRYL 4-0 PS-2 27 IN Y426H

## (undated) DEVICE — TUBING SMOKE EVAC W/FILTRATION DEVICE PLUMEPORT ACTIV

## (undated) DEVICE — [HIGH FLOW INSUFFLATOR,  DO NOT USE IF PACKAGE IS DAMAGED,  KEEP DRY,  KEEP AWAY FROM SUNLIGHT,  PROTECT FROM HEAT AND RADIOACTIVE SOURCES.]: Brand: PNEUMOSURE

## (undated) DEVICE — TIBURON LAPAROSCOPIC ABDOMINAL DRAPE: Brand: CONVERTORS

## (undated) DEVICE — GLOVE SRG BIOGEL 7.5

## (undated) DEVICE — GLOVE SRG BIOGEL 7

## (undated) DEVICE — DRAPE EQUIPMENT RF WAND

## (undated) DEVICE — SYRINGE 20ML LL

## (undated) DEVICE — VISIGI 3D®  CALIBRATION SYSTEM  SIZE 36FR SLEEVE/STD: Brand: BOEHRINGER® VISIGI 3D™ SLEEVE GASTRECTOMY CALIBRATION SYSTEM, SIZE 36FR

## (undated) DEVICE — SHOULDER SUSPENSION KIT 6 PER BOX

## (undated) DEVICE — TROCAR: Brand: KII FIOS FIRST ENTRY

## (undated) DEVICE — TROCARS: Brand: KII® OPTICAL ACCESS SYSTEM

## (undated) DEVICE — BLADE SHAVER DISSECTOR 4MM 13CM COOLCUT

## (undated) DEVICE — LIGHT HANDLE COVER SLEEVE DISP BLUE STELLAR

## (undated) DEVICE — POWER SHELL SIGNIA

## (undated) DEVICE — GLOVE INDICATOR PI UNDERGLOVE SZ 7 BLUE

## (undated) DEVICE — GLOVE PI ULTRA TOUCH SZ.7.0

## (undated) DEVICE — DISPOSABLE EQUIPMENT COVER: Brand: SMALL TOWEL DRAPE

## (undated) DEVICE — GLOVE SRG BIOGEL ECLIPSE 7

## (undated) DEVICE — SUT MONOCRYL 4-0 PS-2 18 IN Y496G

## (undated) DEVICE — INTENDED FOR TISSUE SEPARATION, AND OTHER PROCEDURES THAT REQUIRE A SHARP SURGICAL BLADE TO PUNCTURE OR CUT.: Brand: BARD-PARKER SAFETY BLADES SIZE 11, STERILE

## (undated) DEVICE — SPONGE PVP SCRUB WING STERILE